# Patient Record
Sex: FEMALE | Race: WHITE | NOT HISPANIC OR LATINO | Employment: UNEMPLOYED | URBAN - METROPOLITAN AREA
[De-identification: names, ages, dates, MRNs, and addresses within clinical notes are randomized per-mention and may not be internally consistent; named-entity substitution may affect disease eponyms.]

---

## 2017-01-21 ENCOUNTER — HOSPITAL ENCOUNTER (EMERGENCY)
Facility: HOSPITAL | Age: 32
Discharge: HOME | End: 2017-01-21
Attending: EMERGENCY MEDICINE | Admitting: EMERGENCY MEDICINE
Payer: COMMERCIAL

## 2017-01-21 ENCOUNTER — APPOINTMENT (EMERGENCY)
Dept: RADIOLOGY | Facility: HOSPITAL | Age: 32
End: 2017-01-21
Payer: COMMERCIAL

## 2017-01-21 VITALS
TEMPERATURE: 97.4 F | SYSTOLIC BLOOD PRESSURE: 129 MMHG | WEIGHT: 150 LBS | DIASTOLIC BLOOD PRESSURE: 80 MMHG | BODY MASS INDEX: 25.61 KG/M2 | HEIGHT: 64 IN | OXYGEN SATURATION: 98 % | RESPIRATION RATE: 16 BRPM | HEART RATE: 72 BPM

## 2017-01-21 DIAGNOSIS — R00.2 PALPITATIONS: ICD-10-CM

## 2017-01-21 DIAGNOSIS — R07.9 CHEST PAIN: ICD-10-CM

## 2017-01-21 DIAGNOSIS — R51.9 HEADACHE: Primary | ICD-10-CM

## 2017-01-21 LAB
ALBUMIN SERPL BCP-MCNC: 3.7 G/DL (ref 3.5–5)
ALP SERPL-CCNC: 80 U/L (ref 46–116)
ALT SERPL W P-5'-P-CCNC: 19 U/L (ref 12–78)
ANION GAP SERPL CALCULATED.3IONS-SCNC: 11 MMOL/L (ref 4–13)
APTT PPP: 28 SECONDS (ref 24–36)
AST SERPL W P-5'-P-CCNC: 15 U/L (ref 5–45)
BACTERIA UR QL AUTO: ABNORMAL /HPF
BASOPHILS # BLD AUTO: 0.02 THOUSANDS/ΜL (ref 0–0.1)
BASOPHILS NFR BLD AUTO: 0 % (ref 0–1)
BILIRUB SERPL-MCNC: 0.18 MG/DL (ref 0.2–1)
BILIRUB UR QL STRIP: NEGATIVE
BUN SERPL-MCNC: 16 MG/DL (ref 5–25)
CALCIUM SERPL-MCNC: 9.1 MG/DL (ref 8.3–10.1)
CHLORIDE SERPL-SCNC: 106 MMOL/L (ref 100–108)
CLARITY UR: CLEAR
CO2 SERPL-SCNC: 23 MMOL/L (ref 21–32)
COLOR UR: YELLOW
COLOR, POC: NORMAL
CREAT SERPL-MCNC: 0.71 MG/DL (ref 0.6–1.3)
EOSINOPHIL # BLD AUTO: 0.21 THOUSAND/ΜL (ref 0–0.61)
EOSINOPHIL NFR BLD AUTO: 3 % (ref 0–6)
ERYTHROCYTE [DISTWIDTH] IN BLOOD BY AUTOMATED COUNT: 12.7 % (ref 11.6–15.1)
GFR SERPL CREATININE-BSD FRML MDRD: >60 ML/MIN/1.73SQ M
GLUCOSE SERPL-MCNC: 110 MG/DL (ref 65–140)
GLUCOSE UR STRIP-MCNC: NEGATIVE MG/DL
HCG UR QL: NEGATIVE
HCT VFR BLD AUTO: 36.9 % (ref 34.8–46.1)
HGB BLD-MCNC: 12.2 G/DL (ref 11.5–15.4)
HGB UR QL STRIP.AUTO: ABNORMAL
HYALINE CASTS #/AREA URNS LPF: ABNORMAL /LPF
INR PPP: 0.86 (ref 0.86–1.16)
KETONES UR STRIP-MCNC: NEGATIVE MG/DL
LEUKOCYTE ESTERASE UR QL STRIP: NEGATIVE
LYMPHOCYTES # BLD AUTO: 2.84 THOUSANDS/ΜL (ref 0.6–4.47)
LYMPHOCYTES NFR BLD AUTO: 35 % (ref 14–44)
MAGNESIUM SERPL-MCNC: 2 MG/DL (ref 1.6–2.6)
MCH RBC QN AUTO: 28 PG (ref 26.8–34.3)
MCHC RBC AUTO-ENTMCNC: 33.1 G/DL (ref 31.4–37.4)
MCV RBC AUTO: 85 FL (ref 82–98)
MONOCYTES # BLD AUTO: 0.67 THOUSAND/ΜL (ref 0.17–1.22)
MONOCYTES NFR BLD AUTO: 8 % (ref 4–12)
NEUTROPHILS # BLD AUTO: 4.27 THOUSANDS/ΜL (ref 1.85–7.62)
NEUTS SEG NFR BLD AUTO: 54 % (ref 43–75)
NITRITE UR QL STRIP: NEGATIVE
NON-SQ EPI CELLS URNS QL MICRO: ABNORMAL /HPF
NRBC BLD AUTO-RTO: 0 /100 WBCS
PH UR STRIP.AUTO: 6.5 [PH] (ref 4.5–8)
PHOSPHATE SERPL-MCNC: 3.5 MG/DL (ref 2.7–4.5)
PLATELET # BLD AUTO: 371 THOUSANDS/UL (ref 149–390)
PMV BLD AUTO: 9.9 FL (ref 8.9–12.7)
POTASSIUM SERPL-SCNC: 3.9 MMOL/L (ref 3.5–5.3)
PROT SERPL-MCNC: 7.6 G/DL (ref 6.4–8.2)
PROT UR STRIP-MCNC: NEGATIVE MG/DL
PROTHROMBIN TIME: 11.9 SECONDS (ref 12–14.3)
RBC # BLD AUTO: 4.35 MILLION/UL (ref 3.81–5.12)
RBC #/AREA URNS AUTO: ABNORMAL /HPF
SODIUM SERPL-SCNC: 140 MMOL/L (ref 136–145)
SP GR UR STRIP.AUTO: 1.02 (ref 1–1.03)
SPECIMEN SOURCE: NORMAL
TROPONIN I BLD-MCNC: 0 NG/ML (ref 0–0.08)
TSH SERPL DL<=0.05 MIU/L-ACNC: 0.58 UIU/ML (ref 0.36–3.74)
UROBILINOGEN UR QL STRIP.AUTO: 0.2 E.U./DL
WBC # BLD AUTO: 8.02 THOUSAND/UL (ref 4.31–10.16)
WBC #/AREA URNS AUTO: ABNORMAL /HPF

## 2017-01-21 PROCEDURE — 83735 ASSAY OF MAGNESIUM: CPT | Performed by: EMERGENCY MEDICINE

## 2017-01-21 PROCEDURE — 80053 COMPREHEN METABOLIC PANEL: CPT | Performed by: EMERGENCY MEDICINE

## 2017-01-21 PROCEDURE — 96375 TX/PRO/DX INJ NEW DRUG ADDON: CPT

## 2017-01-21 PROCEDURE — 83625 ASSAY OF LDH ENZYMES: CPT | Performed by: EMERGENCY MEDICINE

## 2017-01-21 PROCEDURE — 81001 URINALYSIS AUTO W/SCOPE: CPT

## 2017-01-21 PROCEDURE — 99285 EMERGENCY DEPT VISIT HI MDM: CPT

## 2017-01-21 PROCEDURE — 85025 COMPLETE CBC W/AUTO DIFF WBC: CPT | Performed by: EMERGENCY MEDICINE

## 2017-01-21 PROCEDURE — 96361 HYDRATE IV INFUSION ADD-ON: CPT

## 2017-01-21 PROCEDURE — 81002 URINALYSIS NONAUTO W/O SCOPE: CPT | Performed by: EMERGENCY MEDICINE

## 2017-01-21 PROCEDURE — 85610 PROTHROMBIN TIME: CPT | Performed by: EMERGENCY MEDICINE

## 2017-01-21 PROCEDURE — 84100 ASSAY OF PHOSPHORUS: CPT | Performed by: EMERGENCY MEDICINE

## 2017-01-21 PROCEDURE — 87086 URINE CULTURE/COLONY COUNT: CPT

## 2017-01-21 PROCEDURE — 85730 THROMBOPLASTIN TIME PARTIAL: CPT | Performed by: EMERGENCY MEDICINE

## 2017-01-21 PROCEDURE — 84484 ASSAY OF TROPONIN QUANT: CPT

## 2017-01-21 PROCEDURE — 83615 LACTATE (LD) (LDH) ENZYME: CPT | Performed by: EMERGENCY MEDICINE

## 2017-01-21 PROCEDURE — 71020 HB CHEST X-RAY 2VW FRONTAL&LATL: CPT

## 2017-01-21 PROCEDURE — 84443 ASSAY THYROID STIM HORMONE: CPT | Performed by: EMERGENCY MEDICINE

## 2017-01-21 PROCEDURE — 96365 THER/PROPH/DIAG IV INF INIT: CPT

## 2017-01-21 PROCEDURE — 93005 ELECTROCARDIOGRAM TRACING: CPT | Performed by: EMERGENCY MEDICINE

## 2017-01-21 PROCEDURE — 36415 COLL VENOUS BLD VENIPUNCTURE: CPT | Performed by: EMERGENCY MEDICINE

## 2017-01-21 PROCEDURE — 81025 URINE PREGNANCY TEST: CPT | Performed by: EMERGENCY MEDICINE

## 2017-01-21 PROCEDURE — 93005 ELECTROCARDIOGRAM TRACING: CPT

## 2017-01-21 RX ORDER — KETOROLAC TROMETHAMINE 30 MG/ML
15 INJECTION, SOLUTION INTRAMUSCULAR; INTRAVENOUS ONCE
Status: COMPLETED | OUTPATIENT
Start: 2017-01-21 | End: 2017-01-21

## 2017-01-21 RX ORDER — BUTALBITAL, ACETAMINOPHEN AND CAFFEINE 50; 325; 40 MG/1; MG/1; MG/1
1 TABLET ORAL EVERY 4 HOURS PRN
Qty: 12 TABLET | Refills: 0 | Status: SHIPPED | OUTPATIENT
Start: 2017-01-21 | End: 2018-02-16 | Stop reason: ALTCHOICE

## 2017-01-21 RX ORDER — MAGNESIUM SULFATE HEPTAHYDRATE 40 MG/ML
2 INJECTION, SOLUTION INTRAVENOUS ONCE
Status: DISCONTINUED | OUTPATIENT
Start: 2017-01-21 | End: 2017-01-22 | Stop reason: HOSPADM

## 2017-01-21 RX ORDER — LORAZEPAM 2 MG/ML
1 INJECTION INTRAMUSCULAR ONCE
Status: COMPLETED | OUTPATIENT
Start: 2017-01-21 | End: 2017-01-21

## 2017-01-21 RX ADMIN — SODIUM CHLORIDE 1000 ML: 0.9 INJECTION, SOLUTION INTRAVENOUS at 21:09

## 2017-01-21 RX ADMIN — DEXAMETHASONE SODIUM PHOSPHATE 10 MG: 10 INJECTION, SOLUTION INTRAMUSCULAR; INTRAVENOUS at 22:42

## 2017-01-21 RX ADMIN — KETOROLAC TROMETHAMINE 15 MG: 30 INJECTION, SOLUTION INTRAMUSCULAR at 21:07

## 2017-01-21 RX ADMIN — VALPROATE SODIUM 1000 MG: 100 INJECTION, SOLUTION INTRAVENOUS at 22:45

## 2017-01-21 RX ADMIN — LORAZEPAM 1 MG: 2 INJECTION INTRAMUSCULAR; INTRAVENOUS at 21:08

## 2017-01-22 LAB
ATRIAL RATE: 86 BPM
P AXIS: 67 DEGREES
PR INTERVAL: 138 MS
QRS AXIS: 20 DEGREES
QRSD INTERVAL: 76 MS
QT INTERVAL: 346 MS
QTC INTERVAL: 414 MS
T WAVE AXIS: 34 DEGREES
VENTRICULAR RATE: 86 BPM

## 2017-01-23 LAB — BACTERIA UR CULT: NORMAL

## 2017-01-24 LAB
LDH SERPL-CCNC: 630 IU/L (ref 119–226)
LDH1 CFR SERPL ELPH: ABNORMAL %
LDH2 CFR SERPL ELPH: ABNORMAL %
LDH3 CFR SERPL ELPH: ABNORMAL %
LDH4 CFR SERPL ELPH: ABNORMAL %
LDH5 CFR SERPL ELPH: ABNORMAL %

## 2017-02-07 ENCOUNTER — GENERIC CONVERSION - ENCOUNTER (OUTPATIENT)
Dept: OTHER | Facility: OTHER | Age: 32
End: 2017-02-07

## 2017-04-01 ENCOUNTER — HOSPITAL ENCOUNTER (EMERGENCY)
Facility: HOSPITAL | Age: 32
Discharge: HOME | End: 2017-04-01
Attending: EMERGENCY MEDICINE | Admitting: EMERGENCY MEDICINE
Payer: COMMERCIAL

## 2017-04-01 VITALS
DIASTOLIC BLOOD PRESSURE: 77 MMHG | OXYGEN SATURATION: 96 % | BODY MASS INDEX: 25.4 KG/M2 | WEIGHT: 148 LBS | TEMPERATURE: 97.5 F | RESPIRATION RATE: 18 BRPM | HEART RATE: 73 BPM | SYSTOLIC BLOOD PRESSURE: 141 MMHG

## 2017-04-01 DIAGNOSIS — R10.9 ABDOMINAL PAIN: Primary | ICD-10-CM

## 2017-04-01 LAB
ALBUMIN SERPL BCP-MCNC: 3.9 G/DL (ref 3.5–5)
ALP SERPL-CCNC: 76 U/L (ref 46–116)
ALT SERPL W P-5'-P-CCNC: 14 U/L (ref 12–78)
ANION GAP SERPL CALCULATED.3IONS-SCNC: 5 MMOL/L (ref 4–13)
AST SERPL W P-5'-P-CCNC: 8 U/L (ref 5–45)
BACTERIA UR QL AUTO: ABNORMAL /HPF
BASOPHILS # BLD AUTO: 0.01 THOUSANDS/ΜL (ref 0–0.1)
BASOPHILS NFR BLD AUTO: 0 % (ref 0–1)
BILIRUB SERPL-MCNC: 0.37 MG/DL (ref 0.2–1)
BILIRUB UR QL STRIP: NEGATIVE
BUN SERPL-MCNC: 16 MG/DL (ref 5–25)
CALCIUM SERPL-MCNC: 9.2 MG/DL (ref 8.3–10.1)
CHLORIDE SERPL-SCNC: 106 MMOL/L (ref 100–108)
CLARITY UR: CLEAR
CO2 SERPL-SCNC: 29 MMOL/L (ref 21–32)
COLOR UR: YELLOW
COLOR, POC: NORMAL
CREAT SERPL-MCNC: 0.71 MG/DL (ref 0.6–1.3)
EOSINOPHIL # BLD AUTO: 0.15 THOUSAND/ΜL (ref 0–0.61)
EOSINOPHIL NFR BLD AUTO: 2 % (ref 0–6)
ERYTHROCYTE [DISTWIDTH] IN BLOOD BY AUTOMATED COUNT: 13.6 % (ref 11.6–15.1)
GFR SERPL CREATININE-BSD FRML MDRD: >60 ML/MIN/1.73SQ M
GLUCOSE SERPL-MCNC: 93 MG/DL (ref 65–140)
GLUCOSE UR STRIP-MCNC: NEGATIVE MG/DL
HCG UR QL: NORMAL
HCT VFR BLD AUTO: 34.3 % (ref 34.8–46.1)
HGB BLD-MCNC: 11.4 G/DL (ref 11.5–15.4)
HGB UR QL STRIP.AUTO: ABNORMAL
HYALINE CASTS #/AREA URNS LPF: ABNORMAL /LPF
KETONES UR STRIP-MCNC: NEGATIVE MG/DL
LEUKOCYTE ESTERASE UR QL STRIP: NEGATIVE
LIPASE SERPL-CCNC: 129 U/L (ref 73–393)
LYMPHOCYTES # BLD AUTO: 2.64 THOUSANDS/ΜL (ref 0.6–4.47)
LYMPHOCYTES NFR BLD AUTO: 31 % (ref 14–44)
MCH RBC QN AUTO: 28.1 PG (ref 26.8–34.3)
MCHC RBC AUTO-ENTMCNC: 33.2 G/DL (ref 31.4–37.4)
MCV RBC AUTO: 85 FL (ref 82–98)
MONOCYTES # BLD AUTO: 0.87 THOUSAND/ΜL (ref 0.17–1.22)
MONOCYTES NFR BLD AUTO: 10 % (ref 4–12)
NEUTROPHILS # BLD AUTO: 4.72 THOUSANDS/ΜL (ref 1.85–7.62)
NEUTS SEG NFR BLD AUTO: 57 % (ref 43–75)
NITRITE UR QL STRIP: NEGATIVE
NON-SQ EPI CELLS URNS QL MICRO: ABNORMAL /HPF
NRBC BLD AUTO-RTO: 0 /100 WBCS
PH UR STRIP.AUTO: 6.5 [PH] (ref 4.5–8)
PLATELET # BLD AUTO: 287 THOUSANDS/UL (ref 149–390)
PMV BLD AUTO: 9.6 FL (ref 8.9–12.7)
POTASSIUM SERPL-SCNC: 3.5 MMOL/L (ref 3.5–5.3)
PROT SERPL-MCNC: 7.3 G/DL (ref 6.4–8.2)
PROT UR STRIP-MCNC: NEGATIVE MG/DL
RBC # BLD AUTO: 4.06 MILLION/UL (ref 3.81–5.12)
RBC #/AREA URNS AUTO: ABNORMAL /HPF
SODIUM SERPL-SCNC: 140 MMOL/L (ref 136–145)
SP GR UR STRIP.AUTO: 1.02 (ref 1–1.03)
UROBILINOGEN UR QL STRIP.AUTO: 1 E.U./DL
WBC # BLD AUTO: 8.41 THOUSAND/UL (ref 4.31–10.16)
WBC #/AREA URNS AUTO: ABNORMAL /HPF

## 2017-04-01 PROCEDURE — 36415 COLL VENOUS BLD VENIPUNCTURE: CPT | Performed by: EMERGENCY MEDICINE

## 2017-04-01 PROCEDURE — 96374 THER/PROPH/DIAG INJ IV PUSH: CPT

## 2017-04-01 PROCEDURE — 87086 URINE CULTURE/COLONY COUNT: CPT

## 2017-04-01 PROCEDURE — 83690 ASSAY OF LIPASE: CPT | Performed by: EMERGENCY MEDICINE

## 2017-04-01 PROCEDURE — 80053 COMPREHEN METABOLIC PANEL: CPT | Performed by: EMERGENCY MEDICINE

## 2017-04-01 PROCEDURE — 81001 URINALYSIS AUTO W/SCOPE: CPT

## 2017-04-01 PROCEDURE — 96361 HYDRATE IV INFUSION ADD-ON: CPT

## 2017-04-01 PROCEDURE — 96375 TX/PRO/DX INJ NEW DRUG ADDON: CPT

## 2017-04-01 PROCEDURE — 99284 EMERGENCY DEPT VISIT MOD MDM: CPT

## 2017-04-01 PROCEDURE — 85025 COMPLETE CBC W/AUTO DIFF WBC: CPT | Performed by: EMERGENCY MEDICINE

## 2017-04-01 PROCEDURE — 81025 URINE PREGNANCY TEST: CPT | Performed by: EMERGENCY MEDICINE

## 2017-04-01 PROCEDURE — 81002 URINALYSIS NONAUTO W/O SCOPE: CPT | Performed by: EMERGENCY MEDICINE

## 2017-04-01 RX ORDER — ONDANSETRON 2 MG/ML
4 INJECTION INTRAMUSCULAR; INTRAVENOUS ONCE
Status: COMPLETED | OUTPATIENT
Start: 2017-04-01 | End: 2017-04-01

## 2017-04-01 RX ORDER — MAGNESIUM HYDROXIDE/ALUMINUM HYDROXICE/SIMETHICONE 120; 1200; 1200 MG/30ML; MG/30ML; MG/30ML
30 SUSPENSION ORAL ONCE
Status: COMPLETED | OUTPATIENT
Start: 2017-04-01 | End: 2017-04-01

## 2017-04-01 RX ORDER — FAMOTIDINE 20 MG/1
20 TABLET, FILM COATED ORAL 2 TIMES DAILY
Qty: 28 TABLET | Refills: 0 | Status: SHIPPED | OUTPATIENT
Start: 2017-04-01 | End: 2018-01-21

## 2017-04-01 RX ORDER — ONDANSETRON 4 MG/1
4 TABLET, ORALLY DISINTEGRATING ORAL EVERY 8 HOURS PRN
Qty: 12 TABLET | Refills: 0 | Status: SHIPPED | OUTPATIENT
Start: 2017-04-01 | End: 2018-01-21

## 2017-04-01 RX ORDER — SUCRALFATE 1 G/1
1 TABLET ORAL 4 TIMES DAILY
Qty: 28 TABLET | Refills: 0 | Status: SHIPPED | OUTPATIENT
Start: 2017-04-01 | End: 2018-01-21

## 2017-04-01 RX ORDER — SUCRALFATE ORAL 1 G/10ML
1000 SUSPENSION ORAL EVERY 6 HOURS SCHEDULED
Status: DISCONTINUED | OUTPATIENT
Start: 2017-04-02 | End: 2017-04-02 | Stop reason: HOSPADM

## 2017-04-01 RX ADMIN — ONDANSETRON 4 MG: 2 INJECTION INTRAMUSCULAR; INTRAVENOUS at 22:06

## 2017-04-01 RX ADMIN — FAMOTIDINE 20 MG: 10 INJECTION, SOLUTION INTRAVENOUS at 22:08

## 2017-04-01 RX ADMIN — LIDOCAINE HYDROCHLORIDE 15 ML: 20 SOLUTION ORAL; TOPICAL at 22:10

## 2017-04-01 RX ADMIN — SODIUM CHLORIDE 1000 ML: 0.9 INJECTION, SOLUTION INTRAVENOUS at 22:05

## 2017-04-01 RX ADMIN — ALUMINUM HYDROXIDE, MAGNESIUM HYDROXIDE, AND SIMETHICONE 30 ML: 200; 200; 20 SUSPENSION ORAL at 22:10

## 2017-04-03 LAB — BACTERIA UR CULT: NORMAL

## 2017-04-13 ENCOUNTER — ALLSCRIPTS OFFICE VISIT (OUTPATIENT)
Dept: OTHER | Facility: OTHER | Age: 32
End: 2017-04-13

## 2017-04-21 ENCOUNTER — ALLSCRIPTS OFFICE VISIT (OUTPATIENT)
Dept: OTHER | Facility: OTHER | Age: 32
End: 2017-04-21

## 2017-04-21 DIAGNOSIS — I10 ESSENTIAL (PRIMARY) HYPERTENSION: ICD-10-CM

## 2017-04-21 DIAGNOSIS — F41.9 ANXIETY DISORDER: ICD-10-CM

## 2017-05-02 ENCOUNTER — TRANSCRIBE ORDERS (OUTPATIENT)
Dept: LAB | Facility: CLINIC | Age: 32
End: 2017-05-02

## 2017-05-02 ENCOUNTER — APPOINTMENT (OUTPATIENT)
Dept: LAB | Facility: CLINIC | Age: 32
End: 2017-05-02
Payer: COMMERCIAL

## 2017-05-02 DIAGNOSIS — R10.13 ABDOMINAL PAIN, EPIGASTRIC: ICD-10-CM

## 2017-05-02 DIAGNOSIS — R10.13 DYSPEPSIA AND OTHER SPECIFIED DISORDERS OF FUNCTION OF STOMACH: Primary | ICD-10-CM

## 2017-05-02 DIAGNOSIS — K31.89 DYSPEPSIA AND OTHER SPECIFIED DISORDERS OF FUNCTION OF STOMACH: Primary | ICD-10-CM

## 2017-05-02 DIAGNOSIS — K27.9 PEPTIC ULCER: ICD-10-CM

## 2017-05-02 LAB
CRP SERPL QL: <3 MG/L
ERYTHROCYTE [SEDIMENTATION RATE] IN BLOOD: 23 MM/HOUR (ref 0–20)
LIPASE SERPL-CCNC: 126 U/L (ref 73–393)

## 2017-05-02 PROCEDURE — 85652 RBC SED RATE AUTOMATED: CPT

## 2017-05-02 PROCEDURE — 83690 ASSAY OF LIPASE: CPT

## 2017-05-02 PROCEDURE — 86677 HELICOBACTER PYLORI ANTIBODY: CPT

## 2017-05-02 PROCEDURE — 86140 C-REACTIVE PROTEIN: CPT

## 2017-05-02 PROCEDURE — 36415 COLL VENOUS BLD VENIPUNCTURE: CPT

## 2017-05-03 ENCOUNTER — TRANSCRIBE ORDERS (OUTPATIENT)
Dept: ADMINISTRATIVE | Facility: HOSPITAL | Age: 32
End: 2017-05-03

## 2017-05-03 DIAGNOSIS — K31.89 DYSPEPSIA AND OTHER SPECIFIED DISORDERS OF FUNCTION OF STOMACH: Primary | ICD-10-CM

## 2017-05-03 DIAGNOSIS — R10.13 DYSPEPSIA AND OTHER SPECIFIED DISORDERS OF FUNCTION OF STOMACH: Primary | ICD-10-CM

## 2017-05-03 LAB — H PYLORI IGG SER IA-ACNC: <0.9 U/ML (ref 0–0.8)

## 2017-05-05 ENCOUNTER — HOSPITAL ENCOUNTER (OUTPATIENT)
Dept: RADIOLOGY | Facility: HOSPITAL | Age: 32
Discharge: HOME | End: 2017-05-05
Payer: COMMERCIAL

## 2017-05-05 DIAGNOSIS — K31.89 DYSPEPSIA AND OTHER SPECIFIED DISORDERS OF FUNCTION OF STOMACH: ICD-10-CM

## 2017-05-05 DIAGNOSIS — R10.13 DYSPEPSIA AND OTHER SPECIFIED DISORDERS OF FUNCTION OF STOMACH: ICD-10-CM

## 2017-05-05 PROCEDURE — 74177 CT ABD & PELVIS W/CONTRAST: CPT

## 2017-05-05 RX ADMIN — IOHEXOL 100 ML: 350 INJECTION, SOLUTION INTRAVENOUS at 16:07

## 2017-07-19 ENCOUNTER — ALLSCRIPTS OFFICE VISIT (OUTPATIENT)
Dept: OTHER | Facility: OTHER | Age: 32
End: 2017-07-19

## 2017-08-03 ENCOUNTER — LAB CONVERSION - ENCOUNTER (OUTPATIENT)
Dept: OTHER | Facility: OTHER | Age: 32
End: 2017-08-03

## 2017-08-03 LAB
A/G RATIO (HISTORICAL): 1.8 (CALC) (ref 1–2.5)
ALBUMIN SERPL BCP-MCNC: 4.8 G/DL (ref 3.6–5.1)
ALP SERPL-CCNC: 98 U/L (ref 33–115)
ALT SERPL W P-5'-P-CCNC: 9 U/L (ref 6–29)
AST SERPL W P-5'-P-CCNC: 13 U/L (ref 10–30)
BASOPHILS # BLD AUTO: 0.1 %
BASOPHILS # BLD AUTO: 8 CELLS/UL (ref 0–200)
BILIRUB SERPL-MCNC: 0.7 MG/DL (ref 0.2–1.2)
BUN SERPL-MCNC: 11 MG/DL (ref 7–25)
BUN/CREA RATIO (HISTORICAL): NORMAL (CALC) (ref 6–22)
CALCIUM SERPL-MCNC: 9.7 MG/DL (ref 8.6–10.2)
CHLORIDE SERPL-SCNC: 103 MMOL/L (ref 98–110)
CHOLEST SERPL-MCNC: 190 MG/DL (ref 125–200)
CHOLEST/HDLC SERPL: 3.6 (CALC)
CO2 SERPL-SCNC: 24 MMOL/L (ref 20–31)
CREAT SERPL-MCNC: 0.9 MG/DL (ref 0.5–1.1)
DEPRECATED RDW RBC AUTO: 13 % (ref 11–15)
EGFR AFRICAN AMERICAN (HISTORICAL): 100 ML/MIN/1.73M2
EGFR-AMERICAN CALC (HISTORICAL): 86 ML/MIN/1.73M2
EOSINOPHIL # BLD AUTO: 188 CELLS/UL (ref 15–500)
EOSINOPHIL # BLD AUTO: 2.5 %
GAMMA GLOBULIN (HISTORICAL): 2.6 G/DL (CALC) (ref 1.9–3.7)
GLUCOSE (HISTORICAL): 89 MG/DL (ref 65–99)
HCT VFR BLD AUTO: 37.5 % (ref 35–45)
HDLC SERPL-MCNC: 53 MG/DL
HGB BLD-MCNC: 12.3 G/DL (ref 11.7–15.5)
LDL CHOLESTEROL (HISTORICAL): 117 MG/DL (CALC)
LYMPHOCYTES # BLD AUTO: 2093 CELLS/UL (ref 850–3900)
LYMPHOCYTES # BLD AUTO: 27.9 %
MCH RBC QN AUTO: 28.5 PG (ref 27–33)
MCHC RBC AUTO-ENTMCNC: 32.8 G/DL (ref 32–36)
MCV RBC AUTO: 87 FL (ref 80–100)
MONOCYTES # BLD AUTO: 488 CELLS/UL (ref 200–950)
MONOCYTES (HISTORICAL): 6.5 %
NEUTROPHILS # BLD AUTO: 4725 CELLS/UL (ref 1500–7800)
NEUTROPHILS # BLD AUTO: 63 %
NON-HDL-CHOL (CHOL-HDL) (HISTORICAL): 137 MG/DL (CALC)
PLATELET # BLD AUTO: 314 THOUSAND/UL (ref 140–400)
PMV BLD AUTO: 10.4 FL (ref 7.5–12.5)
POTASSIUM SERPL-SCNC: 4.2 MMOL/L (ref 3.5–5.3)
RBC # BLD AUTO: 4.31 MILLION/UL (ref 3.8–5.1)
SODIUM SERPL-SCNC: 138 MMOL/L (ref 135–146)
TOTAL PROTEIN (HISTORICAL): 7.4 G/DL (ref 6.1–8.1)
TRIGL SERPL-MCNC: 102 MG/DL
TSH SERPL DL<=0.05 MIU/L-ACNC: 0.89 MIU/L
WBC # BLD AUTO: 7.5 THOUSAND/UL (ref 3.8–10.8)

## 2017-08-08 ENCOUNTER — ALLSCRIPTS OFFICE VISIT (OUTPATIENT)
Dept: OTHER | Facility: OTHER | Age: 32
End: 2017-08-08

## 2017-08-21 ENCOUNTER — ALLSCRIPTS OFFICE VISIT (OUTPATIENT)
Dept: OTHER | Facility: OTHER | Age: 32
End: 2017-08-21

## 2017-08-22 ENCOUNTER — GENERIC CONVERSION - ENCOUNTER (OUTPATIENT)
Dept: OTHER | Facility: OTHER | Age: 32
End: 2017-08-22

## 2017-09-20 ENCOUNTER — ALLSCRIPTS OFFICE VISIT (OUTPATIENT)
Dept: OTHER | Facility: OTHER | Age: 32
End: 2017-09-20

## 2017-09-20 LAB
BILIRUB UR QL STRIP: NORMAL
CLARITY UR: NORMAL
COLOR UR: NORMAL
GLUCOSE (HISTORICAL): NORMAL
HGB UR QL STRIP.AUTO: NORMAL
KETONES UR STRIP-MCNC: NORMAL MG/DL
LEUKOCYTE ESTERASE UR QL STRIP: NORMAL
NITRITE UR QL STRIP: NORMAL
PH UR STRIP.AUTO: 5.5 [PH]
PROT UR STRIP-MCNC: 100 MG/DL
SP GR UR STRIP.AUTO: 1.03
UROBILINOGEN UR QL STRIP.AUTO: 1

## 2017-09-26 ENCOUNTER — ALLSCRIPTS OFFICE VISIT (OUTPATIENT)
Dept: OTHER | Facility: OTHER | Age: 32
End: 2017-09-26

## 2017-09-26 ENCOUNTER — TRANSCRIBE ORDERS (OUTPATIENT)
Dept: ADMINISTRATIVE | Facility: HOSPITAL | Age: 32
End: 2017-09-26

## 2017-09-26 ENCOUNTER — APPOINTMENT (OUTPATIENT)
Dept: LAB | Facility: HOSPITAL | Age: 32
End: 2017-09-26
Attending: FAMILY MEDICINE
Payer: COMMERCIAL

## 2017-09-26 ENCOUNTER — HOSPITAL ENCOUNTER (OUTPATIENT)
Dept: RADIOLOGY | Facility: HOSPITAL | Age: 32
Discharge: HOME | End: 2017-09-26
Attending: FAMILY MEDICINE
Payer: COMMERCIAL

## 2017-09-26 DIAGNOSIS — R30.0 DYSURIA: ICD-10-CM

## 2017-09-26 DIAGNOSIS — R10.32 ABDOMINAL PAIN, LEFT LOWER QUADRANT: Primary | ICD-10-CM

## 2017-09-26 DIAGNOSIS — R10.32 ABDOMINAL PAIN, LEFT LOWER QUADRANT: ICD-10-CM

## 2017-09-26 DIAGNOSIS — R10.32 LEFT LOWER QUADRANT PAIN: ICD-10-CM

## 2017-09-26 LAB
BILIRUB UR QL STRIP: NEGATIVE
CLARITY UR: NORMAL
COLOR UR: YELLOW
GLUCOSE (HISTORICAL): NEGATIVE
HGB UR QL STRIP.AUTO: NORMAL
KETONES UR STRIP-MCNC: NEGATIVE MG/DL
LEUKOCYTE ESTERASE UR QL STRIP: NEGATIVE
NITRITE UR QL STRIP: NEGATIVE
PH UR STRIP.AUTO: 6 [PH]
PROT UR STRIP-MCNC: NEGATIVE MG/DL
SP GR UR STRIP.AUTO: 1.03
UROBILINOGEN UR QL STRIP.AUTO: 0.2

## 2017-09-26 PROCEDURE — 87086 URINE CULTURE/COLONY COUNT: CPT

## 2017-09-26 PROCEDURE — 74176 CT ABD & PELVIS W/O CONTRAST: CPT

## 2017-09-27 ENCOUNTER — TRANSCRIBE ORDERS (OUTPATIENT)
Dept: LAB | Facility: HOSPITAL | Age: 32
End: 2017-09-27

## 2017-09-27 ENCOUNTER — APPOINTMENT (OUTPATIENT)
Dept: LAB | Facility: HOSPITAL | Age: 32
End: 2017-09-27
Attending: FAMILY MEDICINE
Payer: COMMERCIAL

## 2017-09-27 DIAGNOSIS — R10.32 LEFT LOWER QUADRANT PAIN: ICD-10-CM

## 2017-09-27 LAB
ALBUMIN SERPL BCP-MCNC: 3.9 G/DL (ref 3.5–5)
ALP SERPL-CCNC: 83 U/L (ref 46–116)
ALT SERPL W P-5'-P-CCNC: 13 U/L (ref 12–78)
ANION GAP SERPL CALCULATED.3IONS-SCNC: 8 MMOL/L (ref 4–13)
AST SERPL W P-5'-P-CCNC: 9 U/L (ref 5–45)
BASOPHILS # BLD AUTO: 0.01 THOUSANDS/ΜL (ref 0–0.1)
BASOPHILS NFR BLD AUTO: 0 % (ref 0–1)
BILIRUB SERPL-MCNC: 0.53 MG/DL (ref 0.2–1)
BUN SERPL-MCNC: 13 MG/DL (ref 5–25)
CALCIUM SERPL-MCNC: 9.7 MG/DL (ref 8.3–10.1)
CHLORIDE SERPL-SCNC: 107 MMOL/L (ref 100–108)
CO2 SERPL-SCNC: 25 MMOL/L (ref 21–32)
CREAT SERPL-MCNC: 0.92 MG/DL (ref 0.6–1.3)
EOSINOPHIL # BLD AUTO: 0.23 THOUSAND/ΜL (ref 0–0.61)
EOSINOPHIL NFR BLD AUTO: 4 % (ref 0–6)
ERYTHROCYTE [DISTWIDTH] IN BLOOD BY AUTOMATED COUNT: 13 % (ref 11.6–15.1)
GFR SERPL CREATININE-BSD FRML MDRD: 84 ML/MIN/1.73SQ M
GLUCOSE P FAST SERPL-MCNC: 89 MG/DL (ref 65–99)
HCT VFR BLD AUTO: 38.3 % (ref 34.8–46.1)
HGB BLD-MCNC: 12.5 G/DL (ref 11.5–15.4)
LYMPHOCYTES # BLD AUTO: 1.9 THOUSANDS/ΜL (ref 0.6–4.47)
LYMPHOCYTES NFR BLD AUTO: 33 % (ref 14–44)
MCH RBC QN AUTO: 28.8 PG (ref 26.8–34.3)
MCHC RBC AUTO-ENTMCNC: 32.6 G/DL (ref 31.4–37.4)
MCV RBC AUTO: 88 FL (ref 82–98)
MONOCYTES # BLD AUTO: 0.49 THOUSAND/ΜL (ref 0.17–1.22)
MONOCYTES NFR BLD AUTO: 8 % (ref 4–12)
NEUTROPHILS # BLD AUTO: 3.19 THOUSANDS/ΜL (ref 1.85–7.62)
NEUTS SEG NFR BLD AUTO: 55 % (ref 43–75)
NRBC BLD AUTO-RTO: 0 /100 WBCS
PLATELET # BLD AUTO: 325 THOUSANDS/UL (ref 149–390)
PMV BLD AUTO: 9.9 FL (ref 8.9–12.7)
POTASSIUM SERPL-SCNC: 4.3 MMOL/L (ref 3.5–5.3)
PROT SERPL-MCNC: 7.9 G/DL (ref 6.4–8.2)
RBC # BLD AUTO: 4.34 MILLION/UL (ref 3.81–5.12)
SODIUM SERPL-SCNC: 140 MMOL/L (ref 136–145)
WBC # BLD AUTO: 5.83 THOUSAND/UL (ref 4.31–10.16)

## 2017-09-27 PROCEDURE — 80053 COMPREHEN METABOLIC PANEL: CPT

## 2017-09-27 PROCEDURE — 36415 COLL VENOUS BLD VENIPUNCTURE: CPT

## 2017-09-27 PROCEDURE — 85025 COMPLETE CBC W/AUTO DIFF WBC: CPT

## 2017-09-28 ENCOUNTER — GENERIC CONVERSION - ENCOUNTER (OUTPATIENT)
Dept: OTHER | Facility: OTHER | Age: 32
End: 2017-09-28

## 2017-09-28 LAB — BACTERIA UR CULT: NORMAL

## 2017-10-23 NOTE — PROGRESS NOTES
Assessment  1  Depot contraception (V25 49) (Z30 40)   2  Benign essential HTN (401 1) (I10)   3  Anxiety (300 00) (F41 9)    Plan  Anxiety    · Follow-up visit in 3 months Evaluation and Treatment  Follow-up  Status: Hold For -  Scheduling  Requested for: 62Zya1310   Ordered; For: Anxiety; Ordered By: Narciso Davila Performed:  Due: 02SHQ3552  Anxiety, SocHx: Depot contraception    · MedroxyPROGESTERone Acetate 150 MG/ML Intramuscular Suspension; INJECT  EVERY 12 WEEKS AS DIRECTED   Rx By: Narciso Davila; Dispense: 0 Days ; #:1 ML; Refill: 3;For: Anxiety, SocHx: Depot contraception; CAL = N; Verified Transmission to [place] [address]; Last Updated By: System, [employer]; 8/21/2017 2:09:13 PM  Contraception    · MedroxyPROGESTERone Acetate 150 MG/ML Intramuscular Suspension   For: Contraception; Dose of 150 mg; Intramuscular; CAL = N; Administered by: Mg Beltran: 8/21/2017 12:00:00 AM; Last Updated By: Mg Beltran; 8/21/2017 2:26:41 PM  Contraception management    · Urine HCG- POC; Status:Active - Perform Order; Requested for:78Obg3225;    Perform: In Office; BJK:76WCG3748;DOHWZCP; Today; For:Contraception management; Ordered By:Evan Clark;    Discussion/Summary  Discussion Summary:   Menses- currently at peak flowRTO 3 months for annual visit with CoTestingDPMA # 1  Chief Complaint  Chief Complaint Free Text Note Form: pt here for yearly exam  LMP 8/20/2017 no record of last pap      History of Present Illness  HPI: This was to be Priscila's annual visit but her period started and is still very heavy  A neg pregnancy test was obtained and she is prepared to begin Depo-Provera today  Her blood pressure is on better control and her medication was cut in half  She has no complaints  Active Problems  1  Abnormal glucose in pregnancy, antepartum (648 83) (O99 810)   2  Anxiety (300 00) (F41 9)   3  Benign essential HTN (401 1) (I10)   4  Bipolar disorder (manic depression) (296 80) (F31 9)   5  Contraception (V25 9) (Z30 9)   6  Depression (311) (F32 9)   7  Hypertension in pregnancy (642 90) (O16 9)   8  Insomnia (780 52) (G47 00)   9  Menorrhagia (626 2) (N92 0)   10  Patient is a currently breast-feeding mother (V24 1) (Z39 1)   11  Post partum depression (198 74,906) (F53)   12  Severe preeclampsia, third trimester (642 53) (O14 13)    Past Medical History  1  History of Bipolar disorder with depression (296 50) (F31 30)   2  History of Breast pain (611 71) (N64 4)   3  History of endometriosis (V13 29) (Z87 42)   4  History of scoliosis (V13 59) (Z87 39)   5  History of Teratogen exposure in current pregnancy (655 80) (O35 9XX0)   6  History of Tobacco user (305 1) (Z72 0)  Active Problems And Past Medical History Reviewed: The active problems and past medical history were reviewed and updated today  Anxiety  Depression  Scoliosis   1 para 1; C/S 30 weeks for non-reassuring fetal status during cervical ripening and preeclampsia with severe features- prolonged admission for control of hypertension and anxiety- M 3# 3 oz  D/C POD 8  Persistent hypertension        Surgical History  1  History of Hernia Repair   2  History of Oral Surgery Tooth Extraction [place] [facility]   3  History of Tonsillectomy    Family History  Mother    1  Family history of depression (V17 0) (Z81 8)  Father    2  Family history of High blood pressure (not hypertension)  Family History Reviewed: The family history was reviewed and updated today  Social History   · Currently sexually active   · Daily caffeine consumption, 1 serving a day   · Exercises regularly   · Former cigarette smoker (V15 82) (Q48 900)   · No alcohol use   · No drug use   · History of Smoker (305 1) (F17 200)        Son is doing well  Birthday 16  , lives with her  Marla Iglesias and son Mary Alice Gee  Current Meds   1  AmLODIPine Besylate 2 5 MG Oral Tablet; TAKE 1 TABLET DAILY;    Therapy: 72HKN0629 to (Evaluate:50Gsr7817)  Requested for: 08Aug2017; Last   Rx:67Cax6893 Ordered   2  ClonazePAM 1 MG Oral Tablet Disintegrating; PLACE 1 TABLET ON TONGUE AND   ALLOW TO DISSOLVE 3 TIMES DAILY; Therapy: (Recorded:21Apr2017) to Recorded   3  Effexor  MG Oral Capsule Extended Release 24 Hour; take 1 capsule daily; Therapy: 28UGW9363 to Recorded   4  MedroxyPROGESTERone Acetate 150 MG/ML Intramuscular Suspension; INJECT   EVERY 12 WEEKS AS DIRECTED; Therapy: 37WTX4856 to (Evaluate:15Pbt2183)  Requested for: 72HSR7870; Last   Rx:15Evg6475 Ordered   5  Norethindrone 0 35 MG Oral Tablet; TAKE 1 TABLET DAILY; Therapy: 13Apr2017 to (Evaluate:05Shv9591)  Requested for: 13Apr2017; Last   Rx:13Apr2017 Ordered   6  Omeprazole 40 MG Oral Capsule Delayed Release; take 1 capsule daily; Therapy: (Recorded:08Aug2017) to Recorded   7  SEROquel 100 MG Oral Tablet; TAKE 1 TABLET AT BEDTIME; Therapy: (Recorded:93Xmy7938) to Recorded   8  Tylenol 500 MG CAPS; Therapy: (Recorded:13Jun2016) to Recorded   9  Wellbutrin  MG Oral Tablet Extended Release 24 Hour; TAKE 1 TABLET DAILY; Therapy: (Recorded:21Apr2017) to Recorded    Allergies  1  Reglan   2  sulfa  3  No Known Environmental Allergies   4  No Known Food Allergies    Vitals  Vital Signs    Recorded: 21Aug2017 01:44PM   Heart Rate 81   Systolic 874   Diastolic 76   Height 5 ft 3 in   Weight 134 lb 8 oz   BMI Calculated 23 83   BSA Calculated 1 63   O2 Saturation 98   LMP 83Fbo0818     Physical Exam    Constitutional   General appearance: No acute distress, well appearing and well nourished  Psychiatric   Orientation to person, place, and time: Normal     Mood and affect: Normal        Future Appointments    Date/Time Provider Specialty Site   11/13/2017 01:45 PM Delisa Madera MD Internal Medicine [place] [address]   11/21/2017 11:00 AM JADEN Fletcher   Obstetrics/Gynecology [facility]  [facility]   Electronically signed by : JADEN Mckeon ; Aug 21 2017  4:58PM EST                       (Author)

## 2017-10-23 NOTE — PROGRESS NOTES
Assessment  1  Depot contraception (V25 49) (Z30 40)   2  Benign essential HTN (401 1) (I10)   3  Anxiety (300 00) (F41 9)    Plan  Anxiety    · Follow-up visit in 3 months Evaluation and Treatment  Follow-up  Status: Hold For -  Scheduling  Requested for: 17Sqs3958   Ordered; For: Anxiety; Ordered By: Marga Donaldson Performed:  Due: 97OHE1658  Anxiety, SocHx: Depot contraception    · MedroxyPROGESTERone Acetate 150 MG/ML Intramuscular Suspension; INJECT  EVERY 12 WEEKS AS DIRECTED   Rx By: Marga Donaldson; Dispense: 0 Days ; #:1 ML; Refill: 3;For: Anxiety, SocHx: Depot contraception; CAL = N; Sent To: [facility] 5334    Discussion/Summary    Menses- currently at peak flowRTO 3 months for annual visit with CoTestingDPMA # 1  Chief Complaint  pt here for yearly exam  LMP 8/20/2017 no record of last pap      History of Present Illness  HPI: This was to be Priscila's annual visit but her period started and is still very heavy  A neg pregnancy test was obtained and she is prepared to begin Depo-Provera today  Her blood pressure is on better control and her medication was cut in half  She has no complaints  Active Problems  1  Abnormal glucose in pregnancy, antepartum (648 83) (O99 810)   2  Anxiety (300 00) (F41 9)   3  Benign essential HTN (401 1) (I10)   4  Bipolar disorder (manic depression) (296 80) (F31 9)   5  Contraception (V25 9) (Z30 9)   6  Depression (311) (F32 9)   7  Hypertension in pregnancy (642 90) (O16 9)   8  Insomnia (780 52) (G47 00)   9  Menorrhagia (626 2) (N92 0)   10  Patient is a currently breast-feeding mother (V24 1) (Z39 1)   11  Post partum depression (583 97,555) (F53)   12   Severe preeclampsia, third trimester (642 53) (O14 13)    Past Medical History   · History of Bipolar disorder with depression (296 50) (F31 30)   · History of Breast pain (611 71) (N64 4)   · History of endometriosis (V13 29) (Z87 42)   · History of scoliosis (V13 59) (Z87 39)   · History of Teratogen exposure in current pregnancy (655 80) (O35 9XX0)   · History of Tobacco user (305 1) (Z72 0)    The active problems and past medical history were reviewed and updated today  Anxiety  Depression  Scoliosis   1 para 1; C/S 30 weeks for non-reassuring fetal status during cervical ripening and preeclampsia with severe features- prolonged admission for control of hypertension and anxiety- M 3# 3 oz  D/C POD 8  Persistent hypertension        Surgical History   · History of Hernia Repair   · History of Oral Surgery Tooth Extraction [place] [facility]   · History of Tonsillectomy    Family History  Mother    · Family history of depression (V17 0) (Z81 8)  Father    · Family history of High blood pressure (not hypertension)    The family history was reviewed and updated today  Social History   · Currently sexually active   · Daily caffeine consumption, 1 serving a day   · Depot contraception (V25 49) (Z30 40)   · Exercises regularly   · Former cigarette smoker (V15 82) (M24 460)   · No alcohol use   · No drug use   · History of Smoker (305 1) (F17 200)      Son is doing well  Birthday 16  , lives with her  Carmelina Singh and son Óscar Hunter  Current Meds   1  AmLODIPine Besylate 2 5 MG Oral Tablet; TAKE 1 TABLET DAILY; Therapy: 90NGY9965 to (Evaluate:41Gyy2572)  Requested for: 04Nqz4238; Last   Rx:64Zci8439 Ordered   2  ClonazePAM 1 MG Oral Tablet Disintegrating; PLACE 1 TABLET ON TONGUE AND   ALLOW TO DISSOLVE 3 TIMES DAILY; Therapy: (Recorded:2017) to Recorded   3  Effexor  MG Oral Capsule Extended Release 24 Hour; take 1 capsule daily; Therapy: 57VWS5943 to Recorded   4  MedroxyPROGESTERone Acetate 150 MG/ML Intramuscular Suspension; INJECT   EVERY 12 WEEKS AS DIRECTED; Therapy: 47SHY6823 to (Evaluate:18Yqx6475)  Requested for: 25JKB5661; Last   Rx:14Ohs8765 Ordered   5  Norethindrone 0 35 MG Oral Tablet; TAKE 1 TABLET DAILY;    Therapy: 84Zsc9440 to (Evaluate:26Etw1915)  Requested for: 04Ggh5183; Last   Rx:13Apr2017 Ordered   6  Omeprazole 40 MG Oral Capsule Delayed Release; take 1 capsule daily; Therapy: (Recorded:08Aug2017) to Recorded   7  SEROquel 100 MG Oral Tablet; TAKE 1 TABLET AT BEDTIME; Therapy: (Recorded:03Wmu9520) to Recorded   8  Tylenol 500 MG CAPS; Therapy: (Recorded:13Jun2016) to Recorded   9  Wellbutrin  MG Oral Tablet Extended Release 24 Hour; TAKE 1 TABLET DAILY; Therapy: (Recorded:21Apr2017) to Recorded    Allergies  1  Reglan   2  sulfa  3  No Known Environmental Allergies   4  No Known Food Allergies    Vitals   Recorded: 21Aug2017 01:44PM   Heart Rate 81   Systolic 484   Diastolic 76   Height 5 ft 3 in   Weight 134 lb 8 oz   BMI Calculated 23 83   BSA Calculated 1 63   O2 Saturation 98   LMP 20Aug2017     Physical Exam    Constitutional   General appearance: No acute distress, well appearing and well nourished  Psychiatric   Orientation to person, place, and time: Normal     Mood and affect: Normal        Future Appointments    Date/Time Provider Specialty Site   11/13/2017 01:45 PM Liliana Sanchez MD Internal Medicine [facility]   11/21/2017 11:00 AM JADEN Max   Obstetrics/Gynecology [facility] OB/GYN AT [address] [place] [address]   Electronically signed by : JADEN Samaniego ; Aug 21 2017  2:08PM EST                       (Author)

## 2017-11-21 ENCOUNTER — ALLSCRIPTS OFFICE VISIT (OUTPATIENT)
Dept: OTHER | Facility: OTHER | Age: 32
End: 2017-11-21

## 2017-11-21 PROCEDURE — G0145 SCR C/V CYTO,THINLAYER,RESCR: HCPCS | Performed by: OBSTETRICS & GYNECOLOGY

## 2017-11-21 PROCEDURE — 87624 HPV HI-RISK TYP POOLED RSLT: CPT | Performed by: OBSTETRICS & GYNECOLOGY

## 2017-11-22 ENCOUNTER — LAB REQUISITION (OUTPATIENT)
Dept: LAB | Facility: HOSPITAL | Age: 32
End: 2017-11-22
Payer: COMMERCIAL

## 2017-11-22 DIAGNOSIS — Z01.419 ENCOUNTER FOR GYNECOLOGICAL EXAMINATION WITHOUT ABNORMAL FINDING: ICD-10-CM

## 2017-11-22 NOTE — PROGRESS NOTES
Assessment    1  Encounter for annual routine gynecological examination (V72 31) (Z01 419)   2  Depot contraception (V25 49) (Z30 40)   3  Post partum depression (420 55,982) (F53)    Plan  Encounter for annual routine gynecological examination    · (1) THIN PREP PAP WITH IMAGING; Status: In Progress - Specimen/DataCollected,Retrospective Authorization;   Done: 40CSH8416   Perform:[facility] In Office Collection; YVF:51YUG6700; Last Updated By:Kierra Cuevas; 11/21/2017 11:34:58 AM;Ordered;for annual routine gynecological examination; Ordered By:Stephanie Clark; Maturation index required? : No  HPV? : Regardless of Interpretation   · Follow-up visit in 1 year Evaluation and Treatment  Follow-up  Status: Hold For -Scheduling  Requested for: 97MVJ3080   Ordered;Encounter for annual routine gynecological examination; Ordered By: Davey Formica Performed:  Due: 70BLP9642  SocHx: Depot contraception    · MedroxyPROGESTERone Acetate 150 MG/ML Intramuscular Suspension; INJECTEVERY 12 WEEKS AS DIRECTED   Rx By: Davey Formica; Dispense: 0 Days ; #:1 ML; Refill: 3;SocHx: Depot contraception; CAL = N; Sent To: [facility] 8096   · Follow-up visit in 3 months Evaluation and Treatment  Follow-up  Status: Hold For -Scheduling  Requested for: 99Akk6362   Ordered;SocHx: Depot contraception; Ordered By: Davey Formica Performed:  Due: 75LPY1284    Discussion/Summary  Currently, she eats an adequate diet and has an inadequate exercise regimen  the risks and benefits of cervical cancer screening were discussed HPV and Pap Co-testing Done Today Breast cancer screening: the risks and benefits of breast cancer screening were discussed and monthly self breast exam was advised  Advice and education were given regarding aerobic exercise, weight bearing exercise, calcium supplements, vitamin D supplements and contraception       NGEDPMA- may need to increase to every 11 week intervals if menses become a problem'20Depressionfrom deliverysees GI next week1 yr monthly3/ wk1,000 mg/d with Vit D    Chief Complaint  pt here for yearly exam  LMP 11/15/2017 no pap on file   depo given today      History of Present Illness  HPI: Joan Muse is here for her annual visit  is still menstruating on Depo-Provera and the last one was heavier than usual is up to date for her injections  is currently at the end of the period  in the month, at the time of Enzo's birthday she was having flashbacks of the delivery anddiagnosed with PTSD by her psychiatrist  She was placed on Klonopin  This is her 4th psychiatric medication  Review of Systems   Constitutional: No fever, no chills, feels well, no tiredness, no recent weight gain or loss  ENT: no ear ache, no loss of hearing, no nosebleeds or nasal discharge, no sore throat or hoarseness  Cardiovascular: no complaints of slow or fast heart rate, no chest pain, no palpitations, no leg claudication or lower extremity edema  Respiratory: no complaints of shortness of breath, no wheezing, no dyspnea on exertion, no orthopnea or PND  Breasts: no complaints of breast pain, breast lump or nipple discharge  Gastrointestinal: constipation, but-- no abdominal pain,-- no nausea,-- no vomiting,-- no diarrhea-- and-- no blood in stools  Genitourinary: no dysuria,-- no pelvic pain,-- no vaginal discharge,-- no incontinence,-- no dysmenorrhea-- and-- no unexplained vaginal bleeding  Musculoskeletal: no complaints of arthralgia, no myalgia, no joint swelling or stiffness, no limb pain or swelling  Integumentary: no complaints of skin rash or lesion, no itching or dry skin, no skin wounds  Neurological: headache-- and-- Proved with new glasses  , but-- no numbness,-- no tingling,-- no confusion,-- no dizziness-- and-- no fainting  Active Problems    1  Abnormal glucose in pregnancy, antepartum (648 83) (O99 810)   2  Anxiety (300 00) (F41 9)   3  Benign essential HTN (401 1) (I10)   4   Bipolar disorder (manic depression) (296 80) (F31 9)   5  Constipation (564 00) (K59 00)   6  Continuous LLQ abdominal pain (789 04) (R10 32)   7  Contraception (V25 9) (Z30 9)   8  Contraception management (V25 9) (Z30 9)   9  Depot contraception (V25 49) (Z30 40)   10  Depression (311) (F32 9)   11  Hypertension in pregnancy (642 90) (O16 9)   12  Insomnia (780 52) (G47 00)   13  Menorrhagia (626 2) (N92 0)   14  Patient is a currently breast-feeding mother (V24 1) (Z39 1)   15  Post partum depression (061 88,015) (F53)   16  Severe preeclampsia, third trimester (642 53) (O14 13)    Past Medical History     · History of Bipolar disorder with depression (296 50) (F31 30)   · History of Breast pain (611 71) (N64 4)   · History of dysuria (V13 00) (S12 200)   · History of endometriosis (V13 29) (Z87 42)   · History of hematuria (V13 09) (Z87 448)   · History of scoliosis (V13 59) (Z87 39)   · History of Teratogen exposure in current pregnancy (655 80) (O35 9XX0)   · History of Tobacco user (305 1) (Z72 0)    The active problems and past medical history were reviewed and updated today  Anxiety Depression Scoliosis  1 para 1; C/S 30 weeks for non-reassuring fetal status during cervical ripening and preeclampsia with severe features- prolonged admission for control of hypertension and anxiety- M 3# 3 oz  D/C POD 8 Persistent hypertension PUD/Stomach Polyps- EGD  Constipation - sees GI  PTSD       Surgical History   · History of Hernia Repair   · History of Oral Surgery Tooth Extraction [place] Tooth   · History of Tonsillectomy    Family History  Mother    · Family history of depression (V17 0) (Z81 8)  Father    · Family history of High blood pressure (not hypertension)    The family history was reviewed and updated today         Social History     · Currently sexually active   · Daily caffeine consumption, 1 serving a day   · Depot contraception (V25 49) (Z30 40)   · Exercises regularly   · Former cigarette smoker (V15 82) (Z63 991)   · No alcohol use   · No drug use   · History of Smoker (305 1) (F17 200)  Son is doing well  Birthday 11/2/16 , lives with her  Jeromy Tao and son Aleena Angeles  Current Meds   1  AmLODIPine Besylate 2 5 MG Oral Tablet; TAKE 1 TABLET DAILY; Therapy: 24RLK2210 to (Evaluate:73Gti6184)  Requested for: 71Dit6396; Last Rx:43Etf4241 Ordered   2  ClonazePAM 1 MG Oral Tablet Disintegrating; PLACE 1 TABLET ON TONGUE AND ALLOW TO DISSOLVE 3 TIMES DAILY; Therapy: (Recorded:21Apr2017) to Recorded   3  Dulcolax Stool Softener CAPS; take 1 capsule daily; Therapy: [phone]) to Recorded   4  Effexor  MG Oral Capsule Extended Release 24 Hour; take 1 capsule daily; Therapy: 46LWC3650 to Recorded   5  MedroxyPROGESTERone Acetate 150 MG/ML Intramuscular Suspension; INJECT EVERY 12 WEEKS AS DIRECTED; Therapy: 15LIQ8906 to (Evaluate:21Mjw6094)  Requested for: 53NNZ9434; Last Rx:91Njl9506 Ordered   6  MiraLax Oral Packet; MIX 1 PACKET IN 8 OUNCES OF LIQUID AND DRINK ONCE DAILY as needed for constipation; Therapy: [phone]) to Recorded   7  Omeprazole 40 MG Oral Capsule Delayed Release; take 1 capsule daily; Therapy: (Recorded:30Wsu8463) to Recorded   8  SEROquel 100 MG Oral Tablet; TAKE 1 TABLET AT BEDTIME; Therapy: (Recorded:38Qyn7529) to Recorded   9  Tylenol 500 MG CAPS; Therapy: (Recorded:13Jun2016) to Recorded   10  Wellbutrin  MG Oral Tablet Extended Release 24 Hour; TAKE 1 TABLET DAILY; Therapy: (Recorded:21Apr2017) to Recorded    Allergies  1  Reglan   2  sulfa  3  No Known Environmental Allergies   4  No Known Food Allergies    Vitals   Recorded: 21Nov2017 10:54AM   Heart Rate 81   Systolic 795   Diastolic 68   Height 5 ft 4 in   Weight 133 lb 4 oz   BMI Calculated 22 87   BSA Calculated 1 65   O2 Saturation 98   LMP 43TLI5023       Physical Exam   Constitutional  General appearance: No acute distress, well appearing and well nourished     Neck  Neck: Normal, supple, trachea midline, no masses  Thyroid: Normal, no thyromegaly  Pulmonary  Respiratory effort: No increased work of breathing or signs of respiratory distress  Auscultation of lungs: Clear to auscultation  Cardiovascular  Auscultation of heart: Normal rate and rhythm, normal S1 and S2, no murmurs  Peripheral vascular exam: Normal pulses Throughout  Genitourinary  External genitalia: Normal and no lesions appreciated  Vagina: Normal, no lesions or dryness appreciated  Urethra: Normal    Urethral meatus: Normal    Bladder: Normal, soft, non-tender and no prolapse or masses appreciated  Cervix: Normal, no palpable masses  -- Nulliparous  Uterus: Normal, non-tender, not enlarged, and no palpable masses  -- Retroverted and small  Adnexa/parametria: Normal, non-tender and no fullness or masses appreciated  Anus, perineum, and rectum: Normal sphincter tone, no masses, and no prolapse  Chest  Breasts: Normal and no dimpling or skin changes noted  Abdomen  Abdomen: Normal, non-tender, and no organomegaly noted  Liver and spleen: No hepatomegaly or splenomegaly  Examination for hernias: No hernias appreciated  Lymphatic  Palpation of lymph nodes in neck, axillae, groin and/or other locations: No lymphadenopathy or masses noted  Skin  Skin and subcutaneous tissue: Normal skin turgor and no rashes     Palpation of skin and subcutaneous tissue: Normal    Psychiatric  Orientation to person, place, and time: Normal    Mood and affect: Normal        Future Appointments    Date/Time Provider Specialty Site   12/04/2017 03:30 PM Cecilia Martinez MD Internal Medicine [facility]   11/28/2017 01:00 PM Mela Umana MD Gastroenterology Adult [address]       Signatures   Electronically signed by : JADEN Marcial ; Nov 21 2017 11:43AM EST                       (Author)

## 2017-11-27 LAB — HPV RRNA GENITAL QL NAA+PROBE: NORMAL

## 2017-11-28 ENCOUNTER — HOSPITAL ENCOUNTER (OUTPATIENT)
Dept: RADIOLOGY | Facility: HOSPITAL | Age: 32
Discharge: HOME | End: 2017-11-28
Attending: INTERNAL MEDICINE
Payer: COMMERCIAL

## 2017-11-28 ENCOUNTER — TRANSCRIBE ORDERS (OUTPATIENT)
Dept: RADIOLOGY | Facility: HOSPITAL | Age: 32
End: 2017-11-28

## 2017-11-28 ENCOUNTER — ALLSCRIPTS OFFICE VISIT (OUTPATIENT)
Dept: OTHER | Facility: OTHER | Age: 32
End: 2017-11-28

## 2017-11-28 DIAGNOSIS — K59.00 CONSTIPATION: ICD-10-CM

## 2017-11-29 LAB
LAB AP GYN PRIMARY INTERPRETATION: NORMAL
Lab: NORMAL

## 2017-11-29 NOTE — CONSULTS
Assessment  1  Constipation (564 00) (K59 00)   2  Acid reflux disease (530 81) (K21 9)    Plan  Acid reflux disease    · Famotidine 40 MG Oral Tablet; TAKE 1 TABLET AT BEDTIME   Rx By: Dylon Kinsey; Dispense: 30 Days ; #:30 Tablet; Refill: 2;Acid reflux disease; CAL = N; Verified Transmission to [employer] [facility]; Last Updated By: System, [employer]; 11/28/2017 1:13:18 PM  Constipation    · MiraLax Oral Powder (Polyethylene Glycol 3350); MIX 17 GM IN 8 OUNCES OFLIQUID AND DRINK 1 TO 2 TIMES DAILY FOR CONSTIPATION   Rx By: Dylon Kinsey; Dispense: 30 Days ; #:1 X 510 GM Bottle (2 Bottles); Refill: 2;For: Constipation; CAL = N; Verified Transmission to [employer] [facility]; Last Updated By: System, [employer]; 11/28/2017 1:13:18 PM   · XR COLON TRANSIT STUDY; Status:Active; Requested for:28Nov2017;    Perform:[facility]; [phone]; Last Updated By:Hume, Monta Spark; 11/28/2017 1:22:34 PM;Ordered;Ordered By:Yamileth Taylor;    Discussion/Summary  Discussion Summary:   Acdi reflux:Not well controlled with omeprazole 40 mg daily  I will add on famotidine at night to see whether they can improve her symptoms  Constipation:it appears she has chronic constipation for years  Patient and mother were counseled on the correct use of laxative (miralax) and stool softeners  They were also counseled on the importance of lifestyle modification including increased fluid intake and fiber intake  colonic transit testfollow-up in 2 months  consider using Amitiza/Linzess if patient's symptoms not improving  Consider anorectal manometry  Obtain records from previous gastroenterologist    Counseling Documentation With Imm: The patient was counseled regarding diagnostic results,-- instructions for management,-- patient and family education,-- impressions  Chief Complaint  Chief Complaint Free Text Note Form: Pt consult for constipation; complains of abdominal pain, blood in stool, nausea, reflux and fatigue        History of Present Illness  HPI: 82-year-old female with long-time constipation presented for evaluation  Patient was seen by Dr Vanessa Dewitt more than 3 years ago and underwent an upper endoscopy and colonoscopy  Her upper endoscopy biopsy showed gastritis and colonoscopy was negative for microscopic colitis  Patient reported she has been having small pellet stool all her life  She has sense of incomplete evacuation  She has bowel movement usually once every 3 days  She takes over-the-counter stool softeners without relief of her symptoms  She denies blood in stool  She went to the [facility] gastroenterologist for evaluation and they did moreno an upper endoscopy She was told she has peptic ulcer disease and gastric polyps  She also reported significant acid reflux  She is on omeprazole 40 mg a day with no relief of her acid reflux  She underwent CT scan with IV contrast ordered by her primary care doctor and was found to have significant stool impaction  Review of Systems  Complete-Female GI Adult:  Constitutional: No fever, no chills, feels well, no tiredness, no recent weight gain or weight loss  Eyes: No complaints of eye pain, no red eyes, no eyesight problems, no discharge, no dry eyes, no itching of eyes  ENT: no complaints of earache, no loss of hearing, no nose bleeds, no nasal discharge, no sore throat, no hoarseness  Cardiovascular: No complaints of slow heart rate, no fast heart rate, no chest pain, no palpitations, no leg claudication, no lower extremity edema  Respiratory: No complaints of shortness of breath, no wheezing, no cough, no SOB on exertion, no orthopnea, no PND  Gastrointestinal: abdominal pain,-- nausea,-- constipation,-- bloody stools-- and-- reflux  Genitourinary: No complaints of dysuria, no incontinence, no pelvic pain, no dysmenorrhea, no vaginal discharge or bleeding  Musculoskeletal: No complaints of arthralgias, no myalgias, no joint swelling or stiffness, no limb pain or swelling  Integumentary: No complaints of skin rash or lesions, no itching, no skin wounds, no breast pain or lump  Neurological: No complaints of headache, no confusion, no convulsions, no numbness, no dizziness or fainting, no tingling, no limb weakness, no difficulty walking  Psychiatric: Not suicidal, no sleep disturbance, no anxiety or depression, no change in personality, no emotional problems  Endocrine: No complaints of proptosis, no hot flashes, no muscle weakness, no deepening of the voice, no feelings of weakness  Hematologic/Lymphatic: No complaints of swollen glands, no swollen glands in the neck, does not bleed easily, does not bruise easily  ROS Reviewed:   ROS reviewed  Active Problems    1  Abnormal glucose in pregnancy, antepartum (648 83) (O99 810)   2  Anxiety (300 00) (F41 9)   3  Benign essential HTN (401 1) (I10)   4  Bipolar disorder (manic depression) (296 80) (F31 9)   5  Constipation (564 00) (K59 00)   6  Continuous LLQ abdominal pain (789 04) (R10 32)   7  Contraception (V25 9) (Z30 9)   8  Contraception management (V25 9) (Z30 9)   9  Depot contraception (V25 49) (Z30 40)   10  Depression (311) (F32 9)   11  Encounter for annual routine gynecological examination (V72 31) (Z01 419)   12  Hypertension in pregnancy (642 90) (O16 9)   13  Insomnia (780 52) (G47 00)   14  Menorrhagia (626 2) (N92 0)   15  Patient is a currently breast-feeding mother (V24 1) (Z39 1)   16  Post partum depression (075 08,389) (F53)   17  Severe preeclampsia, third trimester (642 53) (O14 13)    Past Medical History  1  History of Bipolar disorder with depression (296 50) (F31 30)   2  History of Breast pain (611 71) (N64 4)   3  History of dysuria (V13 00) (Z87 898)   4  History of endometriosis (V13 29) (Z87 42)   5  History of hematuria (V13 09) (Z87 448)   6  History of scoliosis (V13 59) (Z87 39)   7  History of Teratogen exposure in current pregnancy (655 80) (O35 9XX0)   8   History of Tobacco user (305  1) (Z72 0)  Active Problems And Past Medical History Reviewed: The active problems and past medical history were reviewed and updated today  Surgical History  1  History of Hernia Repair   2  History of Oral Surgery Tooth Extraction [place] Tooth   3  History of Tonsillectomy  Surgical History Reviewed: The surgical history was reviewed and updated today  Family History  Mother    1  Family history of depression (V17 0) (Z81 8)  Father    2  Family history of High blood pressure (not hypertension)  Family History Reviewed: The family history was reviewed and updated today  Social History     · Currently sexually active   · Daily caffeine consumption, 1 serving a day   · Depot contraception (V25 49) (Z30 40)   · Exercises regularly   · Former cigarette smoker (V15 82) (L98 013)   · No alcohol use   · No drug use   · History of Smoker (305 1) (F17 200)  Social History Reviewed: The social history was reviewed and updated today  Current Meds   1  AmLODIPine Besylate 2 5 MG Oral Tablet; TAKE 1 TABLET DAILY; Therapy: 94FLN7580 to (Evaluate:23Jaa5337)  Requested for: 37Ivk8795; Last Rx:08Aug2017 Ordered   2  ClonazePAM 1 MG Oral Tablet; Therapy: 59PRP3129 to Recorded   3  Dulcolax Stool Softener CAPS; take 1 capsule daily; Therapy: [phone]) to Recorded   4  Effexor  MG Oral Capsule Extended Release 24 Hour; take 1 capsule daily; Therapy: 50ONR7894 to Recorded   5  MedroxyPROGESTERone Acetate 150 MG/ML Intramuscular Suspension; INJECT EVERY 12 WEEKS AS DIRECTED; Therapy: 25FUO3329 to (Evaluate:51Mll7865)  Requested for: 90OWQ9164; Last Rx:87Sml0086 Ordered   6  MedroxyPROGESTERone Acetate 150 MG/ML Intramuscular Suspension; INJECT EVERY 12 WEEKS AS DIRECTED; Therapy: 32NXB8831 to (Last Rx:21Nov2017)  Requested for: 21Nov2017 Ordered   7  MiraLax Oral Packet; MIX 1 PACKET IN 8 OUNCES OF LIQUID AND DRINK ONCE DAILY as needed for constipation;  Therapy: (Recorded:02Oct2017) to Recorded   8  Omeprazole 40 MG Oral Capsule Delayed Release; take 1 capsule daily; Therapy: (Recorded:72Mcx9991) to Recorded   9  SEROquel 100 MG Oral Tablet; TAKE 1 TABLET AT BEDTIME; Therapy: (Recorded:93Xsw8361) to Recorded   10  Tylenol 500 MG CAPS; Therapy: (Recorded:71Cwx6938) to Recorded   11  Wellbutrin  MG Oral Tablet Extended Release 24 Hour; TAKE 1 TABLET DAILY; Therapy: (Recorded:21Apr2017) to Recorded  Medication List Reviewed: The medication list was reviewed and updated today  Allergies  1  Reglan   2  sulfa  3  No Known Environmental Allergies   4  No Known Food Allergies    Vitals  Vital Signs    Recorded: 85YYT8518 12:43PM   Temperature 98 1 F, Tympanic   Heart Rate 82   Systolic 139, LUE, Sitting   Diastolic 88, LUE, Sitting   Height 5 ft 4 in   Weight 132 lb 8 oz   BMI Calculated 22 74   BSA Calculated 1 64   O2 Saturation 98, RA       Physical Exam   Constitutional  General appearance: No acute distress, well appearing and well nourished  Eyes  Conjunctiva and lids: No swelling, erythema or discharge  Ears, Nose, Mouth, and Throat  Oropharynx: Normal with no erythema, edema, exudate or lesions  Pulmonary  Respiratory effort: No increased work of breathing or signs of respiratory distress  Cardiovascular  Examination of extremities for edema and/or varicosities: Normal    Abdomen  Abdomen: Non-tender, no masses     Musculoskeletal  Gait and station: Normal    Psychiatric  Orientation to person, place, and time: Normal          Future Appointments    Date/Time Provider Specialty Site   12/04/2017 03:30 PM Dewayne Campos MD Internal Medicine [facility]   02/08/2018 09:00 AM Lupis Wu MD Gastroenterology Adult [facility] [address] [facility]   02/06/2018 11:00 AM OB/GYN at [facility], Nurse Schedule   [address] [facility] OB/GYN AT [address] [place] [address]   Electronically signed by : Deyanira Art MD; Nov 28 2017  3:52PM EST                       (Author)

## 2017-11-30 ENCOUNTER — GENERIC CONVERSION - ENCOUNTER (OUTPATIENT)
Dept: OTHER | Facility: OTHER | Age: 32
End: 2017-11-30

## 2018-01-07 ENCOUNTER — APPOINTMENT (OUTPATIENT)
Dept: LAB | Facility: HOSPITAL | Age: 33
End: 2018-01-07
Payer: COMMERCIAL

## 2018-01-07 ENCOUNTER — TRANSCRIBE ORDERS (OUTPATIENT)
Dept: LAB | Facility: HOSPITAL | Age: 33
End: 2018-01-07

## 2018-01-07 DIAGNOSIS — K59.00 CONSTIPATION, UNSPECIFIED CONSTIPATION TYPE: ICD-10-CM

## 2018-01-07 DIAGNOSIS — R10.13 ABDOMINAL PAIN, EPIGASTRIC: ICD-10-CM

## 2018-01-07 DIAGNOSIS — R10.13 ABDOMINAL PAIN, EPIGASTRIC: Primary | ICD-10-CM

## 2018-01-07 LAB
ALBUMIN SERPL BCP-MCNC: 3.9 G/DL (ref 3.5–5)
ALP SERPL-CCNC: 82 U/L (ref 46–116)
ALT SERPL W P-5'-P-CCNC: 13 U/L (ref 12–78)
ANION GAP SERPL CALCULATED.3IONS-SCNC: 5 MMOL/L (ref 4–13)
AST SERPL W P-5'-P-CCNC: 11 U/L (ref 5–45)
BILIRUB SERPL-MCNC: 0.63 MG/DL (ref 0.2–1)
BUN SERPL-MCNC: 11 MG/DL (ref 5–25)
CALCIUM SERPL-MCNC: 9.7 MG/DL (ref 8.3–10.1)
CHLORIDE SERPL-SCNC: 107 MMOL/L (ref 100–108)
CO2 SERPL-SCNC: 27 MMOL/L (ref 21–32)
CREAT SERPL-MCNC: 0.74 MG/DL (ref 0.6–1.3)
CRP SERPL QL: 5 MG/L
ERYTHROCYTE [DISTWIDTH] IN BLOOD BY AUTOMATED COUNT: 13.3 % (ref 11.6–15.1)
ERYTHROCYTE [SEDIMENTATION RATE] IN BLOOD: 34 MM/HOUR (ref 0–20)
GFR SERPL CREATININE-BSD FRML MDRD: 109 ML/MIN/1.73SQ M
GLUCOSE SERPL-MCNC: 106 MG/DL (ref 65–140)
HCT VFR BLD AUTO: 37.3 % (ref 34.8–46.1)
HGB BLD-MCNC: 12.4 G/DL (ref 11.5–15.4)
MCH RBC QN AUTO: 29 PG (ref 26.8–34.3)
MCHC RBC AUTO-ENTMCNC: 33.2 G/DL (ref 31.4–37.4)
MCV RBC AUTO: 87 FL (ref 82–98)
PLATELET # BLD AUTO: 322 THOUSANDS/UL (ref 149–390)
PMV BLD AUTO: 10 FL (ref 8.9–12.7)
POTASSIUM SERPL-SCNC: 4 MMOL/L (ref 3.5–5.3)
PROT SERPL-MCNC: 7.9 G/DL (ref 6.4–8.2)
RBC # BLD AUTO: 4.28 MILLION/UL (ref 3.81–5.12)
SODIUM SERPL-SCNC: 139 MMOL/L (ref 136–145)
WBC # BLD AUTO: 5.23 THOUSAND/UL (ref 4.31–10.16)

## 2018-01-07 PROCEDURE — 85027 COMPLETE CBC AUTOMATED: CPT

## 2018-01-07 PROCEDURE — 83516 IMMUNOASSAY NONANTIBODY: CPT

## 2018-01-07 PROCEDURE — 85652 RBC SED RATE AUTOMATED: CPT

## 2018-01-07 PROCEDURE — 86255 FLUORESCENT ANTIBODY SCREEN: CPT

## 2018-01-07 PROCEDURE — 82784 ASSAY IGA/IGD/IGG/IGM EACH: CPT

## 2018-01-07 PROCEDURE — 80053 COMPREHEN METABOLIC PANEL: CPT

## 2018-01-07 PROCEDURE — 86140 C-REACTIVE PROTEIN: CPT

## 2018-01-07 PROCEDURE — 36415 COLL VENOUS BLD VENIPUNCTURE: CPT

## 2018-01-09 ENCOUNTER — APPOINTMENT (OUTPATIENT)
Dept: LAB | Facility: HOSPITAL | Age: 33
End: 2018-01-09
Payer: COMMERCIAL

## 2018-01-09 DIAGNOSIS — R10.13 ABDOMINAL PAIN, EPIGASTRIC: ICD-10-CM

## 2018-01-09 DIAGNOSIS — K59.00 CONSTIPATION, UNSPECIFIED CONSTIPATION TYPE: ICD-10-CM

## 2018-01-09 LAB
ENDOMYSIUM IGA SER QL: NEGATIVE
GLIADIN PEPTIDE IGA SER-ACNC: 3 UNITS (ref 0–19)
GLIADIN PEPTIDE IGG SER-ACNC: 2 UNITS (ref 0–19)
IGA SERPL-MCNC: 136 MG/DL (ref 87–352)
TTG IGA SER-ACNC: <2 U/ML (ref 0–3)
TTG IGG SER-ACNC: <2 U/ML (ref 0–5)

## 2018-01-09 PROCEDURE — 83993 ASSAY FOR CALPROTECTIN FECAL: CPT

## 2018-01-09 PROCEDURE — 87338 HPYLORI STOOL AG IA: CPT

## 2018-01-10 LAB — H PYLORI AG STL QL IA: NEGATIVE

## 2018-01-10 NOTE — PROGRESS NOTES
OCT 27 2016         RE: Tatiana Fret                              To: JADEN Lutz    MR#: [id number]                                    [address]   : AUG [address]:                                              Faustino Dunn U  6    (Exam #: [id number])                           Fax: [phone]      The LMP of this 36year old,  G1, P0-0-0-0 patient was 2016, giving   her an BEHZAD of 2017 and a current gestational age of 33 weeks 6 days   by dates  A sonographic examination was performed on OCT 27 2016 using   real time equipment  The ultrasound examination was performed using   abdominal technique  Earliest ultrasound found in her record: 2016  6w1d  2016 BEHZAD      Thelma Singh is currently an inpatient at [facility] receiving   evaluation and management for her pregnancy complicated by a diagnosis of   preeclampsia with severe features  Cardiac motion was observed at 129 bpm       INDICATIONS      preeclampsia      Exam Types      Level I      RESULTS      Fetus # 1 of 1   Fetal growth appeared normal   Placenta Location = Anterior   Placenta Grade = II      MEASUREMENTS (* Included In Average GA)      AC              25 1 cm        29 weeks 2 days* (35%)   BPD              7 9 cm        31 weeks 4 days* (72%)   HC              28 1 cm        30 weeks 4 days* (45%)   Femur            5 3 cm        28 weeks 3 days* (16%)      Cerebellum       3 7 cm        32 weeks 1 day      HC/AC           1 12   FL/AC           0 21   FL/BPD          0 68   EFW (Ac/Fl/Hc)  1353 grams - 2 lbs 15 oz                 (37%)      THE AVERAGE GESTATIONAL AGE is 29 weeks 6 days +/- 18 days        AMNIOTIC FLUID      Q1: 6 7      Q2: 4 9      Q3: 4 6      Q4: 7 4   ROSIO Total = 23 6 cm   Amniotic Fluid: Normal      ANATOMY DETAILS      Visualized Appearing Sonographically Normal:   HEAD: (Calvarium, BPD Level, Lateral Ventricles, Cerebellum);      FACE/NECK: (Profile, Nose/Lips);    TH  CAV : (Diaphragm); HEART: ([facility], Cardiac Axis);    STOMACH, RIGHT KIDNEY, LEFT KIDNEY,   BLADDER, SPINE: (Cervical Spine, Thoracic Spine, Lumbar Spine, Sacrum); PLACENTA      ANATOMY COMMENTS      The prior fetal anatomic survey was limited with respect to imaging of the   spine  This anatomic view was seen today as sonographically normal within   the inherent limitations of fetal ultrasound  BIOPHYSICAL PROFILE      The Biophysical Profile score was 8/8  Breathin  Movement: 2  Tone: 2  AFV: 2      IMPRESSION      Duffy IUP   29 weeks and 6 days by this ultrasound  (BEHZAD=2017)   Fetal growth appeared normal   Regular fetal heart rate of 129 bpm   Anterior placenta      GENERAL COMMENT      No fetal structural abnormality is identified on the Level I survey today  Fetal interval growth and amniotic fluid volume are normal  The placenta   is normal in appearance  NICHOLAS Davila M D     Maternal-Fetal Medicine   Electronically signed 10/27/16 17:58

## 2018-01-11 NOTE — MISCELLANEOUS
Signatures   Electronically signed by :  Corinne Celeste MD; Feb 19 2017  6:44PM EST                       (Author)

## 2018-01-11 NOTE — PROGRESS NOTES
Chief Complaint  Patient and , Heri Blackburn, seen for genetic counseling to discuss concerns related to use of multiple medications in pregnancy for treatment of anxiety and depression  At the time of our genetic counseling session, Wale Meredith was approximately 10 weeks 3 days pregnant  To review, the patient reports taking multiple medications for treatment of anxiety and depression  Her current medications include Effexor and Buspar though she was also taking Seroquel, Clonopin and doxepin up until approximately 6 weeks gestation  She did report also having taken Remeron but stopping use of that medication approximate one-month prior to conception  Her anxiety and depression are managed by her family doctor and the plan is for her to continue use of Effexor and BuSpar through the duration of the pregnancy  With regards to medication use in pregnancy, this couple was advised that most of the available data comes from animal studies which may or may not be reliably applied to human experience and anecdotal reports of human pregnancies which are often obtained through drug registries  According to the Reproductive Toxicity Review, none of the medications the patient has taken are expected to significantly increase the risk of any particular birth defect or pattern of birth defects  However, we discussed that it is not possible to determine if there is any potential interactive effects from taking multiple medications during pregnancy  Effexor which is a bicyclic anti-depressant has been associated with an increased risk for mild  complications most likely due to withdrawal effects  Though successful lactation has been reported in some cases I was not able to locate a statement by the American Academy of Pediatrics either for or against lactation with maternal use of either Effexor or BuSpar  This couple was encouraged to discuss this issue with their chosen pediatrician prior to delivery   Please refer to the [employer] reports scanned in under separate cover for more detailed information  During our counseling session, histories were taken on the patient's family and her 's family  In addition to her own anxiety and depression, Stanton López reports having a sister with OCD and a mother with bipolar disorder  We discussed the multifactorial inheritance of anxiety depression and other mental health issues and the patient was advised that given this history her children are likely to have a significantly increased risk over the general population are developing similar problems to herself and her family members  Currently, there are no genetic tests routinely clinically available to detect susceptibility to mental health conditions  The patient reports being of [place] descent while her  reports being of [place], [place] and [facility] descent  They both deny having any known Anglican ancestry  Carrier screening for CF, SMA, Fragile X and hemoglobinopathies was discussed  This couple expressed interest in having CF, SMA and Fragile X any performed and was provided with the CPT an ICD 10 codes for checking insurance coverage  Hemoglobinopathy screening is recommended if not previously performed  Lastly, we discussed the fact that it is important to keep in mind that everyone in the general population regardless of age, family history, or medical background has approximately a 3% risk of having a child with some type of her defect, genetic disease or intellectual disability  Currently there are no tests available to rule out all birth defects or health problems  Part of this risk is an age specific risk for chromosome abnormalities  At the age of 36, there is an estimated 1/416 for a fetal chromosome abnormality at term  Approximately half of these abnormalities are due to Down syndrome and the remainder due to other chromosome abnormalities      The patient and her  were made aware of the availability of screening for Down syndrome and other chromosome abnormalities  They elected to pursue sequential screening including nuchal translucency ultrasound evaluation as well as level II ultrasound evaluation  Appointments have already been made  Active Problems    1  Bipolar disorder (manic depression) (296 80) (F31 9)   2  Breast pain (611 71) (N64 4)   3  Depression (311) (F32 9)   4  Encounter for pregnancy related examination in first trimester (V22 1) (Z34 81)   5  Single pregnancy, first trimester (V22 1) (Z34 81)   6  Teratogen exposure in current pregnancy, fetus 1 (655 80) (O35 8XX1)    Past Medical History    1  History of Anxiety (300 00) (F41 9)   2  History of Bipolar disorder with depression (296 50) (F31 30)   3  History of endometriosis (V13 29) (Z87 42)   4  History of scoliosis (V13 59) (Z87 39)   5  History of Teratogen exposure in current pregnancy (655 80) (O35 8XX0)   6  History of Tobacco user (305 1) (Z72 0)    Surgical History    1  History of Oral Surgery Tooth Extraction [place] Tooth   2  History of Tonsillectomy    Family History    1  Family history of depression (V17 0) (Z81 8)    2  Family history of High blood pressure (not hypertension)    Social History    · Currently sexually active   · Daily caffeine consumption, 1 serving a day   · Exercises regularly   · Never a smoker   · No alcohol use   · No drug use   · History of Smoker (305 1) (F17 200)    Current Meds   1  Benadryl 25 MG Oral Tablet; Therapy: (Recorded:13Jun2016) to Recorded   2  BusPIRone HCl - 10 MG Oral Tablet; Therapy: (Recorded:11May2016) to Recorded   3  Effexor 75 MG TABS; Therapy: (Recorded:19May2016) to Recorded   4  Prenate Essential 29-0 6-0 4-340 MG Oral Capsule; take 1 capsule daily; Therapy: 98XVD2935 to (Evaluate:10May2017)  Requested for: 92EZR4267; Last   Rx:11May2016 Ordered   5  Tums 500 CHEW;   Therapy: (Recorded:13Jun2016) to Recorded   6  Tylenol 500 MG CAPS;    Therapy: (Recorded:74Ayp1403) to Recorded    Allergies    1   Reglan   2  sulfa    Future Appointments    Date/Time Provider Specialty Site   2016 01:00 PM MIKE Ivy Obstetrics/Gynecology [facility] OB/GYN AT [facility]   2016 09:30 AM  Shaun, [facility] [address]   2016 09:30 AM  Shaun, [facility]     Signatures   Electronically signed by : Magaly Pierce, ; 2016 12:54PM EST                       (Author)

## 2018-01-12 VITALS
BODY MASS INDEX: 24.45 KG/M2 | OXYGEN SATURATION: 98 % | WEIGHT: 138 LBS | HEIGHT: 63 IN | DIASTOLIC BLOOD PRESSURE: 86 MMHG | SYSTOLIC BLOOD PRESSURE: 142 MMHG

## 2018-01-12 LAB — CALPROTECTIN STL-MCNT: 190 UG/G (ref 0–120)

## 2018-01-12 NOTE — PROGRESS NOTES
2016         RE: Rena Christy                              To: JADEN Jose    MR#: [id number]                                    [address]   : AUG [address]:                                              Faustino Dunn U  6    (Exam #: [id number])                           Fax: [phone]      The LMP of this 31year old,  G1, P0-0-0-0 patient was 2016, giving   her an BEHZAD of 2017 and a current gestational age of 16 weeks 3 days   by dates  A sonographic examination was performed on 2016 using   real time equipment  The ultrasound examination was performed using   abdominal technique  The patient has a BMI of 23 7  Her blood pressure   today was 151/82  Earliest ultrasound found in her record: 2016  6w1d  2016 BEHZAD   Multiple longitudinal and transverse sections revealed a loaiza   intrauterine pregnancy with the fetus in variable presentation  The   placenta is anterior in implantation, grade 0 in appearance, and there is   no placenta previa  Cardiac motion was observed at 167 bpm       INDICATIONS      depression   maternal anxiety   maternal bipolar disorder   first trimester screening      Exam Types      Level I      RESULTS      Fetus # 1 of 1   Variable presentation   Fetal growth appeared normal      MEASUREMENTS (* Included In Average GA)      CRL              5 3 cm        11 weeks 5 days*   Nuchal Trans    1 50 mm      THE AVERAGE GESTATIONAL AGE is 11 weeks 5 days +/- 7 days  UTERINE ARTERIES                                  S/D   PI    RI    NOTCH       Left Uterine Artery              2 60         No       Right Uterine Artery             2 06         No      ANATOMY COMMENTS      Anatomic detail is limited at this gestational age  The yolk sac was not   noted  The fetal cranium appeared normal in shape and the nuchal   translucency was normal in size (1 5mm)  The nasal bone appears to be   present  The intracranial anatomy was unremarkable  Evaluation of the   spine revealed no obvious evidence for a neural tube defect  Anatomy of   the fetal thorax appeared within normal limits  The cardiac rhythm was   regular  Within the abdomen, stomach was visualized and the abdominal   wall appeared intact  A three vessel cord appears to be present  Active   movement of the fetal body & extremities was seen  There is no suspicion   of a subchorionic bleed  The placental cord insertion was normal    The   uterine artery Dopplers are abnormal for this gestational age  There is no   suspicion of a uterine myoma  Free fluid is not seen in the posterior   cul-de-sac  ADNEXA      The left ovary appeared normal and measured 2 8 x 2 2 x 1 3 cm with a   volume of 4 2 cc  The right ovary was not visualized  AMNIOTIC FLUID         Largest Vertical Pocket = 3 0 cm   Amniotic Fluid: Normal      IMPRESSION      Duffy IUP   11 weeks and 5 days by this ultrasound  (BEHZAD=2017)   Variable presentation   Fetal growth appeared normal   Regular fetal heart rate of 167 bpm   Anterior placenta   No placenta previa      GENERAL COMMENT         I had the pleasure of seeing Amna Cardenas in the  Center for   an initial sequential screen and blood work  She is a 27-year-old    1 para 0 with a working due date  currently at 12 weeks and 3   days  She does take Effexor and BuSpar with no problems  She denies any   pregnancy related issues  As you know she does have anxiety and depression   as well  She notes no pregnancy complications to date  Today's ultrasound showed a viable fetus in a variable presentation  The   amniotic fluid and overall fetal growth appeared normal  The placenta is    anterior in location and there is no evidence of a placenta previa  There   were no obvious anomalies seen in the fetus today   Down syndrome screening   was reassuring as the nuchal translucency was normal in size and the nasal   bone was present  The uterine artery Doppler flow studies were abnormal   bilaterally  Only the left ovary was seen and it appeared normal  The   right ovary was not well seen today  There were no subchorionic hematomas   or uterine myomas  The patient appears well-nourished, well-developed and oriented to person   place and time  Her abdomen was soft and nontender  The uterus was gravid   and nontender  The fetal cranium was intact  There was a normal midline to   the fetal cranium, and the choroid plexi were seen bilaterally  A fetal   heart, a fetal stomach, and the fetal bladder were all seen today and   appeared unremarkable  The ventral wall appeared intact with no evidence   of an abdominal wall defect  There was a normal abdominal cord insertion   seen  All extremities were seen including 2 arms and 2 legs  The spine   showed no obvious neural tube defect  The fetal heart rate and rhythm were   within normal range  Active fetal movements were seen today  She went on to complete the part one bloodwork today  We did discuss the   logistics of part 2 of the sequential screen  As you know the second part   of the sequential screen is best done between 16 and 18 weeks although it   can be done up until 21 weeks gestational age  I did recommend that she   return to the [address] [place] [address] for her 20 week level II ultrasound  I   answered all of her questions to the best of my ability  One issue on today's ultrasound is that the uterine artery Doppler flow   studies are abnormal  This does increase the risk of adverse pregnancy   outcomes including preeclampsia and fetal growth restriction  Low dose   aspirin has been shown in many studies to help reduce the risk of these   adverse pregnancy outcomes, however low-dose aspirin therapy must be   started prior to 16 weeks in order to help prevent adverse pregnancy   outcomes  Her recent meta-analysis showed a very favorable effect when   started less than 16 weeks on reducing the risk of preeclampsia and fetal   growth restriction  It started after 16 weeks and has not shown any   significant favorable affect on reducing the risk of adverse pregnancy   outcomes  IMPORTANT NEW FINDINGS ON TODAY'S ULTRASOUND: Abnormal uterine artery   Doppler flow studies         IN SUMMARY: Today's ultrasound was overall reassuring  The fetus appears   to be growing well  There were no obvious anomalies seen today  The nuchal   translucency was normal in size as it measured 1 5 mm in an enlarged   midsagittal plane and the nasal bone was present  Her uterine artery   Doppler flow studies are abnormal and I did recommend that she begin low   dose aspirin at this time  She will return in 7-8 weeks for her level II   ultrasound  She did go on to complete the bloodwork portion of today's   visit  Total face-to-face time with the patient, excluding ultrasound time was 15   minutes with more than 50% of the time devoted to counseling and   coordination of care  Thank you very much for allowing me to participate in the care of your   patient  If you have any questions or concerns about today's visit, please   do not hesitate to call me  Sincerely,      JADEN Winter  Maternal Fetal Medicine      NICHOLAS Potts M D  Maternal-Fetal Medicine   Electronically signed 06/27/16 11:36           Electronically signed by:Evan LEAL  Jun 28 2016  2:51PM EST      Electronically signed by:Evan LEAL    Jun 28 2016  2:52PM EST

## 2018-01-12 NOTE — PROGRESS NOTES
AUG 22 2016         RE: Lemuel Stoll                              To: JADEN Villagomez    MR#: [id number]                                    [address]   :  James Givens : [id number]                             Faustino Dunn U  6    (Exam #: [id number])                           Fax: [phone]      The LMP of this 36year old,  G1, P0-0-0-0 patient was 2016, giving   her an BEHZAD of 2017 and a current gestational age of 25 weeks 3 days   by dates  A sonographic examination was performed on AUG 22 2016 using   real time equipment  The ultrasound examination was performed using   abdominal & vaginal techniques  The patient has a BMI of 24 9  Her blood   pressure today was 137/86  Earliest ultrasound found in her record: 2016  6w1d  2016 BEHZAD      Nura Gregorio has no complaints today  She reports fetal movement and denies   vaginal bleeding  She has not yet gone to the lab for the second trimester   component of the Sequential Screen  Nura Gregorio continues to be treated with   Effexor and BuSpar  She also takes 81 mg of aspirin a day for the   indication of abnormal first trimester uterine artery Dopplers        Cardiac motion was observed at 144 bpm       INDICATIONS      fetal anatomical survey   abnormal uterine Doppler      Exam Types      LEVEL II   Transvaginal      RESULTS      Fetus # 1 of 1   Vertex presentation   Fetal growth appeared normal   Placenta Location = Anterior   No placenta previa   Placenta Grade = 0      MEASUREMENTS (* Included In Average GA)      AC              14 8 cm        19 weeks 5 days* (38%)   BPD              4 8 cm        20 weeks 4 days* (55%)   HC              17 5 cm        19 weeks 6 days* (34%)   Femur            3 1 cm        19 weeks 5 days* (27%)      Nuchal Fold      3 6 mm      Humerus          3 0 cm        19 weeks 6 days  (40%)   Radius           2 5 cm        20 weeks 1 day   Ulna             2 7 cm        19 weeks 6 days   Tibia            2 5 cm        19 weeks 0 days  (<5%)   Fibula           2 5 cm        18 weeks 3 days   Foot             3 4 cm        20 weeks 5 days      Cerebellum       2 1 cm        20 weeks 6 days   Biorbit          3 2 cm        20 weeks 2 days   CisternaMagna    4 5 mm      HC/AC           1 18   FL/AC           0 21   FL/BPD          0 64   EFW (Ac/Fl/Hc)   314 grams - 0 lbs 11 oz      THE AVERAGE GESTATIONAL AGE is 20 weeks 0 days +/- 10 days  AMNIOTIC FLUID         Largest Vertical Pocket = 4 9 cm   Amniotic Fluid: Normal      UTERINE ARTERIES                                  S/D   PI    RI    NOTCH       Left Uterine Artery              1 81         Yes       Right Uterine Artery             1 41         No      CERVICAL EVALUATION      The cervix appeared normal (Ultrasound Examination)  SUPINE      Cervical Length: 3 80 cm      OTHER TEST RESULTS           Funneling?: No             Dynamic Changes?: No        Resp  To TFP?: No                      Debris?: No      ANATOMY      Head                                    Normal   Face/Neck                               Normal   Th  Cav  Normal   Heart                                   Normal   Abd  Cav  Normal   Stomach                                 Normal   Right Kidney                            Normal   Left Kidney                             Normal   Bladder                                 Normal   Abd  Wall                               Normal   Spine                                   Not Visualized   Extrems                                 Normal   Placenta                                Normal   Umbl   Cord                              Normal   Uterus                                  Normal   PCI                                     Normal      ANATOMY DETAILS      Visualized Appearing Sonographically Normal:   HEAD: (Calvarium, BPD Level, Cavum, Lateral Ventricles, Choroid Plexus,   Cerebellum, Cisterna Magna);    FACE/NECK: (Neck, Nuchal Fold, Profile,   Orbits, Nose/Lips, Palate, Face);    TH  CAV : (Diaphragm); HEART:   (Four Chamber View, Proximal Left Outflow, Proximal Right Outflow, 3   Vessel Trachea, Short Axis of Greater Vessels, Ductal Arch, Aortic Arch,   Interventricular Septum, Interatrial Septum, IVC, SVC, Cardiac Axis,   Cardiac Position);    ABD  CAV : (Gall Bladder);    STOMACH, RIGHT KIDNEY,   LEFT KIDNEY, BLADDER, ABD  WALL, EXTREMS: (Lt Humerus, Rt Humerus, Lt   Forearm, Rt Forearm, Lt Hand, Rt Hand, Lt Femur, Rt Femur, Lt Low Leg, Rt   Low Leg, Lt Foot, Rt Foot); PLACENTA, UMBL  CORD, UTERUS, PCI      Not Visualized:   SPINE      ADNEXA      The left ovary appeared normal and measured 3 1 x 1 9 x 1 3 cm with a   volume of 4 0 cc  The right ovary appeared normal and measured 2 4 x 2 1 x   1 5 cm with a volume of 4 0 cc  IMPRESSION      Duffy IUP   20 weeks and 0 days by this ultrasound  (BEHZAD=JAN 9 2017)   Vertex presentation   Fetal growth appeared normal   Regular fetal heart rate of 144 bpm   Anterior placenta   No placenta previa      GENERAL COMMENT      No fetal structural abnormality or ultrasound marker for aneuploidy is   identified on the Level II ultrasound study today  The spine is   suboptimally imaged  Fetal growth and amniotic fluid volume are normal    The maternal uterine artery Doppler study remains abnormal, with an   elevated mean pulsatility index  The placenta is normal in appearance  The cervix is normal in appearance by transvaginal sonography  Today's ultrasound findings and suggested follow-up were discussed in   detail with Bhavani Goss  We discussed that prenatal ultrasound cannot rule   out all congenital abnormalities   The persistently abnormal maternal   uterine artery Doppler study is associated with an increased risk for   adverse pregnancy outcomes related to abnormal placentation, including   IUGR and preeclampsia  Daily low dose aspirin therapy initiated at less   than 16 weeks gestation is associated with a significant reduction of   these risks  Continuation of daily low dose aspirin therapy is recommended   through 34 weeks gestation  Lakshmi López will return to the [facility]  at   32 weeks gestation to assess interval growth  She was given a lab slip   today in order to have the second trimester component of the Sequential   Screen completed  Based on experimental animal studies and limited human reports, Effexor   and its active metabolite are not anticipated to increase the risk of   congenital anomalies  Transient and usually mild  complications   have been reported for Effexor and other serotonergic neurogenic   antidepressants  Based on experimental animal studies, BuSpar is not   anticipated to increase the risk of congenital anomalies  There are no   controlled human data  The face to face time, in addition to time spent discussing ultrasound   results, was 10 minutes, greater than 50% of which was spent during   counseling and coordination of care  NICHOLAS Mclaughlin M D  Maternal-Fetal Medicine   Electronically signed 16 13:01           Electronically signed by:Evan LEAL    Aug 23 2016 12:43AM EST

## 2018-01-13 VITALS
BODY MASS INDEX: 22.73 KG/M2 | WEIGHT: 133.13 LBS | DIASTOLIC BLOOD PRESSURE: 80 MMHG | OXYGEN SATURATION: 98 % | HEART RATE: 123 BPM | SYSTOLIC BLOOD PRESSURE: 130 MMHG | HEIGHT: 64 IN | TEMPERATURE: 98.5 F

## 2018-01-13 VITALS
WEIGHT: 147 LBS | DIASTOLIC BLOOD PRESSURE: 94 MMHG | HEART RATE: 112 BPM | BODY MASS INDEX: 26.05 KG/M2 | SYSTOLIC BLOOD PRESSURE: 132 MMHG | OXYGEN SATURATION: 98 % | TEMPERATURE: 98.9 F | HEIGHT: 63 IN

## 2018-01-13 VITALS
HEIGHT: 64 IN | OXYGEN SATURATION: 98 % | WEIGHT: 132.5 LBS | HEART RATE: 82 BPM | DIASTOLIC BLOOD PRESSURE: 88 MMHG | SYSTOLIC BLOOD PRESSURE: 126 MMHG | BODY MASS INDEX: 22.62 KG/M2 | TEMPERATURE: 98.1 F

## 2018-01-13 VITALS
OXYGEN SATURATION: 97 % | SYSTOLIC BLOOD PRESSURE: 120 MMHG | HEIGHT: 64 IN | HEART RATE: 90 BPM | BODY MASS INDEX: 22.81 KG/M2 | DIASTOLIC BLOOD PRESSURE: 82 MMHG | WEIGHT: 133.6 LBS | TEMPERATURE: 98.1 F

## 2018-01-14 VITALS
OXYGEN SATURATION: 98 % | SYSTOLIC BLOOD PRESSURE: 122 MMHG | BODY MASS INDEX: 22.75 KG/M2 | WEIGHT: 133.25 LBS | DIASTOLIC BLOOD PRESSURE: 68 MMHG | HEART RATE: 81 BPM | HEIGHT: 64 IN

## 2018-01-14 VITALS
SYSTOLIC BLOOD PRESSURE: 124 MMHG | HEART RATE: 81 BPM | WEIGHT: 134.5 LBS | OXYGEN SATURATION: 98 % | HEIGHT: 63 IN | DIASTOLIC BLOOD PRESSURE: 76 MMHG | BODY MASS INDEX: 23.83 KG/M2

## 2018-01-14 VITALS
HEIGHT: 63 IN | WEIGHT: 135.13 LBS | OXYGEN SATURATION: 99 % | TEMPERATURE: 98.2 F | SYSTOLIC BLOOD PRESSURE: 112 MMHG | BODY MASS INDEX: 23.94 KG/M2 | DIASTOLIC BLOOD PRESSURE: 74 MMHG | HEART RATE: 95 BPM

## 2018-01-14 VITALS
DIASTOLIC BLOOD PRESSURE: 86 MMHG | OXYGEN SATURATION: 99 % | HEIGHT: 63 IN | SYSTOLIC BLOOD PRESSURE: 124 MMHG | HEART RATE: 82 BPM | BODY MASS INDEX: 26.05 KG/M2 | WEIGHT: 147 LBS

## 2018-01-15 NOTE — MISCELLANEOUS
Message  Return to work or school:   Joann Rahman is under my professional care  She was seen in my office on 10/26/2016       Please excuse Hernando Zapata from either being late or missing work today due to wife being sent to hospital from visit  Any questions or concerns please feel free to call us  Thanks  Dr Leland Alvarado        Signatures   Electronically signed by : JADEN Cardona ; Oct 26 2016  1:47PM EST                       (Author)

## 2018-01-15 NOTE — RESULT NOTES
Verified Results  (1) THIN PREP PAP WITH IMAGING 21Nov2017 11:30AM Mando Rodriguez     Test Name Result Flag Reference   LAB AP CASE REPORT (Report)     Gynecologic Cytology Report            Case: [id number]                  Authorizing Provider: Lugenia Gilford, MD     Collected:      11/21/2017 1130        First Screen:     BRUNO Granados    Received:      11/24/2017 1103        Specimen:  LIQUID-BASED PAP, SCREENING, Cervix   HPV HIGH RISK RESULT (Report)     HPV, High Risk: HPV NEG, HPV16 NEG, HPV18 NEG      Other High Risk HPV Negative, HPV 16 Negative, HPV 18 Negative  HPV types: 16,18,31,33,35,39,45,51,52,56,58,59,66 and 68 DNA are undetectable or below the pre-set threshold  [employer] FDA approved Jose [address] 4800 is utilized with strict adherence to the manufacturers instruction  manual to test for the presence of High-Risk HPV DNA, as well as HPV 16 and HPV 18  This instrument  has been validated by our laboratory and/or by the   A negative result does not preclude the presence of HPV infection because results depend on adequate  specimen collection, absence of inhibitors and sufficient DNA to be detected  Additionally, HPV negative  results are not intended to prevent women from proceeding to colposcopy if clinically warranted  Positive HPV test results indicate the presence of any one or more of the high risk types, but since patients  are often co-infected with low-risk types it does not rule out the presence of low-risk types in patients  with mixed infections  LAB AP GYN PRIMARY INTERPRETATION      Negative for intraepithelial lesion or malignancy  Electronically signed by BRUNO Granados on 11/29/2017 at 12:23 PM   LAB AP GYN SPECIMEN ADEQUACY      Satisfactory for evaluation  Endocervical/transformation zone component present     LAB AP GYN ADDITIONAL INFORMATION (Report)     [employer]'s FDA approved ,  and ThinPrep Imaging System are   utilized with strict adherence to the 's instruction manual to   prepare gynecologic and non-gynecologic cytology specimens for the   production of ThinPrep slides as well as for gynecologic ThinPrep imaging  These processes have been validated by our laboratory and/or by the     The Pap test is not a diagnostic procedure and should not be used as the   sole means to detect cervical cancer  It is only a screening procedure to   aid in the detection of cervical cancer and its precursors  Both   false-negative and false-positive results have been experienced  Your   patient's test result should be interpreted in this context together with   the history and clinical findings

## 2018-01-15 NOTE — MISCELLANEOUS
Message  Return to work or school:   Tin Saldaña is under my professional care  She was seen in my office on 7/20/2016   She is currently 15 weeks pregnant and her BEHZAD is 1/6/2017  If there are any questions please feel free to give the office a call    Thank you,          Leopoldo De Los Santos MD       Signatures   Electronically signed by : JADEN Garcia ; Jul 20 2016 11:32AM EST                       (Author)

## 2018-01-16 NOTE — MISCELLANEOUS
Assessment   1  Benign essential HTN (401 1) (I10)    Plan  Benign essential HTN    · Start: AmLODIPine Besylate 5 MG Oral Tablet; TAKE 1 TABLET DAILY  Rx By: Jose Vann; Dispense: 30 Days ; #:30 Tablet; Refill: 3;For: Benign essential   HTN; CAL = N; Sent To: [facility] 106 [id number]   · (1) CBC/ PLT (NO DIFF); Status:Active; Requested for:84Lkm7779;   Perform:[facility]; NNE:[id number];SEKAEJW; For:Benign essential HTN;   Ordered By:Luis Alberto Douglas;   · (1) VITAMIN D 25-HYDROXY; Status:Active; Requested for:80Knu1379;   Perform:[facility]; OWY:54APE1561;VFFZQTC; For:Benign essential HTN;   Ordered By:Luis Alberto Douglas;   · Follow-up visit in 1 month Evaluation and Treatment  Follow-up  Status: Hold For -  Scheduling  Requested for: 86Isj5740  Ordered; For: Benign essential HTN;  Ordered By: Jose Vann  Performed:   Due:   56TFM9743    Discussion/Summary  Discussion Summary:   D/c hydralazine  start amlodopine 5 mg daily  add iron, vit D  Counseling Documentation With Imm: The was counseled regarding diagnostic results, instructions for management, risk factor reductions, prognosis, patient and family education, impressions  Chief Complaint  Chief Complaint Free Text Note Form: Patient is here today for transition of care visit  She was hospitalized 12/17/16 for pre-eclampsia and discharged on 12/18/16  Hydralazine is listed as three times daily and as every 6 hours  She is taking the medication twice daily  Last blood testing done 12/17/16  She is using Labetalol as emergency med  History of Present Illness  TCM Communication [facility]: The patient is being contacted for follow-up after hospitalization and JOVANI scheduled 12/29/16 @ 3 PM w/ IA in [facility] records were reviewed  She was hospitalized at [facility]  The date of admission: 12/17/16, date of discharge: 12/18/16  Diagnosis: pre-eclampsia, post partum depression  She was discharged to home   Medications reviewed and updated today  She scheduled a follow up appointment  Follow-up appointments with other specialists: OB/ GYN  Symptoms: headache  The patient is currently experiencing symptoms  Counseling was provided to the patient  Communication performed and completed by kb      Review of Systems  Complete-Female:   Constitutional: No fever, no chills, feels well, no tiredness, no recent weight gain or weight loss  Eyes: No complaints of eye pain, no red eyes, no eyesight problems, no discharge, no dry eyes, no itching of eyes  ENT: no complaints of earache, no loss of hearing, no nose bleeds, no nasal discharge, no sore throat, no hoarseness  Cardiovascular: No complaints of slow heart rate, no fast heart rate, no chest pain, no palpitations, no leg claudication, no lower extremity edema  Respiratory: No complaints of shortness of breath, no wheezing, no cough, no SOB on exertion, no orthopnea, no PND  Musculoskeletal: No complaints of arthralgias, no myalgias, no joint swelling or stiffness, no limb pain or swelling  Psychiatric: depression, but Not suicidal, no sleep disturbance, no anxiety or depression, no change in personality, no emotional problems  Active Problems   1  Abnormal glucose in pregnancy, antepartum (648 83) (O99 810)  2  Anxiety (300 00) (F41 9)  3  Benign essential HTN (401 1) (I10)  4  Bipolar disorder (manic depression) (296 80) (F31 9)  5  Contraception (V25 9) (Z30 9)  6  Depression (311) (F32 9)  7  Hypertension in pregnancy (642 90) (O16 9)  8  Insomnia (780 52) (G47 00)  9  Menorrhagia (626 2) (N92 0)  10  Patient is a currently breast-feeding mother (V24 1) (Z39 1)  11  Severe preeclampsia, third trimester (642 53) (O14 13)    Past Medical History   1  History of Bipolar disorder with depression (296 50) (F31 30)  2  History of Breast pain (611 71) (N64 4)  3  History of endometriosis (V13 29) (Z87 42)  4  History of scoliosis (V13 59) (Z87 39)  5   History of Teratogen exposure in current pregnancy (655 80) (O35 8XX0)  6  History of Tobacco user (305 1) (Z72 0)    Surgical History   1  History of Hernia Repair  2  History of Oral Surgery Tooth Extraction [place] Tooth  3  History of Tonsillectomy  Surgical History Reviewed: The surgical history was reviewed and updated today  Family History  Mother   1  Family history of depression (V17 0) (Z81 8)  Father   2  Family history of High blood pressure (not hypertension)  Family History Reviewed: The family history was reviewed and updated today  Social History    · Currently sexually active   · Daily caffeine consumption, 1 serving a day   · Exercises regularly   · Never a smoker   · No alcohol use   · No drug use   · History of Smoker (305 1) (F17 200)  Social History Reviewed: The social history was reviewed and updated today  Current Meds  1  Ativan 1 MG Oral Tablet; TAKE 1 TABLET 3 times daily PRN; Therapy: (Recorded:58Ses8312) to Recorded  2  Effexor  MG Oral Capsule Extended Release 24 Hour; take 1 capsule daily; Therapy: 28OOG7278 to Recorded  3  Magnesium Oxide 400 MG Oral Tablet; Take 1 tablet twice daily; Therapy: (Recorded:67Jon2295) to Recorded  4  SEROquel 100 MG Oral Tablet; TAKE 1 TABLET AT BEDTIME; Therapy: (Recorded:38Ucm7992) to Recorded  5  Tylenol 500 MG CAPS; Therapy: (Recorded:40Zxb7044) to Recorded  Medication List Reviewed: The medication list was reviewed and updated today  Allergies   1  Reglan  2  sulfa   3  No Known Environmental Allergies  4   No Known Food Allergies    Vitals  Signs   Recorded: 68KUO3508 33:56WR   Systolic: 857  Diastolic: 78  Recorded: 34PLA0386 02:58PM   Temperature: 97 6 F, Tympanic  Heart Rate: 96, L Radial  Pulse Quality: Regular, L Radial  Systolic: 738, LUE, Sitting  Diastolic: 80, LUE, Sitting  Height: 5 ft 3 19 in  Weight: 153 lb   BMI Calculated: 26 94  BSA Calculated: 1 73  O2 Saturation: 98, RA    Physical Exam    Constitutional General appearance: Abnormal   overweight  Eyes   Conjunctiva and lids: No swelling, erythema or discharge  Ears, Nose, Mouth, and Throat   Otoscopic examination: Tympanic membranes translucent with normal light reflex  Canals patent without erythema  Oropharynx: Normal with no erythema, edema, exudate or lesions  Pulmonary   Auscultation of lungs: Clear to auscultation  Cardiovascular   Auscultation of heart: Normal rate and rhythm, normal S1 and S2, without murmurs  Examination of extremities for edema and/or varicosities: Normal     Carotid pulses: Normal     Abdomen   Abdomen: Non-tender, no masses  Musculoskeletal   Gait and station: Normal     Neurologic   Cranial nerves: Cranial nerves 2-12 intact  Reflexes: 2+ and symmetric  Psychiatric   Orientation to person, place, and time: Normal     Mood and affect: Normal          Future Appointments    Date/Time Provider Specialty Site   12/29/2016 03:00 PM Bulmaro Viramontes MD Internal Medicine [facility]     Signatures   Electronically signed by : Tobias Quispe, ; Dec 20 2016 12:49PM EST                       (Co-author)    Electronically signed by : Henrietta Townsend MD; Dec 29 2016  3:21PM EST                       (Author)    Electronically signed by :  Henrietta Townsend MD; Mar  6 2017  9:11PM EST                       (Author)

## 2018-01-16 NOTE — PROGRESS NOTES
2016         RE: [facility]                              To: JADEN Nelson    MR#: [id number]                                    [address]   : AUG [address]: [id number]:Faustino Zarate U  6    (Exam #: [id number])                           Fax: [phone]      The LMP of this 31year old,  G1, P0-0-0-0 patient was 2016, giving   her an BEHZAD of 2017 and a current gestational age of 9 weeks 3 days   by dates  A sonographic examination was performed on 2016 using   real time equipment  The ultrasound examination was performed using   abdominal technique  The patient has a BMI of 23 9  Her blood pressure   today was 129/90  Earliest ultrasound found in her record: 2016  6w1d  2016 BEHZAD      Mazin Ariza reports she is on Effexor 75 mg daily and buspirone 10 mg 3 times   a day  She is also on prenatal vitamins, Tums, and DHA daily and Benadryl   25 mg tablet at bedtime for sleep as needed  She reports an allergy to   sulfa and Reglan  She is followed by a Jonny Antonio from the [address] for her depression, anxiety, and bipolar syndrome   that she was diagnosed with at age 17  Prior to pregnancy and in early   pregnancy she was also on Seroquel, Clonazepam, Remeron and Doxepin  She   denies any use of cigarettes, alcohol or illicit drugs  Her past medical   history as stated above is significant for depression, anxiety, and   bipolar since age 17 and scoliosis for which she sees a chiropractor  Her   surgical history includes a tonsillectomy, removal of wisdom teeth, and a   hernia repair  Her family history significant for high blood pressure, and   hypothyroidism in her mother and mental illness in herself, her mother and   her grandmother who all have depression, anxiety and bipolar disease   She   denies any first generation family history of diabetes, thrombosis or   congenital anomalies  A normal gestational sac was documented  A normal fetal pole was   visualized  Cardiac motion was observed at 173 bpm  The yolk sac was seen  INDICATIONS      dating   viability   depression   maternal anxiety   maternal bipolar disorder      Exam Types      Level I      RESULTS      Fetus # 1 of 1   Fetal growth appeared normal   Placenta Location = Anterior   Placenta Grade = 0      MEASUREMENTS (* Included In Average GA)      CRL              3 1 cm        9 weeks 6 days *      THE AVERAGE GESTATIONAL AGE is 9 weeks 6 days +/- 5 days  ANATOMY COMMENTS      Anatomic detail is extremely limited at this gestational age  However, a   discrete fetal pole with cardiac and fetal body motion was documented  Limb buds were documented as well  The yolk sac was clearly seen, and the   gestational sac is normal in appearance and located in the fundus of the   uterus  No gross abnormalities were noted on this examination  Free fluid   is not seen in the posterior cul-de-sac  There is no suspicion of a   subchorionic bleed  ADNEXA      The left ovary appeared normal and measured 3 1 x 1 6 x 2 1 cm with a   volume of 5 4 cc  The right ovary appeared normal and measured 2 3 x 2 3 x   1 5 cm with a volume of 4 2 cc  IMPRESSION      Duffy IUP   9 weeks and 6 days by this ultrasound  (BEHZAD=DANILO 10 2017)   Fetal growth appeared normal   Regular fetal heart rate of 173 bpm   Anterior placenta      GENERAL COMMENT      As per your request, a consultation was performed on your patient for the   following indication:medication use in pregnancy that may be   teratogenic  She will meet with Ben Choi our genetic counselor next to go   over her medication list in depth  Please see that note under a different   cover        The patient was informed of the findings and counseled about the   limitations of the exam in detecting all forms of fetal congenital   abnormalities  She denies any vaginal bleeding or uterine cramping/contractions  Exam shows she is comfortable and her abdomen is non tender  Follow up recommended:   1  She is interested in genetic screening  She will return at 12 weeks for   a sequential screen and at 20 weeks for an anatomy scan  2  Will review her consult with genetics at her next ultrasound to decide   if further ultrasounds for fetal growth are recommended  NICHOLAS Wilkins M D  Maternal-Fetal Medicine   Electronically signed 06/13/16 20:50           Electronically signed by:Evan LEAL    Jun 16 2016  9:26AM EST

## 2018-01-17 ENCOUNTER — TRANSCRIBE ORDERS (OUTPATIENT)
Dept: ADMINISTRATIVE | Facility: HOSPITAL | Age: 33
End: 2018-01-17

## 2018-01-17 DIAGNOSIS — K59.1 FUNCTIONAL DIARRHEA: ICD-10-CM

## 2018-01-17 DIAGNOSIS — R11.0 NAUSEA: ICD-10-CM

## 2018-01-17 DIAGNOSIS — R10.13 ABDOMINAL PAIN, EPIGASTRIC: Primary | ICD-10-CM

## 2018-01-21 ENCOUNTER — APPOINTMENT (EMERGENCY)
Dept: RADIOLOGY | Facility: HOSPITAL | Age: 33
End: 2018-01-21
Payer: COMMERCIAL

## 2018-01-21 ENCOUNTER — HOSPITAL ENCOUNTER (EMERGENCY)
Facility: HOSPITAL | Age: 33
Discharge: HOME | End: 2018-01-21
Attending: EMERGENCY MEDICINE | Admitting: EMERGENCY MEDICINE
Payer: COMMERCIAL

## 2018-01-21 VITALS
RESPIRATION RATE: 18 BRPM | DIASTOLIC BLOOD PRESSURE: 74 MMHG | HEART RATE: 70 BPM | SYSTOLIC BLOOD PRESSURE: 118 MMHG | OXYGEN SATURATION: 99 % | TEMPERATURE: 97.5 F

## 2018-01-21 DIAGNOSIS — R11.0 NAUSEA: ICD-10-CM

## 2018-01-21 DIAGNOSIS — R10.11 RUQ ABDOMINAL PAIN: ICD-10-CM

## 2018-01-21 DIAGNOSIS — R10.13 DYSPEPSIA: Primary | ICD-10-CM

## 2018-01-21 LAB
ALBUMIN SERPL BCP-MCNC: 4.1 G/DL (ref 3.5–5)
ALP SERPL-CCNC: 66 U/L (ref 46–116)
ALT SERPL W P-5'-P-CCNC: 14 U/L (ref 12–78)
ANION GAP SERPL CALCULATED.3IONS-SCNC: 7 MMOL/L (ref 4–13)
AST SERPL W P-5'-P-CCNC: 6 U/L (ref 5–45)
BACTERIA UR QL AUTO: ABNORMAL /HPF
BASOPHILS # BLD AUTO: 0.01 THOUSANDS/ΜL (ref 0–0.1)
BASOPHILS NFR BLD AUTO: 0 % (ref 0–1)
BILIRUB SERPL-MCNC: 0.34 MG/DL (ref 0.2–1)
BILIRUB UR QL STRIP: ABNORMAL
BUN SERPL-MCNC: 11 MG/DL (ref 5–25)
CALCIUM SERPL-MCNC: 9.1 MG/DL (ref 8.3–10.1)
CHLORIDE SERPL-SCNC: 107 MMOL/L (ref 100–108)
CLARITY UR: CLEAR
CO2 SERPL-SCNC: 26 MMOL/L (ref 21–32)
COLOR UR: YELLOW
COLOR, POC: YELLOW
CREAT SERPL-MCNC: 0.87 MG/DL (ref 0.6–1.3)
EOSINOPHIL # BLD AUTO: 0.13 THOUSAND/ΜL (ref 0–0.61)
EOSINOPHIL NFR BLD AUTO: 2 % (ref 0–6)
ERYTHROCYTE [DISTWIDTH] IN BLOOD BY AUTOMATED COUNT: 13.1 % (ref 11.6–15.1)
EXT PREG TEST URINE: NORMAL
GFR SERPL CREATININE-BSD FRML MDRD: 90 ML/MIN/1.73SQ M
GLUCOSE SERPL-MCNC: 98 MG/DL (ref 65–140)
GLUCOSE UR STRIP-MCNC: NEGATIVE MG/DL
HCT VFR BLD AUTO: 38.3 % (ref 34.8–46.1)
HGB BLD-MCNC: 12.7 G/DL (ref 11.5–15.4)
HGB UR QL STRIP.AUTO: NEGATIVE
HYALINE CASTS #/AREA URNS LPF: ABNORMAL /LPF
KETONES UR STRIP-MCNC: NEGATIVE MG/DL
LEUKOCYTE ESTERASE UR QL STRIP: ABNORMAL
LIPASE SERPL-CCNC: 239 U/L (ref 73–393)
LYMPHOCYTES # BLD AUTO: 2.29 THOUSANDS/ΜL (ref 0.6–4.47)
LYMPHOCYTES NFR BLD AUTO: 38 % (ref 14–44)
MCH RBC QN AUTO: 28.7 PG (ref 26.8–34.3)
MCHC RBC AUTO-ENTMCNC: 33.2 G/DL (ref 31.4–37.4)
MCV RBC AUTO: 87 FL (ref 82–98)
MONOCYTES # BLD AUTO: 0.54 THOUSAND/ΜL (ref 0.17–1.22)
MONOCYTES NFR BLD AUTO: 9 % (ref 4–12)
NEUTROPHILS # BLD AUTO: 3.06 THOUSANDS/ΜL (ref 1.85–7.62)
NEUTS SEG NFR BLD AUTO: 51 % (ref 43–75)
NITRITE UR QL STRIP: NEGATIVE
NON-SQ EPI CELLS URNS QL MICRO: ABNORMAL /HPF
NRBC BLD AUTO-RTO: 0 /100 WBCS
PH UR STRIP.AUTO: 7 [PH] (ref 4.5–8)
PLATELET # BLD AUTO: 353 THOUSANDS/UL (ref 149–390)
PMV BLD AUTO: 10.3 FL (ref 8.9–12.7)
POTASSIUM SERPL-SCNC: 3.9 MMOL/L (ref 3.5–5.3)
PROT SERPL-MCNC: 7.8 G/DL (ref 6.4–8.2)
PROT UR STRIP-MCNC: ABNORMAL MG/DL
RBC # BLD AUTO: 4.43 MILLION/UL (ref 3.81–5.12)
RBC #/AREA URNS AUTO: ABNORMAL /HPF
SODIUM SERPL-SCNC: 140 MMOL/L (ref 136–145)
SP GR UR STRIP.AUTO: >=1.03 (ref 1–1.03)
UROBILINOGEN UR QL STRIP.AUTO: 0.2 E.U./DL
WBC # BLD AUTO: 6.04 THOUSAND/UL (ref 4.31–10.16)
WBC #/AREA URNS AUTO: ABNORMAL /HPF

## 2018-01-21 PROCEDURE — 96361 HYDRATE IV INFUSION ADD-ON: CPT

## 2018-01-21 PROCEDURE — 81001 URINALYSIS AUTO W/SCOPE: CPT

## 2018-01-21 PROCEDURE — 96374 THER/PROPH/DIAG INJ IV PUSH: CPT

## 2018-01-21 PROCEDURE — 99284 EMERGENCY DEPT VISIT MOD MDM: CPT

## 2018-01-21 PROCEDURE — 36415 COLL VENOUS BLD VENIPUNCTURE: CPT | Performed by: EMERGENCY MEDICINE

## 2018-01-21 PROCEDURE — 96375 TX/PRO/DX INJ NEW DRUG ADDON: CPT

## 2018-01-21 PROCEDURE — 74177 CT ABD & PELVIS W/CONTRAST: CPT

## 2018-01-21 PROCEDURE — 81025 URINE PREGNANCY TEST: CPT

## 2018-01-21 PROCEDURE — 83690 ASSAY OF LIPASE: CPT | Performed by: EMERGENCY MEDICINE

## 2018-01-21 PROCEDURE — 85025 COMPLETE CBC W/AUTO DIFF WBC: CPT | Performed by: EMERGENCY MEDICINE

## 2018-01-21 PROCEDURE — 80053 COMPREHEN METABOLIC PANEL: CPT | Performed by: EMERGENCY MEDICINE

## 2018-01-21 PROCEDURE — 81002 URINALYSIS NONAUTO W/O SCOPE: CPT

## 2018-01-21 RX ORDER — FAMOTIDINE 20 MG/1
20 TABLET, FILM COATED ORAL 2 TIMES DAILY
Qty: 28 TABLET | Refills: 0 | Status: SHIPPED | OUTPATIENT
Start: 2018-01-21 | End: 2018-02-16 | Stop reason: ALTCHOICE

## 2018-01-21 RX ORDER — MAGNESIUM HYDROXIDE/ALUMINUM HYDROXICE/SIMETHICONE 120; 1200; 1200 MG/30ML; MG/30ML; MG/30ML
30 SUSPENSION ORAL ONCE
Status: COMPLETED | OUTPATIENT
Start: 2018-01-21 | End: 2018-01-21

## 2018-01-21 RX ORDER — MORPHINE SULFATE 4 MG/ML
4 INJECTION, SOLUTION INTRAMUSCULAR; INTRAVENOUS ONCE
Status: COMPLETED | OUTPATIENT
Start: 2018-01-21 | End: 2018-01-21

## 2018-01-21 RX ORDER — SUCRALFATE 1 G/1
1 TABLET ORAL 4 TIMES DAILY
Qty: 28 TABLET | Refills: 0 | Status: SHIPPED | OUTPATIENT
Start: 2018-01-21 | End: 2018-02-16 | Stop reason: ALTCHOICE

## 2018-01-21 RX ORDER — DICYCLOMINE HCL 20 MG
20 TABLET ORAL ONCE
Status: COMPLETED | OUTPATIENT
Start: 2018-01-21 | End: 2018-01-21

## 2018-01-21 RX ORDER — ONDANSETRON 2 MG/ML
4 INJECTION INTRAMUSCULAR; INTRAVENOUS ONCE
Status: COMPLETED | OUTPATIENT
Start: 2018-01-21 | End: 2018-01-21

## 2018-01-21 RX ORDER — ONDANSETRON 4 MG/1
4 TABLET, ORALLY DISINTEGRATING ORAL EVERY 8 HOURS PRN
Qty: 12 TABLET | Refills: 0 | Status: SHIPPED | OUTPATIENT
Start: 2018-01-21 | End: 2018-05-24

## 2018-01-21 RX ADMIN — FAMOTIDINE 20 MG: 10 INJECTION, SOLUTION INTRAVENOUS at 20:51

## 2018-01-21 RX ADMIN — SODIUM CHLORIDE 1000 ML: 0.9 INJECTION, SOLUTION INTRAVENOUS at 19:43

## 2018-01-21 RX ADMIN — LIDOCAINE HYDROCHLORIDE 15 ML: 20 SOLUTION ORAL; TOPICAL at 20:50

## 2018-01-21 RX ADMIN — IOHEXOL 80 ML: 350 INJECTION, SOLUTION INTRAVENOUS at 20:43

## 2018-01-21 RX ADMIN — ALUMINUM HYDROXIDE, MAGNESIUM HYDROXIDE, AND SIMETHICONE 30 ML: 200; 200; 20 SUSPENSION ORAL at 20:50

## 2018-01-21 RX ADMIN — ONDANSETRON 4 MG: 2 INJECTION INTRAMUSCULAR; INTRAVENOUS at 19:45

## 2018-01-21 RX ADMIN — DICYCLOMINE HYDROCHLORIDE 20 MG: 20 TABLET ORAL at 19:43

## 2018-01-21 RX ADMIN — MORPHINE SULFATE 4 MG: 4 INJECTION INTRAVENOUS at 19:46

## 2018-01-22 ENCOUNTER — HOSPITAL ENCOUNTER (OUTPATIENT)
Dept: RADIOLOGY | Facility: HOSPITAL | Age: 33
Discharge: HOME | End: 2018-01-22
Payer: COMMERCIAL

## 2018-01-22 ENCOUNTER — HOSPITAL ENCOUNTER (EMERGENCY)
Facility: HOSPITAL | Age: 33
Discharge: HOME | End: 2018-01-23
Attending: EMERGENCY MEDICINE | Admitting: EMERGENCY MEDICINE
Payer: COMMERCIAL

## 2018-01-22 VITALS
OXYGEN SATURATION: 98 % | BODY MASS INDEX: 22.79 KG/M2 | TEMPERATURE: 98.3 F | HEIGHT: 64 IN | HEART RATE: 84 BPM | WEIGHT: 133.5 LBS | SYSTOLIC BLOOD PRESSURE: 124 MMHG | DIASTOLIC BLOOD PRESSURE: 86 MMHG

## 2018-01-22 VITALS
RESPIRATION RATE: 18 BRPM | BODY MASS INDEX: 22.31 KG/M2 | TEMPERATURE: 98.2 F | DIASTOLIC BLOOD PRESSURE: 74 MMHG | WEIGHT: 130 LBS | SYSTOLIC BLOOD PRESSURE: 128 MMHG | OXYGEN SATURATION: 99 % | HEART RATE: 115 BPM

## 2018-01-22 DIAGNOSIS — R10.13 ABDOMINAL PAIN, EPIGASTRIC: ICD-10-CM

## 2018-01-22 DIAGNOSIS — K59.1 FUNCTIONAL DIARRHEA: ICD-10-CM

## 2018-01-22 DIAGNOSIS — R10.9 ABDOMINAL PAIN: Primary | ICD-10-CM

## 2018-01-22 DIAGNOSIS — R11.0 NAUSEA: ICD-10-CM

## 2018-01-22 LAB
ALBUMIN SERPL BCP-MCNC: 4 G/DL (ref 3.5–5)
ALP SERPL-CCNC: 66 U/L (ref 46–116)
ALT SERPL W P-5'-P-CCNC: 13 U/L (ref 12–78)
ANION GAP SERPL CALCULATED.3IONS-SCNC: 5 MMOL/L (ref 4–13)
AST SERPL W P-5'-P-CCNC: 8 U/L (ref 5–45)
BACTERIA UR QL AUTO: NORMAL /HPF
BASOPHILS # BLD AUTO: 0.01 THOUSANDS/ΜL (ref 0–0.1)
BASOPHILS NFR BLD AUTO: 0 % (ref 0–1)
BILIRUB SERPL-MCNC: 0.26 MG/DL (ref 0.2–1)
BILIRUB UR QL STRIP: NEGATIVE
BUN SERPL-MCNC: 9 MG/DL (ref 5–25)
CALCIUM SERPL-MCNC: 9.5 MG/DL (ref 8.3–10.1)
CHLORIDE SERPL-SCNC: 107 MMOL/L (ref 100–108)
CLARITY UR: CLEAR
CO2 SERPL-SCNC: 27 MMOL/L (ref 21–32)
COLOR UR: YELLOW
CREAT SERPL-MCNC: 0.79 MG/DL (ref 0.6–1.3)
EOSINOPHIL # BLD AUTO: 0.17 THOUSAND/ΜL (ref 0–0.61)
EOSINOPHIL NFR BLD AUTO: 3 % (ref 0–6)
ERYTHROCYTE [DISTWIDTH] IN BLOOD BY AUTOMATED COUNT: 13.1 % (ref 11.6–15.1)
GFR SERPL CREATININE-BSD FRML MDRD: 101 ML/MIN/1.73SQ M
GLUCOSE SERPL-MCNC: 97 MG/DL (ref 65–140)
GLUCOSE UR STRIP-MCNC: NEGATIVE MG/DL
HCT VFR BLD AUTO: 36.5 % (ref 34.8–46.1)
HGB BLD-MCNC: 12.2 G/DL (ref 11.5–15.4)
HGB UR QL STRIP.AUTO: ABNORMAL
HYALINE CASTS #/AREA URNS LPF: NORMAL /LPF
KETONES UR STRIP-MCNC: NEGATIVE MG/DL
LEUKOCYTE ESTERASE UR QL STRIP: NEGATIVE
LIPASE SERPL-CCNC: 202 U/L (ref 73–393)
LYMPHOCYTES # BLD AUTO: 2.36 THOUSANDS/ΜL (ref 0.6–4.47)
LYMPHOCYTES NFR BLD AUTO: 39 % (ref 14–44)
MCH RBC QN AUTO: 29.1 PG (ref 26.8–34.3)
MCHC RBC AUTO-ENTMCNC: 33.4 G/DL (ref 31.4–37.4)
MCV RBC AUTO: 87 FL (ref 82–98)
MONOCYTES # BLD AUTO: 0.43 THOUSAND/ΜL (ref 0.17–1.22)
MONOCYTES NFR BLD AUTO: 7 % (ref 4–12)
NEUTROPHILS # BLD AUTO: 3.12 THOUSANDS/ΜL (ref 1.85–7.62)
NEUTS SEG NFR BLD AUTO: 51 % (ref 43–75)
NITRITE UR QL STRIP: NEGATIVE
NON-SQ EPI CELLS URNS QL MICRO: NORMAL /HPF
NRBC BLD AUTO-RTO: 0 /100 WBCS
PH UR STRIP.AUTO: 6.5 [PH] (ref 4.5–8)
PLATELET # BLD AUTO: 297 THOUSANDS/UL (ref 149–390)
PMV BLD AUTO: 9.7 FL (ref 8.9–12.7)
POTASSIUM SERPL-SCNC: 3.8 MMOL/L (ref 3.5–5.3)
PROT SERPL-MCNC: 7.5 G/DL (ref 6.4–8.2)
PROT UR STRIP-MCNC: NEGATIVE MG/DL
RBC # BLD AUTO: 4.19 MILLION/UL (ref 3.81–5.12)
RBC #/AREA URNS AUTO: NORMAL /HPF
SODIUM SERPL-SCNC: 139 MMOL/L (ref 136–145)
SP GR UR STRIP.AUTO: >=1.03 (ref 1–1.03)
UROBILINOGEN UR QL STRIP.AUTO: 0.2 E.U./DL
WBC # BLD AUTO: 6.1 THOUSAND/UL (ref 4.31–10.16)
WBC #/AREA URNS AUTO: NORMAL /HPF

## 2018-01-22 PROCEDURE — 96374 THER/PROPH/DIAG INJ IV PUSH: CPT

## 2018-01-22 PROCEDURE — 80053 COMPREHEN METABOLIC PANEL: CPT | Performed by: EMERGENCY MEDICINE

## 2018-01-22 PROCEDURE — 36415 COLL VENOUS BLD VENIPUNCTURE: CPT | Performed by: EMERGENCY MEDICINE

## 2018-01-22 PROCEDURE — C9113 INJ PANTOPRAZOLE SODIUM, VIA: HCPCS | Performed by: EMERGENCY MEDICINE

## 2018-01-22 PROCEDURE — 76700 US EXAM ABDOM COMPLETE: CPT

## 2018-01-22 PROCEDURE — 85025 COMPLETE CBC W/AUTO DIFF WBC: CPT | Performed by: EMERGENCY MEDICINE

## 2018-01-22 PROCEDURE — 83690 ASSAY OF LIPASE: CPT | Performed by: EMERGENCY MEDICINE

## 2018-01-22 PROCEDURE — 81001 URINALYSIS AUTO W/SCOPE: CPT

## 2018-01-22 RX ORDER — PANTOPRAZOLE SODIUM 40 MG/1
40 INJECTION, POWDER, FOR SOLUTION INTRAVENOUS ONCE
Status: COMPLETED | OUTPATIENT
Start: 2018-01-22 | End: 2018-01-22

## 2018-01-22 RX ORDER — SUCRALFATE ORAL 1 G/10ML
1000 SUSPENSION ORAL ONCE
Status: COMPLETED | OUTPATIENT
Start: 2018-01-22 | End: 2018-01-22

## 2018-01-22 RX ADMIN — SUCRALFATE 1000 MG: 1 SUSPENSION ORAL at 23:46

## 2018-01-22 RX ADMIN — PANTOPRAZOLE SODIUM 40 MG: 40 INJECTION, POWDER, FOR SOLUTION INTRAVENOUS at 23:32

## 2018-01-22 NOTE — DISCHARGE INSTRUCTIONS
Acute Abdominal Pain   WHAT YOU NEED TO KNOW:   The cause of your abdominal pain may not be found  If a cause is found, treatment will depend on what the cause is  DISCHARGE INSTRUCTIONS:   Return to the emergency department if:   · You vomit blood or cannot stop vomiting  · You have blood in your bowel movement or it looks like tar  · You have bleeding from your rectum  · Your abdomen is larger than usual, more painful, and hard  · You have severe pain in your abdomen  · You stop passing gas and having bowel movements  · You feel weak, dizzy, or faint  Contact your healthcare provider if:   · You have a fever  · You have new signs and symptoms  · Your symptoms do not get better with treatment  · You have questions or concerns about your condition or care  Medicines  may be given to decrease pain, treat an infection, and manage your symptoms  Take your medicine as directed  Call your healthcare provider if you think your medicine is not helping or if you have side effects  Tell him if you are allergic to any medicine  Keep a list of the medicines, vitamins, and herbs you take  Include the amounts, and when and why you take them  Bring the list or the pill bottles to follow-up visits  Carry your medicine list with you in case of an emergency  Manage your symptoms:   · Apply heat  on your abdomen for 20 to 30 minutes every 2 hours for as many days as directed  Heat helps decrease pain and muscle spasms  · Manage your stress  Stress may cause abdominal pain  Your healthcare provider may recommend relaxation techniques and deep breathing exercises to help decrease your stress  Your healthcare provider may recommend you talk to someone about your stress or anxiety, such as a counselor or a trusted friend  Get plenty of sleep and exercise regularly  · Limit or do not drink alcohol  Alcohol can make your abdominal pain worse   Ask your healthcare provider if it is safe for you to drink alcohol  Also ask how much is safe for you to drink  · Do not smoke  Nicotine and other chemicals in cigarettes can damage your esophagus and stomach  Ask your healthcare provider for information if you currently smoke and need help to quit  E-cigarettes or smokeless tobacco still contain nicotine  Talk to your healthcare provider before you use these products  Make changes to the food you eat as directed:  Do not eat foods that cause abdominal pain or other symptoms  Eat small meals more often  · Eat more high-fiber foods if you are constipated  High-fiber foods include fruits, vegetables, whole-grain foods, and legumes  · Do not eat foods that cause gas if you have bloating  Examples include broccoli, cabbage, and cauliflower  Do not drink soda or carbonated drinks, because these may also cause gas  · Do not eat foods or drinks that contain sorbitol or fructose if you have diarrhea and bloating  Some examples are fruit juices, candy, jelly, and sugar-free gum  · Do not eat high-fat foods, such as fried foods, cheeseburgers, hot dogs, and desserts  · Limit or do not drink caffeine  Caffeine may make symptoms, such as heart burn or nausea, worse  · Drink plenty of liquids to prevent dehydration from diarrhea or vomiting  Ask your healthcare provider how much liquid to drink each day and which liquids are best for you  Follow up with your healthcare provider as directed:  Write down your questions so you remember to ask them during your visits  © 2017 [employer] Information is for End User's use only and may not be sold, redistributed or otherwise used for commercial purposes  All illustrations and images included in CareNotes® are the copyrighted property of [employer] , [employer]  or Darren Beebe  The above information is an  only  It is not intended as medical advice for individual conditions or treatments   Talk to your doctor, nurse or pharmacist before following any medical regimen to see if it is safe and effective for you

## 2018-01-22 NOTE — ED PROVIDER NOTES
History  Chief Complaint   Patient presents with    Abdominal Pain     R sided abd pain, burping a lot, diarrhea, weight loss, decreased appetite  HPI  68-year-old female presents to the emergency department for evaluation of right upper quadrant abdominal pain  Patient states that she has had constant right upper quadrant abdominal pain for approximately 5 days  Pain has been worse when lying down, better when sitting  Patient reports associated nausea without vomiting and excessive belching  On review of systems, she complains of chills and decreased p o  intake  She denies any recent fevers, chest pain, shortness of breath, vomiting, dysuria, hematuria, change in menstrual cycle, or complaints other than stated above  Of note, patient does have history of GI ulcers and follows with gastroenterologist   She reports that pain from ulcers was different thing current pain and that ulcers have reportedly healed, the patient was noted to have hernia  Patient was seen by her gastroenterologist few days ago and was sent home with Zofran, Tums, and plan for outpatient right upper quadrant ultrasound  Patient presents today because she was unable to weight for ultrasound tomorrow  Patient does also mention weight loss, but states that she has only lost approximately 10 lb over the past 3 months since onset of GI problems  Prior to Admission Medications   Prescriptions Last Dose Informant Patient Reported? Taking?    LORazepam (ATIVAN) 1 mg tablet 1/20/2018 at Unknown time  Yes Yes   Sig: Take 1 mg by mouth 3 (three) times a day   QUEtiapine (SEROquel) 100 mg tablet 1/20/2018 at Unknown time  Yes Yes   Sig: Take 100 mg by mouth daily at bedtime   butalbital-acetaminophen-caffeine (FIORICET,ESGIC) -40 mg per tablet 1/20/2018 at Unknown time  No Yes   Sig: Take 1 tablet by mouth every 4 (four) hours as needed for headaches for up to 3 doses   famotidine (PEPCID) 20 mg tablet   No No   Sig: Take 1 tablet by mouth 2 (two) times a day for 14 days   hydrALAZINE (APRESOLINE) 25 mg tablet   No No   Sig: Take 1 tablet by mouth every 6 (six) hours for 30 days   Patient taking differently: Take 25 mg by mouth 2 (two) times a day     hydrOXYzine HCL (ATARAX) 25 mg tablet   No No   Sig: Take 1 tablet by mouth every 6 (six) hours for 30 days   labetalol (NORMODYNE) 300 mg tablet   No No   Sig: Take 1 tablet by mouth every 8 (eight) hours for 30 days   magnesium oxide (MAG-OX) 400 mg   No No   Sig: Take 1 tablet by mouth 2 (two) times a day for 30 days   ondansetron (ZOFRAN-ODT) 4 mg disintegrating tablet   No No   Sig: Take 1 tablet by mouth every 8 (eight) hours as needed for nausea or vomiting for up to 3 days   sucralfate (CARAFATE) 1 g tablet   No No   Sig: Take 1 tablet by mouth 4 (four) times a day for 7 days   venlafaxine (EFFEXOR) 75 mg tablet 2018 at Unknown time  Yes Yes   Sig: Take 150 mg by mouth daily        Facility-Administered Medications: None       Past Medical History:   Diagnosis Date    Anxiety     Depression     Hypertension     Scoliosis     Varicella        Past Surgical History:   Procedure Laterality Date    HERNIA REPAIR      [place]  DELIVERY ONLY N/A 2016    Procedure:  SECTION (); Surgeon: Eleonora Gonzales DO;  Location: [facility] ;  Service: Obstetrics    TONSILECTOMY AND ADNOIDECTOMY Bilateral     WISDOM TOOTH EXTRACTION Bilateral        Family History   Problem Relation Age of Onset    Hypertension Father      I have reviewed and agree with the history as documented  Social History   Substance Use Topics    Smoking status: Former Smoker     Quit date: 2016    Smokeless tobacco: Never Used    Alcohol use No      Comment: once a month prior to pregnancy        Review of Systems   Constitutional: Positive for chills and unexpected weight change  Negative for fever  Respiratory: Negative for shortness of breath      Gastrointestinal: Positive for abdominal pain and nausea  Negative for vomiting  Musculoskeletal: Negative for arthralgias and joint swelling  Skin: Negative for rash and wound  Allergic/Immunologic: Negative for immunocompromised state  Neurological: Negative for headaches  Psychiatric/Behavioral: The patient is not nervous/anxious  All other systems reviewed and are negative  Physical Exam  ED Triage Vitals [01/21/18 1802]   Temperature Pulse Respirations Blood Pressure SpO2   97 5 °F (36 4 °C) (!) 128 20 143/89 100 %      Temp Source Heart Rate Source Patient Position - Orthostatic VS BP Location FiO2 (%)   Oral Monitor Sitting Left arm --      Pain Score       8           Orthostatic Vital Signs  Vitals:    01/21/18 1802 01/21/18 2011   BP: 143/89 118/74   Pulse: (!) 128 70   Patient Position - Orthostatic VS: Sitting Lying       Physical Exam   Constitutional: She is oriented to person, place, and time  She appears well-nourished  No distress  HENT:   Head: Normocephalic and atraumatic  Eyes: EOM are normal    Neck: Normal range of motion  Neck supple  Cardiovascular: Normal rate and regular rhythm  Pulmonary/Chest: Effort normal and breath sounds normal  No respiratory distress  Abdominal: Soft  She exhibits no distension  There is tenderness in the right upper quadrant  Musculoskeletal: Normal range of motion  Neurological: She is alert and oriented to person, place, and time  Skin: Skin is warm and dry  She is not diaphoretic  Psychiatric: She has a normal mood and affect  Her behavior is normal    Nursing note and vitals reviewed        ED Medications  Medications   dicyclomine (BENTYL) tablet 20 mg (20 mg Oral Given 1/21/18 1943)   morphine (PF) 4 mg/mL injection 4 mg (4 mg Intravenous Given 1/21/18 1946)   ondansetron (ZOFRAN) injection 4 mg (4 mg Intravenous Given 1/21/18 1945)   sodium chloride 0 9 % bolus 1,000 mL (0 mL Intravenous Stopped 1/21/18 2045)   famotidine (PEPCID) injection 20 mg (20 mg Intravenous Given 1/21/18 2051)   lidocaine viscous (XYLOCAINE) 2 % mucosal solution 15 mL (15 mL Swish & Swallow Given 1/21/18 2050)   aluminum-magnesium hydroxide-simethicone (MYLANTA) 200-200-20 mg/5 mL oral suspension 30 mL (30 mL Oral Given 1/21/18 2050)   iohexol (OMNIPAQUE) 350 MG/ML injection (MULTI-DOSE) 80 mL (80 mL Intravenous Given 1/21/18 2043)       Diagnostic Studies  Results Reviewed     Procedure Component Value Units Date/Time    Comprehensive metabolic panel [[id number]] Collected:  01/21/18 1947    Lab Status:  Final result Specimen:  Blood from Arm, Right Updated:  01/21/18 2022     Sodium 140 mmol/L      Potassium 3 9 mmol/L      Chloride 107 mmol/L      CO2 26 mmol/L      Anion Gap 7 mmol/L      BUN 11 mg/dL      Creatinine 0 87 mg/dL      Glucose 98 mg/dL      Calcium 9 1 mg/dL      AST 6 U/L      ALT 14 U/L      Alkaline Phosphatase 66 U/L      Total Protein 7 8 g/dL      Albumin 4 1 g/dL      Total Bilirubin 0 34 mg/dL      eGFR 90 ml/min/1 73sq m     Narrative:         National Kidney Disease Education Program recommendations are as follows:  GFR calculation is accurate only with a steady state creatinine  Chronic Kidney disease less than 60 ml/min/1 73 sq  meters  Kidney failure less than 15 ml/min/1 73 sq  meters      Lipase [[id number]]  (Normal) Collected:  01/21/18 1947    Lab Status:  Final result Specimen:  Blood from Arm, Right Updated:  01/21/18 2022     Lipase 239 u/L     CBC and differential [[id number]]  (Normal) Collected:  01/21/18 1947    Lab Status:  Final result Specimen:  Blood from Arm, Right Updated:  01/21/18 2017     WBC 6 04 Thousand/uL      RBC 4 43 Million/uL      Hemoglobin 12 7 g/dL      Hematocrit 38 3 %      MCV 87 fL      MCH 28 7 pg      MCHC 33 2 g/dL      RDW 13 1 %      MPV 10 3 fL      Platelets 978 Thousands/uL      nRBC 0 /100 WBCs      Neutrophils Relative 51 %      Lymphocytes Relative 38 %      Monocytes Relative 9 %      Eosinophils Relative 2 % Basophils Relative 0 %      Neutrophils Absolute 3 06 Thousands/µL      Lymphocytes Absolute 2 29 Thousands/µL      Monocytes Absolute 0 54 Thousand/µL      Eosinophils Absolute 0 13 Thousand/µL      Basophils Absolute 0 01 Thousands/µL     POCT urinalysis dipstick [[id number]]  (Normal) Resulted:  01/21/18 2006    Lab Status:  Final result Updated:  01/21/18 2006     Color, UA yellow    POCT pregnancy, urine [[id number]]  (Normal) Resulted:  01/21/18 2006    Lab Status:  Final result Updated:  01/21/18 2006     EXT PREG TEST UR (Ref: Negative) neg    Urine Microscopic [[id number]]  (Abnormal) Collected:  01/21/18 1930    Lab Status:  Final result Specimen:  Urine from Urine, Clean Catch Updated:  01/21/18 1936     RBC, UA 2-4 (A) /hpf      WBC, UA 4-10 (A) /hpf      Epithelial Cells Moderate (A) /hpf      Bacteria, UA Occasional /hpf      Hyaline Casts, UA 10-25 (A) /lpf     ED Urine Macroscopic [[id number]]  (Abnormal) Collected:  01/21/18 1930    Lab Status:  Final result Specimen:  Urine Updated:  01/21/18 1927     Color, UA Yellow     Clarity, UA Clear     pH, UA 7 0     Leukocytes, UA Trace (A)     Nitrite, UA Negative     Protein, UA 30 (1+) (A) mg/dl      Glucose, UA Negative mg/dl      Ketones, UA Negative mg/dl      Urobilinogen, UA 0 2 E U /dl      Bilirubin, UA Interference- unable to analyze (A)     Blood, UA Negative     Specific Gravity, UA >=1 030    Narrative:       CLINITEK RESULT                 CT abdomen pelvis w contrast   Final Result by Shanique Arana MD (01/21 2058)      Slightly prominent intrahepatic biliary ducts, new compared to prior  Common hepatic and common bile duct remain within normal limits  No filling defects evident  Gallbladder also appears within normal limits  Correlate with clinical findings for    evidence of early biliary obstruction           Workstation performed: [id number]               Procedures  Procedures      Phone Consults  ED Phone Contact    ED Course  ED Course as of Jan 22 0257   Theo Campos Jan 21, 2018 2125 Patient feeling better, though still complains of some pain  Feels comfortable going home with GI follow up, return precautions  [facility]  Number of Diagnoses or Management Options  Dyspepsia:   Nausea:   RUQ abdominal pain:   Diagnosis management comments: 31-year-old female with RUQ pain x 5 days  Bedside RUQ ultrasound negative for GB wall thickening  Will obtain belly labs, CTAP  Will provide analgesia, IVF, antiemetics, and reassess  Dispo accordingly  CritCare Time    Disposition  Final diagnoses:   Dyspepsia   RUQ abdominal pain   Nausea     Time reflects when diagnosis was documented in both MDM as applicable and the Disposition within this note     Time User Action Codes Description Comment    1/21/2018  9:26 PM Guadalupe Knott Add [R10 13] Dyspepsia     1/21/2018  9:26 PM Ava Najera Add [R10 11] RUQ abdominal pain     1/21/2018  9:26 PM Ava Marmolejo Add [R11 0] Nausea       ED Disposition     ED Disposition Condition Comment    Discharge  [facility] discharge to home/self care      Condition at discharge: Good        Follow-up Information     Follow up With Specialties Details Why Contact Info    Tremayne Barros MD Gastroenterology Call in 1 day  [address]  [phone]          Discharge Medication List as of 1/21/2018  9:28 PM      CONTINUE these medications which have CHANGED    Details   famotidine (PEPCID) 20 mg tablet Take 1 tablet by mouth 2 (two) times a day for 14 days, Starting Sun 1/21/2018, Until Sun 2/4/2018, Print      ondansetron (ZOFRAN-ODT) 4 mg disintegrating tablet Take 1 tablet by mouth every 8 (eight) hours as needed for nausea or vomiting for up to 3 days, Starting Sun 1/21/2018, Until Wed 1/24/2018, Print      sucralfate (CARAFATE) 1 g tablet Take 1 tablet by mouth 4 (four) times a day for 7 days, Starting Sun 1/21/2018, Until Sun 1/28/2018, Print CONTINUE these medications which have NOT CHANGED    Details   butalbital-acetaminophen-caffeine (FIORICET,ESGIC) -40 mg per tablet Take 1 tablet by mouth every 4 (four) hours as needed for headaches for up to 3 doses, Starting 1/21/2017, Until Discontinued, Print      LORazepam (ATIVAN) 1 mg tablet Take 1 mg by mouth 3 (three) times a day, Until Discontinued, Historical Med      QUEtiapine (SEROquel) 100 mg tablet Take 100 mg by mouth daily at bedtime, Until Discontinued, Historical Med      venlafaxine (EFFEXOR) 75 mg tablet Take 150 mg by mouth daily  , Until Discontinued, Historical Med      hydrALAZINE (APRESOLINE) 25 mg tablet Take 1 tablet by mouth every 6 (six) hours for 30 days, Starting 11/10/2016, Until Fri 12/16/16, Print      hydrOXYzine HCL (ATARAX) 25 mg tablet Take 1 tablet by mouth every 6 (six) hours for 30 days, Starting 11/10/2016, Until Sat 12/10/16, Print      labetalol (NORMODYNE) 300 mg tablet Take 1 tablet by mouth every 8 (eight) hours for 30 days, Starting 11/10/2016, Until Sat 12/10/16, Print      magnesium oxide (MAG-OX) 400 mg Take 1 tablet by mouth 2 (two) times a day for 30 days, Starting 11/10/2016, Until Sat 12/10/16, Print           No discharge procedures on file  ED Provider  Attending physically available and evaluated Keira Knowles I managed the patient along with the ED Attending      Electronically Signed by         Celeste Epley, MD  01/22/18 8280

## 2018-01-22 NOTE — ED ATTENDING ATTESTATION
I, Shira Hannon, DO, saw and evaluated the patient  I have discussed the patient with the resident/non-physician practitioner and agree with the resident's/non-physician practitioner's findings, Plan of Care, and MDM as documented in the resident's/non-physician practitioner's note, except where noted  All available labs and Radiology studies were reviewed  At this point I agree with the current assessment done in the Emergency Department  I have conducted an independent evaluation of this patient a history and physical is as follows:      Critical Care Time  CritCare Time    Procedures     29 yr old fem with five days with RUQ pain with nausea wo vomiting  Better with zofran from primary  ? Change with diet  Increased laying down  NO fever  No prior hx of the same  Exm: tender in RUQ wo distention  Pln: eval for GB and duodenitis

## 2018-01-23 PROCEDURE — 99284 EMERGENCY DEPT VISIT MOD MDM: CPT

## 2018-01-23 RX ORDER — SUCRALFATE ORAL 1 G/10ML
1 SUSPENSION ORAL 4 TIMES DAILY
Qty: 420 ML | Refills: 0 | Status: SHIPPED | OUTPATIENT
Start: 2018-01-23 | End: 2018-02-16 | Stop reason: ALTCHOICE

## 2018-01-23 NOTE — DISCHARGE INSTRUCTIONS

## 2018-01-23 NOTE — ED ATTENDING ATTESTATION
Stevan Perera MD, saw and evaluated the patient  I have discussed the patient with the resident/non-physician practitioner and agree with the resident's/non-physician practitioner's findings, Plan of Care, and MDM as documented in the resident's/non-physician practitioner's note, except where noted  All available labs and Radiology studies were reviewed  At this point I agree with the current assessment done in the Emergency Department  I have conducted an independent evaluation of this patient including a focused history and a physical exam     82-year-old female, presenting to the emergency department for evaluation of right upper quadrant abdominal pain  Patient has a history of gastritis/peptic ulcer disease, has been on omeprazole, had a endoscopy performed 2 weeks ago and was told that she had a hiatal hernia but no evidence of ulcer disease at that time  Six days ago the patient developed right upper quadrant abdominal pain that has been constant, without any alleviating or exacerbating factors, without any radiation to other areas of the abdomen but with some radiation to the right flank  Patient was seen in the emergency department last night for the same, underwent evaluation with lab work and CT scan that were reviewed and were essentially unremarkable except for some questionable intrahepatic ductal dilatation  Patient followed up with an outpatient ultrasound that was performed this morning  Patient presents the emergency department with continued symptoms  Ten systems reviewed negative except as noted  The patient is resting comfortably on a stretcher in no acute respiratory distress  The patient appears nontoxic  HEENT reveals moist mucous membranes  Head is normocephalic and atraumatic  Conjunctiva and sclera are normal  Neck is nontender and supple with full range of motion to flexion, extension, lateral rotation  No meningismus appreciated  No masses are appreciated   Lungs are clear to auscultation bilaterally without any wheezes, rales or rhonchi  Heart is regular rate and rhythm without any murmurs, rubs or gallops  Abdomen is nondistended, is soft with right upper quadrant abdominal tenderness  Negative Adams's sign  Extremities appear grossly normal without any significant arthropathy  Patient is awake, alert, and oriented x3  The patient has normal interaction  Motor is 5 out of 5  Assessment and plan:  Patient with right upper quadrant abdominal pain  Patient will have ultrasound reviewed and will have repeat lab work  Likely this represents gastritis given that the patient is having lot of her T shins  Recommend Carafate in addition to the medications that she is currently prescribed      Labs Reviewed   ED URINE MACROSCOPIC - Abnormal        Result Value Ref Range Status    Color, UA Yellow   Final    Clarity, UA Clear   Final    pH, UA 6 5  4 5 - 8 0 Final    Leukocytes, UA Negative  Negative Final    Nitrite, UA Negative  Negative Final    Protein, UA Negative  Negative mg/dl Final    Glucose, UA Negative  Negative mg/dl Final    Ketones, UA Negative  Negative mg/dl Final    Urobilinogen, UA 0 2  0 2, 1 0 E U /dl E U /dl Final    Bilirubin, UA Negative  Negative Final    Blood, UA Trace (*) Negative Final    Specific Gravity, UA >=1 030  1 003 - 1 030 Final    Narrative:     CLINITEK RESULT   URINE MICROSCOPIC - Normal    RBC, UA None Seen  None Seen, 0-5 /hpf Final    WBC, UA None Seen  None Seen, 0-5, 5-55, 5-65 /hpf Final    Epithelial Cells None Seen  None Seen, Occasional /hpf Final    Bacteria, UA Occasional  None Seen, Occasional /hpf Final    Hyaline Casts, UA None Seen  None Seen /lpf Final   CBC AND DIFFERENTIAL - Normal    WBC 6 10  4 31 - 10 16 Thousand/uL Final    RBC 4 19  3 81 - 5 12 Million/uL Final    Hemoglobin 12 2  11 5 - 15 4 g/dL Final    Hematocrit 36 5  34 8 - 46 1 % Final    MCV 87  82 - 98 fL Final    MCH 29 1  26 8 - 34 3 pg Final    MCHC 33 4  31 4 - 37 4 g/dL Final    RDW 13 1  11 6 - 15 1 % Final    MPV 9 7  8 9 - 12 7 fL Final    Platelets 912  481 - 390 Thousands/uL Final    nRBC 0  /100 WBCs Final    Neutrophils Relative 51  43 - 75 % Final    Lymphocytes Relative 39  14 - 44 % Final    Monocytes Relative 7  4 - 12 % Final    Eosinophils Relative 3  0 - 6 % Final    Basophils Relative 0  0 - 1 % Final    Neutrophils Absolute 3 12  1 85 - 7 62 Thousands/µL Final    Lymphocytes Absolute 2 36  0 60 - 4 47 Thousands/µL Final    Monocytes Absolute 0 43  0 17 - 1 22 Thousand/µL Final    Eosinophils Absolute 0 17  0 00 - 0 61 Thousand/µL Final    Basophils Absolute 0 01  0 00 - 0 10 Thousands/µL Final   LIPASE - Normal    Lipase 202  73 - 393 u/L Final   COMPREHENSIVE METABOLIC PANEL    Sodium 100  136 - 145 mmol/L Final    Potassium 3 8  3 5 - 5 3 mmol/L Final    Chloride 107  100 - 108 mmol/L Final    CO2 27  21 - 32 mmol/L Final    Anion Gap 5  4 - 13 mmol/L Final    BUN 9  5 - 25 mg/dL Final    Creatinine 0 79  0 60 - 1 30 mg/dL Final    Comment: Standardized to IDMS reference method    Glucose 97  65 - 140 mg/dL Final    Comment:   If the patient is fasting, the ADA then defines impaired fasting glucose as > 100 mg/dL and diabetes as > or equal to 123 mg/dL  Specimen collection should occur prior to Sulfasalazine administration due to the potential for falsely depressed results  Specimen collection should occur prior to Sulfapyridine administration due to the potential for falsely elevated results  Calcium 9 5  8 3 - 10 1 mg/dL Final    AST 8  5 - 45 U/L Final    Comment:   Specimen collection should occur prior to Sulfasalazine administration due to the potential for falsely depressed results  ALT 13  12 - 78 U/L Final    Comment:   Specimen collection should occur prior to Sulfasalazine and/or Sulfapyridine administration due to the potential for falsely depressed results       Alkaline Phosphatase 66  46 - 116 U/L Final    Total Protein 7 5  6 4 - 8 2 g/dL Final    Albumin 4 0  3 5 - 5 0 g/dL Final    Total Bilirubin 0 26  0 20 - 1 00 mg/dL Final    eGFR 101  ml/min/1 73sq m Final    Narrative:     National Kidney Disease Education Program recommendations are as follows:  GFR calculation is accurate only with a steady state creatinine  Chronic Kidney disease less than 60 ml/min/1 73 sq  meters  Kidney failure less than 15 ml/min/1 73 sq  meters  No orders to display    Right upper quadrant ultrasound: Tiny gallbladder polyp  No cholelithiasis or biliary ductal obstruction  No ascites

## 2018-01-25 ENCOUNTER — APPOINTMENT (OUTPATIENT)
Dept: LAB | Facility: HOSPITAL | Age: 33
End: 2018-01-25
Payer: COMMERCIAL

## 2018-01-25 ENCOUNTER — TRANSCRIBE ORDERS (OUTPATIENT)
Dept: LAB | Facility: HOSPITAL | Age: 33
End: 2018-01-25

## 2018-01-25 DIAGNOSIS — R10.13 ABDOMINAL PAIN, EPIGASTRIC: ICD-10-CM

## 2018-01-25 DIAGNOSIS — R10.13 ABDOMINAL PAIN, EPIGASTRIC: Primary | ICD-10-CM

## 2018-01-25 PROCEDURE — 87493 C DIFF AMPLIFIED PROBE: CPT

## 2018-01-25 PROCEDURE — 87505 NFCT AGENT DETECTION GI: CPT

## 2018-01-25 NOTE — ED PROVIDER NOTES
History  Chief Complaint   Patient presents with    Abdominal Pain     pt was seen last night for abd pain and went for u/s today  pt states she vomited 2 times today and the pain is worse tonight  This is a 29year old female presenting to the emergency department for evaluation of RUQ abdominal pain  This pain has been present for about a week, and is burning/aching in nature  It does not radiate and is not made better or worse by food  She follows with an outpatient GI doctor and had an recent endoscopy which was unremarkable  She had and outpatient RUQ US performed today and is scheduled to f/u with GI  She states that she had been taking an PPI daily until about 2 weeks ago when she switched to every other day  She was recently seen and evaluated for this pain yesterday and had laboratory studies and imaging which was unremarkable  The pain is similar to ulcer disease she has had in the past but different in location and somewhat in quality  She has had no diarrhea, vomiting, black or bloody stools  No fevers or chills, chest pain or SOB  Prior to Admission Medications   Prescriptions Last Dose Informant Patient Reported? Taking?    LORazepam (ATIVAN) 1 mg tablet   Yes No   Sig: Take 1 mg by mouth 3 (three) times a day   QUEtiapine (SEROquel) 100 mg tablet   Yes No   Sig: Take 100 mg by mouth daily at bedtime   butalbital-acetaminophen-caffeine (FIORICET,ESGIC) -40 mg per tablet   No No   Sig: Take 1 tablet by mouth every 4 (four) hours as needed for headaches for up to 3 doses   famotidine (PEPCID) 20 mg tablet   No No   Sig: Take 1 tablet by mouth 2 (two) times a day for 14 days   hydrALAZINE (APRESOLINE) 25 mg tablet   No No   Sig: Take 1 tablet by mouth every 6 (six) hours for 30 days   Patient taking differently: Take 25 mg by mouth 2 (two) times a day     hydrOXYzine HCL (ATARAX) 25 mg tablet   No No   Sig: Take 1 tablet by mouth every 6 (six) hours for 30 days   labetalol (NORMODYNE) 300 mg tablet   No No   Sig: Take 1 tablet by mouth every 8 (eight) hours for 30 days   magnesium oxide (MAG-OX) 400 mg   No No   Sig: Take 1 tablet by mouth 2 (two) times a day for 30 days   ondansetron (ZOFRAN-ODT) 4 mg disintegrating tablet   No No   Sig: Take 1 tablet by mouth every 8 (eight) hours as needed for nausea or vomiting for up to 3 days   sucralfate (CARAFATE) 1 g tablet   No No   Sig: Take 1 tablet by mouth 4 (four) times a day for 7 days   venlafaxine (EFFEXOR) 75 mg tablet   Yes No   Sig: Take 150 mg by mouth daily        Facility-Administered Medications: None       Past Medical History:   Diagnosis Date    Anxiety     Depression     Hypertension     Scoliosis     Varicella        Past Surgical History:   Procedure Laterality Date    HERNIA REPAIR      [place]  DELIVERY ONLY N/A 2016    Procedure:  SECTION (); Surgeon: Daisy Cervantes DO;  Location: [facility];  Service: Obstetrics    TONSILECTOMY AND ADNOIDECTOMY Bilateral     WISDOM TOOTH EXTRACTION Bilateral        Family History   Problem Relation Age of Onset    Hypertension Father      I have reviewed and agree with the history as documented  Social History   Substance Use Topics    Smoking status: Former Smoker     Quit date: 2016    Smokeless tobacco: Never Used    Alcohol use No      Comment: once a month prior to pregnancy        Review of Systems   Constitutional: Negative for appetite change, chills, fatigue and fever  HENT: Negative for sneezing and sore throat  Eyes: Negative for visual disturbance  Respiratory: Negative for cough, choking, chest tightness, shortness of breath and wheezing  Cardiovascular: Negative for chest pain and palpitations  Gastrointestinal: Positive for abdominal pain  Negative for constipation, diarrhea, nausea and vomiting  Genitourinary: Negative for difficulty urinating and dysuria     Neurological: Negative for dizziness, weakness, light-headedness, numbness and headaches  All other systems reviewed and are negative  Physical Exam  ED Triage Vitals [01/22/18 1917]   Temperature Pulse Respirations Blood Pressure SpO2   98 2 °F (36 8 °C) (!) 115 18 128/74 99 %      Temp Source Heart Rate Source Patient Position - Orthostatic VS BP Location FiO2 (%)   Oral Monitor Sitting Left arm --      Pain Score       Worst Possible Pain           Orthostatic Vital Signs  Vitals:    01/22/18 1917   BP: 128/74   Pulse: (!) 115   Patient Position - Orthostatic VS: Sitting       Physical Exam   Constitutional: She is oriented to person, place, and time  She appears well-developed and well-nourished  No distress  HENT:   Head: Normocephalic and atraumatic  Mouth/Throat: Oropharynx is clear and moist    Eyes: EOM are normal  Pupils are equal, round, and reactive to light  Neck: No JVD present  No tracheal deviation present  Cardiovascular: Normal rate, regular rhythm, normal heart sounds and intact distal pulses  Exam reveals no gallop and no friction rub  No murmur heard  Pulmonary/Chest: Effort normal and breath sounds normal  No respiratory distress  She has no wheezes  She has no rales  Abdominal: Soft  Bowel sounds are normal  She exhibits no distension  There is tenderness in the right upper quadrant and epigastric area  There is no rebound and no guarding  Neurological: She is alert and oriented to person, place, and time  No cranial nerve deficit  She exhibits normal muscle tone  Skin: Skin is warm and dry  She is not diaphoretic  No pallor  Psychiatric: She has a normal mood and affect  Her behavior is normal    Nursing note and vitals reviewed        ED Medications  Medications   sucralfate (CARAFATE) oral suspension 1,000 mg (1,000 mg Oral Given 1/22/18 2346)   pantoprazole (PROTONIX) injection 40 mg (40 mg Intravenous Given 1/22/18 2332)       Diagnostic Studies  Results Reviewed     Procedure Component Value Units Date/Time Comprehensive metabolic panel [[id number]] Collected:  01/22/18 2331    Lab Status:  Final result Specimen:  Blood from Arm, Left Updated:  01/22/18 2358     Sodium 139 mmol/L      Potassium 3 8 mmol/L      Chloride 107 mmol/L      CO2 27 mmol/L      Anion Gap 5 mmol/L      BUN 9 mg/dL      Creatinine 0 79 mg/dL      Glucose 97 mg/dL      Calcium 9 5 mg/dL      AST 8 U/L      ALT 13 U/L      Alkaline Phosphatase 66 U/L      Total Protein 7 5 g/dL      Albumin 4 0 g/dL      Total Bilirubin 0 26 mg/dL      eGFR 101 ml/min/1 73sq m     Narrative:         National Kidney Disease Education Program recommendations are as follows:  GFR calculation is accurate only with a steady state creatinine  Chronic Kidney disease less than 60 ml/min/1 73 sq  meters  Kidney failure less than 15 ml/min/1 73 sq  meters      Lipase [[id number]]  (Normal) Collected:  01/22/18 2331    Lab Status:  Final result Specimen:  Blood from Arm, Left Updated:  01/22/18 2358     Lipase 202 u/L     CBC and differential [[id number]]  (Normal) Collected:  01/22/18 2331    Lab Status:  Final result Specimen:  Blood from Arm, Left Updated:  01/22/18 2341     WBC 6 10 Thousand/uL      RBC 4 19 Million/uL      Hemoglobin 12 2 g/dL      Hematocrit 36 5 %      MCV 87 fL      MCH 29 1 pg      MCHC 33 4 g/dL      RDW 13 1 %      MPV 9 7 fL      Platelets 168 Thousands/uL      nRBC 0 /100 WBCs      Neutrophils Relative 51 %      Lymphocytes Relative 39 %      Monocytes Relative 7 %      Eosinophils Relative 3 %      Basophils Relative 0 %      Neutrophils Absolute 3 12 Thousands/µL      Lymphocytes Absolute 2 36 Thousands/µL      Monocytes Absolute 0 43 Thousand/µL      Eosinophils Absolute 0 17 Thousand/µL      Basophils Absolute 0 01 Thousands/µL     Urine Microscopic [[id number]]  (Normal) Collected:  01/22/18 2253    Lab Status:  Final result Specimen:  Urine from Urine, Clean Catch Updated:  01/22/18 2303     RBC, UA None Seen /hpf      WBC, UA None Seen /hpf Epithelial Cells None Seen /hpf      Bacteria, UA Occasional /hpf      Hyaline Casts, UA None Seen /lpf     ED Urine Macroscopic [[id number]]  (Abnormal) Collected:  01/22/18 2253    Lab Status:  Final result Specimen:  Urine Updated:  01/22/18 2249     Color, UA Yellow     Clarity, UA Clear     pH, UA 6 5     Leukocytes, UA Negative     Nitrite, UA Negative     Protein, UA Negative mg/dl      Glucose, UA Negative mg/dl      Ketones, UA Negative mg/dl      Urobilinogen, UA 0 2 E U /dl      Bilirubin, UA Negative     Blood, UA Trace (A)     Specific Gravity, UA >=1 030    Narrative:       CLINITEK RESULT                 No orders to display         Procedures  Procedures      Phone Consults  ED Phone Contact    ED Course  ED Course                                MDM  Number of Diagnoses or Management Options  Abdominal pain:   Diagnosis management comments: 29year old female with persistent RUQ pain  Relatively benign exam and recent extensive workup which was unremarkable  Suspect PUD  Will repeat labs and obtain formal read of RUQ US performed today  Will give carafate and protonix IV for Sx and likely recommend resuming daily PPI  Reviewed labs and US, which are unremarkable  Pt feels slightly better and will be discharged w/ GI F/U    CritCare Time    Disposition  Final diagnoses:   Abdominal pain     Time reflects when diagnosis was documented in both MDM as applicable and the Disposition within this note     Time User Action Codes Description Comment    1/23/2018 12:03 AM Sissy Mckenzie Add [R10 9] Abdominal pain       ED Disposition     ED Disposition Condition Comment    Discharge  [facility] discharge to home/self care      Condition at discharge: Stable        Follow-up Information     Follow up With Specialties Details Why Contact Info    Arlin Livingston MD Gastroenterology Call  [address]  [phone]          Discharge Medication List as of 1/23/2018 12:06 AM START taking these medications    Details   sucralfate (CARAFATE) 1 g/10 mL suspension Take 10 mL by mouth 4 (four) times a day, Starting Tue 1/23/2018, Print         CONTINUE these medications which have NOT CHANGED    Details   butalbital-acetaminophen-caffeine (FIORICET,ESGIC) -40 mg per tablet Take 1 tablet by mouth every 4 (four) hours as needed for headaches for up to 3 doses, Starting 1/21/2017, Until Discontinued, Print      famotidine (PEPCID) 20 mg tablet Take 1 tablet by mouth 2 (two) times a day for 14 days, Starting Sun 1/21/2018, Until Sun 2/4/2018, Print      hydrALAZINE (APRESOLINE) 25 mg tablet Take 1 tablet by mouth every 6 (six) hours for 30 days, Starting 11/10/2016, Until Fri 12/16/16, Print      hydrOXYzine HCL (ATARAX) 25 mg tablet Take 1 tablet by mouth every 6 (six) hours for 30 days, Starting 11/10/2016, Until Sat 12/10/16, Print      labetalol (NORMODYNE) 300 mg tablet Take 1 tablet by mouth every 8 (eight) hours for 30 days, Starting 11/10/2016, Until Sat 12/10/16, Print      LORazepam (ATIVAN) 1 mg tablet Take 1 mg by mouth 3 (three) times a day, Until Discontinued, Historical Med      magnesium oxide (MAG-OX) 400 mg Take 1 tablet by mouth 2 (two) times a day for 30 days, Starting 11/10/2016, Until Sat 12/10/16, Print      ondansetron (ZOFRAN-ODT) 4 mg disintegrating tablet Take 1 tablet by mouth every 8 (eight) hours as needed for nausea or vomiting for up to 3 days, Starting Sun 1/21/2018, Until Wed 1/24/2018, Print      QUEtiapine (SEROquel) 100 mg tablet Take 100 mg by mouth daily at bedtime, Until Discontinued, Historical Med      sucralfate (CARAFATE) 1 g tablet Take 1 tablet by mouth 4 (four) times a day for 7 days, Starting Sun 1/21/2018, Until Sun 1/28/2018, Print      venlafaxine (EFFEXOR) 75 mg tablet Take 150 mg by mouth daily  , Until Discontinued, Historical Med           No discharge procedures on file      ED Provider  Attending physically available and evaluated Mukesh Dodson I managed the patient along with the ED Attending      Electronically Signed by         Rachael Baez MD  01/25/18 7002

## 2018-01-26 LAB
C DIFF TOX GENS STL QL NAA+PROBE: NORMAL
CAMPYLOBACTER DNA SPEC NAA+PROBE: NORMAL
SALMONELLA DNA SPEC QL NAA+PROBE: NORMAL
SHIGA TOXIN STX GENE SPEC NAA+PROBE: NORMAL
SHIGELLA DNA SPEC QL NAA+PROBE: NORMAL

## 2018-02-02 ENCOUNTER — TRANSCRIBE ORDERS (OUTPATIENT)
Dept: ADMINISTRATIVE | Facility: HOSPITAL | Age: 33
End: 2018-02-02

## 2018-02-02 DIAGNOSIS — R11.0 NAUSEA: ICD-10-CM

## 2018-02-02 DIAGNOSIS — R10.13 ABDOMINAL PAIN, EPIGASTRIC: ICD-10-CM

## 2018-02-02 DIAGNOSIS — R10.11 ABDOMINAL PAIN, RIGHT UPPER QUADRANT: Primary | ICD-10-CM

## 2018-02-06 ENCOUNTER — CLINICAL SUPPORT (OUTPATIENT)
Dept: GYNECOLOGY | Facility: CLINIC | Age: 33
End: 2018-02-06
Payer: COMMERCIAL

## 2018-02-06 VITALS
HEIGHT: 64 IN | DIASTOLIC BLOOD PRESSURE: 60 MMHG | BODY MASS INDEX: 22.43 KG/M2 | WEIGHT: 131.4 LBS | SYSTOLIC BLOOD PRESSURE: 98 MMHG

## 2018-02-06 DIAGNOSIS — Z30.42 DEPOT CONTRACEPTION: Primary | ICD-10-CM

## 2018-02-06 PROCEDURE — 96372 THER/PROPH/DIAG INJ SC/IM: CPT

## 2018-02-06 RX ORDER — BUPROPION HYDROCHLORIDE 150 MG/1
150 TABLET, EXTENDED RELEASE ORAL 2 TIMES DAILY
COMMUNITY
End: 2018-07-08

## 2018-02-06 RX ORDER — MEDROXYPROGESTERONE ACETATE 150 MG/ML
150 INJECTION, SUSPENSION INTRAMUSCULAR
Status: DISCONTINUED | OUTPATIENT
Start: 2018-02-06 | End: 2018-02-06

## 2018-02-06 RX ORDER — CLONAZEPAM 1 MG/1
1 TABLET ORAL 3 TIMES DAILY
COMMUNITY
End: 2019-10-23 | Stop reason: SDUPTHER

## 2018-02-06 RX ORDER — CLONIDINE HYDROCHLORIDE 0.1 MG/1
0.1 TABLET ORAL EVERY 12 HOURS SCHEDULED
COMMUNITY
End: 2019-03-19 | Stop reason: HOSPADM

## 2018-02-06 RX ORDER — AMLODIPINE BESYLATE 2.5 MG/1
5 TABLET ORAL DAILY
COMMUNITY
End: 2018-02-16 | Stop reason: SDUPTHER

## 2018-02-06 RX ORDER — MEDROXYPROGESTERONE ACETATE 150 MG/ML
150 INJECTION, SUSPENSION INTRAMUSCULAR ONCE
Status: COMPLETED | OUTPATIENT
Start: 2018-02-06 | End: 2018-02-06

## 2018-02-06 RX ORDER — OMEPRAZOLE 40 MG/1
40 CAPSULE, DELAYED RELEASE ORAL DAILY
COMMUNITY
End: 2018-07-08

## 2018-02-06 RX ORDER — ACETAMINOPHEN 500 MG
500 TABLET ORAL EVERY 6 HOURS PRN
COMMUNITY
End: 2020-01-27 | Stop reason: HOSPADM

## 2018-02-06 RX ORDER — MEDROXYPROGESTERONE ACETATE 150 MG/ML
150 INJECTION, SUSPENSION INTRAMUSCULAR
COMMUNITY
End: 2018-12-20 | Stop reason: SDUPTHER

## 2018-02-06 RX ADMIN — MEDROXYPROGESTERONE ACETATE 150 MG: 150 INJECTION, SUSPENSION INTRAMUSCULAR at 11:50

## 2018-02-15 ENCOUNTER — HOSPITAL ENCOUNTER (OUTPATIENT)
Dept: NON INVASIVE DIAGNOSTICS | Facility: CLINIC | Age: 33
Discharge: HOME | End: 2018-02-15
Payer: COMMERCIAL

## 2018-02-15 DIAGNOSIS — R10.13 ABDOMINAL PAIN, EPIGASTRIC: ICD-10-CM

## 2018-02-15 DIAGNOSIS — R11.0 NAUSEA: ICD-10-CM

## 2018-02-15 DIAGNOSIS — R10.11 ABDOMINAL PAIN, RIGHT UPPER QUADRANT: ICD-10-CM

## 2018-02-15 PROCEDURE — 78227 HEPATOBIL SYST IMAGE W/DRUG: CPT

## 2018-02-15 PROCEDURE — A9537 TC99M MEBROFENIN: HCPCS

## 2018-02-15 RX ADMIN — SINCALIDE 1.2 MCG: 5 INJECTION, POWDER, LYOPHILIZED, FOR SOLUTION INTRAVENOUS at 09:19

## 2018-02-16 ENCOUNTER — OFFICE VISIT (OUTPATIENT)
Dept: INTERNAL MEDICINE CLINIC | Age: 33
End: 2018-02-16
Payer: COMMERCIAL

## 2018-02-16 VITALS
TEMPERATURE: 96.4 F | HEART RATE: 121 BPM | SYSTOLIC BLOOD PRESSURE: 112 MMHG | OXYGEN SATURATION: 98 % | DIASTOLIC BLOOD PRESSURE: 80 MMHG | WEIGHT: 132 LBS | BODY MASS INDEX: 22.53 KG/M2 | HEIGHT: 64 IN

## 2018-02-16 DIAGNOSIS — K21.9 GASTROESOPHAGEAL REFLUX DISEASE WITHOUT ESOPHAGITIS: ICD-10-CM

## 2018-02-16 DIAGNOSIS — I10 ESSENTIAL HYPERTENSION: ICD-10-CM

## 2018-02-16 DIAGNOSIS — I10 ESSENTIAL HYPERTENSION: Primary | ICD-10-CM

## 2018-02-16 DIAGNOSIS — F41.9 ANXIETY: ICD-10-CM

## 2018-02-16 DIAGNOSIS — R10.11 RIGHT UPPER QUADRANT ABDOMINAL PAIN: ICD-10-CM

## 2018-02-16 PROCEDURE — 99214 OFFICE O/P EST MOD 30 MIN: CPT | Performed by: INTERNAL MEDICINE

## 2018-02-16 RX ORDER — AMLODIPINE BESYLATE 2.5 MG/1
5 TABLET ORAL DAILY
Qty: 30 TABLET | Refills: 5 | Status: SHIPPED | OUTPATIENT
Start: 2018-02-16 | End: 2018-02-16 | Stop reason: SDUPTHER

## 2018-02-16 RX ORDER — VENLAFAXINE HYDROCHLORIDE 150 MG/1
150 CAPSULE, EXTENDED RELEASE ORAL DAILY
COMMUNITY
End: 2019-10-23 | Stop reason: ALTCHOICE

## 2018-02-16 NOTE — PROGRESS NOTES
Assessment/Plan:    1  Right upper quadrant / epigastric abdominal pain     patient is here for a right upper quadrant and epigastric pain which started about 3-4 months ago  She had extensive workup done by gastroenterologist through [facility] which includes the HIDA scan ultrasound of the gallbladder and CT abdominal and pelvis  She also underwent EGD and colonoscopy  Recently she was diagnosed with ulcerative colitis and is being treated with mesalamine 1 2 g b i d    and she is also on Prilosec 40 mg daily and Klonopin 1 mg b i d  Despite all these the medicine and workup she is not pain free  Other differential diagnosis include gastroparesis  I discussed with the patient and her mother that she should undergo gastric emptying study for possible gastric paresis  Or she may need to repeat HIDA scan to rule out dysfunctional gallbladder  She was given in the past Bentyl and Reglan but due to side effect that was discontinued  she also have extensive leg work done including CBC CMP stool for C diff and stool cultures which were all negative for any kind of infection  Diagnoses and all orders for this visit:    Essential hypertension  -     amLODIPine (NORVASC) 2 5 mg tablet; Take 2 tablets (5 mg total) by mouth daily    Gastroesophageal reflux disease without esophagitis    Right upper quadrant abdominal pain    Other orders  -     venlafaxine (EFFEXOR-XR) 150 mg 24 hr capsule; Take 150 mg by mouth daily          Subjective:   Abdominal pain         Patient ID: Kimberly Velasquez is a 29 y o  female  Abdominal Pain   This is a recurrent problem  The onset quality is undetermined  The problem occurs intermittently  The problem has been gradually worsening  The pain is located in the RUQ  The pain is at a severity of 9/10  The quality of the pain is sharp  The abdominal pain radiates to the back  Associated symptoms include anorexia and belching   Pertinent negatives include no arthralgias, diarrhea, dysuria, fever, headaches or vomiting  The pain is aggravated by belching  The pain is relieved by nothing  She has tried proton pump inhibitors for the symptoms  Her past medical history is significant for ulcerative colitis  The following portions of the patient's history were reviewed and updated as appropriate: allergies, current medications, past family history, past medical history, past social history, past surgical history and problem list     Review of Systems   Constitutional: Negative for fatigue and fever  HENT: Negative for congestion, ear discharge, ear pain, postnasal drip, sinus pressure, sore throat, tinnitus and trouble swallowing  Eyes: Negative for discharge, itching and visual disturbance  Respiratory: Negative for cough and shortness of breath  Cardiovascular: Negative for chest pain and palpitations  Gastrointestinal: Positive for abdominal pain and anorexia  Negative for diarrhea and vomiting  Endocrine: Negative for cold intolerance and polyuria  Genitourinary: Negative for difficulty urinating, dysuria and urgency  Musculoskeletal: Negative for arthralgias and neck pain  Skin: Negative for rash  Allergic/Immunologic: Negative for environmental allergies  Neurological: Negative for dizziness, weakness and headaches  Psychiatric/Behavioral: The patient is not nervous/anxious            Past Medical History:   Diagnosis Date    Anxiety     Depression     Hypertension     Scoliosis     Varicella          Current Outpatient Prescriptions:     acetaminophen (TYLENOL) 500 mg tablet, Take 500 mg by mouth every 6 (six) hours as needed for mild pain, Disp: , Rfl:     amLODIPine (NORVASC) 2 5 mg tablet, Take 5 mg by mouth daily, Disp: , Rfl:     buPROPion (WELLBUTRIN SR) 150 mg 12 hr tablet, Take 150 mg by mouth 2 (two) times a day, Disp: , Rfl:     clonazePAM (KlonoPIN) 1 mg tablet, Take 0 5 mg by mouth 2 (two) times a day, Disp: , Rfl:   cloNIDine (CATAPRES) 0 1 mg tablet, Take 0 2 mg by mouth every 12 (twelve) hours, Disp: , Rfl:     medroxyPROGESTERone (DEPO-PROVERA) 150 mg/mL injection, Inject 150 mg into the shoulder, thigh, or buttocks every 3 (three) months, Disp: , Rfl:     omeprazole (PriLOSEC) 40 MG capsule, Take 40 mg by mouth daily, Disp: , Rfl:     ondansetron (ZOFRAN-ODT) 4 mg disintegrating tablet, Take 1 tablet by mouth every 8 (eight) hours as needed for nausea or vomiting for up to 3 days, Disp: 12 tablet, Rfl: 0    QUEtiapine (SEROquel) 100 mg tablet, Take 100 mg by mouth daily at bedtime, Disp: , Rfl:     venlafaxine (EFFEXOR-XR) 150 mg 24 hr capsule, Take 150 mg by mouth daily, Disp: , Rfl:     Allergies   Allergen Reactions    Sulfa Antibiotics Anaphylaxis and Other (See Comments)     swelling    Reglan [Metoclopramide] Anxiety       Social History   Past Surgical History:   Procedure Laterality Date    HERNIA REPAIR      [place]  DELIV[place] ONLY N/A 2016    Procedure:  SECTION (); Surgeon: Jef Steen DO;  Location: [facility] ;  Service: Obstetrics    TONSILECTOMY AND ADNOIDECTOMY Bilateral     WISDOM TOOTH EXTRACTION Bilateral      Family History   Problem Relation Age of Onset    Hypertension Father        Objective:  /80 (BP Location: Left arm, Patient Position: Sitting, Cuff Size: Child)   Pulse (!) 121   Temp (!) 96 4 °F (35 8 °C)   Ht 5' 3 78" (1 62 m)   Wt 59 9 kg (132 lb)   LMP  (LMP Unknown)   SpO2 98%   BMI 22 81 kg/m²   Body mass index is 22 81 kg/m²  Physical Exam   Constitutional: She appears well-developed  HENT:   Head: Normocephalic  Mouth/Throat: Oropharynx is clear and moist    Eyes: Pupils are equal, round, and reactive to light  No scleral icterus  Neck: Normal range of motion  Neck supple  No tracheal deviation present  No thyromegaly present  Cardiovascular: Normal rate, regular rhythm and normal heart sounds      Pulmonary/Chest: Effort normal and breath sounds normal  No respiratory distress  She exhibits no tenderness  Abdominal: Soft  Bowel sounds are normal  She exhibits no mass  There is no tenderness  Mild epigastric and right upper quadrant tenderness noted  No rebound tenderness   Musculoskeletal: Normal range of motion  Lymphadenopathy:     She has no cervical adenopathy  Neurological: She is alert  No cranial nerve deficit  Skin: Skin is warm  Psychiatric: She has a normal mood and affect

## 2018-02-27 ENCOUNTER — TRANSCRIBE ORDERS (OUTPATIENT)
Dept: ADMINISTRATIVE | Facility: HOSPITAL | Age: 33
End: 2018-02-27

## 2018-02-27 DIAGNOSIS — R10.11 ABDOMINAL PAIN, RIGHT UPPER QUADRANT: Primary | ICD-10-CM

## 2018-03-01 ENCOUNTER — HOSPITAL ENCOUNTER (OUTPATIENT)
Dept: RADIOLOGY | Facility: HOSPITAL | Age: 33
Discharge: HOME | End: 2018-03-01
Payer: COMMERCIAL

## 2018-03-01 DIAGNOSIS — R10.11 ABDOMINAL PAIN, RIGHT UPPER QUADRANT: ICD-10-CM

## 2018-03-01 PROCEDURE — 74250 X-RAY XM SM INT 1CNTRST STD: CPT

## 2018-03-08 NOTE — PRE-PROCEDURE INSTRUCTIONS
My Surgical Experience    The following information was developed to assist you to prepare for your operation  What do I need to do before coming to the hospital?   Arrange for a responsible person to drive you to and from the hospital    Arrange care for your children at home  Children are not allowed in the recovery areas of the hospital   Plan to wear clothing that is easy to put on and take off  If you are having shoulder surgery, wear a shirt that buttons or zippers in the front  Bathing  o Shower the evening before and the morning of your surgery with an antibacterial soap  Please refer to the Pre Op Showering Instructions for Surgery Patients Sheet   o Remove nail polish and all body piercing jewelry  o Do not shave any body part for at least 24 hours before surgery-this includes face, arms, legs and upper body  Food  o Nothing to eat or drink after midnight the night before your surgery  This includes candy and chewing gum  o Exception: If your surgery is after 12:00pm (noon), you may have clear liquids such as 7-Up®, ginger ale, apple or cranberry juice, Jell-O®, water, or clear broth until 8:00 am  o Do not drink milk or juice with pulp on the morning before surgery  o Do not drink alcohol 24 hours before surgery  Medicine  o Follow instructions you received from your surgeon about which medicines you may take on the day of surgery  o If instructed to take medicine on the morning of surgery, take pills with just a small sip of water  Call your prescribing doctor for specific infroamtion on what to do if you take insulin    What should I bring to the hospital?    Bring:  Dilan Roberto or a walker, if you have them, for foot or knee surgery   A list of the daily medicines, vitamins, minerals, herbals and nutritional supplements you take   Include the dosages of medicines and the time you take them each day   Glasses, dentures or hearing aids   Minimal clothing; you will be wearing hospital sleepwear   Photo ID; required to verify your identity   If you have a Living Will or Power of , bring a copy of the documents   If you have an ostomy, bring an extra pouch and any supplies you use    Do not bring   Medicines or inhalers   Money, valuables or jewelry    What other information should I know about the day of surgery?  Notify your surgeons if you develop a cold, sore throat, cough, fever, rash or any other illness   Report to the Ambulatory Surgical/Same Day Surgery Unit   You will be instructed to stop at Registration only if you have not been pre-registered   Inform your  fi they do not stay that they will be asked by the staff to leave a phone number where they can be reached   Be available to be reached before surgery  In the event the operating room schedule changes, you may be asked to come in earlier or later than expected    *It is important to tell your doctor and others involved in your health care if you are taking or have been taking any non-prescription drugs, vitamins, minerals, herbals or other nutritional supplements  Any of these may interact with some food or medicines and cause a reaction      Pre-Surgery Instructions:   Medication Instructions    acetaminophen (TYLENOL) 500 mg tablet Instructed patient per Anesthesia Guidelines   amLODIPine (NORVASC) 2 5 mg tablet Instructed patient per Anesthesia Guidelines   buPROPion (WELLBUTRIN SR) 150 mg 12 hr tablet Instructed patient per Anesthesia Guidelines   clonazePAM (KlonoPIN) 1 mg tablet Instructed patient per Anesthesia Guidelines   cloNIDine (CATAPRES) 0 1 mg tablet Instructed patient per Anesthesia Guidelines   medroxyPROGESTERone (DEPO-PROVERA) 150 mg/mL injection Instructed patient per Anesthesia Guidelines   omeprazole (PriLOSEC) 40 MG capsule Instructed patient per Anesthesia Guidelines      ondansetron (ZOFRAN-ODT) 4 mg disintegrating tablet Instructed patient per Anesthesia Guidelines   QUEtiapine (SEROquel) 100 mg tablet Instructed patient per Anesthesia Guidelines  To takeamlodipine, welbutrin, klonopin, clonidine, omeprazole and effexor a m   Of surgery

## 2018-03-12 ENCOUNTER — HOSPITAL ENCOUNTER (OUTPATIENT)
Facility: HOSPITAL | Age: 33
Setting detail: OUTPATIENT SURGERY
Discharge: HOME | End: 2018-03-12
Attending: SPECIALIST | Admitting: SPECIALIST
Payer: COMMERCIAL

## 2018-03-12 ENCOUNTER — ANESTHESIA (OUTPATIENT)
Dept: PERIOP | Facility: HOSPITAL | Age: 33
End: 2018-03-12
Payer: COMMERCIAL

## 2018-03-12 ENCOUNTER — ANESTHESIA EVENT (OUTPATIENT)
Dept: PERIOP | Facility: HOSPITAL | Age: 33
End: 2018-03-12
Payer: COMMERCIAL

## 2018-03-12 VITALS
DIASTOLIC BLOOD PRESSURE: 65 MMHG | TEMPERATURE: 96.4 F | RESPIRATION RATE: 16 BRPM | OXYGEN SATURATION: 99 % | HEART RATE: 98 BPM | SYSTOLIC BLOOD PRESSURE: 122 MMHG

## 2018-03-12 DIAGNOSIS — K82.9 DISEASE OF GALLBLADDER: ICD-10-CM

## 2018-03-12 DIAGNOSIS — K82.4 POLYP OF GALLBLADDER: Primary | ICD-10-CM

## 2018-03-12 PROBLEM — R10.11 RIGHT UPPER QUADRANT ABDOMINAL PAIN: Status: ACTIVE | Noted: 2018-03-12

## 2018-03-12 LAB — EXT PREGNANCY TEST URINE: NEGATIVE

## 2018-03-12 PROCEDURE — 88304 TISSUE EXAM BY PATHOLOGIST: CPT | Performed by: PATHOLOGY

## 2018-03-12 PROCEDURE — 81025 URINE PREGNANCY TEST: CPT | Performed by: SPECIALIST

## 2018-03-12 PROCEDURE — 47562 LAPAROSCOPIC CHOLECYSTECTOMY: CPT | Performed by: PHYSICIAN ASSISTANT

## 2018-03-12 RX ORDER — FENTANYL CITRATE/PF 50 MCG/ML
25 SYRINGE (ML) INJECTION
Status: DISCONTINUED | OUTPATIENT
Start: 2018-03-12 | End: 2018-03-12 | Stop reason: HOSPADM

## 2018-03-12 RX ORDER — HYDROMORPHONE HCL 110MG/55ML
0.2 PATIENT CONTROLLED ANALGESIA SYRINGE INTRAVENOUS
Status: DISCONTINUED | OUTPATIENT
Start: 2018-03-12 | End: 2018-03-12 | Stop reason: HOSPADM

## 2018-03-12 RX ORDER — SODIUM CHLORIDE 9 MG/ML
75 INJECTION, SOLUTION INTRAVENOUS CONTINUOUS
Status: DISCONTINUED | OUTPATIENT
Start: 2018-03-12 | End: 2018-03-12 | Stop reason: HOSPADM

## 2018-03-12 RX ORDER — LIDOCAINE HYDROCHLORIDE 10 MG/ML
INJECTION, SOLUTION INFILTRATION; PERINEURAL AS NEEDED
Status: DISCONTINUED | OUTPATIENT
Start: 2018-03-12 | End: 2018-03-12 | Stop reason: SURG

## 2018-03-12 RX ORDER — OXYCODONE HYDROCHLORIDE AND ACETAMINOPHEN 5; 325 MG/1; MG/1
1 TABLET ORAL EVERY 4 HOURS PRN
Qty: 20 TABLET | Refills: 0 | Status: SHIPPED | OUTPATIENT
Start: 2018-03-12 | End: 2018-03-17

## 2018-03-12 RX ORDER — ROCURONIUM BROMIDE 10 MG/ML
INJECTION, SOLUTION INTRAVENOUS AS NEEDED
Status: DISCONTINUED | OUTPATIENT
Start: 2018-03-12 | End: 2018-03-12 | Stop reason: SURG

## 2018-03-12 RX ORDER — FENTANYL CITRATE 50 UG/ML
INJECTION, SOLUTION INTRAMUSCULAR; INTRAVENOUS AS NEEDED
Status: DISCONTINUED | OUTPATIENT
Start: 2018-03-12 | End: 2018-03-12 | Stop reason: SURG

## 2018-03-12 RX ORDER — ONDANSETRON 2 MG/ML
INJECTION INTRAMUSCULAR; INTRAVENOUS AS NEEDED
Status: DISCONTINUED | OUTPATIENT
Start: 2018-03-12 | End: 2018-03-12 | Stop reason: SURG

## 2018-03-12 RX ORDER — ONDANSETRON 2 MG/ML
4 INJECTION INTRAMUSCULAR; INTRAVENOUS ONCE AS NEEDED
Status: DISCONTINUED | OUTPATIENT
Start: 2018-03-12 | End: 2018-03-12 | Stop reason: HOSPADM

## 2018-03-12 RX ORDER — GLYCOPYRROLATE 0.2 MG/ML
INJECTION INTRAMUSCULAR; INTRAVENOUS AS NEEDED
Status: DISCONTINUED | OUTPATIENT
Start: 2018-03-12 | End: 2018-03-12 | Stop reason: SURG

## 2018-03-12 RX ORDER — MAGNESIUM HYDROXIDE 1200 MG/15ML
LIQUID ORAL AS NEEDED
Status: DISCONTINUED | OUTPATIENT
Start: 2018-03-12 | End: 2018-03-12 | Stop reason: HOSPADM

## 2018-03-12 RX ORDER — MIDAZOLAM HYDROCHLORIDE 1 MG/ML
INJECTION INTRAMUSCULAR; INTRAVENOUS AS NEEDED
Status: DISCONTINUED | OUTPATIENT
Start: 2018-03-12 | End: 2018-03-12 | Stop reason: SURG

## 2018-03-12 RX ORDER — DIPHENHYDRAMINE HYDROCHLORIDE 50 MG/ML
12.5 INJECTION INTRAMUSCULAR; INTRAVENOUS ONCE AS NEEDED
Status: COMPLETED | OUTPATIENT
Start: 2018-03-12 | End: 2018-03-12

## 2018-03-12 RX ORDER — BUPIVACAINE HYDROCHLORIDE 5 MG/ML
INJECTION, SOLUTION PERINEURAL AS NEEDED
Status: DISCONTINUED | OUTPATIENT
Start: 2018-03-12 | End: 2018-03-12 | Stop reason: HOSPADM

## 2018-03-12 RX ORDER — ONDANSETRON 2 MG/ML
4 INJECTION INTRAMUSCULAR; INTRAVENOUS ONCE AS NEEDED
Status: COMPLETED | OUTPATIENT
Start: 2018-03-12 | End: 2018-03-12

## 2018-03-12 RX ORDER — PROPOFOL 10 MG/ML
INJECTION, EMULSION INTRAVENOUS AS NEEDED
Status: DISCONTINUED | OUTPATIENT
Start: 2018-03-12 | End: 2018-03-12 | Stop reason: SURG

## 2018-03-12 RX ADMIN — FENTANYL CITRATE 50 MCG: 50 INJECTION, SOLUTION INTRAMUSCULAR; INTRAVENOUS at 12:53

## 2018-03-12 RX ADMIN — PROPOFOL 50 MG: 10 INJECTION, EMULSION INTRAVENOUS at 11:47

## 2018-03-12 RX ADMIN — CEFAZOLIN SODIUM 2000 MG: 2 SOLUTION INTRAVENOUS at 11:37

## 2018-03-12 RX ADMIN — FENTANYL CITRATE 25 MCG: 50 INJECTION, SOLUTION INTRAMUSCULAR; INTRAVENOUS at 14:13

## 2018-03-12 RX ADMIN — LIDOCAINE HYDROCHLORIDE 50 MG: 10 INJECTION, SOLUTION INFILTRATION; PERINEURAL at 11:44

## 2018-03-12 RX ADMIN — ROCURONIUM BROMIDE 35 MG: 10 INJECTION INTRAVENOUS at 11:44

## 2018-03-12 RX ADMIN — PHENYLEPHRINE HYDROCHLORIDE 2 DROP: 1 SPRAY NASAL at 11:52

## 2018-03-12 RX ADMIN — DIPHENHYDRAMINE HYDROCHLORIDE 12.5 MG: 50 INJECTION, SOLUTION INTRAMUSCULAR; INTRAVENOUS at 13:55

## 2018-03-12 RX ADMIN — DEXAMETHASONE SODIUM PHOSPHATE 8 MG: 10 INJECTION INTRAMUSCULAR; INTRAVENOUS at 11:55

## 2018-03-12 RX ADMIN — ONDANSETRON 4 MG: 2 INJECTION INTRAMUSCULAR; INTRAVENOUS at 13:30

## 2018-03-12 RX ADMIN — SODIUM CHLORIDE 75 ML/HR: 0.9 INJECTION, SOLUTION INTRAVENOUS at 13:28

## 2018-03-12 RX ADMIN — FENTANYL CITRATE 100 MCG: 50 INJECTION, SOLUTION INTRAMUSCULAR; INTRAVENOUS at 11:44

## 2018-03-12 RX ADMIN — NEOSTIGMINE METHYLSULFATE 4 MG: 1 INJECTION, SOLUTION INTRAMUSCULAR; INTRAVENOUS; SUBCUTANEOUS at 12:44

## 2018-03-12 RX ADMIN — ONDANSETRON 4 MG: 2 INJECTION INTRAMUSCULAR; INTRAVENOUS at 11:55

## 2018-03-12 RX ADMIN — PROPOFOL 150 MG: 10 INJECTION, EMULSION INTRAVENOUS at 11:44

## 2018-03-12 RX ADMIN — GLYCOPYRROLATE 0.8 MG: 0.2 INJECTION, SOLUTION INTRAMUSCULAR; INTRAVENOUS at 12:44

## 2018-03-12 RX ADMIN — SODIUM CHLORIDE: 0.9 INJECTION, SOLUTION INTRAVENOUS at 11:37

## 2018-03-12 RX ADMIN — MIDAZOLAM HYDROCHLORIDE 2 MG: 1 INJECTION, SOLUTION INTRAMUSCULAR; INTRAVENOUS at 11:37

## 2018-03-12 NOTE — ANESTHESIA PREPROCEDURE EVALUATION
Review of Systems/Medical History  Patient summary reviewed  Chart reviewed      Cardiovascular  Hypertension controlled,    Pulmonary       GI/Hepatic    GERD well controlled,             Endo/Other     GYN       Hematology   Musculoskeletal       Neurology   Psychology   Anxiety, Depression , being treated for depression,              Physical Exam    Airway    Mallampati score: II  TM Distance: >3 FB  Neck ROM: full     Dental       Cardiovascular  Rhythm: regular, Rate: normal,     Pulmonary  Breath sounds clear to auscultation,     Other Findings        Anesthesia Plan  ASA Score- 2     Anesthesia Type- general and regional with ASA Monitors  Additional Monitors:   Airway Plan: ETT  Plan Factors-    Induction- intravenous  Postoperative Plan-     Informed Consent- Anesthetic plan and risks discussed with patient  I personally reviewed this patient with the CRNA  Discussed and agreed on the Anesthesia Plan with the CRNA  Kamryn Spade

## 2018-03-12 NOTE — PERIOPERATIVE NURSING NOTE
Seen by Dr Dicky Peabody, tolerated po fluids  IV d/elizabeth,  at bedside, full discharge instructions given to  and patient  Abdominal dressing dry, intact, voided in bathroom

## 2018-03-12 NOTE — PERIOPERATIVE NURSING NOTE
Received Patient, abdominal dressing dry, intact  Patient sipping apple juice, denies nausea,  at bedside

## 2018-03-12 NOTE — DISCHARGE INSTRUCTIONS
SAME DAY SURGERY POST-OPERATIVE INSTRUCTIONS    Please follow carefully all instructions checked below  Eric Whipple  Pre-op Diagnosis:   Disease of gallbladder [K82 9]  Post-op Diagnosis:  * No post-op diagnosis entered *   Post-Op Diagnosis Codes:     * Disease of gallbladder [K82 9]     * Polyp of gallbladder [K82 4]  Procedure(s):  LAPAROSCOPIC CHOLECYSTECTOMY  Surgeon(s):  Oralee Rhyme, MD Orinda Goldmann, PA-C    1  Diet:  · Begin with liquids and light food (Jell-O, soup, etc) Progress slowly to your normal diet  No alcoholic beverages for 24 hours  · Resume your normal diet  2  Medications:  · Home medications reviewed  Resume pre-operative medications unless noted below  · Prescription sent home with patient and Prescription sent over to pharmacy  · See after visit summary for reconciled discharge medications provided to patient and family  3  Activities:  · Do NOT make important personal or business decisions for 24 hours  · Do NOT drive or operate machinery for 24 hours  · While taking pain medication, do not drive and be careful as you walk or climb stairs  · Limit your activities for 24 hours  Do not engage in sports, heavy work or heavy  lifting until your physician gives you permission  4  When to Call:       Call if fever, Nausea, vomiting, Constipation not feeling well, or bleeding per rectum        and excessive pain,  5  Follow-up Care:  · Make an appointment to see [place] [id number]  JOSUE  in 10 days for Follow up  Please Call Office  [phone] for appointment,   · Call if fever, Nausea, vomiting, Constipation not feeling well, orbleeding and excessive pain,        Jesus Dunabr MD [facility]  03/12/18  1:07 PM   PATIENT INSTRUCTIONS  GALL BLADDER SURGERY  (CHOLECYSTECTOMY)    FOLLOW-UP:  Please make an appointment with your physician in 7 day(s)    Call your physician immediately if you have any fevers greater than 102 5, drainage from your wound that is not clear or looks infected, persistent bleeding, increasing abdominal pain, problems urinating, or persistent nausea/vomiting  You should be aware that you may have right shoulder pain after surgery and that this will progressively go away  This is called 'referred pain' and is from the area of the gallbladder  It can also be caused by gas that may be trapped under the diaphragm from the surgery, especially if it was performed laparoscopically through mini-incisions  This gas will progressively get reabsorbed by your body  WOUND CARE INSTRUCTIONS:  Keep a dry clean dressing on the wound if there is drainage  The initial bandage may be removed after 24 hours  Once the wound has quit draining you may leave it open to air  If clothing rubs against the wound or causes irritation and the wound is not draining you may cover it with a dry dressing during the daytime  Try to keep the wound dry and avoid ointments on the wound unless directed to do so  If the wound becomes bright red and painful or starts to drain infected material that is not clear, please contact your physician immediately  You should also call if you begin to drain fluid that is thin and greenish-brown from the wound and appears to look like bile  If the wound though is mildly pink and has a thick firm ridge underneath it, this is normal, and is referred to as a healing ridge  This will resolve over the next 4-6 weeks  DIET:  You may eat any foods that you can tolerate  It is a good idea to eat a high fiber diet and take in plenty of fluids to prevent constipation  If you do become constipated you may want to take a mild laxative or take ducolax tablets on a daily basis until your bowel habits are regular  Constipation can be very uncomfortable, along with straining, after recent abdominal surgery      ACTIVITY:  You are encouraged to cough and deep breath or use your incentive spirometer if you were given one, every 15-30 minutes when awake   This will help prevent respiratory complications and low grade fevers post-operatively  You may want to hug a pillow when coughing and sneezing to add additional support to the surgical area(s) which will decrease pain during these times  You are encouraged to walk and engage in light activity for the next two weeks  You should not lift more than 20 pounds during this time frame as it could put you at increased risk for a post-operative hernia  Twenty pounds is roughly equivalent to a plastic bag of groceries  MEDICATIONS:  Try to take narcotic medications and anti-inflammatory medications, such as tylenol, ibuprofen, naprosyn, etc , with food  This will minimize stomach upset from the medication  Should you develop nausea and vomiting from the pain medication, or develop a rash, please discontinue the medication and contact your physician  You should not drive, make important decisions, or operate machinery when taking narcotic pain medication  QUESTIONS:  Please feel free to call your physician or the hospital  if you have any questions, and they will be glad to assist you

## 2018-03-12 NOTE — OP NOTE
General SurgeryLAPAROSCOPIC CHOLECYSTECTOMY Op Note    Ellie Shell  3/12/2018    Pre-op Diagnosis:   Disease of gallbladder [K82 9]    PMH  Past Medical History:   Diagnosis Date    Anxiety     Depression     Hypertension     Scoliosis     Varicella        Post-op Diagnosis:   Patient Active Problem List   Diagnosis    29 weeks gestation of pregnancy    Preeclampsia, severe    Anxiety    Postpartum hypertension    Acid reflux disease    Polyp of gallbladder    Right upper quadrant abdominal pain     Procedure(s):  LAPAROSCOPIC CHOLECYSTECTOMY    Surgeon(s):  MD Breann eVlazquez PA-C    Anesthesia:  General    Operative Findings:  Distended gallbladder  Multiple adhesions all along the gallbladder with omentum transverse colon omentum duodenum      Assistant:  Mr Dax Brambila of [employer] was utilized as a first assistant as there is no surgical residency program at [facility]    Staff:   Circulator: Ramo Moran RN    Ellie Shell was identified by me  Proper consent was obtained  The risks, benefits, alternatives,and probabilities of success were discussed in detail with no guarantee made as to outcome  All questions were answered to the patient's satisfaction  Site was marked prior coming to OR  Operative site was cleansed with ChloraPrep and draped in usual sterile fashion  Ellie Shell was given pre-operative antibiotics  There is no height or weight on file to calculate BMI  Ellie Shell is ASA 2 and Fire risk- 3  Ellie Shell was re-identified in the OR  Site was identified and detailed timeout was performed with Anesthesia and OR staff  Proper detailed consent was obtained from patient risk and benefits were explained to patient and family in detail prior to coming to Operating Room  Site was marked  Area was cleansed with ChloraPrep and draped in the usual sterile fashion  Infraumbilical transverse incision was made    The skin and subcutaneous tissue was cut along the line of incision  With open Camden technique a 10mm port was placed  Gallbladder was identified  Be covered with omental adhesions fundus to Jacquelyn's pouch between the gallbladder and transverse colon gallbladder and duodenum there carefully dissected cauterized and cut Three other ports were placed, one in the subxiphoid 10mm subcostal, one 5mm midclavicular subcostal and other one 5mm ant axillary subcostal  Gallbladder was grasped with a grasper introduced from the lateralmost 5 mm port at the fundus and was retracted upward laterally  Another grasper was introduced from the midclavicular port 5 mm and Soliman's pouch of the gallbladder was grasped and was retracted downward and laterally   Patient has a very dilated short cystic duct which were carefully identified  Cystic duct and artery were identified  Cystic duct was doubly clipped and cut between the clips  Cystic artery was doubly clipped and cut between the clips  Gallbladder was then removed from the gallbladder bed with Bovie dissection  Proper hemostasis was achieved  No active bleeding was noted  The gallbladder was then placed in the Endo pouch and was delivered through the infraumbilical transverse incision  All the 10 mm port sites were closed in double layers with figure of 8-0 Vicryl, 2-0 for the sheath and the skin was approximated with subcuticular Monocryl  On the final reports were stitch with a single layer with subcuticular Monocryl skin approximation All the ports were thoroughly infiltrated with Marcaine with Epinephrine  Sterile dressing was applied    Priscila Watkins tolerated the procedure well, remain stable during and after the procedure  At the end of the procedure all the instruments, needle and sponge counts were noted to be correct  Sterile dresing was applied and patient was transfered to recovery area in stable condition       Estimated Blood Loss:  Minimal    Specimens:    Order Name Source Comment Collection Info Order Time   TISSUE EXAM Gallbladder  Collected By: Bri Johnson MD 3/12/2018 12:27 PM     Drains:   nil    Complications:  None      I was present for the entire procedure No qualified resident was available  A physician's assistant was required due to the intensity of the surgery  Physician assistant was required for camera operation, hemostasis retraction and the closure of the surgical wound  Physician assistant was present during the entire procedure      Patient Disposition:  PACU       Bri Johnson MD MS Karyle Heat  Date: 3/12/2018  Time: 12:53 PM  Copy to PCP: Karen Bonner MD

## 2018-03-25 RX ORDER — AMLODIPINE BESYLATE 2.5 MG/1
2.5 TABLET ORAL DAILY
Qty: 30 TABLET | Refills: 5 | OUTPATIENT
Start: 2018-03-25 | End: 2018-07-17 | Stop reason: SDUPTHER

## 2018-04-24 ENCOUNTER — TELEPHONE (OUTPATIENT)
Dept: GASTROENTEROLOGY | Facility: CLINIC | Age: 33
End: 2018-04-24

## 2018-04-24 NOTE — TELEPHONE ENCOUNTER
Dr Jose Cui pt    Pt called in to set up an appt with dr Hussein Savage, she previously has seen Dr Jose Cui  She would like a 2nd opinion from dr Hussein Benítez

## 2018-04-26 ENCOUNTER — CLINICAL SUPPORT (OUTPATIENT)
Dept: GYNECOLOGY | Facility: CLINIC | Age: 33
End: 2018-04-26
Payer: COMMERCIAL

## 2018-04-26 VITALS
WEIGHT: 128 LBS | SYSTOLIC BLOOD PRESSURE: 118 MMHG | DIASTOLIC BLOOD PRESSURE: 60 MMHG | HEIGHT: 64 IN | BODY MASS INDEX: 21.85 KG/M2 | HEART RATE: 91 BPM

## 2018-04-26 DIAGNOSIS — Z30.42 DEPOT CONTRACEPTION: Primary | ICD-10-CM

## 2018-04-26 PROCEDURE — 96372 THER/PROPH/DIAG INJ SC/IM: CPT | Performed by: OBSTETRICS & GYNECOLOGY

## 2018-04-26 RX ORDER — MEDROXYPROGESTERONE ACETATE 150 MG/ML
150 INJECTION, SUSPENSION INTRAMUSCULAR ONCE
Status: COMPLETED | OUTPATIENT
Start: 2018-04-26 | End: 2018-04-26

## 2018-04-26 RX ADMIN — MEDROXYPROGESTERONE ACETATE 150 MG: 150 INJECTION, SUSPENSION INTRAMUSCULAR at 11:30

## 2018-05-18 ENCOUNTER — PATIENT MESSAGE (OUTPATIENT)
Dept: INTERNAL MEDICINE CLINIC | Facility: CLINIC | Age: 33
End: 2018-05-18

## 2018-07-08 ENCOUNTER — APPOINTMENT (EMERGENCY)
Dept: CT IMAGING | Facility: HOSPITAL | Age: 33
End: 2018-07-08
Payer: COMMERCIAL

## 2018-07-08 ENCOUNTER — HOSPITAL ENCOUNTER (OUTPATIENT)
Facility: HOSPITAL | Age: 33
Setting detail: OBSERVATION
Discharge: HOME | End: 2018-07-09
Attending: EMERGENCY MEDICINE | Admitting: INTERNAL MEDICINE
Payer: COMMERCIAL

## 2018-07-08 DIAGNOSIS — R10.84 GENERALIZED ABDOMINAL PAIN: ICD-10-CM

## 2018-07-08 DIAGNOSIS — K92.2 GI BLEED: ICD-10-CM

## 2018-07-08 DIAGNOSIS — R19.7 BLOODY DIARRHEA: ICD-10-CM

## 2018-07-08 DIAGNOSIS — R10.9 ABDOMINAL PAIN: Primary | ICD-10-CM

## 2018-07-08 DIAGNOSIS — R19.7 DIARRHEA: ICD-10-CM

## 2018-07-08 PROBLEM — E87.6 HYPOKALEMIA: Status: ACTIVE | Noted: 2018-07-08

## 2018-07-08 LAB
ALBUMIN SERPL BCP-MCNC: 4.1 G/DL (ref 3.5–5)
ALP SERPL-CCNC: 84 U/L (ref 46–116)
ALT SERPL W P-5'-P-CCNC: 19 U/L (ref 12–78)
ANION GAP SERPL CALCULATED.3IONS-SCNC: 9 MMOL/L (ref 4–13)
AST SERPL W P-5'-P-CCNC: 13 U/L (ref 5–45)
BASOPHILS # BLD AUTO: 0.01 THOUSANDS/ΜL (ref 0–0.1)
BASOPHILS NFR BLD AUTO: 0 % (ref 0–1)
BILIRUB SERPL-MCNC: 0.4 MG/DL (ref 0.2–1)
BUN SERPL-MCNC: 3 MG/DL (ref 5–25)
CALCIUM SERPL-MCNC: 9.1 MG/DL (ref 8.3–10.1)
CHLORIDE SERPL-SCNC: 105 MMOL/L (ref 100–108)
CO2 SERPL-SCNC: 27 MMOL/L (ref 21–32)
CREAT SERPL-MCNC: 0.82 MG/DL (ref 0.6–1.3)
EOSINOPHIL # BLD AUTO: 0.11 THOUSAND/ΜL (ref 0–0.61)
EOSINOPHIL NFR BLD AUTO: 2 % (ref 0–6)
ERYTHROCYTE [DISTWIDTH] IN BLOOD BY AUTOMATED COUNT: 12.7 % (ref 11.6–15.1)
GFR SERPL CREATININE-BSD FRML MDRD: 96 ML/MIN/1.73SQ M
GLUCOSE SERPL-MCNC: 89 MG/DL (ref 65–140)
HCT VFR BLD AUTO: 38.8 % (ref 34.8–46.1)
HGB BLD-MCNC: 12.7 G/DL (ref 11.5–15.4)
LIPASE SERPL-CCNC: 97 U/L (ref 73–393)
LYMPHOCYTES # BLD AUTO: 1.65 THOUSANDS/ΜL (ref 0.6–4.47)
LYMPHOCYTES NFR BLD AUTO: 22 % (ref 14–44)
MCH RBC QN AUTO: 29.1 PG (ref 26.8–34.3)
MCHC RBC AUTO-ENTMCNC: 32.7 G/DL (ref 31.4–37.4)
MCV RBC AUTO: 89 FL (ref 82–98)
MONOCYTES # BLD AUTO: 0.5 THOUSAND/ΜL (ref 0.17–1.22)
MONOCYTES NFR BLD AUTO: 7 % (ref 4–12)
NEUTROPHILS # BLD AUTO: 5.16 THOUSANDS/ΜL (ref 1.85–7.62)
NEUTS SEG NFR BLD AUTO: 70 % (ref 43–75)
PLATELET # BLD AUTO: 287 THOUSANDS/UL (ref 149–390)
PMV BLD AUTO: 10.4 FL (ref 8.9–12.7)
POTASSIUM SERPL-SCNC: 3.4 MMOL/L (ref 3.5–5.3)
PROT SERPL-MCNC: 7.6 G/DL (ref 6.4–8.2)
RBC # BLD AUTO: 4.37 MILLION/UL (ref 3.81–5.12)
SODIUM SERPL-SCNC: 141 MMOL/L (ref 136–145)
TSH SERPL DL<=0.05 MIU/L-ACNC: 3.73 UIU/ML (ref 0.36–3.74)
WBC # BLD AUTO: 7.43 THOUSAND/UL (ref 4.31–10.16)

## 2018-07-08 PROCEDURE — 74177 CT ABD & PELVIS W/CONTRAST: CPT

## 2018-07-08 PROCEDURE — 36415 COLL VENOUS BLD VENIPUNCTURE: CPT | Performed by: EMERGENCY MEDICINE

## 2018-07-08 PROCEDURE — 83690 ASSAY OF LIPASE: CPT | Performed by: EMERGENCY MEDICINE

## 2018-07-08 PROCEDURE — 96375 TX/PRO/DX INJ NEW DRUG ADDON: CPT

## 2018-07-08 PROCEDURE — 85025 COMPLETE CBC W/AUTO DIFF WBC: CPT | Performed by: EMERGENCY MEDICINE

## 2018-07-08 PROCEDURE — 96374 THER/PROPH/DIAG INJ IV PUSH: CPT

## 2018-07-08 PROCEDURE — 99285 EMERGENCY DEPT VISIT HI MDM: CPT

## 2018-07-08 PROCEDURE — 84443 ASSAY THYROID STIM HORMONE: CPT | Performed by: INTERNAL MEDICINE

## 2018-07-08 PROCEDURE — 99220 PR INITIAL OBSERVATION CARE/DAY 70 MINUTES: CPT | Performed by: INTERNAL MEDICINE

## 2018-07-08 PROCEDURE — 96361 HYDRATE IV INFUSION ADD-ON: CPT

## 2018-07-08 PROCEDURE — 80053 COMPREHEN METABOLIC PANEL: CPT | Performed by: EMERGENCY MEDICINE

## 2018-07-08 RX ORDER — ONDANSETRON 2 MG/ML
4 INJECTION INTRAMUSCULAR; INTRAVENOUS ONCE
Status: COMPLETED | OUTPATIENT
Start: 2018-07-08 | End: 2018-07-08

## 2018-07-08 RX ORDER — TRAMADOL HYDROCHLORIDE 50 MG/1
50 TABLET ORAL ONCE
Status: COMPLETED | OUTPATIENT
Start: 2018-07-08 | End: 2018-07-09

## 2018-07-08 RX ORDER — ONDANSETRON 2 MG/ML
4 INJECTION INTRAMUSCULAR; INTRAVENOUS EVERY 4 HOURS PRN
Status: DISCONTINUED | OUTPATIENT
Start: 2018-07-08 | End: 2018-07-09 | Stop reason: HOSPADM

## 2018-07-08 RX ORDER — VENLAFAXINE HYDROCHLORIDE 150 MG/1
150 CAPSULE, EXTENDED RELEASE ORAL DAILY
Status: DISCONTINUED | OUTPATIENT
Start: 2018-07-09 | End: 2018-07-09 | Stop reason: HOSPADM

## 2018-07-08 RX ORDER — AMLODIPINE BESYLATE 2.5 MG/1
2.5 TABLET ORAL DAILY
Status: DISCONTINUED | OUTPATIENT
Start: 2018-07-09 | End: 2018-07-09 | Stop reason: HOSPADM

## 2018-07-08 RX ORDER — SODIUM CHLORIDE 9 MG/ML
125 INJECTION, SOLUTION INTRAVENOUS CONTINUOUS
Status: DISCONTINUED | OUTPATIENT
Start: 2018-07-08 | End: 2018-07-09 | Stop reason: HOSPADM

## 2018-07-08 RX ORDER — ACETAMINOPHEN 325 MG/1
500 TABLET ORAL EVERY 6 HOURS PRN
Status: DISCONTINUED | OUTPATIENT
Start: 2018-07-08 | End: 2018-07-09 | Stop reason: HOSPADM

## 2018-07-08 RX ORDER — CLONIDINE HYDROCHLORIDE 0.1 MG/1
0.2 TABLET ORAL EVERY 12 HOURS SCHEDULED
Status: DISCONTINUED | OUTPATIENT
Start: 2018-07-08 | End: 2018-07-09 | Stop reason: HOSPADM

## 2018-07-08 RX ORDER — QUETIAPINE FUMARATE 100 MG/1
100 TABLET, FILM COATED ORAL
Status: DISCONTINUED | OUTPATIENT
Start: 2018-07-08 | End: 2018-07-09 | Stop reason: HOSPADM

## 2018-07-08 RX ORDER — BUPROPION HYDROCHLORIDE 150 MG/1
300 TABLET ORAL DAILY
Status: DISCONTINUED | OUTPATIENT
Start: 2018-07-09 | End: 2018-07-09 | Stop reason: HOSPADM

## 2018-07-08 RX ORDER — POTASSIUM CHLORIDE 20 MEQ/1
40 TABLET, EXTENDED RELEASE ORAL ONCE
Status: COMPLETED | OUTPATIENT
Start: 2018-07-08 | End: 2018-07-08

## 2018-07-08 RX ORDER — DOCUSATE SODIUM 100 MG/1
100 CAPSULE, LIQUID FILLED ORAL DAILY
Status: DISCONTINUED | OUTPATIENT
Start: 2018-07-09 | End: 2018-07-09 | Stop reason: HOSPADM

## 2018-07-08 RX ORDER — CLONAZEPAM 1 MG/1
1 TABLET ORAL 3 TIMES DAILY
Status: DISCONTINUED | OUTPATIENT
Start: 2018-07-08 | End: 2018-07-09 | Stop reason: HOSPADM

## 2018-07-08 RX ADMIN — SODIUM CHLORIDE 1000 ML: 0.9 INJECTION, SOLUTION INTRAVENOUS at 15:51

## 2018-07-08 RX ADMIN — ONDANSETRON 4 MG: 2 INJECTION INTRAMUSCULAR; INTRAVENOUS at 16:45

## 2018-07-08 RX ADMIN — HYDROMORPHONE HYDROCHLORIDE 0.5 MG: 1 INJECTION, SOLUTION INTRAMUSCULAR; INTRAVENOUS; SUBCUTANEOUS at 18:07

## 2018-07-08 RX ADMIN — IOHEXOL 100 ML: 350 INJECTION, SOLUTION INTRAVENOUS at 16:37

## 2018-07-08 RX ADMIN — POTASSIUM CHLORIDE 40 MEQ: 1500 TABLET, EXTENDED RELEASE ORAL at 20:24

## 2018-07-08 RX ADMIN — CLONAZEPAM 1 MG: 1 TABLET ORAL at 21:17

## 2018-07-08 RX ADMIN — QUETIAPINE FUMARATE 100 MG: 100 TABLET ORAL at 21:17

## 2018-07-08 RX ADMIN — ACETAMINOPHEN 488 MG: 325 TABLET, FILM COATED ORAL at 20:31

## 2018-07-08 RX ADMIN — HYDROMORPHONE HYDROCHLORIDE 0.5 MG: 1 INJECTION, SOLUTION INTRAMUSCULAR; INTRAVENOUS; SUBCUTANEOUS at 16:05

## 2018-07-08 RX ADMIN — SODIUM CHLORIDE 125 ML/HR: 0.9 INJECTION, SOLUTION INTRAVENOUS at 20:24

## 2018-07-08 NOTE — ASSESSMENT & PLAN NOTE
· In the setting of acute gastroenteritis with associated nausea and dry heaves  · CT of the abdomen/pelvis showed no acute intra-abdominal disease  · Pain control, antiemetics

## 2018-07-08 NOTE — H&P
H&P- Neno Singh 1985, 29 y o  female MRN: [id number]    Unit/Bed#: -01 Encounter: [id number]    Primary Care Provider: Kat Hernandez MD   Date and time admitted to hospital: 7/8/2018  3:23 PM    * Bloody diarrhea   Assessment & Plan    · Patient has an extensive history of GI symptoms  She is status post colonoscopy and biopsy in the past results of which revealed no evidence of UC or Crohn's disease  Patient reports positive family history of IBS  · Status post recent lab cholecystectomy on 3/12/18  · Presenting with bloody diarrhea of 1 week duration ranging from bright red blood to maroon-colored stools  · Maintain clear liquid diet at this time, check stool studies, IV fluids  · Obtain GI evaluation  Patient follows with Dr Kem Tran at [facility]      Generalized abdominal pain   Assessment & Plan    · In the setting of acute gastroenteritis with associated nausea and dry heaves  · CT of the abdomen/pelvis showed no acute intra-abdominal disease  · Pain control, antiemetics      Hypokalemia   Assessment & Plan    · Secondary to diarrhea  Replete        VTE Prophylaxis: Low risk, ambulate  / sequential compression device     Code Status:   Full code    POLST: POLST form is not discussed and not completed at this time  Anticipated Length of Stay:  Patient will be admitted on an Observation basis with an anticipated length of stay of  < 2 midnights  Justification for Hospital Stay:   Bloody diarrhea    Chief Complaint:     Diarrhea/blood in stool/abdominal pain    History of Present Illness:  Neno Singh is a 29 y o  female who presents with one-week history bloody diarrhea  She had pictures taken on her phone which showed color ranging from bright red to maroon  She has had associated generalized abdominal pain and nausea with dry heaves  She denies fever, no sick contacts, no recent travel or eating out of the ordinary    She has had extensive history of bowel disease and recently had a laparoscopic cholecystectomy in 2018  She has had colonoscopy with biopsies done which were negative for UC or Crohn's  She reports a strong family history of UC  She follows with [facility]  She has had poor oral intake due to lack of appetite since onset of symptoms    Review of Systems   Constitutional: Positive for activity change, appetite change and fatigue  Negative for chills, diaphoresis and fever  HENT: Negative  Respiratory: Negative  Cardiovascular: Negative  Gastrointestinal: Positive for abdominal pain, blood in stool, diarrhea and nausea  Negative for vomiting  Genitourinary: Negative  Musculoskeletal: Negative  Skin: Negative  Neurological: Negative  All other systems reviewed and are negative  Past Medical History:   Diagnosis Date    Anxiety     Bipolar disorder with depression ([facility] [address] )     Last assessed: 16    Depression     Endometriosis     Last assessed: 16    Hypertension     Scoliosis     Varicella        Past Surgical History:   Procedure Laterality Date     SECTION      COLONOSCOPY      HERNIA REPAIR      FL  DELIVERY ONLY N/A 2016    Procedure:  SECTION (); Surgeon: Yudith Santiago DO;  Location: [facility];  Service: Obstetrics    FL LAP,CHOLECYSTECTOMY N/A 3/12/2018    Procedure: Scarlet Jefferson;  Surgeon: Filemon Loyola MD;  Location: [facility];  Service: General    TONSILECTOMY AND ADNOIDECTOMY Bilateral     TONSILLECTOMY      WISDOM TOOTH EXTRACTION Bilateral        Family History:  Family History   Problem Relation Age of Onset    Hypertension Father     Other Father         high blood pressure (not hypertension)    Depression Mother        Home Meds:  all medications and allergies reviewed    Allergies:    Allergies   Allergen Reactions    Sulfa Antibiotics Anaphylaxis and Other (See Comments)     swelling    Reglan [Metoclopramide] Anxiety         Marital Status: /Civil Union     History   Alcohol Use    Yes     Comment: rarely, no alcohol use, per allscripts     History   Smoking Status    Former Smoker    Types: Cigarettes    Quit date: 5/27/2016   Smokeless Tobacco    Never Used     History   Drug Use No           Physical Exam:   Vitals:   Blood Pressure: 126/78 (07/08/18 1831)  Pulse: 73 (07/08/18 1831)  Temperature: 97 7 °F (36 5 °C) (07/08/18 1831)  Temp Source: Oral (07/08/18 1831)  Respirations: 18 (07/08/18 1831)  Height: 5' 4" (162 6 cm) (07/08/18 1831)  Weight - Scale: 57 2 kg (126 lb 1 7 oz) (07/08/18 1831)  SpO2: 95 % (07/08/18 1831)    Physical Exam   Constitutional: She is oriented to person, place, and time  She appears well-developed  She has a sickly appearance  No distress  HENT:   Head: Normocephalic and atraumatic  Acne scars   Eyes: Conjunctivae and EOM are normal  Right eye exhibits no discharge  Left eye exhibits no discharge  No scleral icterus  Neck: Normal range of motion  Neck supple  Cardiovascular: Normal rate, regular rhythm and normal heart sounds  No murmur heard  Pulmonary/Chest: Effort normal and breath sounds normal  No respiratory distress  Abdominal: Soft  She exhibits no distension  There is generalized tenderness  There is no rebound and no guarding  Musculoskeletal: Normal range of motion  She exhibits no edema or tenderness  Neurological: She is alert and oriented to person, place, and time  Skin: Skin is warm and dry  She is not diaphoretic  Facial acne   Vitals reviewed  Lab Results: I have personally reviewed pertinent reports          Results from last 7 days  Lab Units 07/08/18  1551   WBC Thousand/uL 7 43   HEMOGLOBIN g/dL 12 7   HEMATOCRIT % 38 8   PLATELETS Thousands/uL 287   NEUTROS PCT % 70   LYMPHS PCT % 22   MONOS PCT % 7   EOS PCT % 2       Results from last 7 days  Lab Units 07/08/18  1551   SODIUM mmol/L 141   POTASSIUM mmol/L 3 4*   CHLORIDE mmol/L 105   CO2 mmol/L 27   BUN mg/dL 3*   CREATININE mg/dL 0 82   CALCIUM mg/dL 9 1   TOTAL PROTEIN g/dL 7 6   BILIRUBIN TOTAL mg/dL 0 40   ALK PHOS U/L 84   ALT U/L 19   AST U/L 13   GLUCOSE RANDOM mg/dL 89           Imaging: I have personally reviewed pertinent reports  Ct Abdomen Pelvis With Contrast    Result Date: 7/8/2018  Narrative: CT ABDOMEN AND PELVIS WITH IV CONTRAST INDICATION:   Low abdominal pain, rectal bleeding  COMPARISON: 1/21/2018, 5/5/2017 and 11/15/2014  TECHNIQUE:  CT examination of the abdomen and pelvis was performed  Axial, sagittal, and coronal 2D reformatted images were created from the source data and submitted for interpretation  Radiation dose length product (DLP) for this visit:  384 mGy-cm   This examination, like all CT scans performed in the [facility], was performed utilizing techniques to minimize radiation dose exposure, including the use of iterative reconstruction and automated exposure control  IV Contrast:  100 mL of iohexol (OMNIPAQUE) Enteric Contrast:  Enteric contrast was not administered  FINDINGS: ABDOMEN LOWER CHEST:  No clinically significant abnormality identified in the visualized lower chest  LIVER/BILIARY TREE:  Unremarkable  GALLBLADDER:  Gallbladder is surgically absent  SPLEEN:  Unremarkable  PANCREAS:  Unremarkable  ADRENAL GLANDS:  Unremarkable  KIDNEYS/URETERS:  Unremarkable  No hydronephrosis  STOMACH AND BOWEL:  No obstruction or acute inflammatory changes  APPENDIX:  No findings to suggest appendicitis  ABDOMINOPELVIC CAVITY:  No ascites or free intraperitoneal air  No lymphadenopathy  VESSELS:  Unremarkable for patient's age  PELVIS REPRODUCTIVE ORGANS:  Unremarkable for patient's age  URINARY BLADDER:  Unremarkable  ABDOMINAL WALL/INGUINAL REGIONS:  Unremarkable  OSSEOUS STRUCTURES:  No acute fracture or destructive osseous lesion  Impression: No acute pathology   Workstation performed: [id number] ** Please Note: Dragon 360 Dictation voice to text software may have been used in the creation of this document   **

## 2018-07-08 NOTE — ASSESSMENT & PLAN NOTE
· Patient has an extensive history of GI symptoms  She is status post colonoscopy and biopsy in the past results of which revealed no evidence of UC or Crohn's disease  Patient reports positive family history of IBS  · Status post recent lab cholecystectomy on 3/12/18  · Presenting with bloody diarrhea of 1 week duration ranging from bright red blood to maroon-colored stools  · Maintain clear liquid diet at this time, check stool studies, IV fluids  · Obtain GI evaluation    Patient follows with Dr Cony Vidales at [facility]

## 2018-07-08 NOTE — ED PROVIDER NOTES
History  Chief Complaint   Patient presents with    Rectal Bleeding     bloody stools and lower abd  pain Xone week     Patient presents to the emergency department with increasing abdominal pain and rectal bleeding over the past 1 week  Patient is feeling weak at this point  She states bowel movements of become loose and diarrhea like but the blood persists  Patient has a history of inflammatory bowel disease but has been ruled out for Crohn's or ulcerative colitis  Patient denies trauma fall or injury  Patient denies vaginal bleeding discharge or urinary symptoms  Denies chest pain sob  She denies fever chills or rash  She denies ill contacts foreign travel or recent antibiotic use  Prior to Admission Medications   Prescriptions Last Dose Informant Patient Reported? Taking?    QUEtiapine (SEROquel) 100 mg tablet   Yes No   Sig: Take 100 mg by mouth daily at bedtime   XIFAXAN 550 MG tablet   Yes No   Sig: TAKE ONE TABLET BY MOUTH THREE TIMES A DAY FOR 14 DAYS   acetaminophen (TYLENOL) 500 mg tablet   Yes No   Sig: Take 500 mg by mouth every 6 (six) hours as needed for mild pain   amLODIPine (NORVASC) 2 5 mg tablet   No No   Sig: Take 1 tablet (2 5 mg total) by mouth daily   buPROPion (WELLBUTRIN SR) 150 mg 12 hr tablet   Yes No   Sig: Take 150 mg by mouth 2 (two) times a day   buPROPion (WELLBUTRIN XL) 300 mg 24 hr tablet   Yes No   Sig: Take 300 mg by mouth daily   cloNIDine (CATAPRES) 0 1 mg tablet   Yes No   Sig: Take 0 2 mg by mouth every 12 (twelve) hours   clonazePAM (KlonoPIN) 1 mg tablet   Yes No   Sig: Take 0 5 mg by mouth 2 (two) times a day   dicyclomine (BENTYL) 10 mg capsule   Yes No   Sig: TAKE ONE TO TWO BY MOUTH TWO TIMES A DAY AS NEEDED FOR PAIN   docusate sodium (DULCOLAX) 100 mg capsule   Yes No   Sig: Take 1 capsule by mouth daily   escitalopram (LEXAPRO) 10 mg tablet   Yes No   Sig: TAKE ONE-HALF (1/2) TABLET BY MOUTH DAILY FOR 6 DAYS, THEN TAKE ONE TABLET BY MOUTH EVERY DAY famotidine (PEPCID) 40 MG tablet   Yes No   Sig: Take 1 tablet by mouth   medroxyPROGESTERone (DEPO-PROVERA) 150 mg/mL injection   Yes No   Sig: Inject 150 mg into the shoulder, thigh, or buttocks every 3 (three) months   mesalamine (LIALDA) 1 2 g EC tablet   Yes No   Sig: Take 2 4 g by mouth daily   omeprazole (PriLOSEC) 40 MG capsule   Yes No   Sig: Take 40 mg by mouth daily   polyethylene glycol (MIRALAX) powder   Yes No   Sig: Take by mouth   venlafaxine (EFFEXOR-XR) 150 mg 24 hr capsule  Self Yes No   Sig: Take 150 mg by mouth daily   venlafaxine (EFFEXOR-XR) 75 mg 24 hr capsule   Yes No   Sig: Take 225 mg by mouth daily      Facility-Administered Medications: None       Past Medical History:   Diagnosis Date    Anxiety     [facility] )     Last assessed: 16    Depression     Endometriosis     Last assessed: 16    Hypertension     Scoliosis     Varicella        Past Surgical History:   Procedure Laterality Date     SECTION      COLONOSCOPY      HERNIA REPAIR      [place]  DELIVERY ONLY N/A 2016    Procedure:  SECTION (); Surgeon: Ranjana Lundberg DO;  Location: [facility];  Service: Obstetrics    [place] LAP,CHOLECYSTECTOMY N/A 3/12/2018    Procedure: Rashida Pink;  Surgeon: Tatiana Burns MD;  [facility];  Service: General    TONSILECTOMY AND ADNOIDECTOMY Bilateral     TONSILLECTOMY      WISDOM TOOTH EXTRACTION Bilateral        Family History   Problem Relation Age of Onset    Hypertension Father     Other Father         high blood pressure (not hypertension)    Depression Mother      I have reviewed and agree with the history as documented      Social History   Substance Use Topics    Smoking status: Former Smoker     Types: Cigarettes     Quit date: 2016    Smokeless tobacco: Never Used    Alcohol use Yes      Comment: rarely, no alcohol use, per allscripts        Review of Systems   Constitutional: Negative  Negative for activity change, appetite change, chills, diaphoresis, fatigue and fever  HENT: Negative  Negative for congestion, drooling, rhinorrhea, sinus pain, sinus pressure, trouble swallowing and voice change  Eyes: Negative  Negative for photophobia and visual disturbance  Respiratory: Negative  Negative for cough, chest tightness, shortness of breath, wheezing and stridor  Cardiovascular: Negative  Negative for chest pain, palpitations and leg swelling  Gastrointestinal: Positive for abdominal pain, blood in stool and diarrhea  Negative for constipation, nausea, rectal pain and vomiting  Endocrine: Negative  Genitourinary: Negative  Negative for difficulty urinating, dysuria, frequency, hematuria, urgency, vaginal bleeding, vaginal discharge and vaginal pain  Musculoskeletal: Negative  Negative for back pain, neck pain and neck stiffness  Skin: Negative  Negative for rash and wound  Allergic/Immunologic: Negative  Neurological: Negative  Negative for dizziness, tremors, seizures, syncope, facial asymmetry, speech difficulty, weakness, light-headedness, numbness and headaches  Hematological: Negative  Does not bruise/bleed easily  Psychiatric/Behavioral: Negative  Negative for confusion  Physical Exam  Physical Exam   Constitutional: She is oriented to person, place, and time  She appears well-developed and well-nourished  Nontoxic appearance  No respiratory distress  Patient looks comfortable sitting upright in the stretcher  HENT:   Head: Normocephalic and atraumatic  Right Ear: External ear normal    Left Ear: External ear normal    Mouth/Throat: Oropharynx is clear and moist    Eyes: Conjunctivae and EOM are normal  Pupils are equal, round, and reactive to light  Neck: Normal range of motion  Neck supple  Cardiovascular: Normal rate, regular rhythm, normal heart sounds and intact distal pulses      Pulmonary/Chest: Effort normal and breath sounds normal  No respiratory distress  She has no wheezes  She has no rales  She exhibits no tenderness  Abdominal: Soft  Bowel sounds are normal  She exhibits no distension and no mass  There is tenderness  There is no rebound and no guarding  No hernia  Generalized tenderness to palpation without peritoneal signs   Musculoskeletal: Normal range of motion  She exhibits no edema, tenderness or deformity  Neurological: She is alert and oriented to person, place, and time  She has normal reflexes  She displays normal reflexes  No cranial nerve deficit or sensory deficit  She exhibits normal muscle tone  Coordination normal    Skin: Skin is warm and dry  Psychiatric: She has a normal mood and affect  Her behavior is normal  Judgment and thought content normal    Nursing note and vitals reviewed        Vital Signs  ED Triage Vitals   Temperature Pulse Respirations Blood Pressure SpO2   07/08/18 1419 07/08/18 1419 07/08/18 1419 07/08/18 1419 07/08/18 1419   98 2 °F (36 8 °C) 103 18 138/70 99 %      Temp src Heart Rate Source Patient Position - Orthostatic VS BP Location FiO2 (%)   -- 07/08/18 1646 07/08/18 1419 07/08/18 1419 --    Monitor Lying Left arm       Pain Score       07/08/18 1419       7           Vitals:    07/08/18 1419 07/08/18 1646   BP: 138/70 138/84   Pulse: 103 81   Patient Position - Orthostatic VS: Lying Lying       Visual Acuity      ED Medications  Medications   sodium chloride 0 9 % bolus 1,000 mL (0 mL Intravenous Stopped 7/8/18 1645)   HYDROmorphone (DILAUDID) injection 0 5 mg (0 5 mg Intravenous Given 7/8/18 1605)   iohexol (OMNIPAQUE) 350 MG/ML injection (MULTI-DOSE) 100 mL (100 mL Intravenous Given 7/8/18 1637)   ondansetron (ZOFRAN) injection 4 mg (4 mg Intravenous Given 7/8/18 1645)       Diagnostic Studies  Results Reviewed     Procedure Component Value Units Date/Time    Comprehensive metabolic panel [[id number]]  (Abnormal) Collected:  07/08/18 1551    Lab Status:  Final result Specimen:  Blood from Arm, Right Updated:  07/08/18 1615     Sodium 141 mmol/L      Potassium 3 4 (L) mmol/L      Chloride 105 mmol/L      CO2 27 mmol/L      Anion Gap 9 mmol/L      BUN 3 (L) mg/dL      Creatinine 0 82 mg/dL      Glucose 89 mg/dL      Calcium 9 1 mg/dL      AST 13 U/L      ALT 19 U/L      Alkaline Phosphatase 84 U/L      Total Protein 7 6 g/dL      Albumin 4 1 g/dL      Total Bilirubin 0 40 mg/dL      eGFR 96 ml/min/1 73sq m     Narrative:         National Kidney Disease Education Program recommendations are as follows:  GFR calculation is accurate only with a steady state creatinine  Chronic Kidney disease less than 60 ml/min/1 73 sq  meters  Kidney failure less than 15 ml/min/1 73 sq  meters  Lipase [[id number]]  (Normal) Collected:  07/08/18 1551    Lab Status:  Final result Specimen:  Blood from Arm, Right Updated:  07/08/18 1615     Lipase 97 u/L     CBC and differential [[id number]]  (Normal) Collected:  07/08/18 1551    Lab Status:  Final result Specimen:  Blood from Arm, Right Updated:  07/08/18 1604     WBC 7 43 Thousand/uL      RBC 4 37 Million/uL      Hemoglobin 12 7 g/dL      Hematocrit 38 8 %      MCV 89 fL      MCH 29 1 pg      MCHC 32 7 g/dL      RDW 12 7 %      MPV 10 4 fL      Platelets 907 Thousands/uL      Neutrophils Relative 70 %      Lymphocytes Relative 22 %      Monocytes Relative 7 %      Eosinophils Relative 2 %      Basophils Relative 0 %      Neutrophils Absolute 5 16 Thousands/µL      Lymphocytes Absolute 1 65 Thousands/µL      Monocytes Absolute 0 50 Thousand/µL      Eosinophils Absolute 0 11 Thousand/µL      Basophils Absolute 0 01 Thousands/µL                  CT abdomen pelvis with contrast   Final Result by Ethan Delgado MD (07/08 1718)      No acute pathology              Workstation performed: [id number]                    Procedures  Procedures       Phone Contacts  ED Phone Contact    ED Course  ED Course as of Jul 08 1749   Karen Diaz Jul 08, 2018 1739 Patient to be admitted to the hospitalist service further evaluation treatment and GI consultation  [address] with Dr Marrufo who has accepted this patient to the hospitalist service  Patient is hemodynamically stable and comfortable at this time  MDM  CritCare Time    Disposition  Final diagnoses:   Abdominal pain   Diarrhea   GI bleed     Time reflects when diagnosis was documented in both MDM as applicable and the Disposition within this note     Time User Action Codes Description Comment    7/8/2018  5:40 PM Vasques, Darliss Silvius Add [R10 9] Abdominal pain     7/8/2018  5:40 PM Vasques, Darliss Silvius Add [R19 7] Diarrhea     7/8/2018  5:41 PM Vasques, Darliss Silvius Add [K92 2] GI bleed       ED Disposition     ED Disposition Condition Comment    Admit  Case was discussed with Dr Ami Monte and the patient's admission status was agreed to be Admission Status: observation status to the service of Dr Mario Campos    None         Patient's Medications   Discharge Prescriptions    No medications on file     No discharge procedures on file      ED Provider  Electronically Signed by           nAtonietta Funez MD  07/08/18 2146

## 2018-07-09 VITALS
BODY MASS INDEX: 21.53 KG/M2 | HEIGHT: 64 IN | OXYGEN SATURATION: 100 % | WEIGHT: 126.1 LBS | SYSTOLIC BLOOD PRESSURE: 129 MMHG | TEMPERATURE: 98.1 F | HEART RATE: 68 BPM | RESPIRATION RATE: 18 BRPM | DIASTOLIC BLOOD PRESSURE: 78 MMHG

## 2018-07-09 LAB
ANION GAP SERPL CALCULATED.3IONS-SCNC: 8 MMOL/L (ref 4–13)
BUN SERPL-MCNC: 2 MG/DL (ref 5–25)
CALCIUM SERPL-MCNC: 8.2 MG/DL (ref 8.3–10.1)
CHLORIDE SERPL-SCNC: 112 MMOL/L (ref 100–108)
CO2 SERPL-SCNC: 25 MMOL/L (ref 21–32)
CREAT SERPL-MCNC: 0.76 MG/DL (ref 0.6–1.3)
CRP SERPL QL: <3 MG/L
ERYTHROCYTE [DISTWIDTH] IN BLOOD BY AUTOMATED COUNT: 12.8 % (ref 11.6–15.1)
ERYTHROCYTE [SEDIMENTATION RATE] IN BLOOD: 9 MM/HOUR (ref 0–20)
GFR SERPL CREATININE-BSD FRML MDRD: 106 ML/MIN/1.73SQ M
GLUCOSE P FAST SERPL-MCNC: 92 MG/DL (ref 65–99)
GLUCOSE SERPL-MCNC: 92 MG/DL (ref 65–140)
HCT VFR BLD AUTO: 35.2 % (ref 34.8–46.1)
HGB BLD-MCNC: 11.3 G/DL (ref 11.5–15.4)
MCH RBC QN AUTO: 28.8 PG (ref 26.8–34.3)
MCHC RBC AUTO-ENTMCNC: 32.1 G/DL (ref 31.4–37.4)
MCV RBC AUTO: 90 FL (ref 82–98)
PLATELET # BLD AUTO: 258 THOUSANDS/UL (ref 149–390)
PMV BLD AUTO: 10.2 FL (ref 8.9–12.7)
POTASSIUM SERPL-SCNC: 4 MMOL/L (ref 3.5–5.3)
RBC # BLD AUTO: 3.92 MILLION/UL (ref 3.81–5.12)
SODIUM SERPL-SCNC: 145 MMOL/L (ref 136–145)
WBC # BLD AUTO: 7.33 THOUSAND/UL (ref 4.31–10.16)

## 2018-07-09 PROCEDURE — 85652 RBC SED RATE AUTOMATED: CPT | Performed by: NURSE PRACTITIONER

## 2018-07-09 PROCEDURE — 85027 COMPLETE CBC AUTOMATED: CPT | Performed by: INTERNAL MEDICINE

## 2018-07-09 PROCEDURE — 86140 C-REACTIVE PROTEIN: CPT | Performed by: NURSE PRACTITIONER

## 2018-07-09 PROCEDURE — 80048 BASIC METABOLIC PNL TOTAL CA: CPT | Performed by: INTERNAL MEDICINE

## 2018-07-09 PROCEDURE — 99217 PR OBSERVATION CARE DISCHARGE MANAGEMENT: CPT | Performed by: PHYSICIAN ASSISTANT

## 2018-07-09 RX ORDER — PANTOPRAZOLE SODIUM 40 MG/1
40 TABLET, DELAYED RELEASE ORAL
Qty: 30 TABLET | Refills: 1 | Status: SHIPPED | OUTPATIENT
Start: 2018-07-10 | End: 2018-07-17 | Stop reason: ALTCHOICE

## 2018-07-09 RX ORDER — TRAMADOL HYDROCHLORIDE 50 MG/1
50 TABLET ORAL EVERY 6 HOURS PRN
Qty: 16 TABLET | Refills: 0 | Status: SHIPPED | OUTPATIENT
Start: 2018-07-09 | End: 2018-07-13

## 2018-07-09 RX ORDER — PANTOPRAZOLE SODIUM 40 MG/1
40 TABLET, DELAYED RELEASE ORAL
Status: DISCONTINUED | OUTPATIENT
Start: 2018-07-09 | End: 2018-07-09 | Stop reason: HOSPADM

## 2018-07-09 RX ORDER — SIMETHICONE 180 MG
180 CAPSULE ORAL EVERY 6 HOURS PRN
Qty: 30 CAPSULE | Refills: 1 | Status: SHIPPED | OUTPATIENT
Start: 2018-07-09 | End: 2018-07-17 | Stop reason: ALTCHOICE

## 2018-07-09 RX ORDER — ONDANSETRON 4 MG/1
4 TABLET, ORALLY DISINTEGRATING ORAL EVERY 6 HOURS PRN
Qty: 20 TABLET | Refills: 0 | Status: SHIPPED | OUTPATIENT
Start: 2018-07-09 | End: 2018-07-17 | Stop reason: ALTCHOICE

## 2018-07-09 RX ORDER — TRAMADOL HYDROCHLORIDE 50 MG/1
50 TABLET ORAL EVERY 6 HOURS PRN
Status: DISCONTINUED | OUTPATIENT
Start: 2018-07-09 | End: 2018-07-09 | Stop reason: HOSPADM

## 2018-07-09 RX ADMIN — PANTOPRAZOLE SODIUM 40 MG: 40 TABLET, DELAYED RELEASE ORAL at 12:28

## 2018-07-09 RX ADMIN — SODIUM CHLORIDE 125 ML/HR: 0.9 INJECTION, SOLUTION INTRAVENOUS at 12:57

## 2018-07-09 RX ADMIN — VENLAFAXINE HYDROCHLORIDE 150 MG: 150 CAPSULE, EXTENDED RELEASE ORAL at 08:33

## 2018-07-09 RX ADMIN — SODIUM CHLORIDE 1000 ML: 0.9 INJECTION, SOLUTION INTRAVENOUS at 15:51

## 2018-07-09 RX ADMIN — ACETAMINOPHEN 488 MG: 325 TABLET, FILM COATED ORAL at 10:19

## 2018-07-09 RX ADMIN — ONDANSETRON 4 MG: 2 INJECTION INTRAMUSCULAR; INTRAVENOUS at 12:28

## 2018-07-09 RX ADMIN — TRAMADOL HYDROCHLORIDE 50 MG: 50 TABLET, FILM COATED ORAL at 00:04

## 2018-07-09 RX ADMIN — BUPROPION HYDROCHLORIDE 300 MG: 150 TABLET, FILM COATED, EXTENDED RELEASE ORAL at 08:33

## 2018-07-09 RX ADMIN — DOCUSATE SODIUM 100 MG: 100 CAPSULE, LIQUID FILLED ORAL at 08:33

## 2018-07-09 RX ADMIN — CLONAZEPAM 1 MG: 1 TABLET ORAL at 15:54

## 2018-07-09 RX ADMIN — SODIUM CHLORIDE 125 ML/HR: 0.9 INJECTION, SOLUTION INTRAVENOUS at 05:00

## 2018-07-09 RX ADMIN — CLONAZEPAM 1 MG: 1 TABLET ORAL at 08:33

## 2018-07-09 NOTE — DISCHARGE INSTRUCTIONS
Abdominal Pain, Ambulatory Care   Sergey Valiente RE & Oscar S: Abdominal Pain  In: Jessie Daniel, Marily RM, [employer], et al, eds  [address] Emergency Medicine Consult, 4th ed  [address] [facility],[address] [place], [place], 2011  [address], & Jacinto LOOMIS: Approach to Abdominal Pain  In: Britt Rutherford Been  The [address] [facility],5Th Floor, [facility], 2nd ed  [address], [place], [place], 2006 © 2014 [address] is for End User's use only and may not be sold, redistributed or otherwise used for commercial purposes  All illustrations and images included in CareNotes® are the copyrighted property of [employer] , [employer]  or Darren Beebe  The above information is an  only  It is not intended as medical advice for individual conditions or treatments  Talk to your doctor, nurse or pharmacist before following any medical regimen to see if it is safe and effective for you  Acute Diarrhea   AMBULATORY CARE:   Acute diarrhea  starts quickly and lasts a short time, usually 1 to 3 days  It can last up to 2 weeks  Signs and symptoms that may happen with diarrhea:  You may have 3 or more episodes of diarrhea  It may be hard to control your diarrhea  You may also have any of the following:  · Fever and chills    · Headache or abdominal pain    · Nausea and vomiting    · Symptoms of dehydration such as thirst, decreased urination, dry skin, sunken eyes, or fast, pounding heartbeat  Seek care immediately if:   · You feel confused  · Your heartbeat is faster than normal      · Your eyes look deeply sunken, or you have no tears when you cry  · You urinate less than usual, or your urine is dark yellow  · You have blood or mucus in your stools  · You have severe abdominal pain  · You are unable to drink any liquids  Contact your healthcare provider if:  · Your symptoms do not get better with treatment       · You have a fever higher than 101 3°F (38  5°C)  · You have trouble eating and drinking because you are vomiting  · You are thirsty or have a dry mouth  · Your diarrhea does not get better in 7 days  · You have questions or concerns about your condition or care  Treatment for acute diarrhea  may include medicines to slow or stop your diarrhea  You may also need medicine to treat an infection  Self-care:   · Drink liquids as directed  Liquids will help prevent dehydration caused by diarrhea  Ask your healthcare provider how much liquid to drink each day and which liquids are best for you  You may need to drink an oral rehydration solution (ORS)  An ORS has the right amounts of water, salts, and sugar you need to replace body fluids  You can buy an ORS at most grocery stores and pharmacies  · Eat foods that are easy to digest   Examples include rice, lentils, cereal, bananas, potatoes, and bread  It also includes some fruits (bananas, melon), well-cooked vegetables, and lean meats  Avoid foods high in fiber, fat, and sugar  Also avoid caffeine, alcohol, dairy, and red meat until your diarrhea is gone  Prevent diarrhea:   · Wash your hands often  Use soap and water  Wash your hands before you eat or prepare food  Also wash your hands after you use the bathroom  Use an alcohol-based hand gel when soap and water are not available  · Keep bathroom surfaces clean  This helps prevent the spread of germs that cause acute diarrhea  · Wash fruits and vegetables well before you eat them  This can help remove germs that cause diarrhea  If possible, remove the skin from fruits and vegetables, or cook them well before you eat them  · Cook meat as directed  ¨ Cook ground meat  to 160°F      ¨ Cook ground poultry, whole poultry, or cuts of poultry  to at least 165°F  Remove the meat from heat  Let it stand for 3 minutes before you eat it       ¨ Cook whole cuts of meat other than poultry  to at least 145°F  Remove the meat from heat  Let it stand for 3 minutes before you eat it  · Wash dishes that have touched raw meat with hot water and soap  This includes cutting boards, utensils, dishes, and serving containers  · Place raw or cooked meat in the refrigerator as soon as possible  Bacteria can grow in meat that is left at room temperature too long  · Do not eat raw or undercooked oysters, clams, or mussels  These foods may be contaminated and cause infection  · Drink filtered or treated water only when you travel  Do not put ice in your drinks  Drink bottled water whenever possible  Follow up with your healthcare provider as directed:  Write down your questions so you remember to ask them during your visits  © 2017 [address] Information is for End User's use only and may not be sold, redistributed or otherwise used for commercial purposes  All illustrations and images included in CareNotes® are the copyrighted property of A D A M [employer] Inc  or Darren Beebe  The above information is an  only  It is not intended as medical advice for individual conditions or treatments  Talk to your doctor, nurse or pharmacist before following any medical regimen to see if it is safe and effective for you

## 2018-07-09 NOTE — ASSESSMENT & PLAN NOTE
· In the setting of acute gastroenteritis with associated nausea and dry heaves  Reports most of her GI pain is in her lower abdomen today  · Still with some nausea today, but no further dry heaves  · CT of the abdomen/pelvis showed no acute intra-abdominal disease  · Pain control, antiemetics  · Per GI: could be related to IBS  Recommend starting pantoprazole, increased pain regimen, and advancing diet to soft meal  If able to tolerated, can be d/c'ed home   If continued belching, could consider EGD as an outpatient with Dr Alejandro Mcelroy

## 2018-07-09 NOTE — PLAN OF CARE
Nutrition/Hydration-ADULT     Nutrient/Hydration intake appropriate for improving, restoring or maintaining nutritional needs Progressing        PAIN - ADULT     Verbalizes/displays adequate comfort level or baseline comfort level Progressing        SAFETY ADULT     Patient will remain free of falls Progressing     Maintain or return to baseline ADL function Progressing     Maintain or return mobility status to optimal level Progressing

## 2018-07-09 NOTE — SOCIAL WORK
CM met with patient at bedside, patient alert and oriented  OBS status explained to patient, patient signed OBS form, copy given to patient and copy sent to medical records for scanning  CM Department will continue to assess for needs and will follow through discharge

## 2018-07-09 NOTE — PROGRESS NOTES
Progress Note - Lois Lacy 1985, 29 y o  female MRN: [id number]    Unit/Bed#: -01 Encounter: [id number]    Primary Care Provider: Leroy Levine MD   Date and time admitted to hospital: 7/8/2018  3:23 PM    * Bloody diarrhea   Assessment & Plan    · Patient has an extensive history of GI symptoms  She is status post colonoscopy and biopsy in the past results of which revealed no evidence of UC or Crohn's disease  Patient reports positive family history of IBS  · Status post recent lab cholecystectomy on 3/12/18  · Presenting with bloody diarrhea of 1 week duration ranging from bright red blood to maroon-colored stools  · Patient has not had any further episodes since admission to the hospital  · Check stool studies, IV fluids  · See GI recommendations below        Generalized abdominal pain   Assessment & Plan    · In the setting of acute gastroenteritis with associated nausea and dry heaves  Reports most of her GI pain is in her lower abdomen today  · Still with some nausea today, but no further dry heaves  · CT of the abdomen/pelvis showed no acute intra-abdominal disease  · Pain control, antiemetics  · Per GI: could be related to IBS, or questionably her recent ccx  Recommend starting pantoprazole, better pain control, and advancing diet to soft meal  If able to tolerated, can be d/c'ed home  If continued belching, could consider EGD as an outpatient with Dr Anabel Stroud        Hypokalemia   Assessment & Plan    · Secondary to diarrhea  4 0 after being repleted yesterday  · Continue to monitor          VTE Pharmacologic Prophylaxis:   Pharmacologic: Pharmacologic VTE Prophylaxis contraindicated due to low risk-- not indicated  Mechanical VTE Prophylaxis in Place: Yes    Patient Centered Rounds: I have performed bedside rounds with nursing staff today      Discussions with Specialists or Other Care Team Provider: case management, GI    Education and Discussions with Family / Patient: patient at bedside    Time Spent for Care: 30 minutes  More than 50% of total time spent on counseling and coordination of care as described above  Current Length of Stay: 0 day(s)    Current Patient Status: Observation   Certification Statement: The patient, admitted on an observation basis, will now require > 2 midnight hospital stay due to hypotension and pain management    Discharge Plan: d/c home-- possibly tonight if tolerated diet with better control     Code Status: Level 1 - Full Code      Subjective:   Patient still with pain in her lower abdomen  Some nausea, but no further dry heaves  No further episodes of bloody diarrhea  Does not feel that the tylenol is helping her pain  Objective:     Vitals:   Temp (24hrs), Av 7 °F (36 5 °C), Min:97 5 °F (36 4 °C), Max:98 2 °F (36 8 °C)    HR:  [] 80  Resp:  [16-18] 18  BP: (100-138)/(55-84) 100/56  SpO2:  [95 %-99 %] 99 %  Body mass index is 21 65 kg/m²  Input and Output Summary (last 24 hours): Intake/Output Summary (Last 24 hours) at 18 1137  Last data filed at 18 0959   Gross per 24 hour   Intake             1120 ml   Output                0 ml   Net             1120 ml       Physical Exam:     Physical Exam   Constitutional: She is oriented to person, place, and time  She appears distressed (appears uncomfortable)  HENT:   Head: Normocephalic and atraumatic  Mouth/Throat: No oropharyngeal exudate  Eyes: Conjunctivae and EOM are normal  Pupils are equal, round, and reactive to light  No scleral icterus  Neck: No JVD present  Cardiovascular: Normal rate and regular rhythm  Exam reveals no gallop and no friction rub  No murmur heard  Pulmonary/Chest: Effort normal and breath sounds normal  No respiratory distress  She has no wheezes  She has no rales  Abdominal: Soft  She exhibits no distension  There is tenderness (RLQ, LLQ, suprapubic)  There is no rebound and no guarding     Hypoactive bowel sounds Musculoskeletal: She exhibits no edema or tenderness  Neurological: She is alert and oriented to person, place, and time  She displays normal reflexes  No cranial nerve deficit  She exhibits normal muscle tone  Skin: Skin is warm and dry  No rash noted  She is not diaphoretic  No erythema  Psychiatric: She has a normal mood and affect  Her behavior is normal      Additional Data:     Labs:      Results from last 7 days  Lab Units 07/09/18  0502 07/08/18  1551   WBC Thousand/uL 7 33 7 43   HEMOGLOBIN g/dL 11 3* 12 7   HEMATOCRIT % 35 2 38 8   PLATELETS Thousands/uL 258 287   NEUTROS PCT %  --  70   LYMPHS PCT %  --  22   MONOS PCT %  --  7   EOS PCT %  --  2       Results from last 7 days  Lab Units 07/09/18  0502 07/08/18  1551   SODIUM mmol/L 145 141   POTASSIUM mmol/L 4 0 3 4*   CHLORIDE mmol/L 112* 105   CO2 mmol/L 25 27   BUN mg/dL 2* 3*   CREATININE mg/dL 0 76 0 82   CALCIUM mg/dL 8 2* 9 1   TOTAL PROTEIN g/dL  --  7 6   BILIRUBIN TOTAL mg/dL  --  0 40   ALK PHOS U/L  --  84   ALT U/L  --  19   AST U/L  --  13   GLUCOSE RANDOM mg/dL 92 89                     * I Have Reviewed All Lab Data Listed Above  * Additional Pertinent Lab Tests Reviewed:  Shirley 68 Admission Reviewed    Imaging:    Imaging Reports Reviewed Today Include: CTAP  Imaging Personally Reviewed by Myself Includes: CTAP    Recent Cultures (last 7 days):           Last 24 Hours Medication List:     Current Facility-Administered Medications:  acetaminophen 488 mg Oral Q6H PRN Amber Trujillo MD    amLODIPine 2 5 mg Oral Daily Amber Trujillo MD    buPROPion 300 mg Oral Daily Amber Trujillo MD    clonazePAM 1 mg Oral TID Amber Trujillo MD    cloNIDine 0 2 mg Oral Q12H [address] Amber Trujillo MD    docusate sodium 100 mg Oral Daily Amber Trujillo MD    ondansetron 4 mg Intravenous Q4H PRN Amber Trujillo MD    pantoprazole 40 mg Oral Early Morning MIKE Mares    QUEtiapine 100 mg Oral HS Amber Trujillo MD    sodium chloride 125 mL/hr Intravenous Continuous Aakash Vidal MD Last Rate: 125 mL/hr (07/09/18 0500)   traMADol 50 mg Oral Q6H PRN Sarah Reyes PA-C    venlafaxine 150 mg Oral Daily Aakash Vidal MD         Today, Patient Was Seen By: Jessi Zelaya PA-C    ** Please Note: Dictation voice to text software may have been used in the creation of this document   **

## 2018-07-09 NOTE — PLAN OF CARE
Problem: DISCHARGE PLANNING - CARE MANAGEMENT  Goal: Discharge to post-acute care or home with appropriate resources  INTERVENTIONS:  - Conduct assessment to determine patient/family and health care team treatment goals, and need for post-acute services based on payer coverage, community resources, and patient preferences, and barriers to discharge  - Address psychosocial, clinical, and financial barriers to discharge as identified in assessment in conjunction with the patient/family and health care team  - Arrange appropriate level of post-acute services according to patients   needs and preference and payer coverage in collaboration with the physician and health care team  - Communicate with and update the patient/family, physician, and health care team regarding progress on the discharge plan  - Arrange appropriate transportation to post-acute venues  Outcome: Progressing  CM met with patient at bedside, patient alert and oriented  OBS status explained to patient, patient signed OBS form, copy given to patient and copy sent to medical records for scanning  CM Department will continue to assess for needs and will follow through discharge

## 2018-07-09 NOTE — CASE MANAGEMENT
Initial Clinical Review    Admission: Date/Time/Statement: 07/08/18 @ 1750 Observation Written     Orders Placed This Encounter   Procedures    Place in Observation (expected length of stay for this patient is less than two midnights)     Standing Status:   Standing     Number of Occurrences:   1     Order Specific Question:   Admitting Physician     Answer:   Lorenza Whitaker [[id number]]     Order Specific Question:   Level of Care     Answer:   Med Surg [[id number]]         ED: Date/Time/Mode of Arrival:   ED Arrival Information     Expected Arrival Acuity Means of Arrival Escorted By Service Admission Type    - 7/8/2018 13:45 Urgent Walk-In Self General Medicine Urgent    Arrival Complaint    Blood in Stool           Chief Complaint:   Chief Complaint   Patient presents with    Rectal Bleeding     bloody stools and lower abd  pain Xone week       History of Illness: Patient presents to the emergency department with increasing abdominal pain and rectal bleeding over the past 1 week  Patient is feeling weak at this point  She states bowel movements of become loose and diarrhea like but the blood persists  Patient has a history of inflammatory bowel disease but has been ruled out for Crohn's or ulcerative colitis  Patient denies trauma fall or injury  Patient denies vaginal bleeding discharge or urinary symptoms  ED Vital Signs:   ED Triage Vitals   Temperature Pulse Respirations Blood Pressure SpO2   07/08/18 1419 07/08/18 1419 07/08/18 1419 07/08/18 1419 07/08/18 1419   98 2 °F (36 8 °C) 103 18 138/70 99 %      Temp Source Heart Rate Source Patient Position - Orthostatic VS BP Location FiO2 (%)   07/08/18 1831 07/08/18 1646 07/08/18 1419 07/08/18 1419 --   Oral Monitor Lying Left arm       Pain Score       07/08/18 1419       7        Wt Readings from Last 1 Encounters:   07/08/18 57 2 kg (126 lb 1 7 oz)       Vital Signs (abnormal):  WNL    Abnormal Labs/Diagnostic Test Results:   POTASSIUM 3 4*   BUN 3*     CT AP: WN    ED Treatment:   Medication Administration from 07/08/2018 1345 to 07/08/2018 1840       Date/Time Order Dose Route Action     07/08/2018 1551 sodium chloride 0 9 % bolus 1,000 mL 1,000 mL Intravenous New Bag     07/08/2018 1605 HYDROmorphone (DILAUDID) injection 0 5 mg 0 5 mg Intravenous Given     07/08/2018 1637 iohexol (OMNIPAQUE) 350 MG/ML injection (MULTI-DOSE) 100 mL 100 mL Intravenous Given     07/08/2018 1645 ondansetron (ZOFRAN) injection 4 mg 4 mg Intravenous Given     07/08/2018 1807 HYDROmorphone (DILAUDID) injection 0 5 mg 0 5 mg Intravenous Given          Past Medical/Surgical History: Active Ambulatory Problems     Diagnosis Date Noted    Anxiety 10/29/2016    Acid reflux disease 11/28/2017    Polyp of gallbladder 03/12/2018    Right upper quadrant abdominal pain 03/12/2018    Disease of gallbladder 03/12/2018     Resolved Ambulatory Problems     Diagnosis Date Noted    29 weeks gestation of pregnancy 10/26/2016    Preeclampsia, severe 10/26/2016    Postpartum hypertension 12/17/2016     Past Medical History:   Diagnosis Date    Anxiety     Bipolar disorder with depression ([facility] [address] )     Depression     Endometriosis     Hypertension     Scoliosis     Varicella        Admitting Diagnosis: Blood in stool [K92 1]  Diarrhea [R19 7]  Abdominal pain [R10 9]  Generalized abdominal pain [R10 84]  GI bleed [K92 2]  Bloody diarrhea [R19 7]    Age/Sex: 29 y o  female    Assessment/Plan:   * Bloody diarrhea   Assessment & Plan     · Patient has an extensive history of GI symptoms  She is status post colonoscopy and biopsy in the past results of which revealed no evidence of UC or Crohn's disease   Patient reports positive family history of IBS  · Status post recent lab cholecystectomy on 3/12/18  · Presenting with bloody diarrhea of 1 week duration ranging from bright red blood to maroon-colored stools  · Maintain clear liquid diet at this time, check stool studies, IV fluids  · Obtain GI evaluation  Patient follows with Dr Cony Vidales at [facility]       Generalized abdominal pain   Assessment & Plan     · In the setting of acute gastroenteritis with associated nausea and dry heaves  · CT of the abdomen/pelvis showed no acute intra-abdominal disease  · Pain control, antiemetics       Hypokalemia   Assessment & Plan     · Secondary to diarrhea  Replete          VTE Prophylaxis: Low risk, ambulate  / sequential compression device      Anticipated Length of Stay:  Patient will be admitted on an Observation basis with an anticipated length of stay of  < 2 midnights     Justification for Hospital Stay:   Bloody diarrhea     Admission Orders:  Clear liquid diet  I/O  Stool cx  Consult gastroenterology    Scheduled Meds:   Current Facility-Administered Medications:  acetaminophen 488 mg Oral Q6H PRN   amLODIPine 2 5 mg Oral Daily   buPROPion 300 mg Oral Daily   clonazePAM 1 mg Oral TID   cloNIDine 0 2 mg Oral Q12H RACHEL   docusate sodium 100 mg Oral Daily   ondansetron 4 mg Intravenous Q4H PRN   QUEtiapine 100 mg Oral HS   sodium chloride 125 mL/hr Intravenous Continuous   venlafaxine 150 mg Oral Daily     Continuous Infusions:   sodium chloride 125 mL/hr Last Rate: 125 mL/hr (07/09/18 0500)     PRN Meds:   acetaminophen    ondansetron

## 2018-07-09 NOTE — ASSESSMENT & PLAN NOTE
· Patient has an extensive history of GI symptoms  She is status post colonoscopy and biopsy in the past results of which revealed no evidence of UC or Crohn's disease   Patient reports positive family history of IBS  · Status post recent lab cholecystectomy on 3/12/18  · Presenting with bloody diarrhea of 1 week duration ranging from bright red blood to maroon-colored stools  · Patient has not had any further episodes since admission to the hospital  · Check stool studies, IV fluids  · See GI recommendations below

## 2018-07-09 NOTE — ASSESSMENT & PLAN NOTE
· Patient has an extensive history of GI symptoms  She is status post colonoscopy and biopsy in the past results of which revealed no evidence of UC or Crohn's disease   Patient reports positive family history of IBS  · Status post recent lab cholecystectomy on 3/12/18  · Presenting with bloody diarrhea of 1 week duration ranging from bright red blood to maroon-colored stools  · Patient has not had any further episodes since admission to the hospital  · See GI recommendations below

## 2018-07-09 NOTE — DISCHARGE SUMMARY
Discharge- Tatiana Fret 1985, 29 y o  female MRN: [id number]    Unit/Bed#: -01 Encounter: [id number]    Primary Care Provider: Evy Mustafa MD   Date and time admitted to hospital: 7/8/2018  3:23 PM    * Bloody diarrhea   Assessment & Plan    · Patient has an extensive history of GI symptoms  She is status post colonoscopy and biopsy in the past results of which revealed no evidence of UC or Crohn's disease  Patient reports positive family history of IBS  · Status post recent lab cholecystectomy on 3/12/18  · Presenting with bloody diarrhea of 1 week duration ranging from bright red blood to maroon-colored stools  · Patient has not had any further episodes since admission to the hospital  · See GI recommendations below        Generalized abdominal pain   Assessment & Plan    · In the setting of acute gastroenteritis with associated nausea and dry heaves  Reports most of her GI pain is in her lower abdomen today  · Still with some nausea today, but no further dry heaves  · CT of the abdomen/pelvis showed no acute intra-abdominal disease  · Pain control, antiemetics  · Per GI: could be related to IBS  Recommend starting pantoprazole, increased pain regimen, and advancing diet to soft meal  If able to tolerated, can be d/c'ed home  If continued belching, could consider EGD as an outpatient with Dr Michoacano Partida        Hypokalemia   Assessment & Plan    · Secondary to diarrhea   4 0 after being repleted yesterday  · Continue to monitor            Discharging Physician / Practitioner: Alva Bach PA-C  PCP: Evy Mustafa MD  Admission Date:   Admission Orders     Ordered        07/08/18 1749  Place in Observation (expected length of stay for this patient is less than two midnights)  Once             Discharge Date: 07/09/18    Resolved Problems  Date Reviewed: 7/9/2018    None          Consultations During Hospital Stay:  · GI-- Dr Michocaano Partida    Procedures Performed:   · CTAP    Significant Findings / Test Results:   · CTAP-- no acute intraabdominal abnormalities    Incidental Findings:   · Hypokalemia-- repleted with potassium and resolved    Test Results Pending at Discharge (will require follow up): · None-- stool samples not obtained as patient has not had a BM since admission     Outpatient Tests Requested:  · none    Complications:  none    Reason for Admission: abdominal pain, bloody diarrhea    Hospital Course:     Milagro Herring is a 29 y o  female patient who originally presented to the hospital on 7/8/2018 due to abdominal pain and blood diarrhea x1 week  Patient reports b/l LQ abdominal pain and frequent diarrhea that ranged from sanjay blood to maroon  Patient was admitted for IV hydration, pain control, and stool studies  Patient was seen by GI on day of discharge who did not make any further imaging recommendations as patient has had frequent GI imaging over the last 4 months  Her diet was advanced and GI recommended discharge if patient could tolerate without pain or nausea  Patient was able to tolerated clear liquid diet and soft diet without complication  She also has not had a bowel movement since she was admitted  She reports frustration about no imaging/testing being done and she would like to leave  She was initially going to be kept for one more night of observation as she had hypotension-- however, this was an incorrectly documented blood pressure and she actually has had normal blood pressures throughout her stay  Will provide patient with Rx for protonix, mylicon, zofran, and tramadol  She is recommended to follow up with her primary GI doctor upon discharge  Please see above list of diagnoses and related plan for additional information       Condition at Discharge: fair     Discharge Day Visit / Exam:     * Please refer to separate progress note for these details *    Discussion with Family: family at bedside    Discharge instructions/Information to patient and family:   See after visit summary for information provided to patient and family  Provisions for Follow-Up Care:  See after visit summary for information related to follow-up care and any pertinent home health orders  Disposition:     Home    For Discharges to [facility] SNF:   · Not Applicable to this Patient - Not Applicable to this Patient    Planned Readmission: none     Discharge Statement:  I spent 30 minutes discharging the patient  This time was spent on the day of discharge  I had direct contact with the patient on the day of discharge  Greater than 50% of the total time was spent examining patient, answering all patient questions, arranging and discussing plan of care with patient as well as directly providing post-discharge instructions  Additional time then spent on discharge activities  Discharge Medications:  See after visit summary for reconciled discharge medications provided to patient and family        ** Please Note: This note has been constructed using a voice recognition system **

## 2018-07-09 NOTE — CONSULTS
Consultation - Clyda Schlatter 29 y o  female MRN: [id number]    Unit/Bed#: -01 Encounter: [id number]     Reason for Consult: Abdominal pain Bloody diarrhea     Assessment/Plan     1  Right and left lower Abdominal pain bloody diarrhea CT with no evidence of acute abdomen ileus obstruction  Agree with stool studies rule out infectious etiology  This may be due to component of IBS or due to recent cholecyst   Hemoglobin is low but this may be due to hydration  Order Sed rate and CRP  Continue supportive care with Zofran as needed and pain management  Can advance diet as tolerated discussed with hospital PA, [address] as well  At this time she has had no further diarrhea if tolerates diet can consider discharge home and follow up as outpatient  OF note tsh and lipase are wnl   2  Epigastric pain belching nausea she has as history of hiatal hernia this could be due to acid peptic disease also with history of duodenal bulb ulcer  Start Pantoprazole 40mg po daily, if symptoms continue can consider EGD for further evaluation which can be done as outpatient  History of Present Illness     HPI: Clyda Schlatter is a 29y o  year old female who presents for evaluation of one week of bloody diarrhea with associated abdominal pain nausea  She recently had lap cholecystectomy in March of this year  On admission CT of abdomen and pelvis with no noted acute pathology  She is usually followed by Dr Eric Ruiz she has had ongoing abdominal pain bloating and belching  Thus far noted with elevated gege protectin suggesting bowel inflammation but also noted with prior duodenal ulcer  Evaluation with small bowel follow through unrevealing She was started on Lialda which was helping some of her symptoms but states she stopped medication as she felt it was making her anxious    She had evaluation with colonoscopy which revealed minimal  Proctitis mild erythema in left colon normal right colonic mucosa normal terminal ileum biopsies unrevealing for any underlying inflammatory process  She had evaluation with EGD on 4/17/18 which noted small clean based ulcer in duodenal bulb biopsy consistent with gastritis negative for underlying H pylori  Repeat EGD in 1/18/18 with noted previously healed ulcer and 2 cm hiatal hernia  She denies any sick contacts recent antibiotics or NSAID use  She reports dull epigastric pain has previously tried nexium, omeprazole and pepcid with no noted benefit  Her nausea is constant with no active vomiting  Lower abdominal is intermittent in nature described as cramping with no noted aggravating or relieving factors  She reports weight loss secondary to diminished appetite  Last episode of diarrhea was yesterday with bright red blood no noted mucous  Review of Systems   Constitutional: Positive for activity change, appetite change and unexpected weight change  Negative for fever  HENT: Negative for postnasal drip, sore throat, trouble swallowing and voice change  Eyes: Negative for visual disturbance  Respiratory: Positive for shortness of breath  Negative for cough, choking and chest tightness  Cardiovascular: Negative for chest pain, palpitations and leg swelling  Gastrointestinal: Positive for abdominal pain, blood in stool, diarrhea and nausea  Negative for abdominal distention, constipation and vomiting  Endocrine: Negative for cold intolerance and heat intolerance  Genitourinary: Negative for dysuria and hematuria  Musculoskeletal: Negative for arthralgias and back pain  Skin: Negative for color change and pallor  Neurological: Positive for dizziness, weakness and light-headedness  Negative for headaches  Hematological: Bruises/bleeds easily  Psychiatric/Behavioral: Negative for confusion         Historical Information   Past Medical History:   Diagnosis Date    Anxiety     [facility]     Last assessed: 5/11/16    Depression     Endometriosis     Last assessed: 16    Hypertension     Scoliosis     Varicella      Past Surgical History:   Procedure Laterality Date     SECTION      COLONOSCOPY      HERNIA REPAIR      [place]  DELIVERY ONLY N/A 2016    Procedure:  SECTION ();   Surgeon: Parker Caruso DO;  [facility];  Service: Obstetrics    [place] LAP,CHOLECYSTECTOMY N/A 3/12/2018    Procedure: Rosa Higgins;  Surgeon: Jay Norman MD;  Location: [place] [facility];  Service: General    TONSILECTOMY AND ADNOIDECTOMY Bilateral     TONSILLECTOMY      WISDOM TOOTH EXTRACTION Bilateral      Social History   History   Alcohol Use    Yes     Comment: rarely, no alcohol use, per allscripts     History   Drug Use No     History   Smoking Status    Former Smoker    Types: Cigarettes    Quit date: 2016   Smokeless Tobacco    Never Used     Family History   Problem Relation Age of Onset    Hypertension Father     Other Father         high blood pressure (not hypertension)    Depression Mother        Meds/Allergies   Current Facility-Administered Medications   Medication Dose Route Frequency Provider Last Rate Last Dose    acetaminophen (TYLENOL) tablet 488 mg  488 mg Oral Q6H PRN Chris Sierra MD   488 mg at 18 1019    amLODIPine (NORVASC) tablet 2 5 mg  2 5 mg Oral Daily Chris Sierra MD        buPROPion (WELLBUTRIN XL) 24 hr tablet 300 mg  300 mg Oral Daily Chris Sierra MD   300 mg at 18 0833    clonazePAM (KlonoPIN) tablet 1 mg  1 mg Oral TID Chris Sierra MD   1 mg at 18 0833    cloNIDine (CATAPRES) tablet 0 2 mg  0 2 mg Oral Q12H [address] Chris Sierra MD        docusate sodium (COLACE) capsule 100 mg  100 mg Oral Daily Chris Sierra MD   100 mg at 18 0833    ondansetron (ZOFRAN) injection 4 mg  4 mg Intravenous Q4H PRN Chris Sierra MD        QUEtiapine (SEROquel) tablet 100 mg  100 mg Oral HS Chris Sierra MD   100 mg at 18 2117    sodium chloride 0 9 % infusion  125 mL/hr Intravenous Continuous Liliana Galan  mL/hr at 07/09/18 0500 125 mL/hr at 07/09/18 0500    venlafaxine (EFFEXOR-XR) 24 hr capsule 150 mg  150 mg Oral Daily Liliana Galan MD   150 mg at 07/09/18 8596     Allergies   Allergen Reactions    Sulfa Antibiotics Anaphylaxis and Other (See Comments)     swelling    Reglan [Metoclopramide] Anxiety     Prescriptions Prior to Admission   Medication    acetaminophen (TYLENOL) 500 mg tablet    amLODIPine (NORVASC) 2 5 mg tablet    buPROPion (WELLBUTRIN XL) 300 mg 24 hr tablet    clonazePAM (KlonoPIN) 1 mg tablet    cloNIDine (CATAPRES) 0 1 mg tablet    docusate sodium (DULCOLAX) 100 mg capsule    medroxyPROGESTERone (DEPO-PROVERA) 150 mg/mL injection    polyethylene glycol (MIRALAX) powder    QUEtiapine (SEROquel) 100 mg tablet    venlafaxine (EFFEXOR-XR) 150 mg 24 hr capsule          Objective     Vitals: Blood pressure 100/56, pulse 80, temperature 97 5 °F (36 4 °C), temperature source Oral, resp  rate 18, height 5' 4" (1 626 m), weight 57 2 kg (126 lb 1 7 oz), SpO2 99 %, not currently breastfeeding  Intake/Output Summary (Last 24 hours) at 07/09/18 1029  Last data filed at 07/09/18 0959   Gross per 24 hour   Intake             1120 ml   Output                0 ml   Net             1120 ml       Ct Abdomen Pelvis With Contrast    Result Date: 7/8/2018  Narrative: CT ABDOMEN AND PELVIS WITH IV CONTRAST INDICATION:   Low abdominal pain, rectal bleeding  COMPARISON: 1/21/2018, 5/5/2017 and 11/15/2014  TECHNIQUE:  CT examination of the abdomen and pelvis was performed  Axial, sagittal, and coronal 2D reformatted images were created from the source data and submitted for interpretation  Radiation dose length product (DLP) for this visit:  384 mGy-cm     This examination, like all CT scans performed in the [facility], was performed utilizing techniques to minimize radiation dose exposure, including the use of iterative reconstruction and automated exposure control  IV Contrast:  100 mL of iohexol (OMNIPAQUE) Enteric Contrast:  Enteric contrast was not administered  FINDINGS: ABDOMEN LOWER CHEST:  No clinically significant abnormality identified in the visualized lower chest  LIVER/BILIARY TREE:  Unremarkable  GALLBLADDER:  Gallbladder is surgically absent  SPLEEN:  Unremarkable  PANCREAS:  Unremarkable  ADRENAL GLANDS:  Unremarkable  KIDNEYS/URETERS:  Unremarkable  No hydronephrosis  STOMACH AND BOWEL:  No obstruction or acute inflammatory changes  APPENDIX:  No findings to suggest appendicitis  ABDOMINOPELVIC CAVITY:  No ascites or free intraperitoneal air  No lymphadenopathy  VESSELS:  Unremarkable for patient's age  PELVIS REPRODUCTIVE ORGANS:  Unremarkable for patient's age  URINARY BLADDER:  Unremarkable  ABDOMINAL WALL/INGUINAL REGIONS:  Unremarkable  OSSEOUS STRUCTURES:  No acute fracture or destructive osseous lesion  Impression: No acute pathology  Workstation performed: [id number]       Physical Exam   Constitutional: She is oriented to person, place, and time  She appears well-developed and well-nourished  HENT:   Head: Normocephalic and atraumatic  Eyes: Conjunctivae and EOM are normal  Pupils are equal, round, and reactive to light  Right eye exhibits no discharge  Left eye exhibits no discharge  No scleral icterus  Neck: Normal range of motion  Neck supple  No thyromegaly present  Cardiovascular: Normal rate and regular rhythm  Exam reveals no friction rub  No murmur heard  Pulmonary/Chest: Effort normal and breath sounds normal  No respiratory distress  She has no wheezes  She has no rales  Abdominal: Soft  Bowel sounds are normal  She exhibits no distension and no mass  There is tenderness  There is no rebound and no guarding  Epigastric tenderness right and left lower abdominal tenderness    Musculoskeletal: Normal range of motion  She exhibits no edema or tenderness  Neurological: She is alert and oriented to person, place, and time  Skin: Skin is warm and dry  No rash noted  No erythema  No pallor  Psychiatric: She has a normal mood and affect         Recent Results (from the past 24 hour(s))   Comprehensive metabolic panel    Collection Time: 07/08/18  3:51 PM   Result Value Ref Range    Sodium 141 136 - 145 mmol/L    Potassium 3 4 (L) 3 5 - 5 3 mmol/L    Chloride 105 100 - 108 mmol/L    CO2 27 21 - 32 mmol/L    Anion Gap 9 4 - 13 mmol/L    BUN 3 (L) 5 - 25 mg/dL    Creatinine 0 82 0 60 - 1 30 mg/dL    Glucose 89 65 - 140 mg/dL    Calcium 9 1 8 3 - 10 1 mg/dL    AST 13 5 - 45 U/L    ALT 19 12 - 78 U/L    Alkaline Phosphatase 84 46 - 116 U/L    Total Protein 7 6 6 4 - 8 2 g/dL    Albumin 4 1 3 5 - 5 0 g/dL    Total Bilirubin 0 40 0 20 - 1 00 mg/dL    eGFR 96 ml/min/1 73sq m   CBC and differential    Collection Time: 07/08/18  3:51 PM   Result Value Ref Range    WBC 7 43 4 31 - 10 16 Thousand/uL    RBC 4 37 3 81 - 5 12 Million/uL    Hemoglobin 12 7 11 5 - 15 4 g/dL    Hematocrit 38 8 34 8 - 46 1 %    MCV 89 82 - 98 fL    MCH 29 1 26 8 - 34 3 pg    MCHC 32 7 31 4 - 37 4 g/dL    RDW 12 7 11 6 - 15 1 %    MPV 10 4 8 9 - 12 7 fL    Platelets 594 300 - 626 Thousands/uL    Neutrophils Relative 70 43 - 75 %    Lymphocytes Relative 22 14 - 44 %    Monocytes Relative 7 4 - 12 %    Eosinophils Relative 2 0 - 6 %    Basophils Relative 0 0 - 1 %    Neutrophils Absolute 5 16 1 85 - 7 62 Thousands/µL    Lymphocytes Absolute 1 65 0 60 - 4 47 Thousands/µL    Monocytes Absolute 0 50 0 17 - 1 22 Thousand/µL    Eosinophils Absolute 0 11 0 00 - 0 61 Thousand/µL    Basophils Absolute 0 01 0 00 - 0 10 Thousands/µL   Lipase    Collection Time: 07/08/18  3:51 PM   Result Value Ref Range    Lipase 97 73 - 393 u/L   TSH, 3rd generation    Collection Time: 07/08/18  8:17 PM   Result Value Ref Range    TSH 3RD GENERATON 3 732 0 358 - 3 740 uIU/mL   Basic metabolic panel    Collection Time: 07/09/18 5:02 AM   Result Value Ref Range    Sodium 145 136 - 145 mmol/L    Potassium 4 0 3 5 - 5 3 mmol/L    Chloride 112 (H) 100 - 108 mmol/L    CO2 25 21 - 32 mmol/L    Anion Gap 8 4 - 13 mmol/L    BUN 2 (L) 5 - 25 mg/dL    Creatinine 0 76 0 60 - 1 30 mg/dL    Glucose 92 65 - 140 mg/dL    Glucose, Fasting 92 65 - 99 mg/dL    Calcium 8 2 (L) 8 3 - 10 1 mg/dL    eGFR 106 ml/min/1 73sq m   CBC (With Platelets)    Collection Time: 07/09/18  5:02 AM   Result Value Ref Range    WBC 7 33 4 31 - 10 16 Thousand/uL    RBC 3 92 3 81 - 5 12 Million/uL    Hemoglobin 11 3 (L) 11 5 - 15 4 g/dL    Hematocrit 35 2 34 8 - 46 1 %    MCV 90 82 - 98 fL    MCH 28 8 26 8 - 34 3 pg    MCHC 32 1 31 4 - 37 4 g/dL    RDW 12 8 11 6 - 15 1 %    Platelets 769 738 - 064 Thousands/uL    MPV 10 2 8 9 - 12 7 fL   liver function tests   Lab Results: I have personally reviewed pertinent reports  Counseling / Coordination of Care   All of the above discussed with Dr Adina Perez  All ramos decisions made by him

## 2018-07-11 ENCOUNTER — TELEPHONE (OUTPATIENT)
Dept: INTERNAL MEDICINE CLINIC | Facility: CLINIC | Age: 33
End: 2018-07-11

## 2018-07-11 NOTE — TELEPHONE ENCOUNTER
----- Message from Shaniqua Del Cid RN sent at 7/11/2018  8:44 AM EDT -----  Regarding: FW: Test Results Question  Contact: [phone]      ----- Message -----  From: Lemuel Stoll  Sent: 7/10/2018   6:23 PM  To: Dottie Urena Clinical  Subject: Test Results Question                            I have a question about BASIC METABOLIC PANEL and other tests that resulted on 7/9/18 at 5:34 AM

## 2018-07-11 NOTE — TELEPHONE ENCOUNTER
----- Message from Alfonso Herrera sent at 7/10/2018  6:23 PM EDT -----  Regarding: Test Results Question  Contact: [phone]  I have a question about BASIC METABOLIC PANEL and other tests that resulted on 7/9/18 at 5:34 AM

## 2018-07-12 ENCOUNTER — CLINICAL SUPPORT (OUTPATIENT)
Dept: GYNECOLOGY | Facility: CLINIC | Age: 33
End: 2018-07-12
Payer: COMMERCIAL

## 2018-07-12 VITALS — WEIGHT: 121.8 LBS | HEIGHT: 64 IN | BODY MASS INDEX: 20.79 KG/M2

## 2018-07-12 DIAGNOSIS — Z30.42 DEPOT CONTRACEPTION: Primary | ICD-10-CM

## 2018-07-12 PROCEDURE — 96372 THER/PROPH/DIAG INJ SC/IM: CPT

## 2018-07-12 RX ORDER — MEDROXYPROGESTERONE ACETATE 150 MG/ML
150 INJECTION, SUSPENSION INTRAMUSCULAR ONCE
Status: COMPLETED | OUTPATIENT
Start: 2018-07-12 | End: 2018-07-12

## 2018-07-12 RX ADMIN — MEDROXYPROGESTERONE ACETATE 150 MG: 150 INJECTION, SUSPENSION INTRAMUSCULAR at 11:22

## 2018-07-17 ENCOUNTER — OFFICE VISIT (OUTPATIENT)
Dept: INTERNAL MEDICINE CLINIC | Age: 33
End: 2018-07-17
Payer: COMMERCIAL

## 2018-07-17 VITALS
HEART RATE: 92 BPM | TEMPERATURE: 98.4 F | SYSTOLIC BLOOD PRESSURE: 118 MMHG | OXYGEN SATURATION: 99 % | BODY MASS INDEX: 21.17 KG/M2 | DIASTOLIC BLOOD PRESSURE: 88 MMHG | WEIGHT: 124 LBS | HEIGHT: 64 IN

## 2018-07-17 DIAGNOSIS — R19.7 BLOODY DIARRHEA: ICD-10-CM

## 2018-07-17 DIAGNOSIS — I10 ESSENTIAL HYPERTENSION: Primary | ICD-10-CM

## 2018-07-17 DIAGNOSIS — R10.84 GENERALIZED ABDOMINAL PAIN: ICD-10-CM

## 2018-07-17 PROCEDURE — 3079F DIAST BP 80-89 MM HG: CPT | Performed by: INTERNAL MEDICINE

## 2018-07-17 PROCEDURE — 99214 OFFICE O/P EST MOD 30 MIN: CPT | Performed by: INTERNAL MEDICINE

## 2018-07-17 PROCEDURE — 3008F BODY MASS INDEX DOCD: CPT | Performed by: INTERNAL MEDICINE

## 2018-07-17 PROCEDURE — 1036F TOBACCO NON-USER: CPT | Performed by: INTERNAL MEDICINE

## 2018-07-17 PROCEDURE — 3074F SYST BP LT 130 MM HG: CPT | Performed by: INTERNAL MEDICINE

## 2018-07-17 PROCEDURE — 1111F DSCHRG MED/CURRENT MED MERGE: CPT | Performed by: INTERNAL MEDICINE

## 2018-07-17 RX ORDER — AMLODIPINE BESYLATE 2.5 MG/1
2.5 TABLET ORAL DAILY
Qty: 30 TABLET | Refills: 6 | OUTPATIENT
Start: 2018-07-17 | End: 2019-02-26 | Stop reason: SDUPTHER

## 2018-07-17 NOTE — PROGRESS NOTES
Assessment/Plan:     1  Essential hypertension   blood pressure is well controlled on present regimen of amlodipine and she is also taking clonidine 0 1 mg b i d  By her psychiatrist     2  Chronic abdominal pain/bloody diarrhea   advised to follow with her gastroenterologist as soon as possible  Diagnoses and all orders for this visit:    Essential hypertension  -     amLODIPine (NORVASC) 2 5 mg tablet; Take 1 tablet (2 5 mg total) by mouth daily    Generalized abdominal pain    Bloody diarrhea          Subjective:          Patient ID: Neno Singh is a 29 y o  female  Patient complained of bloody diarrhea on July 9th  She was [facility]  CC RP, sed rate and electrolytes were unremarkable except hemoglobin was little bit low in 11 the range  Then next day she was also seen in the emergency room of [address] [place] [address] and hemoglobin was more than 12  Still she is complaining of off and on abdominal pain        The following portions of the patient's history were reviewed and updated as appropriate: allergies, current medications, past family history, past medical history, past social history, past surgical history and problem list     Review of Systems   Constitutional: Negative for fatigue and fever  HENT: Negative for congestion, ear discharge, ear pain, postnasal drip, sinus pressure, sore throat, tinnitus and trouble swallowing  Eyes: Negative for discharge, itching and visual disturbance  Respiratory: Negative for cough and shortness of breath  Cardiovascular: Negative for chest pain and palpitations  Gastrointestinal: Positive for abdominal pain and blood in stool  Negative for diarrhea, nausea and vomiting  Endocrine: Negative for cold intolerance and polyuria  Genitourinary: Negative for difficulty urinating, dysuria and urgency  Musculoskeletal: Negative for arthralgias and neck pain  Skin: Negative for rash     Allergic/Immunologic: Negative for environmental allergies  Neurological: Negative for dizziness, weakness and headaches  Psychiatric/Behavioral: Negative for sleep disturbance and suicidal ideas  The patient is not nervous/anxious  Past Medical History:   Diagnosis Date    Anxiety     B[facility] disorder with depression ([facility] )     Last assessed: 16    Depression     Endometriosis     Last assessed: 16    Hypertension     Scoliosis     Varicella          Current Outpatient Prescriptions:     acetaminophen (TYLENOL) 500 mg tablet, Take 500 mg by mouth every 6 (six) hours as needed for mild pain, Disp: , Rfl:     amLODIPine (NORVASC) 2 5 mg tablet, Take 1 tablet (2 5 mg total) by mouth daily, Disp: 30 tablet, Rfl: 6    buPROPion (WELLBUTRIN XL) 300 mg 24 hr tablet, Take 300 mg by mouth daily, Disp: , Rfl: 2    clonazePAM (KlonoPIN) 1 mg tablet, Take 1 mg by mouth 3 (three) times a day  , Disp: , Rfl:     cloNIDine (CATAPRES) 0 1 mg tablet, Take 0 1 mg by mouth every 12 (twelve) hours  , Disp: , Rfl:     docusate sodium (DULCOLAX) 100 mg capsule, Take 1 capsule by mouth daily as needed  , Disp: , Rfl:     medroxyPROGESTERone (DEPO-PROVERA) 150 mg/mL injection, Inject 150 mg into the shoulder, thigh, or buttocks every 3 (three) months, Disp: , Rfl:     QUEtiapine (SEROquel) 100 mg tablet, Take 100 mg by mouth daily at bedtime, Disp: , Rfl:     venlafaxine (EFFEXOR-XR) 150 mg 24 hr capsule, Take 150 mg by mouth daily, Disp: , Rfl:     Allergies   Allergen Reactions    Sulfa Antibiotics Anaphylaxis, Other (See Comments) and Edema     swelling  swelling    Reglan [Metoclopramide] Anxiety       Social History   Past Surgical History:   Procedure Laterality Date     SECTION      COLONOSCOPY      HERNIA REPAIR      [place]  DELIVERY ONLY N/A 2016    Procedure:  SECTION ();   Surgeon: Braeden Martinez DO;  Location: [facility] ;  Service: Obstetrics    [place] LAP,CHOLECYSTECTOMY N/A 3/12/2018    Procedure: Parowan Downing;  Surgeon: Gopi Lomax MD;  Location: [facility];  Service: General    TONSILECTOMY AND ADNOIDECTOMY Bilateral     TONSILLECTOMY      WISDOM TOOTH EXTRACTION Bilateral      Family History   Problem Relation Age of Onset    Hypertension Father     Depression Mother        Objective:  /88 (BP Location: Left arm, Patient Position: Sitting, Cuff Size: Adult)   Pulse 92   Temp 98 4 °F (36 9 °C) (Tympanic)   Ht 5' 3 58" (1 615 m)   Wt 56 2 kg (124 lb)   SpO2 99% Comment: room air  BMI 21 56 kg/m²   Body mass index is 21 56 kg/m²  Physical Exam   Constitutional: She appears well-developed  HENT:   Head: Normocephalic  Right Ear: External ear normal    Left Ear: External ear normal    Mouth/Throat: Oropharynx is clear and moist    Eyes: Pupils are equal, round, and reactive to light  No scleral icterus  Neck: Normal range of motion  Neck supple  No tracheal deviation present  No thyromegaly present  Cardiovascular: Normal rate, regular rhythm and normal heart sounds  No murmur heard  Pulmonary/Chest: Effort normal and breath sounds normal  No respiratory distress  She has no wheezes  She exhibits no tenderness  Abdominal: Soft  Bowel sounds are normal  She exhibits no mass  There is no tenderness  Musculoskeletal: Normal range of motion  Lymphadenopathy:     She has no cervical adenopathy  Neurological: She is alert  No cranial nerve deficit  Skin: Skin is warm  No erythema

## 2018-08-01 DIAGNOSIS — I10 ESSENTIAL HYPERTENSION: ICD-10-CM

## 2018-08-01 RX ORDER — AMLODIPINE BESYLATE 2.5 MG/1
TABLET ORAL
Qty: 30 TABLET | Refills: 5 | OUTPATIENT
Start: 2018-08-01

## 2018-08-27 ENCOUNTER — TRANSCRIBE ORDERS (OUTPATIENT)
Dept: LAB | Facility: HOSPITAL | Age: 33
End: 2018-08-27

## 2018-08-27 ENCOUNTER — APPOINTMENT (OUTPATIENT)
Dept: LAB | Facility: HOSPITAL | Age: 33
End: 2018-08-27
Payer: COMMERCIAL

## 2018-08-27 DIAGNOSIS — K92.1 BLOOD IN STOOL: ICD-10-CM

## 2018-08-27 DIAGNOSIS — K92.1 BLOOD IN STOOL: Primary | ICD-10-CM

## 2018-08-27 LAB
ALBUMIN SERPL BCP-MCNC: 3.9 G/DL (ref 3.5–5)
ALP SERPL-CCNC: 89 U/L (ref 46–116)
ALT SERPL W P-5'-P-CCNC: 12 U/L (ref 12–78)
ANION GAP SERPL CALCULATED.3IONS-SCNC: 5 MMOL/L (ref 4–13)
AST SERPL W P-5'-P-CCNC: 10 U/L (ref 5–45)
BASOPHILS # BLD AUTO: 0.01 THOUSANDS/ΜL (ref 0–0.1)
BASOPHILS NFR BLD AUTO: 0 % (ref 0–1)
BILIRUB SERPL-MCNC: 0.55 MG/DL (ref 0.2–1)
BUN SERPL-MCNC: 5 MG/DL (ref 5–25)
CALCIUM SERPL-MCNC: 9.1 MG/DL (ref 8.3–10.1)
CHLORIDE SERPL-SCNC: 108 MMOL/L (ref 100–108)
CO2 SERPL-SCNC: 26 MMOL/L (ref 21–32)
CREAT SERPL-MCNC: 0.78 MG/DL (ref 0.6–1.3)
CRP SERPL QL: <3 MG/L
EOSINOPHIL # BLD AUTO: 0.17 THOUSAND/ΜL (ref 0–0.61)
EOSINOPHIL NFR BLD AUTO: 3 % (ref 0–6)
ERYTHROCYTE [DISTWIDTH] IN BLOOD BY AUTOMATED COUNT: 12.9 % (ref 11.6–15.1)
ERYTHROCYTE [SEDIMENTATION RATE] IN BLOOD: 18 MM/HOUR (ref 0–20)
GFR SERPL CREATININE-BSD FRML MDRD: 102 ML/MIN/1.73SQ M
GLUCOSE SERPL-MCNC: 82 MG/DL (ref 65–140)
HCT VFR BLD AUTO: 39.7 % (ref 34.8–46.1)
HGB BLD-MCNC: 12.8 G/DL (ref 11.5–15.4)
IMM GRANULOCYTES # BLD AUTO: 0.02 THOUSAND/UL (ref 0–0.2)
IMM GRANULOCYTES NFR BLD AUTO: 0 % (ref 0–2)
LYMPHOCYTES # BLD AUTO: 1.99 THOUSANDS/ΜL (ref 0.6–4.47)
LYMPHOCYTES NFR BLD AUTO: 32 % (ref 14–44)
MCH RBC QN AUTO: 29.6 PG (ref 26.8–34.3)
MCHC RBC AUTO-ENTMCNC: 32.2 G/DL (ref 31.4–37.4)
MCV RBC AUTO: 92 FL (ref 82–98)
MONOCYTES # BLD AUTO: 0.38 THOUSAND/ΜL (ref 0.17–1.22)
MONOCYTES NFR BLD AUTO: 6 % (ref 4–12)
NEUTROPHILS # BLD AUTO: 3.66 THOUSANDS/ΜL (ref 1.85–7.62)
NEUTS SEG NFR BLD AUTO: 59 % (ref 43–75)
NRBC BLD AUTO-RTO: 0 /100 WBCS
PLATELET # BLD AUTO: 326 THOUSANDS/UL (ref 149–390)
PMV BLD AUTO: 10.6 FL (ref 8.9–12.7)
POTASSIUM SERPL-SCNC: 3.8 MMOL/L (ref 3.5–5.3)
PROT SERPL-MCNC: 7.6 G/DL (ref 6.4–8.2)
RBC # BLD AUTO: 4.33 MILLION/UL (ref 3.81–5.12)
SODIUM SERPL-SCNC: 139 MMOL/L (ref 136–145)
WBC # BLD AUTO: 6.23 THOUSAND/UL (ref 4.31–10.16)

## 2018-08-27 PROCEDURE — 36415 COLL VENOUS BLD VENIPUNCTURE: CPT

## 2018-08-27 PROCEDURE — 85025 COMPLETE CBC W/AUTO DIFF WBC: CPT

## 2018-08-27 PROCEDURE — 80053 COMPREHEN METABOLIC PANEL: CPT

## 2018-08-27 PROCEDURE — 86140 C-REACTIVE PROTEIN: CPT

## 2018-08-27 PROCEDURE — 85652 RBC SED RATE AUTOMATED: CPT

## 2018-10-17 ENCOUNTER — CLINICAL SUPPORT (OUTPATIENT)
Dept: GYNECOLOGY | Facility: CLINIC | Age: 33
End: 2018-10-17
Payer: COMMERCIAL

## 2018-10-17 VITALS
WEIGHT: 117 LBS | DIASTOLIC BLOOD PRESSURE: 82 MMHG | HEART RATE: 99 BPM | SYSTOLIC BLOOD PRESSURE: 122 MMHG | BODY MASS INDEX: 19.97 KG/M2 | HEIGHT: 64 IN

## 2018-10-17 DIAGNOSIS — Z30.42 ENCOUNTER FOR SURVEILLANCE OF INJECTABLE CONTRACEPTIVE: Primary | ICD-10-CM

## 2018-10-17 PROCEDURE — 96372 THER/PROPH/DIAG INJ SC/IM: CPT | Performed by: OBSTETRICS & GYNECOLOGY

## 2018-10-17 RX ORDER — PANTOPRAZOLE SODIUM 40 MG/1
TABLET, DELAYED RELEASE ORAL
COMMUNITY
Start: 2018-08-18 | End: 2019-06-13

## 2018-10-17 RX ORDER — VENLAFAXINE HYDROCHLORIDE 75 MG/1
75 CAPSULE, EXTENDED RELEASE ORAL 3 TIMES DAILY
COMMUNITY
Start: 2018-08-17 | End: 2019-10-23 | Stop reason: ALTCHOICE

## 2018-10-17 RX ORDER — LUBIPROSTONE 8 UG/1
CAPSULE, GELATIN COATED ORAL
COMMUNITY
Start: 2018-08-29 | End: 2019-06-13

## 2018-10-17 RX ORDER — MEDROXYPROGESTERONE ACETATE 150 MG/ML
150 INJECTION, SUSPENSION INTRAMUSCULAR ONCE
Status: COMPLETED | OUTPATIENT
Start: 2018-10-17 | End: 2018-10-17

## 2018-10-17 RX ADMIN — MEDROXYPROGESTERONE ACETATE 150 MG: 150 INJECTION, SUSPENSION INTRAMUSCULAR at 11:14

## 2018-11-16 ENCOUNTER — TRANSCRIBE ORDERS (OUTPATIENT)
Dept: ADMINISTRATIVE | Facility: HOSPITAL | Age: 33
End: 2018-11-16

## 2018-11-16 DIAGNOSIS — R10.11 ABDOMINAL PAIN, RIGHT UPPER QUADRANT: ICD-10-CM

## 2018-11-16 DIAGNOSIS — R10.32 ABDOMINAL PAIN, LEFT LOWER QUADRANT: Primary | ICD-10-CM

## 2018-12-20 DIAGNOSIS — Z30.42 DEPOT CONTRACEPTION: Primary | ICD-10-CM

## 2018-12-20 RX ORDER — MEDROXYPROGESTERONE ACETATE 150 MG/ML
INJECTION, SUSPENSION INTRAMUSCULAR
Qty: 1 ML | Refills: 3 | Status: SHIPPED | OUTPATIENT
Start: 2018-12-20 | End: 2019-12-08 | Stop reason: SDUPTHER

## 2019-01-09 ENCOUNTER — OFFICE VISIT (OUTPATIENT)
Dept: GYNECOLOGY | Facility: CLINIC | Age: 34
End: 2019-01-09
Payer: COMMERCIAL

## 2019-01-09 VITALS
DIASTOLIC BLOOD PRESSURE: 80 MMHG | HEIGHT: 64 IN | SYSTOLIC BLOOD PRESSURE: 154 MMHG | WEIGHT: 133.6 LBS | BODY MASS INDEX: 22.81 KG/M2

## 2019-01-09 DIAGNOSIS — F41.9 ANXIETY: ICD-10-CM

## 2019-01-09 DIAGNOSIS — Z30.8 ENCOUNTER FOR OTHER CONTRACEPTIVE MANAGEMENT: ICD-10-CM

## 2019-01-09 DIAGNOSIS — L98.9 FACIAL LESION: ICD-10-CM

## 2019-01-09 DIAGNOSIS — K52.9 COLITIS: ICD-10-CM

## 2019-01-09 DIAGNOSIS — Z30.42 ENCOUNTER FOR MANAGEMENT AND INJECTION OF DEPO-PROVERA: Primary | ICD-10-CM

## 2019-01-09 PROCEDURE — 99213 OFFICE O/P EST LOW 20 MIN: CPT | Performed by: OBSTETRICS & GYNECOLOGY

## 2019-01-09 RX ORDER — MEDROXYPROGESTERONE ACETATE 150 MG/ML
150 INJECTION, SUSPENSION INTRAMUSCULAR ONCE
Status: COMPLETED | OUTPATIENT
Start: 2019-01-09 | End: 2019-01-09

## 2019-01-09 RX ORDER — BACLOFEN 20 MG/1
20 TABLET ORAL 3 TIMES DAILY
COMMUNITY
Start: 2019-01-04 | End: 2021-02-03 | Stop reason: SDUPTHER

## 2019-01-09 RX ORDER — CHLORDIAZEPOXIDE HYDROCHLORIDE AND CLIDINIUM BROMIDE 5; 2.5 MG/1; MG/1
CAPSULE ORAL
Status: ON HOLD | COMMUNITY
Start: 2018-12-20 | End: 2020-01-09 | Stop reason: ALTCHOICE

## 2019-01-09 RX ADMIN — MEDROXYPROGESTERONE ACETATE 150 MG: 150 INJECTION, SUSPENSION INTRAMUSCULAR at 10:11

## 2019-01-09 NOTE — PROGRESS NOTES
Assessment/Plan:  Mild bleeding pattern changes with Depo-Provera  Four new medications  This should not affect its efficacy  To ask pharmacist to do a computer search  Mirena was briefly discussed and a pamphlet was given  If the bleeding pattern further expands that I would use condoms as a backup  She had a  and Mirena may be difficult to place  Facial lesions- recommend she see Dr Devonte Monzon given  [facility] Annual 3 mos   Today's visit lasted 20 min , greater than 50% was spent on counseling  Diagnoses and all orders for this visit:    Encounter for management and injection of depo-Provera  -     medroxyPROGESTERone (DEPO-PROVERA) IM injection 150 mg; Inject 1 mL (150 mg total) into a muscle once     Encounter for other contraceptive management    Facial lesion    Colitis    Anxiety    Other orders  -     baclofen 20 mg tablet;   -     LIBRAX 5-2 5 MG per capsule;               Subjective:        Patient ID: Mary Kate Dean is a 34 y o  female  David Elissa is here with Ariana Landeros  She had some changes recently and her bleeding and was concerned the Depo-Provera might not be working properly  In the past she enjoyed amenorrhea but for the past 6 months has been having 2 day menses which are light and associated with headaches  There are mild cramps but nothing like the dysmenorrhea she experienced in the past     Recently she has had numerous medical problems including:  Cholecystectomy   Hiatal hernia - initially observed but now getting ready to have a TIF procedure   Colitis- 3 EGDs and 3 colonoscopies in the past year- intermittent constipation/diarrhea and chronic belching  Facial lesions- past 6 months, soft lesions developed which break open, drain and scar    Her hypertension has been stable but the last several office visits she has had a heart rate of   She is on for new medication since last year- baclofen, librax, Seroquel, and Dexalant          The following portions of the patient's history were reviewed and updated as appropriate: She  has a past medical history of Anxiety; Bipolar disorder with depression ([facility] [address] ); Depression; Endometriosis; Hypertension; Scoliosis; and Varicella  Patient Active Problem List    Diagnosis Date Noted    Encounter for management and injection of depo-Provera 2019    Encounter for other contraceptive management 2019    Colitis 2019    Facial lesion 2019    Encounter for surveillance of injectable contraceptive 10/17/2018    Bloody diarrhea 2018    Generalized abdominal pain 2018    Hypokalemia 2018    Polyp of gallbladder 2018    Right upper quadrant abdominal pain 2018    Disease of gallbladder 2018    Acid reflux disease 2017    Anxiety 10/29/2016     She  has a past surgical history that includes TONSILECTOMY AND ADNOIDECTOMY (Bilateral); Fowler tooth extraction (Bilateral); Hernia repair; pr  delivery only (N/A, 2016); Tonsillectomy;  section; Colonoscopy; and pr lap,cholecystectomy (N/A, 3/12/2018)  Her family history includes Depression in her mother; Hypertension in her father  She  reports that she quit smoking about 2 years ago  Her smoking use included Cigarettes  She has never used smokeless tobacco  She reports that she drinks alcohol  She reports that she does not use drugs    Current Outpatient Prescriptions   Medication Sig Dispense Refill    acetaminophen (TYLENOL) 500 mg tablet Take 500 mg by mouth every 6 (six) hours as needed for mild pain      AMITIZA 8 MCG capsule       amLODIPine (NORVASC) 2 5 mg tablet Take 1 tablet (2 5 mg total) by mouth daily 30 tablet 6    buPROPion (WELLBUTRIN XL) 300 mg 24 hr tablet Take 300 mg by mouth daily  2    clonazePAM (KlonoPIN) 1 mg tablet Take 1 mg by mouth 3 (three) times a day        cloNIDine (CATAPRES) 0 1 mg tablet Take 0 1 mg by mouth every 12 (twelve) hours        docusate sodium (DULCOLAX) 100 mg capsule Take 1 capsule by mouth daily as needed        medroxyPROGESTERone (DEPO-PROVERA) 150 mg/mL injection INJECT EVERY 12 WEEK AS DIRECTED 1 mL 3    pantoprazole (PROTONIX) 40 mg tablet       QUEtiapine (SEROquel) 100 mg tablet Take 100 mg by mouth daily at bedtime      venlafaxine (EFFEXOR-XR) 150 mg 24 hr capsule Take 150 mg by mouth daily      venlafaxine (EFFEXOR-XR) 75 mg 24 hr capsule       baclofen 20 mg tablet       LIBRAX 5-2 5 MG per capsule        No current facility-administered medications for this visit  Current Outpatient Prescriptions on File Prior to Visit   Medication Sig    acetaminophen (TYLENOL) 500 mg tablet Take 500 mg by mouth every 6 (six) hours as needed for mild pain    AMITIZA 8 MCG capsule     amLODIPine (NORVASC) 2 5 mg tablet Take 1 tablet (2 5 mg total) by mouth daily    buPROPion (WELLBUTRIN XL) 300 mg 24 hr tablet Take 300 mg by mouth daily    clonazePAM (KlonoPIN) 1 mg tablet Take 1 mg by mouth 3 (three) times a day      cloNIDine (CATAPRES) 0 1 mg tablet Take 0 1 mg by mouth every 12 (twelve) hours      docusate sodium (DULCOLAX) 100 mg capsule Take 1 capsule by mouth daily as needed      medroxyPROGESTERone (DEPO-PROVERA) 150 mg/mL injection INJECT EVERY 12 WEEK AS DIRECTED    pantoprazole (PROTONIX) 40 mg tablet     QUEtiapine (SEROquel) 100 mg tablet Take 100 mg by mouth daily at bedtime    venlafaxine (EFFEXOR-XR) 150 mg 24 hr capsule Take 150 mg by mouth daily    venlafaxine (EFFEXOR-XR) 75 mg 24 hr capsule      No current facility-administered medications on file prior to visit  She is allergic to sulfa antibiotics and reglan [metoclopramide]       Review of Systems   Constitutional: Negative for unexpected weight change  Gastrointestinal: Positive for abdominal pain, constipation and diarrhea  Belching   Genitourinary: Positive for vaginal bleeding   Negative for difficulty urinating, dyspareunia, frequency, menstrual problem, urgency, vaginal discharge and vaginal pain  Skin: Positive for wound  Objective:    Vitals:    01/09/19 0924   BP: 154/80   BP Location: Right arm   Patient Position: Sitting   Cuff Size: Standard   Weight: 60 6 kg (133 lb 9 6 oz)   Height: 5' 4" (1 626 m)            Physical Exam   Constitutional: She is oriented to person, place, and time  She appears well-developed and well-nourished  No distress  HENT:   Head: Normocephalic and atraumatic  Eyes: EOM are normal  Right eye exhibits no discharge  Left eye exhibits no discharge  No scleral icterus  Pulmonary/Chest: Effort normal    Neurological: She is alert and oriented to person, place, and time  Skin: Skin is warm and dry  Lesion and rash noted  Rash is nodular and pustular  Numerous facial lesions ~ 5mm in diameter had various stages of healing  The areas are raised  Pustular and scar afterwards  Discoloration along the right side of her nose- bluish   Psychiatric: She has a normal mood and affect   Her behavior is normal  Judgment and thought content normal

## 2019-02-09 DIAGNOSIS — I10 ESSENTIAL HYPERTENSION: ICD-10-CM

## 2019-02-11 RX ORDER — AMLODIPINE BESYLATE 2.5 MG/1
TABLET ORAL
Qty: 30 TABLET | Refills: 5 | OUTPATIENT
Start: 2019-02-11

## 2019-02-26 DIAGNOSIS — I10 ESSENTIAL HYPERTENSION: ICD-10-CM

## 2019-02-26 NOTE — TELEPHONE ENCOUNTER
Last appt, 7/17/18    Next appt, none pending     Pt no showed to her last 2 appts  Will have her schedule f/u since she is due

## 2019-02-27 RX ORDER — AMLODIPINE BESYLATE 2.5 MG/1
2.5 TABLET ORAL DAILY
Qty: 30 TABLET | Refills: 0 | Status: SHIPPED | OUTPATIENT
Start: 2019-02-27 | End: 2019-03-19 | Stop reason: SDUPTHER

## 2019-02-28 NOTE — TELEPHONE ENCOUNTER
30 days of med ordered and sent to pharmacy  Please call and schedule f/u appt  No further refills until seen  Pt no showed to her last 2 appts and is due for f/u  Thank you!

## 2019-03-19 ENCOUNTER — OFFICE VISIT (OUTPATIENT)
Dept: INTERNAL MEDICINE CLINIC | Age: 34
End: 2019-03-19
Payer: COMMERCIAL

## 2019-03-19 VITALS
BODY MASS INDEX: 25.47 KG/M2 | DIASTOLIC BLOOD PRESSURE: 76 MMHG | WEIGHT: 149.2 LBS | TEMPERATURE: 98.3 F | HEIGHT: 64 IN | HEART RATE: 94 BPM | SYSTOLIC BLOOD PRESSURE: 118 MMHG | OXYGEN SATURATION: 98 %

## 2019-03-19 DIAGNOSIS — I10 ESSENTIAL HYPERTENSION: Primary | ICD-10-CM

## 2019-03-19 DIAGNOSIS — K21.9 GASTROESOPHAGEAL REFLUX DISEASE WITHOUT ESOPHAGITIS: ICD-10-CM

## 2019-03-19 DIAGNOSIS — M77.9 TENDINITIS: ICD-10-CM

## 2019-03-19 DIAGNOSIS — F41.9 ANXIETY: ICD-10-CM

## 2019-03-19 PROCEDURE — 3074F SYST BP LT 130 MM HG: CPT | Performed by: INTERNAL MEDICINE

## 2019-03-19 PROCEDURE — 3078F DIAST BP <80 MM HG: CPT | Performed by: INTERNAL MEDICINE

## 2019-03-19 PROCEDURE — 99214 OFFICE O/P EST MOD 30 MIN: CPT | Performed by: INTERNAL MEDICINE

## 2019-03-19 PROCEDURE — 1036F TOBACCO NON-USER: CPT | Performed by: INTERNAL MEDICINE

## 2019-03-19 PROCEDURE — 3008F BODY MASS INDEX DOCD: CPT | Performed by: INTERNAL MEDICINE

## 2019-03-19 RX ORDER — ACETAMINOPHEN AND CODEINE PHOSPHATE 60; 300 MG/1; MG/1
2 TABLET ORAL DAILY
COMMUNITY
Start: 2019-01-28 | End: 2020-01-27 | Stop reason: HOSPADM

## 2019-03-19 RX ORDER — AMLODIPINE BESYLATE 2.5 MG/1
2.5 TABLET ORAL DAILY
Qty: 90 TABLET | Refills: 1 | Status: SHIPPED | OUTPATIENT
Start: 2019-03-19 | End: 2019-10-10 | Stop reason: SDUPTHER

## 2019-03-19 NOTE — PROGRESS NOTES
Assessment/Plan:    BMI Counseling: Body mass index is 26 01 kg/m²  Discussed the patient's BMI with her  The BMI is in the acceptable range  1  Essential hypertension  Continue with amlodipine 2 5 mg and metoprolol succinate ER 25 mg daily  2  Tendinitis of right elbow  Will prescribe Voltaren gel b i d  For 3-4 weeks  If no significant improvement then she should follow up with the orthopedic surgeon  3  Severe gastroesophageal reflux disease  Being managed by gastroenterologist   Diagnoses and all orders for this visit:    Essential hypertension  -     amLODIPine (NORVASC) 2 5 mg tablet; Take 1 tablet (2 5 mg total) by mouth daily    Anxiety    Gastroesophageal reflux disease without esophagitis    Tendinitis  -     diclofenac sodium (VOLTAREN) 1 %; Apply 2 g topically 4 (four) times a day    Other orders  -     acetaminophen-codeine (TYLENOL #4) 300-60 MG per tablet; Take 2 tablets by mouth daily          Subjective:          Patient ID: Diane Price is a 34 y o  female  Patient is here for regular follow-up  Her blood pressure is okay but she was also started on metoprolol succinate ER 25 mg daily due to tachycardia along with amlodipine 2 5 mg  She is still under the care of a gastroenterologist for chronic gastroesophageal reflux disease and blood pain  She is also under the care of psychiatrist      The following portions of the patient's history were reviewed and updated as appropriate: allergies, current medications, past family history, past medical history, past social history, past surgical history and problem list     Review of Systems   Constitutional: Negative for fatigue and fever  HENT: Negative for congestion, ear discharge, ear pain, postnasal drip, sinus pressure, sore throat, tinnitus and trouble swallowing  Eyes: Negative for discharge, itching and visual disturbance  Respiratory: Negative for cough and shortness of breath      Cardiovascular: Negative for chest pain and palpitations  Gastrointestinal: Positive for abdominal pain  Negative for diarrhea, nausea and vomiting  Endocrine: Negative for cold intolerance and polyuria  Genitourinary: Negative for difficulty urinating, dysuria and urgency  Musculoskeletal: Negative for arthralgias and neck pain  Skin: Negative for rash  Allergic/Immunologic: Negative for environmental allergies  Neurological: Negative for dizziness, weakness and headaches  Psychiatric/Behavioral: Negative for agitation and behavioral problems  The patient is not nervous/anxious            Past Medical History:   Diagnosis Date    Anxiety     Bipolar disorder with depression ([facility] [address] )     Last assessed: 5/11/16    Depression     Endometriosis     Last assessed: 5/11/16    Hypertension     Scoliosis     Varicella          Current Outpatient Medications:     acetaminophen (TYLENOL) 500 mg tablet, Take 500 mg by mouth every 6 (six) hours as needed for mild pain, Disp: , Rfl:     amLODIPine (NORVASC) 2 5 mg tablet, Take 1 tablet (2 5 mg total) by mouth daily, Disp: 90 tablet, Rfl: 1    baclofen 20 mg tablet, , Disp: , Rfl:     buPROPion (WELLBUTRIN XL) 300 mg 24 hr tablet, Take 300 mg by mouth daily, Disp: , Rfl: 2    clonazePAM (KlonoPIN) 1 mg tablet, Take 1 mg by mouth 3 (three) times a day  , Disp: , Rfl:     docusate sodium (DULCOLAX) 100 mg capsule, Take 1 capsule by mouth daily as needed  , Disp: , Rfl:     LIBRAX 5-2 5 MG per capsule, , Disp: , Rfl:     medroxyPROGESTERone (DEPO-PROVERA) 150 mg/mL injection, INJECT EVERY 12 WEEK AS DIRECTED, Disp: 1 mL, Rfl: 3    QUEtiapine (SEROquel) 100 mg tablet, Take 100 mg by mouth daily at bedtime, Disp: , Rfl:     venlafaxine (EFFEXOR-XR) 150 mg 24 hr capsule, Take 150 mg by mouth daily, Disp: , Rfl:     venlafaxine (EFFEXOR-XR) 75 mg 24 hr capsule, , Disp: , Rfl:     acetaminophen-codeine (TYLENOL #4) 300-60 MG per tablet, Take 2 tablets by mouth daily, Disp: , Rfl:     AMITIZA 8 MCG capsule, , Disp: , Rfl:     diclofenac sodium (VOLTAREN) 1 %, Apply 2 g topically 4 (four) times a day, Disp: 1 Tube, Rfl: 1    pantoprazole (PROTONIX) 40 mg tablet, , Disp: , Rfl:     Allergies   Allergen Reactions    Sulfa Antibiotics Anaphylaxis, Other (See Comments) and Edema     swelling  swelling  swelling    Reglan [Metoclopramide] Anxiety       Social History   Past Surgical History:   Procedure Laterality Date     SECTION      COLONOSCOPY      HERNIA REPAIR      MD  DELIVERY ONLY N/A 2016    Procedure:  SECTION (); Surgeon: Jef Steen DO;  [facility];  Service: Obstetrics    MD LAP,CHOLECYSTECTOMY N/A 3/12/2018    Procedure: Paige Yeny;  Surgeon: Rickey Rondon MD;  Location: [facility];  Service: General    TONSILECTOMY AND ADNOIDECTOMY Bilateral     TONSILLECTOMY      WISDOM TOOTH EXTRACTION Bilateral      Family History   Problem Relation Age of Onset    Hypertension Father     Depression Mother        Objective:  /76 (BP Location: Left arm, Patient Position: Sitting, Cuff Size: Adult)   Pulse 94   Temp 98 3 °F (36 8 °C) (Tympanic)   Ht 5' 3 5" (1 613 m)   Wt 67 7 kg (149 lb 3 2 oz)   SpO2 98%   BMI 26 01 kg/m²   Body mass index is 26 01 kg/m²  Physical Exam   Constitutional: She appears well-developed  HENT:   Head: Normocephalic  Mouth/Throat: Oropharynx is clear and moist    Eyes: Pupils are equal, round, and reactive to light  No scleral icterus  Neck: Normal range of motion  Neck supple  No tracheal deviation present  No thyromegaly present  Cardiovascular: Normal rate, regular rhythm and normal heart sounds  Pulmonary/Chest: Effort normal and breath sounds normal  No respiratory distress  She exhibits no tenderness  Abdominal: Soft  Bowel sounds are normal  She exhibits no mass  There is no tenderness  Musculoskeletal: Normal range of motion     Lymphadenopathy:     She has no cervical adenopathy  Neurological: She is alert  No cranial nerve deficit  Skin: Skin is warm  No erythema  Psychiatric: She has a normal mood and affect

## 2019-03-25 ENCOUNTER — TELEPHONE (OUTPATIENT)
Dept: INTERNAL MEDICINE CLINIC | Age: 34
End: 2019-03-25

## 2019-03-25 NOTE — TELEPHONE ENCOUNTER
Patient stated that the pharmacy called to say that the medication Dr Caleb Chase ordered for her tendinitis is not covered by her insurance  Patient did not know the name of the medication  The pharmacy ([address] [place] [address]) instructed her to call our office to begin a prior authorization  Please contact the patient and let her know the status  Thanks

## 2019-05-06 ENCOUNTER — CLINICAL SUPPORT (OUTPATIENT)
Dept: GYNECOLOGY | Facility: CLINIC | Age: 34
End: 2019-05-06
Payer: COMMERCIAL

## 2019-05-06 VITALS
BODY MASS INDEX: 22.81 KG/M2 | DIASTOLIC BLOOD PRESSURE: 72 MMHG | WEIGHT: 133.6 LBS | SYSTOLIC BLOOD PRESSURE: 122 MMHG | HEIGHT: 64 IN

## 2019-05-06 DIAGNOSIS — Z30.42 ENCOUNTER FOR SURVEILLANCE OF INJECTABLE CONTRACEPTIVE: Primary | ICD-10-CM

## 2019-05-06 PROCEDURE — 96372 THER/PROPH/DIAG INJ SC/IM: CPT | Performed by: OBSTETRICS & GYNECOLOGY

## 2019-05-06 RX ORDER — MEDROXYPROGESTERONE ACETATE 150 MG/ML
150 INJECTION, SUSPENSION INTRAMUSCULAR ONCE
Status: COMPLETED | OUTPATIENT
Start: 2019-05-06 | End: 2019-05-06

## 2019-05-06 RX ADMIN — MEDROXYPROGESTERONE ACETATE 150 MG: 150 INJECTION, SUSPENSION INTRAMUSCULAR at 14:32

## 2019-06-13 ENCOUNTER — HOSPITAL ENCOUNTER (OUTPATIENT)
Dept: RADIOLOGY | Facility: HOSPITAL | Age: 34
Discharge: HOME | End: 2019-06-13
Payer: COMMERCIAL

## 2019-06-13 ENCOUNTER — OFFICE VISIT (OUTPATIENT)
Dept: OBGYN CLINIC | Facility: HOSPITAL | Age: 34
End: 2019-06-13
Payer: COMMERCIAL

## 2019-06-13 VITALS
WEIGHT: 140 LBS | HEART RATE: 105 BPM | HEIGHT: 64 IN | DIASTOLIC BLOOD PRESSURE: 89 MMHG | SYSTOLIC BLOOD PRESSURE: 127 MMHG | BODY MASS INDEX: 23.9 KG/M2

## 2019-06-13 DIAGNOSIS — M79.641 RIGHT HAND PAIN: Primary | ICD-10-CM

## 2019-06-13 DIAGNOSIS — M65.4 DE QUERVAIN'S TENOSYNOVITIS, RIGHT: ICD-10-CM

## 2019-06-13 DIAGNOSIS — M79.641 RIGHT HAND PAIN: ICD-10-CM

## 2019-06-13 PROCEDURE — 73130 X-RAY EXAM OF HAND: CPT

## 2019-06-13 PROCEDURE — 99203 OFFICE O/P NEW LOW 30 MIN: CPT | Performed by: PHYSICIAN ASSISTANT

## 2019-06-13 RX ORDER — METOPROLOL SUCCINATE 25 MG/1
25 TABLET, EXTENDED RELEASE ORAL DAILY
COMMUNITY
End: 2019-10-10 | Stop reason: SDUPTHER

## 2019-07-07 ENCOUNTER — HOSPITAL ENCOUNTER (EMERGENCY)
Facility: HOSPITAL | Age: 34
Discharge: HOME | End: 2019-07-07
Attending: EMERGENCY MEDICINE | Admitting: EMERGENCY MEDICINE
Payer: COMMERCIAL

## 2019-07-07 VITALS
SYSTOLIC BLOOD PRESSURE: 133 MMHG | WEIGHT: 140 LBS | HEART RATE: 76 BPM | TEMPERATURE: 98.2 F | HEIGHT: 64 IN | BODY MASS INDEX: 23.9 KG/M2 | DIASTOLIC BLOOD PRESSURE: 79 MMHG | OXYGEN SATURATION: 98 % | RESPIRATION RATE: 18 BRPM

## 2019-07-07 DIAGNOSIS — R11.0 NAUSEA: ICD-10-CM

## 2019-07-07 DIAGNOSIS — R10.32 LEFT LOWER QUADRANT PAIN: Primary | ICD-10-CM

## 2019-07-07 LAB
ALBUMIN SERPL BCP-MCNC: 3.6 G/DL (ref 3.5–5)
ALP SERPL-CCNC: 74 U/L (ref 46–116)
ALT SERPL W P-5'-P-CCNC: 11 U/L (ref 12–78)
ANION GAP SERPL CALCULATED.3IONS-SCNC: 5 MMOL/L (ref 4–13)
AST SERPL W P-5'-P-CCNC: 10 U/L (ref 5–45)
BACTERIA UR QL AUTO: ABNORMAL /HPF
BASOPHILS # BLD AUTO: 0.01 THOUSANDS/ΜL (ref 0–0.1)
BASOPHILS NFR BLD AUTO: 0 % (ref 0–1)
BILIRUB SERPL-MCNC: 0.36 MG/DL (ref 0.2–1)
BILIRUB UR QL STRIP: NEGATIVE
BUN SERPL-MCNC: 6 MG/DL (ref 5–25)
CALCIUM SERPL-MCNC: 8.3 MG/DL (ref 8.3–10.1)
CHLORIDE SERPL-SCNC: 112 MMOL/L (ref 100–108)
CLARITY UR: CLEAR
CO2 SERPL-SCNC: 24 MMOL/L (ref 21–32)
COLOR UR: YELLOW
COLOR, POC: YELLOW
CREAT SERPL-MCNC: 0.65 MG/DL (ref 0.6–1.3)
EOSINOPHIL # BLD AUTO: 0.08 THOUSAND/ΜL (ref 0–0.61)
EOSINOPHIL NFR BLD AUTO: 1 % (ref 0–6)
ERYTHROCYTE [DISTWIDTH] IN BLOOD BY AUTOMATED COUNT: 13.1 % (ref 11.6–15.1)
EXT PREG TEST URINE: NEGATIVE
EXT. CONTROL ED NAV: NORMAL
GFR SERPL CREATININE-BSD FRML MDRD: 119 ML/MIN/1.73SQ M
GLUCOSE SERPL-MCNC: 85 MG/DL (ref 65–140)
GLUCOSE UR STRIP-MCNC: NEGATIVE MG/DL
HCT VFR BLD AUTO: 35.3 % (ref 34.8–46.1)
HGB BLD-MCNC: 11.5 G/DL (ref 11.5–15.4)
HGB UR QL STRIP.AUTO: ABNORMAL
HYALINE CASTS #/AREA URNS LPF: ABNORMAL /LPF
IMM GRANULOCYTES # BLD AUTO: 0.01 THOUSAND/UL (ref 0–0.2)
IMM GRANULOCYTES NFR BLD AUTO: 0 % (ref 0–2)
KETONES UR STRIP-MCNC: NEGATIVE MG/DL
LACTATE SERPL-SCNC: 0.7 MMOL/L (ref 0.5–2)
LEUKOCYTE ESTERASE UR QL STRIP: NEGATIVE
LIPASE SERPL-CCNC: 105 U/L (ref 73–393)
LYMPHOCYTES # BLD AUTO: 2.05 THOUSANDS/ΜL (ref 0.6–4.47)
LYMPHOCYTES NFR BLD AUTO: 36 % (ref 14–44)
MCH RBC QN AUTO: 29.6 PG (ref 26.8–34.3)
MCHC RBC AUTO-ENTMCNC: 32.6 G/DL (ref 31.4–37.4)
MCV RBC AUTO: 91 FL (ref 82–98)
MONOCYTES # BLD AUTO: 0.49 THOUSAND/ΜL (ref 0.17–1.22)
MONOCYTES NFR BLD AUTO: 9 % (ref 4–12)
NEUTROPHILS # BLD AUTO: 3.02 THOUSANDS/ΜL (ref 1.85–7.62)
NEUTS SEG NFR BLD AUTO: 54 % (ref 43–75)
NITRITE UR QL STRIP: NEGATIVE
NON-SQ EPI CELLS URNS QL MICRO: ABNORMAL /HPF
NRBC BLD AUTO-RTO: 0 /100 WBCS
PH UR STRIP.AUTO: 7 [PH] (ref 4.5–8)
PLATELET # BLD AUTO: 297 THOUSANDS/UL (ref 149–390)
PMV BLD AUTO: 10.2 FL (ref 8.9–12.7)
POTASSIUM SERPL-SCNC: 3.7 MMOL/L (ref 3.5–5.3)
PROT SERPL-MCNC: 6.7 G/DL (ref 6.4–8.2)
PROT UR STRIP-MCNC: NEGATIVE MG/DL
RBC # BLD AUTO: 3.88 MILLION/UL (ref 3.81–5.12)
RBC #/AREA URNS AUTO: ABNORMAL /HPF
SODIUM SERPL-SCNC: 141 MMOL/L (ref 136–145)
SP GR UR STRIP.AUTO: 1.02 (ref 1–1.03)
UROBILINOGEN UR QL STRIP.AUTO: 0.2 E.U./DL
WBC # BLD AUTO: 5.66 THOUSAND/UL (ref 4.31–10.16)
WBC #/AREA URNS AUTO: ABNORMAL /HPF

## 2019-07-07 PROCEDURE — 96375 TX/PRO/DX INJ NEW DRUG ADDON: CPT

## 2019-07-07 PROCEDURE — 85025 COMPLETE CBC W/AUTO DIFF WBC: CPT | Performed by: EMERGENCY MEDICINE

## 2019-07-07 PROCEDURE — 96374 THER/PROPH/DIAG INJ IV PUSH: CPT

## 2019-07-07 PROCEDURE — 80053 COMPREHEN METABOLIC PANEL: CPT | Performed by: EMERGENCY MEDICINE

## 2019-07-07 PROCEDURE — 36415 COLL VENOUS BLD VENIPUNCTURE: CPT | Performed by: EMERGENCY MEDICINE

## 2019-07-07 PROCEDURE — 99284 EMERGENCY DEPT VISIT MOD MDM: CPT | Performed by: EMERGENCY MEDICINE

## 2019-07-07 PROCEDURE — 99284 EMERGENCY DEPT VISIT MOD MDM: CPT

## 2019-07-07 PROCEDURE — 81001 URINALYSIS AUTO W/SCOPE: CPT

## 2019-07-07 PROCEDURE — 83605 ASSAY OF LACTIC ACID: CPT | Performed by: EMERGENCY MEDICINE

## 2019-07-07 PROCEDURE — 81025 URINE PREGNANCY TEST: CPT | Performed by: EMERGENCY MEDICINE

## 2019-07-07 PROCEDURE — 96361 HYDRATE IV INFUSION ADD-ON: CPT

## 2019-07-07 PROCEDURE — 83690 ASSAY OF LIPASE: CPT | Performed by: EMERGENCY MEDICINE

## 2019-07-07 RX ORDER — DICYCLOMINE HCL 20 MG
20 TABLET ORAL 2 TIMES DAILY
Qty: 14 TABLET | Refills: 0 | Status: SHIPPED | OUTPATIENT
Start: 2019-07-07 | End: 2020-04-13 | Stop reason: ALTCHOICE

## 2019-07-07 RX ORDER — MAGNESIUM HYDROXIDE/ALUMINUM HYDROXICE/SIMETHICONE 120; 1200; 1200 MG/30ML; MG/30ML; MG/30ML
30 SUSPENSION ORAL ONCE
Status: COMPLETED | OUTPATIENT
Start: 2019-07-07 | End: 2019-07-07

## 2019-07-07 RX ORDER — ONDANSETRON 2 MG/ML
4 INJECTION INTRAMUSCULAR; INTRAVENOUS ONCE
Status: COMPLETED | OUTPATIENT
Start: 2019-07-07 | End: 2019-07-07

## 2019-07-07 RX ORDER — HALOPERIDOL 5 MG/ML
2.5 INJECTION INTRAMUSCULAR ONCE
Status: COMPLETED | OUTPATIENT
Start: 2019-07-07 | End: 2019-07-07

## 2019-07-07 RX ORDER — LIDOCAINE HYDROCHLORIDE 20 MG/ML
15 SOLUTION OROPHARYNGEAL ONCE
Status: COMPLETED | OUTPATIENT
Start: 2019-07-07 | End: 2019-07-07

## 2019-07-07 RX ADMIN — ONDANSETRON 4 MG: 2 INJECTION INTRAMUSCULAR; INTRAVENOUS at 14:24

## 2019-07-07 RX ADMIN — SODIUM CHLORIDE 1000 ML: 0.9 INJECTION, SOLUTION INTRAVENOUS at 14:23

## 2019-07-07 RX ADMIN — ALUMINUM HYDROXIDE, MAGNESIUM HYDROXIDE, AND SIMETHICONE 30 ML: 200; 200; 20 SUSPENSION ORAL at 14:25

## 2019-07-07 RX ADMIN — LIDOCAINE HYDROCHLORIDE 15 ML: 20 SOLUTION ORAL; TOPICAL at 14:25

## 2019-07-07 RX ADMIN — HALOPERIDOL LACTATE 2.5 MG: 5 INJECTION INTRAMUSCULAR at 14:26

## 2019-07-07 NOTE — ED ATTENDING ATTESTATION
Dino Cowart DO, saw and evaluated the patient  I have discussed the patient with the resident/non-physician practitioner and agree with the resident's/non-physician practitioner's findings, Plan of Care, and MDM as documented in the resident's/non-physician practitioner's note, except where noted  All available labs and Radiology studies were reviewed  I was present for key portions of any procedure(s) performed by the resident/non-physician practitioner and I was immediately available to provide assistance  At this point I agree with the current assessment done in the Emergency Department  I have conducted an independent evaluation of this patient a history and physical is as follows:    35 yo female w/hx of what seems like IBS alternating C-D  Reports hx of colitis but pt has had bx recently which showed no evidence of microscopic colitis  Has severe symptoms at times  Pt reports 8/10 pain currently, associated with nausea, diarrhea  Denies blood in stool, fever/chills  No other c/o at this time  Imp: abd pain likely functional based on hx and prior studies  Plan: labs, tx sx, reassess        Critical Care Time  Procedures

## 2019-07-07 NOTE — ED NOTES
Pt states that she is feeling better and would like to go home       Jessica Alejandro RN  07/07/19 1595

## 2019-07-07 NOTE — ED PROVIDER NOTES
History  Chief Complaint   Patient presents with    Abdominal Pain     Pt with abd  pain x 2 years, reports that her colitis is worse today, +n/-v/+d     35 yo F with significant history of colitis in the past for which she is follows with Dr Attila Golden of [facility] in [place] and has an appointment on 7/15  Stating for the last 2 weeks has been having decreased appetite, loose stools approximately 7/day with occasional Bright red blood but not black stool, with associated abdominal pain which she described pressure/sore/tender/bloated mostly LLQ and described as an 8/10 as well as nausea without vomiting  States similar to prior flares  Denies recent illness, fever, night sweats, chills, headache, dizziness, sore throat, cough, chest pain, shortness of breath, dysuria, hematuria  Prior to Admission Medications   Prescriptions Last Dose Informant Patient Reported? Taking?    Dextromethorphan HBr (DEXALONE PO)   Yes Yes   Sig: Take 60 mg by mouth daily   LIBRAX 5-2 5 MG per capsule 7/7/2019 at Unknown time  Yes Yes   Mirtazapine (REMERON PO)   Yes Yes   Sig: Take 30 mg by mouth daily at bedtime   QUEtiapine (SEROquel) 100 mg tablet 7/6/2019 at Unknown time Self Yes Yes   Sig: Take 100 mg by mouth daily at bedtime   acetaminophen (TYLENOL) 500 mg tablet  Self Yes No   Sig: Take 500 mg by mouth every 6 (six) hours as needed for mild pain   acetaminophen-codeine (TYLENOL #4) 300-60 MG per tablet   Yes No   Sig: Take 2 tablets by mouth daily   amLODIPine (NORVASC) 2 5 mg tablet 7/7/2019 at Unknown time  No Yes   Sig: Take 1 tablet (2 5 mg total) by mouth daily   baclofen 20 mg tablet 7/7/2019 at Unknown time  Yes Yes   buPROPion (WELLBUTRIN XL) 300 mg 24 hr tablet 7/7/2019 at Unknown time Self Yes Yes   Sig: Take 300 mg by mouth daily   clonazePAM (KlonoPIN) 1 mg tablet 7/7/2019 at Unknown time Self Yes Yes   Sig: Take 1 mg by mouth 3 (three) times a day     docusate sodium (DULCOLAX) 100 mg capsule 7/7/2019 at Unknown time Self Yes Yes   Sig: Take 1 capsule by mouth daily as needed     medroxyPROGESTERone (DEPO-PROVERA) 150 mg/mL injection 2019  No No   Sig: INJECT EVERY 12 WEEK AS DIRECTED   metoprolol succinate (TOPROL-XL) 25 mg 24 hr tablet 2019 at Unknown time  Yes Yes   Sig: Take 25 mg by mouth daily   venlafaxine (EFFEXOR-XR) 150 mg 24 hr capsule Not Taking at Unknown time Self Yes No   Sig: Take 150 mg by mouth daily   venlafaxine (EFFEXOR-XR) 75 mg 24 hr capsule 2019 at Unknown time  Yes Yes   Si mg 3 (three) times a day       Facility-Administered Medications: None       Past Medical History:   Diagnosis Date    Anxiety     [facility]     Last assessed: 16    Depression     Endometriosis     Last assessed: 16    Hypertension     Scoliosis     Varicella        Past Surgical History:   Procedure Laterality Date     SECTION      COLONOSCOPY      HERNIA REPAIR      HERNIA REPAIR      OTHER SURGICAL HISTORY      Transoral incisionless fundoplication (TIFF)    NE  DELIVERY ONLY N/A 2016    Procedure:  SECTION (); Surgeon: Lisset Lomeli DO;  [facility];  Service: Obstetrics    NE LAP,CHOLECYSTECTOMY N/A 3/12/2018    Procedure: Juanda Null;  Surgeon: Ingris Houser MD;  Location: [facility];  Service: General    TONSILECTOMY AND ADNOIDECTOMY Bilateral     TONSILLECTOMY      WISDOM TOOTH EXTRACTION Bilateral        Family History   Problem Relation Age of Onset    Hypertension Father     Depression Mother      I have reviewed and agree with the history as documented      Social History     Tobacco Use    Smoking status: Former Smoker     Types: Cigarettes     Last attempt to quit: 2016     Years since quitting: 3 1    Smokeless tobacco: Never Used   Substance Use Topics    Alcohol use: Yes     Comment: rare    Drug use: No        Review of Systems   Constitutional: Positive for appetite change  Negative for activity change, chills, diaphoresis, fatigue and fever  HENT: Negative for congestion, nosebleeds, postnasal drip, rhinorrhea, sinus pressure, sinus pain, sneezing and sore throat  Eyes: Negative for redness and visual disturbance  Respiratory: Negative for apnea, cough, chest tightness, shortness of breath, wheezing and stridor  Cardiovascular: Negative for chest pain, palpitations and leg swelling  Gastrointestinal: Positive for abdominal distention, abdominal pain, blood in stool, diarrhea and nausea  Negative for constipation and vomiting  Genitourinary: Negative for difficulty urinating, dysuria, flank pain, frequency, hematuria and urgency  Musculoskeletal: Negative for arthralgias, back pain, gait problem, joint swelling, myalgias, neck pain and neck stiffness  Skin: Negative for color change, pallor, rash and wound  Neurological: Negative for dizziness, syncope, facial asymmetry, weakness, light-headedness, numbness and headaches  Psychiatric/Behavioral: Negative for confusion and decreased concentration  The patient is not nervous/anxious  Physical Exam  ED Triage Vitals [07/07/19 1256]   Temperature Pulse Respirations Blood Pressure SpO2   98 2 °F (36 8 °C) (!) 112 20 (!) 165/111 98 %      Temp Source Heart Rate Source Patient Position - Orthostatic VS BP Location FiO2 (%)   Tympanic Monitor Sitting Left arm --      Pain Score       8             Orthostatic Vital Signs  Vitals:    07/07/19 1315 07/07/19 1455 07/07/19 1500 07/07/19 1600   BP: 152/95 154/98 140/86 133/79   Pulse: 89 76 76 76   Patient Position - Orthostatic VS: Sitting Sitting Sitting Sitting       Physical Exam   Constitutional: She is oriented to person, place, and time  She appears well-developed and well-nourished  Non-toxic appearance  She does not appear ill  No distress  HENT:   Head: Normocephalic and atraumatic     Right Ear: External ear normal    Left Ear: External ear normal    Nose: Nose normal    Eyes: Pupils are equal, round, and reactive to light  Conjunctivae and EOM are normal  Right eye exhibits no discharge  Left eye exhibits no discharge  No scleral icterus  Neck: Normal range of motion  Neck supple  No JVD present  No tracheal deviation present  Cardiovascular: Normal rate, regular rhythm, normal heart sounds and intact distal pulses  Exam reveals no gallop and no friction rub  No murmur heard  Pulmonary/Chest: Effort normal and breath sounds normal  No stridor  No respiratory distress  She has no wheezes  She has no rales  Abdominal: Soft  Normal appearance and bowel sounds are normal  She exhibits no distension and no mass  There is tenderness in the suprapubic area, left upper quadrant and left lower quadrant  There is no rigidity, no rebound, no guarding, no CVA tenderness, no tenderness at McBurney's point and negative Adams's sign  Genitourinary: Rectal exam shows no mass, no tenderness and guaiac negative stool  Musculoskeletal: Normal range of motion  She exhibits no edema, tenderness or deformity  Neurological: She is alert and oriented to person, place, and time  No cranial nerve deficit or sensory deficit  She exhibits normal muscle tone  Skin: Skin is warm and dry  No rash noted  She is not diaphoretic  No erythema  No pallor  Psychiatric: She has a normal mood and affect  Her behavior is normal  Judgment and thought content normal    Nursing note and vitals reviewed        ED Medications  Medications   sodium chloride 0 9 % bolus 1,000 mL (0 mL Intravenous Stopped 7/7/19 1620)   ondansetron (ZOFRAN) injection 4 mg (4 mg Intravenous Given 7/7/19 1424)   aluminum-magnesium hydroxide-simethicone (MYLANTA) 200-200-20 mg/5 mL oral suspension 30 mL (30 mL Oral Given 7/7/19 1425)   haloperidol lactate (HALDOL) injection 2 5 mg (2 5 mg Intravenous Given 7/7/19 1426)   Lidocaine Viscous HCl (XYLOCAINE) 2 % mucosal solution 15 mL (15 mL Swish & Swallow Given 7/7/19 1425)       Diagnostic Studies  Results Reviewed     Procedure Component Value Units Date/Time    Lactic acid, plasma [[id number]]  (Normal) Collected:  07/07/19 1422    Lab Status:  Final result Specimen:  Blood from Arm, Right Updated:  07/07/19 1458     LACTIC ACID 0 7 mmol/L     Narrative:       Result may be elevated if tourniquet was used during collection      Comprehensive metabolic panel [[id number]]  (Abnormal) Collected:  07/07/19 1422    Lab Status:  Final result Specimen:  Blood from Arm, Right Updated:  07/07/19 1453     Sodium 141 mmol/L      Potassium 3 7 mmol/L      Chloride 112 mmol/L      CO2 24 mmol/L      ANION GAP 5 mmol/L      BUN 6 mg/dL      Creatinine 0 65 mg/dL      Glucose 85 mg/dL      Calcium 8 3 mg/dL      AST 10 U/L      ALT 11 U/L      Alkaline Phosphatase 74 U/L      Total Protein 6 7 g/dL      Albumin 3 6 g/dL      Total Bilirubin 0 36 mg/dL      eGFR 119 ml/min/1 73sq m     Narrative:       [place] guidelines for Chronic Kidney Disease (CKD):     Stage 1 with normal or high GFR (GFR > 90 mL/min/1 73 square meters)    Stage 2 Mild CKD (GFR = 60-89 mL/min/1 73 square meters)    Stage 3A Moderate CKD (GFR = 45-59 mL/min/1 73 square meters)    Stage 3B Moderate CKD (GFR = 30-44 mL/min/1 73 square meters)    Stage 4 Severe CKD (GFR = 15-29 mL/min/1 73 square meters)    Stage 5 End Stage CKD (GFR <15 mL/min/1 73 square meters)  Note: GFR calculation is accurate only with a steady state creatinine    Lipase [[id number]]  (Normal) Collected:  07/07/19 1422    Lab Status:  Final result Specimen:  Blood from Arm, Right Updated:  07/07/19 1453     Lipase 105 u/L     CBC and differential [[id number]] Collected:  07/07/19 1422    Lab Status:  Final result Specimen:  Blood from Arm, Right Updated:  07/07/19 1431     WBC 5 66 Thousand/uL      RBC 3 88 Million/uL      Hemoglobin 11 5 g/dL      Hematocrit 35 3 %      MCV 91 fL      MCH 29 6 pg      MCHC 32 6 g/dL      RDW 13 1 %      MPV 10 2 fL      Platelets 318 Thousands/uL      nRBC 0 /100 WBCs      Neutrophils Relative 54 %      Immat GRANS % 0 %      Lymphocytes Relative 36 %      Monocytes Relative 9 %      Eosinophils Relative 1 %      Basophils Relative 0 %      Neutrophils Absolute 3 02 Thousands/µL      Immature Grans Absolute 0 01 Thousand/uL      Lymphocytes Absolute 2 05 Thousands/µL      Monocytes Absolute 0 49 Thousand/µL      Eosinophils Absolute 0 08 Thousand/µL      Basophils Absolute 0 01 Thousands/µL     Urine Microscopic [[id number]]  (Abnormal) Collected:  07/07/19 1421    Lab Status:  Final result Specimen:  Urine, Other Updated:  07/07/19 1430     RBC, UA 4-10 /hpf      WBC, UA None Seen /hpf      Epithelial Cells Occasional /hpf      Bacteria, UA Occasional /hpf      Hyaline Casts, UA None Seen /lpf     POCT urinalysis dipstick [[id number]]  (Normal) Resulted:  07/07/19 1418    Lab Status:  Final result Updated:  07/07/19 1418     Color, UA yellow    POCT pregnancy, urine [[id number]]  (Normal) Resulted:  07/07/19 1418    Lab Status:  Final result Updated:  07/07/19 1418     EXT PREG TEST UR (Ref: Negative) negative     Control valid    ED Urine Macroscopic [[id number]]  (Abnormal) Collected:  07/07/19 1421    Lab Status:  Final result Specimen:  Urine Updated:  07/07/19 1417     Color, UA Yellow     Clarity, UA Clear     pH, UA 7 0     Leukocytes, UA Negative     Nitrite, UA Negative     Protein, UA Negative mg/dl      Glucose, UA Negative mg/dl      Ketones, UA Negative mg/dl      Urobilinogen, UA 0 2 E U /dl      Bilirubin, UA Negative     Blood, UA Small     Specific [place], UA 1 020    Narrative:       CLINITEK RESULT    Lactic acid, plasma [[id number]]     Lab Status:  No result Specimen:  Blood                  No orders to display         Procedures  Procedures        ED Course  ED Course as of Jul 07 1802   Martinez Neil Jul 07, 2019   1529 Patient states feeling mildly better with medications        1559 Has appointment with GI doctor 7/15, Dr Danielle Painter [facility] in [place]      160 Pain and nausea completely relieved patient wants to go home at this time  States she has zofran at home and does not need a precription for it and she is allergic to reglan                                  MDM    Disposition  Final diagnoses:   Left lower quadrant pain   Nausea     Time reflects when diagnosis was documented in both MDM as applicable and the Disposition within this note     Time User Action Codes Description Comment    7/7/2019  4:00 PM [place] Add [R10 32] Left lower quadrant pain     7/7/2019  4:00 PM [place] Add [R11 0] Nausea       ED Disposition     ED Disposition Condition Date/Time Comment    Discharge Stable Sun Jul 7, 2019  4:04 PM Elizabeth Vaughn discharge to home/self care              Follow-up Information     Follow up With Specialties Details Why Contact Info Additional Information    Florette Rubinstein, MD Internal Medicine Go to  As needed [address]       Alexandra Toscano MD Gastroenterology Go in 1 week  66 [facility] 600 [facility] [address] Emergency Department Emergency Medicine  As needed, If symptoms worsen [address]  [phone]  ED, [address] [place] [address], [phone]          Discharge Medication List as of 7/7/2019  4:06 PM      START taking these medications    Details   dicyclomine (BENTYL) 20 mg tablet Take 1 tablet (20 mg total) by mouth 2 (two) times a day for 7 days, Starting Sun 7/7/2019, Until Sun 7/14/2019, Normal         CONTINUE these medications which have NOT CHANGED    Details   amLODIPine (NORVASC) 2 5 mg tablet Take 1 tablet (2 5 mg total) by mouth daily, Starting Tue 3/19/2019, Normal      baclofen 20 mg tablet Starting Fri 1/4/2019, Historical Med      buPROPion [facility] XL) 300 mg 24 hr tablet Take 300 mg by mouth daily, Starting Tue 4/10/2018, Historical Med      clonazePAM (KlonoPIN) 1 mg tablet Take 1 mg by mouth 3 (three) times a day  , Historical Med      Dextromethorphan HBr (DEXALONE PO) Take 60 mg by mouth daily, Historical Med      docusate sodium (DULCOLAX) 100 mg capsule Take 1 capsule by mouth daily as needed  , Historical Med      LIBRAX 5-2 5 MG per capsule Starting Thu 12/20/2018, Historical Med      metoprolol succinate (TOPROL-XL) 25 mg 24 hr tablet Take 25 mg by mouth daily, Historical Med      Mirtazapine (REMERON PO) Take 30 mg by mouth daily at bedtime, Historical Med      QUEtiapine (SEROquel) 100 mg tablet Take 100 mg by mouth daily at bedtime, Historical Med      !! venlafaxine (EFFEXOR-XR) 75 mg 24 hr capsule 75 mg 3 (three) times a day , Starting Fri 8/17/2018, Historical Med      acetaminophen (TYLENOL) 500 mg tablet Take 500 mg by mouth every 6 (six) hours as needed for mild pain, Historical Med      acetaminophen-codeine (TYLENOL #4) 300-60 MG per tablet Take 2 tablets by mouth daily, Starting Mon 1/28/2019, Historical Med      medroxyPROGESTERone (DEPO-PROVERA) 150 mg/mL injection INJECT EVERY 12 WEEK AS DIRECTED, Normal      !! venlafaxine (EFFEXOR-XR) 150 mg 24 hr capsule Take 150 mg by mouth daily, Historical Med       !! - Potential duplicate medications found  Please discuss with provider  No discharge procedures on file  ED Provider  Attending physically available and evaluated Chasidy Bacon I managed the patient along with the ED Attending      Electronically Signed by         Kofi Dietrich DO  07/07/19 2045

## 2019-08-02 ENCOUNTER — OFFICE VISIT (OUTPATIENT)
Dept: OBGYN CLINIC | Facility: HOSPITAL | Age: 34
End: 2019-08-02
Payer: COMMERCIAL

## 2019-08-02 VITALS — HEIGHT: 64 IN | BODY MASS INDEX: 23.9 KG/M2 | WEIGHT: 140 LBS

## 2019-08-02 DIAGNOSIS — M65.4 DE QUERVAIN'S TENOSYNOVITIS, RIGHT: Primary | ICD-10-CM

## 2019-08-02 PROCEDURE — 99213 OFFICE O/P EST LOW 20 MIN: CPT | Performed by: ORTHOPAEDIC SURGERY

## 2019-08-02 PROCEDURE — 20550 NJX 1 TENDON SHEATH/LIGAMENT: CPT | Performed by: ORTHOPAEDIC SURGERY

## 2019-08-02 RX ORDER — LIDOCAINE HYDROCHLORIDE 10 MG/ML
2 INJECTION, SOLUTION INFILTRATION; PERINEURAL
Status: COMPLETED | OUTPATIENT
Start: 2019-08-02 | End: 2019-08-02

## 2019-08-02 RX ORDER — BETAMETHASONE SODIUM PHOSPHATE AND BETAMETHASONE ACETATE 3; 3 MG/ML; MG/ML
6 INJECTION, SUSPENSION INTRA-ARTICULAR; INTRALESIONAL; INTRAMUSCULAR; SOFT TISSUE
Status: COMPLETED | OUTPATIENT
Start: 2019-08-02 | End: 2019-08-02

## 2019-08-02 RX ADMIN — LIDOCAINE HYDROCHLORIDE 2 ML: 10 INJECTION, SOLUTION INFILTRATION; PERINEURAL at 12:10

## 2019-08-02 RX ADMIN — BETAMETHASONE SODIUM PHOSPHATE AND BETAMETHASONE ACETATE 6 MG: 3; 3 INJECTION, SUSPENSION INTRA-ARTICULAR; INTRALESIONAL; INTRAMUSCULAR; SOFT TISSUE at 12:10

## 2019-08-02 NOTE — PROGRESS NOTES
Chief Complaint   Patient presents with    Right Hand - Pain         Assessment:  Right de Quervain's tenosynovitis    Plan:  I explained the diagnosis of de Quervain's tenosynovitis or tendinitis of the wrist to the patient and her mother  I went over conservative versus operative options for her  I always like to try non operative treatment 1st and therefore I injected the 1st dorsal compartment today with lidocaine and Celestone to decrease pain and inflammation  She may use the hand as her pain permits, letting pain be the warning guide to her activity level  She should try to avoid heavy lifting activities as much as possible  She can put ice on the area for 10 minutes 4 times a day and take Advil, Aleve, or Tylenol if needed for pain  These injections are successful in about 60-70% of patients and if not improved, then we can always consider surgical release, which carries a much higher success rate  I will see her back again in 2 months for routine follow-up      HPI:  This 74-year-old white female was seen previously at our [facility] office on 06/13/2019 for right wrist pain  She was diagnosed with de Quervain's tenosynovitis and was treated with a splint and topical analgesics  She comes in now on  08/02/2019  Her symptoms a bother for over a year and she is not getting better  She never use the cream but has tried a thumb spica splint with minimal success  This is her dominant arm  Past medical history, family history,  social history, medication history, and allergies are reviewed  Please see HPI for pertinent review of systems  All other systems reviewed are negative  She does have GERD, history of cholecystitis, colitis, hypertension, bipolar disorder, endometriosis, among others     Exam:   Ht 5' 3 5" (1 613 m)   Wt 63 5 kg (140 lb)   BMI 24 41 kg/m²   She actually had swelling over the 1st dorsal compartment her right wrist today without ecchymosis or redness    She was exquisitely tender over this area and a strongly positive Finkelstein's test   She was not tender over the ulnar border of the wrist   Radial pulse was intact  Finger motion was normal   Sensation to  light touch was normal   She showed good elbow flexion and extension with no antecubital adenopathy  Wrist motion was limited at the extremes especially on flexion and deviation  Studies Reviewed:  I personally reviewed right hand x-rays as well as the radiologist's report  There is no fracture, dislocation, or any degenerative changes  I see no soft tissue calcification       Hand/upper extremity injection: R extensor compartment 1  Date/Time: 8/2/2019 12:10 PM  Consent given by: patient  Site marked: site marked  Supporting Documentation  Indications: pain   Procedure Details  Condition:de Quervain's tenosynovitis Site: R extensor compartment 1   Preparation: Patient was prepped and draped in the usual sterile fashion  Needle size: 25 G  Ultrasound guidance: no  Approach: radial  Medications administered: 2 mL lidocaine 1 %; 6 mg betamethasone acetate-betamethasone sodium phosphate 6 (3-3) mg/mL    Patient tolerance: patient tolerated the procedure well with no immediate complications  Dressing:  Sterile dressing applied

## 2019-08-02 NOTE — PATIENT INSTRUCTIONS
Plan:  I explained the diagnosis of de Quervain's tenosynovitis or tendinitis of the wrist to the patient and her mother  I went over conservative versus operative options for her  I always like to try non operative treatment 1st and therefore I injected the 1st dorsal compartment today with lidocaine and Celestone to decrease pain and inflammation  She may use the hand as her pain permits, letting pain be the warning guide to her activity level  She should try to avoid heavy lifting activities as much as possible  She can put ice on the area for 10 minutes 4 times a day and take Advil, Aleve, or Tylenol if needed for pain  These injections are successful in about 60-70% of patients and if not improved, then we can always consider surgical release, which carries a much higher success rate    I will see her back again in 2 months for routine follow-up

## 2019-09-04 DIAGNOSIS — I10 ESSENTIAL HYPERTENSION: ICD-10-CM

## 2019-09-04 RX ORDER — AMLODIPINE BESYLATE 2.5 MG/1
TABLET ORAL
Qty: 90 TABLET | Refills: 1 | OUTPATIENT
Start: 2019-09-04

## 2019-10-10 DIAGNOSIS — I10 ESSENTIAL HYPERTENSION: ICD-10-CM

## 2019-10-10 RX ORDER — AMLODIPINE BESYLATE 2.5 MG/1
2.5 TABLET ORAL DAILY
Qty: 30 TABLET | Refills: 0 | Status: SHIPPED | OUTPATIENT
Start: 2019-10-10 | End: 2019-11-14 | Stop reason: SDUPTHER

## 2019-10-10 RX ORDER — METOPROLOL SUCCINATE 25 MG/1
25 TABLET, EXTENDED RELEASE ORAL DAILY
Qty: 30 TABLET | Refills: 0 | Status: SHIPPED | OUTPATIENT
Start: 2019-10-10 | End: 2020-03-17 | Stop reason: SDUPTHER

## 2019-10-14 ENCOUNTER — TELEPHONE (OUTPATIENT)
Dept: INTERNAL MEDICINE CLINIC | Facility: CLINIC | Age: 34
End: 2019-10-14

## 2019-10-23 ENCOUNTER — OFFICE VISIT (OUTPATIENT)
Dept: PSYCHIATRY | Facility: CLINIC | Age: 34
End: 2019-10-23
Payer: COMMERCIAL

## 2019-10-23 DIAGNOSIS — F31.81 BIPOLAR 2 DISORDER, MAJOR DEPRESSIVE EPISODE (HCC): Primary | ICD-10-CM

## 2019-10-23 DIAGNOSIS — F33.1 MODERATE EPISODE OF RECURRENT MAJOR DEPRESSIVE DISORDER (HCC): ICD-10-CM

## 2019-10-23 PROCEDURE — 99205 OFFICE O/P NEW HI 60 MIN: CPT | Performed by: NURSE PRACTITIONER

## 2019-10-23 RX ORDER — VENLAFAXINE HYDROCHLORIDE 75 MG/1
75 CAPSULE, EXTENDED RELEASE ORAL 2 TIMES DAILY
Qty: 60 CAPSULE | Refills: 1 | Status: SHIPPED | OUTPATIENT
Start: 2019-10-23 | End: 2020-01-05 | Stop reason: DRUGHIGH

## 2019-10-23 RX ORDER — QUETIAPINE FUMARATE 100 MG/1
200 TABLET, FILM COATED ORAL
Qty: 30 TABLET | Refills: 1 | Status: SHIPPED | OUTPATIENT
Start: 2019-10-23 | End: 2019-11-14 | Stop reason: SDUPTHER

## 2019-10-23 RX ORDER — VENLAFAXINE HYDROCHLORIDE 37.5 MG/1
37.5 CAPSULE, EXTENDED RELEASE ORAL DAILY
Qty: 30 CAPSULE | Refills: 1 | Status: SHIPPED | OUTPATIENT
Start: 2019-10-23 | End: 2019-10-23 | Stop reason: DRUGHIGH

## 2019-10-23 RX ORDER — BUPROPION HYDROCHLORIDE 100 MG/1
100 TABLET, EXTENDED RELEASE ORAL DAILY
Qty: 30 TABLET | Refills: 1 | Status: SHIPPED | OUTPATIENT
Start: 2019-10-23 | End: 2019-11-14 | Stop reason: ALTCHOICE

## 2019-10-23 RX ORDER — BUSPIRONE HYDROCHLORIDE 5 MG/1
5 TABLET ORAL 3 TIMES DAILY
Qty: 90 TABLET | Refills: 1 | Status: SHIPPED | OUTPATIENT
Start: 2019-10-23 | End: 2019-11-14 | Stop reason: SDUPTHER

## 2019-10-23 RX ORDER — MIRTAZAPINE 15 MG/1
15 TABLET, FILM COATED ORAL
Qty: 30 TABLET | Refills: 1 | Status: SHIPPED | OUTPATIENT
Start: 2019-10-23 | End: 2019-11-14 | Stop reason: DRUGHIGH

## 2019-10-23 RX ORDER — CLONAZEPAM 1 MG/1
1 TABLET ORAL 4 TIMES DAILY
Qty: 120 TABLET | Refills: 1 | Status: SHIPPED | OUTPATIENT
Start: 2019-10-23 | End: 2019-12-16 | Stop reason: SDUPTHER

## 2019-10-23 NOTE — PSYCH
[id number]    Name and Date of Birth:  Chelita Mera 30 y o  1985    Date of Visit: 10/23/19    Reason for visit: No chief complaint on file  HPI patient is a 68-year-old female with a long history of depressive disorder, mood disorder, generalized anxiety disorder and difficulty functioning  I had seen her for quite some time at another office and at that time, she had attempted to work several different jobs and just could not  She then applied for social security disability and was awarded without issue  She presents today not feeling very good  She had been seen recently at another practice and is just not feeling well  She is on multiple antidepressants and some anxiolytics but complaining of multiple medical issues which I believe some can be related to anxiety  She has inflammatory bowel disease, she has PTSD from the birth of her 1year-old son she has peptic ulcer disease she has intestinal issues and she is constantly burping  She had been on various medications for this including Reglan which she is allergic to and chlorpromazine which made her feel out of it   Knowing her and her family's psychiatric history, I believe again that a lot of this is related to anxiety, depression rumination and certainly posttraumatic stress disorder  Also, I believe she suffers from postpartum depression  She tells me that she fears her son's birth date which is upcoming in early November  She says she fears it because it brings back so many memories of the birth which she feels was traumatic   She did suffer eclampsia and had been hospitalized medically for the same when she was pregnant  Also, she does have blood pressure issues  So I am taking this into consideration as were prescribing her various medications  Right now, she was prescribed Effexor  mg daily, Wellbutrin  mg daily, Remeron 30 mg daily  All of these medications were prescribed by another provider  Today, I am reducing Wellbutrin  mg daily, Remeron 15 mg HS, and Effexor 150 mg daily using split dose  My goal would be to get her off both the Remeron and the Wellbutrin and use the Effexor since historically, it has been used well in other family members of hers  I will have her return to see me in 1 week for follow-up treatment  I was considering possibly a trial of Vraylar to help with mood and depression  If we added that, I would like to reduce more of her medications  In the past, we use Klonopin 2 mg t i d  To keep her anxiety under control  Today were going to increase the Klonopin to 1 mg q i d  And adding BuSpar 5 mg t i d   Tiera Espinoza is a 30 y o  female with a history of Major Depressive Disorder, Bipolar Disorder type II and Adjustment Disorder who presents for psychiatric evaluation due to depression and anxiety  Primary complaints include DEPRESSIVE SYMPTOMS: depressed mood, helplessness, low energy, low motivation, decreased interest, poor concentration, ruminations, ANXIETY SYMPTOMS: daily anxiety symptoms and MIXED SYMPTOMS OF BIPOLAR DISORDER: poor concentration, difficulty sleeping  Symptoms first started gradual starting Several years ago and progressively worsening over the last several months  Stressors preceding visit included medical problems, chronic pain, social difficulties, everyday stressors and ongoing anxiety  Tiera Espinoza presents today for treatment of mood disorder, major depressive disorder, bipolar depression and generalized anxiety disorder  Historically, the family has a very strong psychiatric history and treatment for mental health issues  She presents today with multiple symptoms of depression and anxiety and PTSD and postpartum depression  Right now, she lives at home with her  and her mother and her 1year-old son  The son looks well    He looks very well nourished and is a typical  little boy with energy and curiosities  She takes fairly good care of him along with the help of her mother     No issues in childcare noted presently  We have discussed treatment options, were going to make medication changes as noted above and I am having her return to see me in 1 week  She denies suicidal ideation, intent or plan at present, denies passive death wish recently  She denies auditory hallucinations, denies visual hallucinations, denies delusional thoughts  She denies any side effects from medications  Lydia WALDEN Appetite Changes and Sleep: fluctuating sleep pattern, disrupted sleep, normal appetite, normal energy level    Psychiatric Review Of Systems:    Sleep changes: yes, decreased  Appetite changes: yes, decreased  Weight changes: yes, decreased  Energy/anergy: yes, decreased  Interest/pleasure/anhedonia: yes, decreased  Somatic symptoms: yes  Anxiety/panic: yes  Simona: no  Guilty/hopeless: no  Self injurious behavior/risky behavior: no  Suicidal ideation: no  Homicidal ideation: no  Auditory hallucinations: no  Visual hallucinations: no  Other hallucinations: no  Delusional thinking: no  Eating disorder history: no  Obsessive/compulsive symptoms: yes    Review Of Systems:    Mood Anxiety and Depression   Behavior Anhedonic   Thought Content Unreasonalbe or Irrational Fears   General Emotional Problems   Personality Dependent   Other Psych Symptoms Very anxious and dependent on others to help with care in the home   Constitutional Gastrointestinal issues   ENT negative   Cardiovascular HTN   Respiratory negative   Gastrointestinal abdominal pain, abdominal discomfort, constipation, heartburn, nausea and diarrhea   Genitourinary negative   Musculoskeletal negative   Integumentary Facial acne   Neurological negative   Endocrine negative   Other Symptoms none       Past Psychiatric History:     Past Inpatient Psychiatric Treatment:   No history of past inpatient psychiatric admissions  Past Outpatient Psychiatric Treatment:    Was in outpatient psychiatric treatment in the past with a psychiatrist  Past Suicide Attempts: no  Past Violent Behavior: no  Past Psychiatric Medication Trials: Zoloft, Effexor XR, Wellbutrin XL, Remeron, Abilify, Seroquel and Thorazine    Family Psychiatric History:     Family History   Problem Relation Age of Onset    Hypertension Father     Depression Mother        Social History     Substance and Sexual Activity   Drug Use No     Social History     Socioeconomic History    Marital status: /Civil Union     Spouse name: Not on file    Number of children: Not on file    Years of education: Not on file    Highest education level: Not on file   Occupational History    Not on file   Social Needs    Financial resource strain: Not on file    Food insecurity:     Worry: Not on file     Inability: Not on file    Transportation needs:     Medical: Not on file     Non-medical: Not on file   Tobacco Use    Smoking status: Former Smoker     Types: Cigarettes     Last attempt to quit: 5/27/2016     Years since quitting: 3 4    Smokeless tobacco: Never Used   Substance and Sexual Activity    Alcohol use: Yes     Comment: rare    Drug use: No    Sexual activity: Yes     Partners: Male     Birth control/protection: Injection     Comment: on depo    Lifestyle    Physical activity:     Days per week: Not on file     Minutes per session: Not on file    Stress: Not on file   Relationships    Social connections:     Talks on phone: Not on file     Gets together: Not on file     Attends Hinduism service: Not on file     Active member of club or organization: Not on file     Attends meetings of clubs or organizations: Not on file     Relationship status: Not on file    Intimate partner violence:     Fear of current or ex partner: Not on file     Emotionally abused: Not on file     Physically abused: Not on file     Forced sexual activity: Not on file   Other Topics Concern    Not on file   Social History Narrative    Daily caffeine consumption, 1 serving a day    Exercises regularly     Social History     Social History Narrative    Daily caffeine consumption, 1 serving a day    Exercises regularly       Traumatic History:     Abuse: emotional: Family  Other Traumatic Events: none    History Review:    normal bulk    OBJECTIVE:     Mental Status Evaluation:    Appearance disheveled   Behavior cooperative   Speech normal volume, normal pitch   Mood depressed, anxious   Affect tearful   Thought Processes tangential   Associations tangential associations   Thought Content mild paranoia   Perceptual Disturbances: none   Abnormal Thoughts  Risk Potential Suicidal ideation - None  Homicidal ideation - None  Potential for aggression - No   Orientation oriented to person, place and time/date   Memory recent and remote memory grossly intact   Cosciousness alert and awake   Attention Span attention span and concentration are age appropriate   Intellect Appears to be of Average Intelligence   Insight fair   Judgement fair   Muscle Strength and  Gait normal muscle strength and normal muscle tone, normal gait and normal balance   Language no difficulty naming common objects   Fund of Knowledge displays adequate knowledge of current events   Pain mild   Pain Scale 4       Laboratory Results: No results found for this or any previous visit  Assessment/Plan:      Diagnoses and all orders for this visit:    Bipolar 2 disorder, major depressive episode ([facility] [[email or web address]ca [facility] )  -     clonazePAM (KlonoPIN) 1 mg tablet; Take 1 tablet (1 mg total) by mouth 4 (four) times a day  -     buPROPion (WELLBUTRIN SR) 100 mg 12 hr tablet; Take 1 tablet (100 mg total) by mouth daily  -     mirtazapine (REMERON) 15 mg tablet; Take 1 tablet (15 mg total) by mouth daily at bedtime  -     Discontinue: venlafaxine (EFFEXOR-XR) 37 5 mg 24 hr capsule;  Take 1 capsule (37 5 mg total) by mouth daily  -     QUEtiapine (SEROquel) 100 mg tablet; Take 2 tablets (200 mg total) by mouth daily at bedtime  -     busPIRone (BUSPAR) 5 mg tablet; Take 1 tablet (5 mg total) by mouth 3 (three) times a day  -     venlafaxine (EFFEXOR-XR) 75 mg 24 hr capsule; Take 1 capsule (75 mg total) by mouth 2 (two) times a day Take 1 in AM and 1 at 5PM    Moderate episode of recurrent major depressive disorder (HCC)          Treatment Recommendations/Precautions:  Increase Klonopin 1 mg q i d  Start BuSpar 5 mg t i d   Reduce Wellbutrin  mg daily  Reduce Remeron 15 mg HS  Increase Seroquel 50 mg b i d  And 100 mg HS  Reduce Effexor XR 75 mg b i d  Next visit consider trial of Latuda or Vraylar  Follow-up in 1 week  Patient to monitor blood pressure  Blood pressure today 136/90        Risks/Benefits      Risks, Benefits And Possible Side Effects Of Medications:    Risks, benefits, and possible side effects of medications explained to patient and patient verbalizes understanding and agreement for treatment      Controlled Medication Discussion:     Julio Snellen has been filling controlled prescriptions on time as prescribed according to [address]  [facility] Josefina Currie

## 2019-11-09 ENCOUNTER — TELEPHONE (OUTPATIENT)
Dept: OTHER | Facility: OTHER | Age: 34
End: 2019-11-09

## 2019-11-09 DIAGNOSIS — I10 ESSENTIAL HYPERTENSION: ICD-10-CM

## 2019-11-09 NOTE — TELEPHONE ENCOUNTER
Dayron Mcgarry 1985  CONFIDENTIALTY NOTICE: This fax transmission is intended only for the addressee  It contains information that is legally privileged,  confidential or otherwise protected from use or disclosure  If you are not the intended recipient, you are strictly prohibited from reviewing,  disclosing, copying using or disseminating any of this information or taking any action in reliance on or regarding this information  If you have  received this fax in error, please notify us immediately by telephone so that we can arrange for its return to us  Page: 1 of 2  Call Id: [id number]  Health Call  Standard Call Report  Health Call  Patient Name: Dayron Mcgarry  Gender: Female  : 1985  Age: 30 Y 2 M 21 D  Return Phone  Number: [phone] (Home)  Address: [address] [place] [address]  City/State/Zip: [facility] [address]  Practice Name: [address] [place] [address] Joseph Barnett  Practice Charged:  Physician:  [facility] Name:  Relationship To  Patient: Self  Return Phone Number: [phone] (Home)  Presenting Problem: "I ran out of amlodipine yesterday  I  feel lightheaded and I am seeing stars "  Service Type: Triage  Charged Service 1: N/A  Pharmacy Name and  Number:  Nurse Assessment  Nurse: Boni Green Date/Time: 2019 4:13:34 PM  Type of assessment required:  ---General (Adult or Child)  Duration of Current S/S  ---Friday  Location/Radiation  ---Unknown  Temperature (F) and route:  ---Denies  Symptom Specific Meds (Dose/Time):  ---None , PT reports out of Amlodipine  Other S/S  ---Patient reporting lightheadedness, nausea, states "seeing stars/spots in my vision"  Patient denies numbness to face and extremities  Patient reports unsure if BP is  elevated, and denies increased heart rate    Pain Scale on scale of 1-10, 10 being the worst:  ---0/10  Symptom progression:  ---worse  Intake and Output  Dayron Mcgarry 1985  CONFIDENTIALTY NOTICE: This fax transmission is intended only for the addressee  It contains information that is legally privileged,  confidential or otherwise protected from use or disclosure  If you are not the intended recipient, you are strictly prohibited from reviewing,  disclosing, copying using or disseminating any of this information or taking any action in reliance on or regarding this information  If you have  received this fax in error, please notify us immediately by telephone so that we can arrange for its return to us  Page: 2 of 2  Call Id: [id number]  Nurse Assessment  ---Drinking and voiding normally  LMP/ Pregnancy:  ---1 year ago, due to Depo injections  Breastfeeding  ---No  Last Exam/Treatment:  ---3/19/2019  Protocols  Protocol Title Nurse Date/Time  Dizziness - Orlene Dec 11/9/2019 4:17:27 PM  Question Caller Affirmed  Disp  Time Disposition Final User  11/9/2019 4:19:06 PM See PCP When Office is Open (within 3  days)  Mattie Sarabia  11/9/2019 4:22:06 PM RN Triaged Yes Lib[place], [address] Advice Given Per Protocol  SEE PCP WITHIN 3 DAYS: * You need to be seen within 2 or 3 days  Call your doctor during regular office hours and make an  appointment  An urgent care center is often the best source of care if your doctor's office is closed or you can't get an appointment  NOTE: If office will be open tomorrow, tell caller to call then, not in 3 days  * IF PATIENT HAS NO PCP: An urgent care center is often  the best source of care if you do not have a regular doctor you can see in the next couple days  NOTE: Try to help caller find a doctor  Is  there a physician referral line or other resource? Having a PCP or 'medical home' means better long-term care  FLUIDS: Drink several  glasses of fruit juice, other clear fluids or water  This will improve hydration and blood glucose  If the weather is hot, make sure the  fluids are cold  REST: Lie down with feet elevated for 1 hour  This will improve circulation and increase blood flow to the brain  FALL  PREVENTION: * Sit at side of bed for several minutes before standing up  Stand up slowly  * Avoid sudden movements of head  CALL  BACK IF: * Passes out (faints) * You become worse  CARE ADVICE given per Dizziness (Adult) guideline    Caller Understands: Yes  Caller Disagree/Comply: Comply  PreDisposition: Unsure

## 2019-11-11 RX ORDER — AMLODIPINE BESYLATE 2.5 MG/1
2.5 TABLET ORAL DAILY
Qty: 30 TABLET | Refills: 0 | Status: CANCELLED | OUTPATIENT
Start: 2019-11-11

## 2019-11-11 NOTE — TELEPHONE ENCOUNTER
JALEN on [phone] requesting a call back to schedule an appt today to address her symptoms and concerns

## 2019-11-14 ENCOUNTER — OFFICE VISIT (OUTPATIENT)
Dept: INTERNAL MEDICINE CLINIC | Age: 34
End: 2019-11-14
Payer: COMMERCIAL

## 2019-11-14 ENCOUNTER — OFFICE VISIT (OUTPATIENT)
Dept: PSYCHIATRY | Facility: CLINIC | Age: 34
End: 2019-11-14
Payer: COMMERCIAL

## 2019-11-14 VITALS
BODY MASS INDEX: 24.16 KG/M2 | SYSTOLIC BLOOD PRESSURE: 126 MMHG | TEMPERATURE: 98.2 F | OXYGEN SATURATION: 99 % | HEIGHT: 65 IN | WEIGHT: 145 LBS | DIASTOLIC BLOOD PRESSURE: 76 MMHG | HEART RATE: 68 BPM

## 2019-11-14 DIAGNOSIS — F31.81 BIPOLAR 2 DISORDER, MAJOR DEPRESSIVE EPISODE (HCC): Primary | ICD-10-CM

## 2019-11-14 DIAGNOSIS — I10 ESSENTIAL HYPERTENSION: ICD-10-CM

## 2019-11-14 DIAGNOSIS — F17.210 CIGARETTE NICOTINE DEPENDENCE WITHOUT COMPLICATION: ICD-10-CM

## 2019-11-14 DIAGNOSIS — Z23 NEED FOR INFLUENZA VACCINATION: ICD-10-CM

## 2019-11-14 DIAGNOSIS — F41.1 GENERALIZED ANXIETY DISORDER: ICD-10-CM

## 2019-11-14 DIAGNOSIS — R00.2 PALPITATIONS: Primary | ICD-10-CM

## 2019-11-14 PROBLEM — F17.200 NICOTINE DEPENDENCE: Status: ACTIVE | Noted: 2019-11-14

## 2019-11-14 PROBLEM — F41.8 ANXIOUS DEPRESSION: Status: ACTIVE | Noted: 2019-11-14

## 2019-11-14 PROCEDURE — 99214 OFFICE O/P EST MOD 30 MIN: CPT | Performed by: NURSE PRACTITIONER

## 2019-11-14 PROCEDURE — 90471 IMMUNIZATION ADMIN: CPT | Performed by: INTERNAL MEDICINE

## 2019-11-14 PROCEDURE — 99406 BEHAV CHNG SMOKING 3-10 MIN: CPT | Performed by: INTERNAL MEDICINE

## 2019-11-14 PROCEDURE — 99213 OFFICE O/P EST LOW 20 MIN: CPT | Performed by: INTERNAL MEDICINE

## 2019-11-14 PROCEDURE — 1036F TOBACCO NON-USER: CPT | Performed by: INTERNAL MEDICINE

## 2019-11-14 PROCEDURE — 90686 IIV4 VACC NO PRSV 0.5 ML IM: CPT | Performed by: INTERNAL MEDICINE

## 2019-11-14 RX ORDER — VENLAFAXINE HYDROCHLORIDE 150 MG/1
150 CAPSULE, EXTENDED RELEASE ORAL DAILY
Qty: 30 CAPSULE | Refills: 1 | Status: SHIPPED | OUTPATIENT
Start: 2019-11-14 | End: 2020-01-05 | Stop reason: SDUPTHER

## 2019-11-14 RX ORDER — BUSPIRONE HYDROCHLORIDE 10 MG/1
10 TABLET ORAL 3 TIMES DAILY
Qty: 90 TABLET | Refills: 1 | Status: SHIPPED | OUTPATIENT
Start: 2019-11-14 | End: 2019-12-10 | Stop reason: DRUGHIGH

## 2019-11-14 RX ORDER — MIRTAZAPINE 7.5 MG/1
7.5 TABLET, FILM COATED ORAL
Qty: 30 TABLET | Refills: 1 | OUTPATIENT
Start: 2019-11-14 | End: 2019-12-10 | Stop reason: SDUPTHER

## 2019-11-14 RX ORDER — AMLODIPINE BESYLATE 2.5 MG/1
2.5 TABLET ORAL DAILY
Qty: 90 TABLET | Refills: 1 | Status: SHIPPED | OUTPATIENT
Start: 2019-11-14 | End: 2020-03-17 | Stop reason: SDUPTHER

## 2019-11-14 RX ORDER — QUETIAPINE FUMARATE 100 MG/1
100 TABLET, FILM COATED ORAL 3 TIMES DAILY
Qty: 90 TABLET | Refills: 2 | Status: SHIPPED | OUTPATIENT
Start: 2019-11-14 | End: 2020-01-05 | Stop reason: SDUPTHER

## 2019-11-14 NOTE — PROGRESS NOTES
Assessment/Plan:    Essential hypertension  -improving  -patient has been counseled to take her blood pressure medication daily as prescribed  -she was counseled on the dangers of uncontrolled blood pressure   -will refill her amlodipine  -she was counseled to eat a low-salt, heart healthy diet  She was also counseled to quit smoking, decrease her caffeine intake and her alcohol intake and also decrease her stress level as much as possible   -patient was also counseled to keep a blood pressure log, she is to measure blood pressure twice daily and to write it down and bring it to her next visit  She was told that the goal blood pressure is 130/80 and below  - will check a TSH  -follow-up in 1 month    Palpitation  - will check a TSH    Nicotine dependence  - smoking cessation counseling given  -I spent about 3 minutes on smoking cessation counseling    Need for influenza vaccination  - patient will get a flu shot today       Diagnoses and all orders for this visit:    Palpitations  -     TSH, 3rd generation with Free T4 reflex; Future    Essential hypertension  -     amLODIPine (NORVASC) 2 5 mg tablet; Take 1 tablet (2 5 mg total) by mouth daily  -     TSH, 3rd generation with Free T4 reflex; Future    Cigarette nicotine dependence without complication          Subjective:      Patient ID: Donovan Mcmanus is a 30 y o  female  HPI  Patient presents with complaints of elevated blood pressure while she was at her psychiatrist's office earlier today  Her systolic blood pressure was up to 150  She states that her blood pressure has been elevated ever since she had her baby about 3 years ago  During that pregnancy she developed eclampsia and had to be delivered at 30 weeks gestation  She has been on blood pressure medications since then because her blood pressure has remained elevated  She does have a family history of hypertension in her grandmother    She states that she ran out of her amlodipine about 6 days ago   Of note, patient states that she usually skips her medications sometimes and does not take it consistently  Also of note, patient started smoking again about 1 month ago  She states that she smokes about 4 sticks of cigarettes daily  She had initially quit and then restarted again  She also states that she drinks about 4 cups of coffee daily  She rarely drinks soda and when she does drink, it is 1 can of ginger ale  She also states that she does not eat salty foods and does not eat junk foods and she does not drink much alcohol  She denies being under undue stress at this time  She admits to occasional palpitations, blurry vision, headaches and nausea but denies fever , chills, night sweats , chest pain , shortness of breath , vomiting, diarrhea , constipation   Of note , she has a history of acid reflux   She admits to family history of thyroid disease  The following portions of the patient's history were reviewed and updated as appropriate:   She  has a past medical history of Anxiety, Bipolar disorder with depression ([address] ), Depression, Endometriosis, Hypertension, Scoliosis, and Varicella    She   Patient Active Problem List    Diagnosis Date Noted    Anxious depression 11/14/2019    Palpitations 11/14/2019    Nicotine dependence 11/14/2019    De Quervain's tenosynovitis, right 08/02/2019    Encounter for management and injection of depo-Provera 01/09/2019    Encounter for other contraceptive management 01/09/2019    Colitis 01/09/2019    Facial lesion 01/09/2019    Encounter for surveillance of injectable contraceptive 10/17/2018    Bloody diarrhea 07/08/2018    Generalized abdominal pain 07/08/2018    Hypokalemia 07/08/2018    Polyp of gallbladder 03/12/2018    Right upper quadrant abdominal pain 03/12/2018    Disease of gallbladder 03/12/2018    Acid reflux disease 11/28/2017    Anxiety 10/29/2016     She  has a past surgical history that includes TONSILECTOMY AND ADNOIDECTOMY (Bilateral); [place] tooth extraction (Bilateral); Hernia repair; pr  delivery only (N/A, 2016); Tonsillectomy;  section; Colonoscopy; pr lap,cholecystectomy (N/A, 3/12/2018); Other surgical history; and Hernia repair ()  Her family history includes Depression in her mother; Hypertension in her father  She  reports that she quit smoking about 3 years ago  Her smoking use included cigarettes  She has never used smokeless tobacco  She reports that she drinks alcohol  She reports that she does not use drugs    Current Outpatient Medications   Medication Sig Dispense Refill    acetaminophen (TYLENOL) 500 mg tablet Take 500 mg by mouth every 6 (six) hours as needed for mild pain      acetaminophen-codeine (TYLENOL #4) 300-60 MG per tablet Take 2 tablets by mouth daily      baclofen 20 mg tablet       clonazePAM (KlonoPIN) 1 mg tablet Take 1 tablet (1 mg total) by mouth 4 (four) times a day 120 tablet 1    docusate sodium (DULCOLAX) 100 mg capsule Take 1 capsule by mouth daily as needed        medroxyPROGESTERone (DEPO-PROVERA) 150 mg/mL injection INJECT EVERY 12 WEEK AS DIRECTED 1 mL 3    metoprolol succinate (TOPROL-XL) 25 mg 24 hr tablet Take 1 tablet (25 mg total) by mouth daily 30 tablet 0    mirtazapine (REMERON) 7 5 MG tablet Take 1 tablet (7 5 mg total) by mouth daily at bedtime 30 tablet 1    QUEtiapine (SEROquel) 100 mg tablet Take 1 tablet (100 mg total) by mouth 3 (three) times a day 90 tablet 2    venlafaxine (EFFEXOR-XR) 150 mg 24 hr capsule Take 1 capsule (150 mg total) by mouth daily 30 capsule 1    venlafaxine (EFFEXOR-XR) 75 mg 24 hr capsule Take 1 capsule (75 mg total) by mouth 2 (two) times a day Take 1 in AM and 1 at 5PM 60 capsule 1    amLODIPine (NORVASC) 2 5 mg tablet Take 1 tablet (2 5 mg total) by mouth daily 90 tablet 1    busPIRone (BUSPAR) 10 mg tablet Take 1 tablet (10 mg total) by mouth 3 (three) times a day (Patient not taking: Reported on 2019) 90 tablet 1    Dextromethorphan HBr (DEXALONE PO) Take 60 mg by mouth daily      dicyclomine (BENTYL) 20 mg tablet Take 1 tablet (20 mg total) by mouth 2 (two) times a day for 7 days 14 tablet 0    LIBRAX 5-2 5 MG per capsule        No current facility-administered medications for this visit        Current Outpatient Medications on File Prior to Visit   Medication Sig    acetaminophen (TYLENOL) 500 mg tablet Take 500 mg by mouth every 6 (six) hours as needed for mild pain    acetaminophen-codeine (TYLENOL #4) 300-60 MG per tablet Take 2 tablets by mouth daily    baclofen 20 mg tablet     clonazePAM (KlonoPIN) 1 mg tablet Take 1 tablet (1 mg total) by mouth 4 (four) times a day    docusate sodium (DULCOLAX) 100 mg capsule Take 1 capsule by mouth daily as needed      medroxyPROGESTERone (DEPO-PROVERA) 150 mg/mL injection INJECT EVERY 12 WEEK AS DIRECTED    metoprolol succinate (TOPROL-XL) 25 mg 24 hr tablet Take 1 tablet (25 mg total) by mouth daily    mirtazapine (REMERON) 7 5 MG tablet Take 1 tablet (7 5 mg total) by mouth daily at bedtime    QUEtiapine (SEROquel) 100 mg tablet Take 1 tablet (100 mg total) by mouth 3 (three) times a day    venlafaxine (EFFEXOR-XR) 150 mg 24 hr capsule Take 1 capsule (150 mg total) by mouth daily    venlafaxine (EFFEXOR-XR) 75 mg 24 hr capsule Take 1 capsule (75 mg total) by mouth 2 (two) times a day Take 1 in AM and 1 at 5PM    busPIRone (BUSPAR) 10 mg tablet Take 1 tablet (10 mg total) by mouth 3 (three) times a day (Patient not taking: Reported on 11/14/2019)    Dextromethorphan HBr (DEXALONE PO) Take 60 mg by mouth daily    dicyclomine (BENTYL) 20 mg tablet Take 1 tablet (20 mg total) by mouth 2 (two) times a day for 7 days    LIBRAX 5-2 5 MG per capsule     [DISCONTINUED] amLODIPine (NORVASC) 2 5 mg tablet Take 1 tablet (2 5 mg total) by mouth daily (Patient not taking: Reported on 11/14/2019)    [DISCONTINUED] buPROPion (WELLBUTRIN SR) 100 mg 12 hr tablet Take 1 tablet (100 mg total) by mouth daily    [DISCONTINUED] busPIRone (BUSPAR) 5 mg tablet Take 1 tablet (5 mg total) by mouth 3 (three) times a day    [DISCONTINUED] mirtazapine (REMERON) 15 mg tablet Take 1 tablet (15 mg total) by mouth daily at bedtime    [DISCONTINUED] QUEtiapine (SEROquel) 100 mg tablet Take 2 tablets (200 mg total) by mouth daily at bedtime     No current facility-administered medications on file prior to visit  She is allergic to sulfa antibiotics and reglan [metoclopramide]       Review of Systems   Constitutional: Negative for activity change, chills, fatigue, fever and unexpected weight change  HENT: Negative for ear pain, postnasal drip, rhinorrhea, sinus pressure and sore throat  Eyes: Negative for pain  Respiratory: Negative for cough, choking, chest tightness, shortness of breath and wheezing  Cardiovascular: Positive for palpitations  Negative for chest pain and leg swelling  Gastrointestinal: Positive for nausea (occasional)  Negative for abdominal pain, constipation, diarrhea and vomiting  Genitourinary: Negative for dysuria and hematuria  Musculoskeletal: Negative for arthralgias, back pain, gait problem, joint swelling, myalgias and neck stiffness  Skin: Negative for pallor and rash  Neurological: Positive for headaches  Negative for dizziness, tremors, seizures, syncope and light-headedness  Hematological: Negative for adenopathy  Psychiatric/Behavioral: Negative for behavioral problems  Objective:      /76 (BP Location: Left arm, Patient Position: Sitting, Cuff Size: Standard)   Pulse 68   Temp 98 2 °F (36 8 °C) (Tympanic)   Ht 5' 4 57" (1 64 m) Comment: shoes on  Wt 65 8 kg (145 lb) Comment: shoes on  SpO2 99%   BMI 24 45 kg/m²          Physical Exam   Constitutional: She is oriented to person, place, and time  She appears well-developed and well-nourished  No distress  HENT:   Head: Normocephalic and atraumatic     Right Ear: External ear normal    Left Ear: External ear normal    Nose: Nose normal    Mouth/Throat: Oropharynx is clear and moist  Mucous membranes are dry (Very dry mucous membranes)  No oropharyngeal exudate  Eyes: Pupils are equal, round, and reactive to light  Conjunctivae and EOM are normal  Right eye exhibits no discharge  Left eye exhibits no discharge  No scleral icterus  Neck: Normal range of motion  Neck supple  No JVD present  No tracheal deviation present  No thyromegaly present  Cardiovascular: Normal rate, regular rhythm, normal heart sounds and intact distal pulses  Exam reveals no gallop and no friction rub  No murmur heard  Pulmonary/Chest: Effort normal and breath sounds normal  No respiratory distress  She has no wheezes  She has no rales  She exhibits no tenderness  Abdominal: Soft  Bowel sounds are normal  She exhibits no distension and no mass  There is tenderness ( positive history of hiatal hernia and GERD status post surgery) in the epigastric area  There is no rebound and no guarding  Musculoskeletal: Normal range of motion  She exhibits no edema, tenderness or deformity  Lymphadenopathy:     She has no cervical adenopathy  Neurological: She is alert and oriented to person, place, and time  She has normal reflexes  No cranial nerve deficit  She exhibits normal muscle tone  Coordination normal    Skin: Skin is warm and dry  No rash noted  She is not diaphoretic  No erythema  No pallor  Psychiatric: She has a normal mood and affect   Her behavior is normal          Admission on 07/07/2019, Discharged on 07/07/2019   Component Date Value Ref Range Status    Color, UA 07/07/2019 yellow   Final    EXT PREG TEST UR (Ref: Negative) 07/07/2019 negative   Final    Control 07/07/2019 valid   Final    WBC 07/07/2019 5 66  4 31 - 10 16 Thousand/uL Final    RBC 07/07/2019 3 88  3 81 - 5 12 Million/uL Final    Hemoglobin 07/07/2019 11 5  11 5 - 15 4 g/dL Final    Hematocrit 07/07/2019 35 3  34 8 - 46 1 % Final    MCV 07/07/2019 91  82 - 98 fL Final    MCH 07/07/2019 29 6  26 8 - 34 3 pg Final    MCHC 07/07/2019 32 6  31 4 - 37 4 g/dL Final    RDW 07/07/2019 13 1  11 6 - 15 1 % Final    MPV 07/07/2019 10 2  8 9 - 12 7 fL Final    Platelets 80/31/7354 297  149 - 390 Thousands/uL Final    nRBC 07/07/2019 0  /100 WBCs Final    Neutrophils Relative 07/07/2019 54  43 - 75 % Final    Immat GRANS % 07/07/2019 0  0 - 2 % Final    Lymphocytes Relative 07/07/2019 36  14 - 44 % Final    Monocytes Relative 07/07/2019 9  4 - 12 % Final    Eosinophils Relative 07/07/2019 1  0 - 6 % Final    Basophils Relative 07/07/2019 0  0 - 1 % Final    Neutrophils Absolute 07/07/2019 3 02  1 85 - 7 62 Thousands/µL Final    Immature Grans Absolute 07/07/2019 0 01  0 00 - 0 20 Thousand/uL Final    Lymphocytes Absolute 07/07/2019 2 05  0 60 - 4 47 Thousands/µL Final    Monocytes Absolute 07/07/2019 0 49  0 17 - 1 22 Thousand/µL Final    Eosinophils Absolute 07/07/2019 0 08  0 00 - 0 61 Thousand/µL Final    Basophils Absolute 07/07/2019 0 01  0 00 - 0 10 Thousands/µL Final    Sodium 07/07/2019 141  136 - 145 mmol/L Final    Potassium 07/07/2019 3 7  3 5 - 5 3 mmol/L Final    Chloride 07/07/2019 112* 100 - 108 mmol/L Final    CO2 07/07/2019 24  21 - 32 mmol/L Final    ANION GAP 07/07/2019 5  4 - 13 mmol/L Final    BUN 07/07/2019 6  5 - 25 mg/dL Final    Creatinine 07/07/2019 0 65  0 60 - 1 30 mg/dL Final    Standardized to IDMS reference method    Glucose 07/07/2019 85  65 - 140 mg/dL Final      If the patient is fasting, the ADA then defines impaired fasting glucose as > 100 mg/dL and diabetes as > or equal to 123 mg/dL  Specimen collection should occur prior to Sulfasalazine administration due to the potential for falsely depressed results  Specimen collection should occur prior to Sulfapyridine administration due to the potential for falsely elevated results      Calcium 07/07/2019 8 3 8 3 - 10 1 mg/dL Final    AST 07/07/2019 10  5 - 45 U/L Final      Specimen collection should occur prior to Sulfasalazine administration due to the potential for falsely depressed results   ALT 07/07/2019 11* 12 - 78 U/L Final      Specimen collection should occur prior to Sulfasalazine and/or Sulfapyridine administration due to the potential for falsely depressed results       Alkaline Phosphatase 07/07/2019 74  46 - 116 U/L Final    Total Protein 07/07/2019 6 7  6 4 - 8 2 g/dL Final    Albumin 07/07/2019 3 6  3 5 - 5 0 g/dL Final    Total Bilirubin 07/07/2019 0 36  0 20 - 1 00 mg/dL Final    eGFR 07/07/2019 119  ml/min/1 73sq m Final    Lipase 07/07/2019 105  73 - 393 u/L Final    LACTIC ACID 07/07/2019 0 7  0 5 - 2 0 mmol/L Final    Color, UA 07/07/2019 Yellow   Final    Clarity, UA 07/07/2019 Clear   Final    pH, UA 07/07/2019 7 0  4 5 - 8 0 Final    Leukocytes, UA 07/07/2019 Negative  Negative Final    Nitrite, UA 07/07/2019 Negative  Negative Final    Protein, UA 07/07/2019 Negative  Negative mg/dl Final    Glucose, UA 07/07/2019 Negative  Negative mg/dl Final    Ketones, UA 07/07/2019 Negative  Negative mg/dl Final    Urobilinogen, UA 07/07/2019 0 2  0 2, 1 0 E U /dl E U /dl Final    Bilirubin, UA 07/07/2019 Negative  Negative Final    Blood, UA 07/07/2019 Small* Negative Final    Specific Gravity, UA 07/07/2019 1 020  1 003 - 1 030 Final    RBC, UA 07/07/2019 4-10* None Seen, 0-5 /hpf Final    WBC, UA 07/07/2019 None Seen  None Seen, 0-5, 5-55, 5-65 /hpf Final    Epithelial Cells 07/07/2019 Occasional  None Seen, Occasional /hpf Final    Bacteria, UA 07/07/2019 Occasional  None Seen, Occasional /hpf Final    Hyaline Casts, UA 07/07/2019 None Seen  None Seen /lpf Final

## 2019-11-14 NOTE — PSYCH
PROGRESS NOTE        [facility]      Name and Date of Birth:  Yaritza Hopkins 30 y o  1985    Date of Visit: 11/14/19    SUBJECTIVE:    Georgie Champagne presents today for treatment of major depressive disorder, bipolar depression, mood disorder and generalized anxiety disorder  On examination today, she continues with her chronic anxiety  She also has a lot of difficulty functioning on a regular basis  Her mother resides with her and is helping her with childcare  Her 1year-old son looks well  He is well nourished and well cared for  Georgie Champagne continues to report anxiety, continuous stomach issues which include ongoing acid reflux along with ongoing burping  I noticed today that the gas pains that she experiences are a little less than they were previously  So were going to continue to work with the medication she is on and increasing it  We will increase BuSpar 10 mg t i d , we will reduce Remeron 7 5 mg HS  Again, the Remeron was at a higher dose but with reduction, I am noting less lethargy and less gas production  Klonopin will continue at 1 mg q i d  And venlafaxine will be increased to 225 mg daily  The other concern is her blood pressure  She had eclampsia when she was pregnant with her 1year-old son and a blood pressure seems to still be fluctuating  She has not seen her PCP a in some time because of missed appointments  I contacted them today with her permission to set up an appointment  They will see her today at 11:45 a m   I also informed them that I have her on Effexor which has been the most helpful to reduce her depression  Previously, she has had a poor response to various agents  I have also asked her to check her blood pressure weekly and recorded so that we could keep a running chart of how her blood pressure is running with the Effexor  Follow-up will be in 1 month      She denies suicidal ideation, intent or plan at present, has no suicidal ideation, intent or plan at present  She denies any auditory hallucinations and visual hallucinations, denies any other delusional thinking, denies any delusional thinking  She denies any side effects from medications    HPI ROS Appetite Changes and Sleep: normal appetite, normal sleep    Review Of Systems:      Constitutional Negative   ENT Negative   Cardiovascular Negative   Respiratory As Noted in HPI   Gastrointestinal Negative   Genitourinary Negative   Musculoskeletal Negative   Integumentary Negative   Neurological Negative   Endocrine Negative   Other Symptoms Negative and None       Laboratory Results: No results found for this or any previous visit      Substance Abuse History:    Social History     Substance and Sexual Activity   Drug Use No       Family Psychiatric History:     Family History   Problem Relation Age of Onset    Hypertension Father     Depression Mother        The following portions of the patient's history were reviewed and updated as appropriate: past family history, past medical history, past social history, past surgical history and problem list     Social History     Socioeconomic History    Marital status: /Civil Union     Spouse name: Not on file    Number of children: Not on file    Years of education: Not on file    Highest education level: Not on file   Occupational History    Not on file   Social Needs    Financial resource strain: Not on file    Food insecurity:     Worry: Not on file     Inability: Not on file    Transportation needs:     Medical: Not on file     Non-medical: Not on file   Tobacco Use    Smoking status: Former Smoker     Types: Cigarettes     Last attempt to quit: 5/27/2016     Years since quitting: 3 4    Smokeless tobacco: Never Used   Substance and Sexual Activity    Alcohol use: Yes     Comment: rare    Drug use: No    Sexual activity: Yes     Partners: Male     Birth control/protection: Injection     Comment: on depo    Lifestyle    Physical activity:     Days per week: Not on file     Minutes per session: Not on file    Stress: Not on file   Relationships    Social connections:     Talks on phone: Not on file     Gets together: Not on file     Attends Anabaptism service: Not on file     Active member of club or organization: Not on file     Attends meetings of clubs or organizations: Not on file     Relationship status: Not on file    Intimate partner violence:     Fear of current or ex partner: Not on file     Emotionally abused: Not on file     Physically abused: Not on file     Forced sexual activity: Not on file   Other Topics Concern    Not on file   Social History Narrative    Daily caffeine consumption, 1 serving a day    Exercises regularly     Social History     Social History Narrative    Daily caffeine consumption, 1 serving a day    Exercises regularly        Social History     Tobacco History     Smoking Status  Former Smoker Quit date  5/27/2016 Smoking Tobacco Type  Cigarettes    Smokeless Tobacco Use  Never Used          Alcohol History     Alcohol Use Status  Yes Comment  rare          Drug Use     Drug Use Status  No          Sexual Activity     Sexually Active  Yes Partners  Male Birth Control/Protection  Injection Comment  on depo           Activities of Daily Living    Not Asked                     OBJECTIVE:     Mental Status Evaluation:    Appearance age appropriate, casually dressed   Behavior pleasant, cooperative   Speech normal volume, normal pitch   Mood Anxious and depressed   Affect Dull   Thought Processes Scattered   Associations Loose   Thought Content Normal   Perceptual Disturbances: None   Abnormal Thoughts  Risk Potential Suicidal ideation - None  Homicidal ideation - None  Potential for aggression - No   Orientation oriented to person, place, time/date and situation   Memory recent and remote memory grossly intact   Cosciousness alert and awake   Attention Span Fair   Intellect Appears to be of Average Intelligence   Insight Fair   Judgement Fair   Muscle Strength and  Gait muscle strength and tone were normal   Language no difficulty naming common objects   Fund of Knowledge displays adequate knowledge of current events   Pain None   Pain Scale 0       Assessment/Plan:       There are no diagnoses linked to this encounter  Treatment Recommendations/Precautions:    Continue current medications:  Increase BuSpar 10 mg t i d  Continue Klonopin 1 mg q i d  Reduce Remeron 7 5 mg HS  Increase Effexor  mg daily  Increase Seroquel 100 mg t i d  To help reduce mood fluctuation and anxiety  Risks/Benefits      Risks, Benefits And Possible Side Effects Of Medications:    Risks, benefits, and possible side effects of medications explained to patient and patient verbalizes understanding and agreement for treatment  Controlled Medication Discussion:  No history of any overuse or abuse of controlled substances      Not applicable    Psychotherapy Provided:     Individual psychotherapy provided: No

## 2019-11-14 NOTE — PATIENT INSTRUCTIONS
Low-Sodium Diet   AMBULATORY CARE:   A low-sodium diet  limits foods that are high in sodium (salt)  You will need to follow a low-sodium diet if you have high blood pressure, kidney disease, or heart failure  You may also need to follow this diet if you have a condition that is causing your body to retain (hold) extra fluid  You may need to limit the amount of sodium you eat to 1,500 mg  Ask your healthcare provider how much sodium you can have each day  How to use food labels to choose foods that are low in sodium:  Read food labels to find the amount of sodium they contain  The amount of sodium is listed in milligrams (mg)  The % Daily Value (DV) column tells you how much of your daily needs are met by 1 serving of the food for each nutrient listed  Choose foods that have less than 5% of the DV of sodium  These foods are considered low in sodium  Foods that have 20% or more of the DV of sodium are considered high in sodium  Some food labels may also list any of the following terms that tell you about the sodium content in the food:  · Sodium-free:  Less than 5 mg in each serving    · Very low sodium:  35 mg of sodium or less in each serving    · Low sodium:  140 mg of sodium or less in each serving    · Reduced sodium: At least 25% less sodium in each serving than the regular type    · Light in sodium:  50% less sodium in each serving    · Unsalted or no added salt:  No extra salt is added during processing (the food may still contain sodium)  Foods to avoid:  Salty foods are high in sodium   You should avoid the following:  · Processed foods:      ¨ Mixes for cornbread, biscuits, cake, and pudding     ¨ Instant foods, such as potatoes, cereals, noodles, and rice     ¨ Packaged foods, such as bread stuffing, rice and pasta mixes, snack dip mixes, and macaroni and cheese     ¨ Canned foods, such as canned vegetables, soups, broths, sauces, and vegetable or tomato juice    ¨ Snack foods, such as salted chips, popcorn, pretzels, pork rinds, salted crackers, and salted nuts    ¨ Frozen foods, such as dinners, entrees, vegetables with sauces, and breaded meats    ¨ Sauerkraut, pickled vegetables, and other foods prepared in brine    · Meats and cheeses:      ¨ Smoked or cured meat, such as corned beef, guevara, ham, hot dogs, and sausage    ¨ Canned meats or spreads, such as potted meats, sardines, anchovies, and imitation seafood    ¨ Deli or lunch meats, such as bologna, ham, turkey, and roast beef    ¨ Processed cheese, such as American cheese and cheese spreads    · Condiments, sauces, and seasonings:      ¨ Salt (¼ teaspoon of salt contains 575 mg of sodium)    ¨ Seasonings made with salt, such as garlic salt, celery salt, onion salt, and seasoned salt    ¨ Regular soy sauce, barbecue sauce, teriyaki sauce, steak sauce, Worcestershire sauce, and most flavored vinegars    ¨ Canned gravy and mixes     ¨ Regular condiments, such as mustard, ketchup, and salad dressings    ¨ Pickles and olives    ¨ Meat tenderizers and monosodium glutamate (MSG)  Foods to include:  Read the food label to find the exact amount of sodium in each serving  · Bread and cereal:  Try to choose breads with less than 80 mg of sodium per serving  ¨ Bread, roll, marry, tortilla, or unsalted crackers  ¨ Ready-to-eat cereals with less than 5% DV of sodium (examples include shredded wheat and puffed rice)    ¨ Pasta    · Vegetables and fruits:      ¨ Unsalted fresh, frozen, or canned vegetables    ¨ Fresh, frozen, or canned fruits    ¨ Fruit juice    · Dairy:  One serving has about 150 mg of sodium  ¨ Milk, all types    ¨ Yogurt    ¨ Hard cheese, such as cheddar, Swiss, [employer], or mozzarella    · Meat and other protein foods:  Some raw meats may have added sodium       ¨ Plain meats, fish, and poultry     ¨ Eggs    · Other foods:      ¨ Homemade pudding    ¨ Unsalted nuts, popcorn, or pretzels    ¨ Unsalted butter or margarine  Ways to decrease sodium:   · Add spices and herbs to foods instead of salt during cooking  Use salt-free seasonings to add flavor to foods  Examples include onion powder, garlic powder, basil, lopez powder, paprika, and parsley  Try lemon or lime juice or vinegar to give foods a tart flavor  Use hot peppers, pepper, or cayenne pepper to add a spicy flavor to foods  · Do not keep a salt shaker at your kitchen table  This may help keep you from adding salt to food at the table  It may take time to get used to enjoying the natural flavor of food instead of adding salt  Talk to your healthcare provider before you use salt substitutes  Some salt substitutes have a high amount of potassium and need to be avoided if you have kidney disease  · Choose low-sodium foods at restaurants  Meals from restaurants are often high in sodium  Some restaurants have nutrition information on the menu that tells you the amount of sodium in their foods  If possible, ask for your food to be prepared with less, or no salt  · Shop for unsalted or low-sodium foods and snacks at the grocery store  Examples include unsalted or low-sodium broths, soups, and canned vegetables  Choose fresh or frozen vegetables instead  Choose unsalted nuts or seeds or fresh fruits or vegetables as snacks  Read food labels and choose salt-free, very low-sodium, or low-sodium foods  © 2017 [employer] Information is for End User's use only and may not be sold, redistributed or otherwise used for commercial purposes  All illustrations and images included in CareNotes® are the copyrighted property of A D A M , Tricia  or Darren Beebe  The above information is an  only  It is not intended as medical advice for individual conditions or treatments  Talk to your doctor, nurse or pharmacist before following any medical regimen to see if it is safe and effective for you    Chronic Hypertension   AMBULATORY CARE:   Hypertension is high blood pressure (BP)  Your BP is the force of your blood moving against the walls of your arteries  Normal BP is less than 120/80  Prehypertension is between 120/80 and 139/89  Hypertension is 140/90 or higher  Hypertension causes your BP to get so high that your heart has to work much harder than normal  This can damage your heart  Chronic hypertension is a long-term condition that you can control with a healthy lifestyle or medicines  A controlled blood pressure helps protect your organs, such as your heart, lungs, brain, and kidneys  Common symptoms include the following:   · Headache     · Blurred vision    · Chest pain     · Dizziness or weakness     · Trouble breathing     · Nosebleeds  Call 911 for any of the following:   · You have discomfort in your chest that feels like squeezing, pressure, fullness, or pain  · You become confused or have difficulty speaking  · You suddenly feel lightheaded or have trouble breathing  · You have pain or discomfort in your back, neck, jaw, stomach, or arm  Seek care immediately if:   · You have a severe headache or vision loss  · You have weakness in an arm or leg  Contact your healthcare provider if:   · You feel faint, dizzy, confused, or drowsy  · You have been taking your BP medicine and your BP is still higher than your healthcare provider says it should be  · You have questions or concerns about your condition or care  Treatment for chronic hypertension  may include medicine to lower your BP and lower your cholesterol level  A low cholesterol level helps prevent heart disease and makes it easier to control your blood pressure  Heart disease can make your blood pressure harder to control  You may also need to make lifestyle changes  Take your medicine exactly as directed  Manage chronic hypertension:  Talk with your healthcare provider about these and other ways to manage hypertension:  · Take your BP at home    Sit and rest for 5 minutes before you take your BP  Extend your arm and support it on a flat surface  Your arm should be at the same level as your heart  Follow the directions that came with your BP monitor  If possible, take at least 2 BP readings each time  Take your BP at least twice a day at the same times each day, such as morning and evening  Keep a record of your BP readings and bring it to your follow-up visits  Ask your healthcare provider what your blood pressure should be  · Limit sodium (salt) as directed  Too much sodium can affect your fluid balance  Check labels to find low-sodium or no-salt-added foods  Some low-sodium foods use potassium salts for flavor  Too much potassium can also cause health problems  Your healthcare provider will tell you how much sodium and potassium are safe for you to have in a day  He or she may recommend that you limit sodium to 2,300 mg a day  · Follow the meal plan recommended by your healthcare provider  A dietitian or your provider can give you more information on low-sodium plans or the DASH (Dietary Approaches to Stop Hypertension) eating plan  The DASH plan is low in sodium, unhealthy fats, and total fat  It is high in potassium, calcium, and fiber  · Exercise to maintain a healthy weight  Exercise at least 30 minutes per day, on most days of the week  This will help decrease your blood pressure  Ask about the best exercise plan for you  · Decrease stress  This may help lower your BP  Learn ways to relax, such as deep breathing or listening to music  · Limit alcohol  Women should limit alcohol to 1 drink a day  Men should limit alcohol to 2 drinks a day  A drink of alcohol is 12 ounces of beer, 5 ounces of wine, or 1½ ounces of liquor  · Do not smoke  Nicotine and other chemicals in cigarettes and cigars can increase your BP and also cause lung damage  Ask your healthcare provider for information if you currently smoke and need help to quit  E-cigarettes or smokeless tobacco still contain nicotine  Talk to your healthcare provider before you use these products  Follow up with your healthcare provider as directed: You will need to return to have your BP checked and to have other lab tests done  Write down your questions so you remember to ask them during your visits  © 2017 [employer] Information is for End User's use only and may not be sold, redistributed or otherwise used for commercial purposes  All illustrations and images included in CareNotes® are the copyrighted property of [employer] , [employer]  or Darren Beebe  The above information is an  only  It is not intended as medical advice for individual conditions or treatments  Talk to your doctor, nurse or pharmacist before following any medical regimen to see if it is safe and effective for you

## 2019-12-08 DIAGNOSIS — Z30.42 DEPOT CONTRACEPTION: ICD-10-CM

## 2019-12-09 RX ORDER — MEDROXYPROGESTERONE ACETATE 150 MG/ML
INJECTION, SUSPENSION INTRAMUSCULAR
Qty: 1 ML | Refills: 0 | Status: SHIPPED | OUTPATIENT
Start: 2019-12-09 | End: 2022-06-27

## 2019-12-10 DIAGNOSIS — F31.81 BIPOLAR 2 DISORDER, MAJOR DEPRESSIVE EPISODE (HCC): ICD-10-CM

## 2019-12-10 RX ORDER — BUPROPION HYDROCHLORIDE 100 MG/1
TABLET, EXTENDED RELEASE ORAL
Qty: 30 TABLET | Refills: 1 | OUTPATIENT
Start: 2019-12-10

## 2019-12-10 RX ORDER — MIRTAZAPINE 7.5 MG/1
7.5 TABLET, FILM COATED ORAL
Qty: 30 TABLET | Refills: 2 | Status: SHIPPED | OUTPATIENT
Start: 2019-12-10 | End: 2020-02-12 | Stop reason: SDUPTHER

## 2019-12-10 RX ORDER — MIRTAZAPINE 15 MG/1
TABLET, FILM COATED ORAL
Qty: 30 TABLET | Refills: 1 | OUTPATIENT
Start: 2019-12-10

## 2019-12-10 RX ORDER — BUSPIRONE HYDROCHLORIDE 5 MG/1
TABLET ORAL
Qty: 90 TABLET | Refills: 1 | OUTPATIENT
Start: 2019-12-10

## 2019-12-15 PROBLEM — I10 ESSENTIAL HYPERTENSION: Status: ACTIVE | Noted: 2019-12-15

## 2019-12-16 DIAGNOSIS — F31.81 BIPOLAR 2 DISORDER, MAJOR DEPRESSIVE EPISODE (HCC): ICD-10-CM

## 2019-12-16 RX ORDER — CLONAZEPAM 1 MG/1
1 TABLET ORAL 4 TIMES DAILY
Qty: 120 TABLET | Refills: 1 | Status: SHIPPED | OUTPATIENT
Start: 2019-12-16 | End: 2020-02-12 | Stop reason: SDUPTHER

## 2019-12-20 ENCOUNTER — OFFICE VISIT (OUTPATIENT)
Dept: OBGYN CLINIC | Facility: HOSPITAL | Age: 34
End: 2019-12-20
Payer: COMMERCIAL

## 2019-12-20 VITALS
DIASTOLIC BLOOD PRESSURE: 77 MMHG | HEART RATE: 75 BPM | HEIGHT: 65 IN | BODY MASS INDEX: 24.16 KG/M2 | SYSTOLIC BLOOD PRESSURE: 109 MMHG | WEIGHT: 145 LBS

## 2019-12-20 DIAGNOSIS — M65.4 DE QUERVAIN'S TENOSYNOVITIS, RIGHT: Primary | ICD-10-CM

## 2019-12-20 PROCEDURE — 99213 OFFICE O/P EST LOW 20 MIN: CPT | Performed by: ORTHOPAEDIC SURGERY

## 2019-12-20 NOTE — PROGRESS NOTES
Chief Complaint   Patient presents with    Right Hand - Follow-up         Assessment:  Right de Quervain's tenosynovitis    Plan:  Unfortunately, the conservative treatment is not helping this patient and her symptoms have persisted  Therefore, I feel that surgical release of the 1st dorsal compartment should be able to quiet this problem for good  I explained the operation of de Quervain's release to the patient and her , as well as its risks, the postop course and alternatives of continued conservative treatment which has been unsuccessful  She also has some vague symptoms of ulnar nerve symptoms possibly due to cubital tunnel syndrome- this will have to be investigated at a later time  I have scheduled this outpatient surgery under local anesthesia with sedation to be done on Thursday 01/09/2020 at [facility] [place]      HPI:  This 72-year-old white female was seen previously at our [facility] office on 06/13/2019 for right wrist pain  She was diagnosed with de Quervain's tenosynovitis and was treated with a splint and topical analgesics  She comes in now on  08/02/2019  Her symptoms a bother for over a year and she is not getting better  She never use the cream but has tried a thumb spica splint with minimal success  This is her dominant arm  She returns on 12/20/2019 for follow-up  At her last visit on 08/02/2019 1st dorsal compartment of the right wrist was injected with cortisone  She states that this did not improve her symptoms and may have even worsen them  She continues to note pain over the 1st dorsal compartment of right wrist and continues with aching symptoms the arm  She also briefly mentions having numbness and tingling to the 4th and 5th fingers which she believes could be related to positioning of the elbow      Past medical history, family history,  social history, medication history, and allergies are reviewed    Please see HPI for pertinent review of systems  All other systems reviewed are negative  She does have GERD, history of cholecystitis, colitis, hypertension, bipolar disorder, endometriosis, among others    Exam:   /77   Pulse 75   Ht 5' 4 57" (1 64 m)   Wt 65 8 kg (145 lb)   BMI 24 45 kg/m²   On today's exam of the right wrist, there was some swelling over the 1st dorsal compartment with no ecchymosis, redness or signs infection  The skin is warm, dry and well perfused  She was tender to palpation over this 1st dorsal compartment with a positive Finkelstein's  She was tender over the medial epicondyle with a negative Tinel's at the elbow today  She is having pain with all wrist motion  Sensation was intact to light touch as well as capillary refill in all fingers  Distal radial pulses intact  There is no antecubital adenopathy or cellulitis noted  Neck:  Supple, good extension  Lungs:  Clear to P and A, no wheezing or rales heard  Heart:  Regular rate and rhythm, no murmurs appreciated  Abdomen:  Soft, nontender, good bowel sounds         Studies Reviewed:  I personally reviewed right hand x-rays as well as the radiologist's report  There is no fracture, dislocation, or any degenerative changes  I see no soft tissue calcification           Scribe Attestation    I,:   Gema Ambrosio MA am acting as a scribe while in the presence of the attending physician :        I,:   Jagdeep Rahman MD personally performed the services described in this documentation    as scribed in my presence :

## 2019-12-20 NOTE — H&P (VIEW-ONLY)
Chief Complaint   Patient presents with    Right Hand - Follow-up         Assessment:  Right de Quervain's tenosynovitis    Plan:  Unfortunately, the conservative treatment is not helping this patient and her symptoms have persisted  Therefore, I feel that surgical release of the 1st dorsal compartment should be able to quiet this problem for good  I explained the operation of de Quervain's release to the patient and her , as well as its risks, the postop course and alternatives of continued conservative treatment which has been unsuccessful  She also has some vague symptoms of ulnar nerve symptoms possibly due to cubital tunnel syndrome- this will have to be investigated at a later time  I have scheduled this outpatient surgery under local anesthesia with sedation to be done on Thursday 01/09/2020 at [facility] [place]      HPI:  This 70-year-old white female was seen previously at our [facility] office on 06/13/2019 for right wrist pain  She was diagnosed with de Quervain's tenosynovitis and was treated with a splint and topical analgesics  She comes in now on  08/02/2019  Her symptoms a bother for over a year and she is not getting better  She never use the cream but has tried a thumb spica splint with minimal success  This is her dominant arm  She returns on 12/20/2019 for follow-up  At her last visit on 08/02/2019 1st dorsal compartment of the right wrist was injected with cortisone  She states that this did not improve her symptoms and may have even worsen them  She continues to note pain over the 1st dorsal compartment of right wrist and continues with aching symptoms the arm  She also briefly mentions having numbness and tingling to the 4th and 5th fingers which she believes could be related to positioning of the elbow      Past medical history, family history,  social history, medication history, and allergies are reviewed    Please see HPI for pertinent review of systems  All other systems reviewed are negative  She does have GERD, history of cholecystitis, colitis, hypertension, bipolar disorder, endometriosis, among others    Exam:   /77   Pulse 75   Ht 5' 4 57" (1 64 m)   Wt 65 8 kg (145 lb)   BMI 24 45 kg/m²   On today's exam of the right wrist, there was some swelling over the 1st dorsal compartment with no ecchymosis, redness or signs infection  The skin is warm, dry and well perfused  She was tender to palpation over this 1st dorsal compartment with a positive Finkelstein's  She was tender over the medial epicondyle with a negative Tinel's at the elbow today  She is having pain with all wrist motion  Sensation was intact to light touch as well as capillary refill in all fingers  Distal radial pulses intact  There is no antecubital adenopathy or cellulitis noted  Neck:  Supple, good extension  Lungs:  Clear to P and A, no wheezing or rales heard  Heart:  Regular rate and rhythm, no murmurs appreciated  Abdomen:  Soft, nontender, good bowel sounds         Studies Reviewed:  I personally reviewed right hand x-rays as well as the radiologist's report  There is no fracture, dislocation, or any degenerative changes  I see no soft tissue calcification           Scribe Attestation    I,:   Rishabh Salas MA am acting as a scribe while in the presence of the attending physician :        I,:   Kee Faustin MD personally performed the services described in this documentation    as scribed in my presence :

## 2019-12-20 NOTE — PATIENT INSTRUCTIONS
Plan:  Unfortunately, the conservative treatment is not helping this patient and her symptoms have persisted  Therefore, I feel that surgical release of the 1st dorsal compartment should be able to quiet this problem for good  I explained the operation of de Quervain's release to the patient and her , as well as its risks, the postop course and alternatives of continued conservative treatment which has been unsuccessful  She also has some vague symptoms of ulnar nerve symptoms possibly due to cubital tunnel syndrome- this will have to be investigated at a later time    I have scheduled this outpatient surgery under local anesthesia with [place]ation [place] [facility] [place]

## 2020-01-02 DIAGNOSIS — F31.81 BIPOLAR 2 DISORDER, MAJOR DEPRESSIVE EPISODE (HCC): ICD-10-CM

## 2020-01-03 ENCOUNTER — TELEPHONE (OUTPATIENT)
Dept: PSYCHIATRY | Facility: CLINIC | Age: 35
End: 2020-01-03

## 2020-01-05 DIAGNOSIS — F31.81 BIPOLAR 2 DISORDER, MAJOR DEPRESSIVE EPISODE (HCC): ICD-10-CM

## 2020-01-05 RX ORDER — QUETIAPINE FUMARATE 100 MG/1
100 TABLET, FILM COATED ORAL 3 TIMES DAILY
Qty: 90 TABLET | Refills: 2 | Status: SHIPPED | OUTPATIENT
Start: 2020-01-05 | End: 2020-02-12 | Stop reason: SDUPTHER

## 2020-01-05 RX ORDER — VENLAFAXINE HYDROCHLORIDE 75 MG/1
75 CAPSULE, EXTENDED RELEASE ORAL DAILY
Qty: 30 CAPSULE | Refills: 2 | Status: SHIPPED | OUTPATIENT
Start: 2020-01-05 | End: 2020-03-13

## 2020-01-05 RX ORDER — VENLAFAXINE HYDROCHLORIDE 150 MG/1
150 CAPSULE, EXTENDED RELEASE ORAL DAILY
Qty: 30 CAPSULE | Refills: 2 | Status: SHIPPED | OUTPATIENT
Start: 2020-01-05 | End: 2020-03-26

## 2020-01-05 RX ORDER — VENLAFAXINE HYDROCHLORIDE 150 MG/1
CAPSULE, EXTENDED RELEASE ORAL
Qty: 30 CAPSULE | Refills: 0 | OUTPATIENT
Start: 2020-01-05

## 2020-01-05 RX ORDER — BUSPIRONE HYDROCHLORIDE 10 MG/1
10 TABLET ORAL 3 TIMES DAILY
Qty: 90 TABLET | Refills: 2 | Status: SHIPPED | OUTPATIENT
Start: 2020-01-05 | End: 2020-01-27 | Stop reason: HOSPADM

## 2020-01-05 RX ORDER — BUSPIRONE HYDROCHLORIDE 10 MG/1
TABLET ORAL
Qty: 90 TABLET | Refills: 0 | OUTPATIENT
Start: 2020-01-05

## 2020-01-08 ENCOUNTER — ANESTHESIA EVENT (OUTPATIENT)
Dept: PERIOP | Facility: HOSPITAL | Age: 35
End: 2020-01-08
Payer: COMMERCIAL

## 2020-01-08 NOTE — PRE-PROCEDURE INSTRUCTIONS
Pre-Surgery Instructions:   Medication Instructions    amLODIPine (NORVASC) 2 5 mg tablet Instructed patient per Anesthesia Guidelines   busPIRone (BUSPAR) 10 mg tablet Instructed patient per Anesthesia Guidelines   clonazePAM (KlonoPIN) 1 mg tablet Instructed patient per Anesthesia Guidelines   metoprolol succinate (TOPROL-XL) 25 mg 24 hr tablet Instructed patient per Anesthesia Guidelines   QUEtiapine (SEROquel) 100 mg tablet Instructed patient per Anesthesia Guidelines   venlafaxine (EFFEXOR-XR) 150 mg 24 hr capsule Instructed patient per Anesthesia Guidelines   venlafaxine (EFFEXOR-XR) 75 mg 24 hr capsule Instructed patient per Anesthesia Guidelines  Pre-op Showering Instructions for Surgery Patients    Before your operation, you play an important role in decreasing your risk for infection by washing with special antiseptic soap  This is an effective way to reduce bacteria on the skin which may help to prevent infections at the surgical site  Please read the following directions in advance  1  In the week before your operation, purchase a 4 ounce bottle of antiseptic soap containing chlorhexidine gluconate (CHG)  4%  Some brand names include: Aplicare®, Endure, and Hibiclens®  The cost is usually less than $5 00   For your convenience, the [employer] carries the soap   It may also be available at your doctors office or pre-admission testing center, and at most retail pharmacies   If you are allergic or sensitive to soaps containing CHG, please let your doctor know so another antiseptic can be suggested   CHG antiseptic soap is for external use only  2   The day before your operation, follow these instructions carefully to get ready   Please clean linens (sheets) on your bed; you should sleep on clean sheets after your evening shower   Get clean towels and washcloth ready - you need enough for 2 showers   Set aside clean underwear, pajamas, and clothing to wear after the showers     Reminders:   DO NOT use any other soap or body rinse on your skin during or after the antiseptic showers   DO NOT use lotion, powder, deodorant, or perfume/aftershave of any kind on your skin after your antiseptic shower   DO NOT shave any body parts in the 24 hours/day before your operation   DO NOT get the antiseptic soap in your eyes, ears, nose, mouth, or vaginal area    3  You will need to shower the night before AND the morning of your surgery  Shower 1:   The first evening before the operation, take the first shower   First, shampoo your hair with regular shampoo and rinse it completely before you use the antiseptic soap  After washing and rinsing your hair, rinse your body   Next, use a clean washcloth to apply the antiseptic soap and wash your body from the neck down to your toes using ½ bottle of the antiseptic soap   You will use the other ½ bottle for the second shower   Clean the area where your incision will be; lather this area well for about 2 minutes   If you are having head or neck surgery, wash areas with the antiseptic soap   Rinse yourself completely with running water   Use a clean towel to dry off   Wear clean underwear and clothing/pajamas  Shower 2   The morning of your operation, take the second shower following the same steps as Shower 1 using the second ½ of the bottle of antiseptic soap   Use clean cloths and towels to wash and dry yourself   Wear clean underwear and clothing

## 2020-01-08 NOTE — ANESTHESIA PREPROCEDURE EVALUATION
Review of Systems/Medical History  Patient summary reviewed  Chart reviewed  No history of anesthetic complications     Cardiovascular  Exercise tolerance (METS): >4,  Hypertension controlled,    Pulmonary  Smoker ex-smoker  , No COPD , No asthma , No sleep apnea ,        GI/Hepatic    GERD well controlled,        Negative  ROS        Endo/Other  Negative endo/other ROS      GYN  Not currently pregnant , Prior pregnancy/OB history : 1 Parity: 1,          Hematology  Negative hematology ROS      Musculoskeletal  Scoliosis ,        Neurology  Negative neurology ROS      Psychology   Psychiatric history, Anxiety, Depression , bipolar disorder, depressed and being treated for depression,              Physical Exam    Airway    Mallampati score: II  TM Distance: >3 FB  Neck ROM: full     Dental       Cardiovascular  Cardiovascular exam normal    Pulmonary  Pulmonary exam normal     Other Findings  Loose upper central incisors  Easily intubated per old chart  Arched palate      Anesthesia Plan  ASA Score- 2     Anesthesia Type- IV sedation with anesthesia with ASA Monitors  Additional Monitors:   Airway Plan:     Comment: Old charts reviewed  Plan Factors-Patient not instructed to abstain from smoking on day of procedure  Patient did not smoke on day of surgery  Induction- intravenous  Postoperative Plan- Plan for postoperative opioid use  Informed Consent- Anesthetic plan and risks discussed with patient and spouse  I personally reviewed this patient with the CRNA  Discussed and agreed on the Anesthesia Plan with the GINO Salinasole

## 2020-01-09 ENCOUNTER — HOSPITAL ENCOUNTER (OUTPATIENT)
Facility: HOSPITAL | Age: 35
Setting detail: OUTPATIENT SURGERY
Discharge: HOME | End: 2020-01-09
Attending: ORTHOPAEDIC SURGERY | Admitting: ORTHOPAEDIC SURGERY
Payer: COMMERCIAL

## 2020-01-09 ENCOUNTER — ANESTHESIA (OUTPATIENT)
Dept: PERIOP | Facility: HOSPITAL | Age: 35
End: 2020-01-09
Payer: COMMERCIAL

## 2020-01-09 VITALS
HEART RATE: 64 BPM | OXYGEN SATURATION: 97 % | SYSTOLIC BLOOD PRESSURE: 109 MMHG | RESPIRATION RATE: 14 BRPM | DIASTOLIC BLOOD PRESSURE: 61 MMHG | TEMPERATURE: 98.1 F

## 2020-01-09 DIAGNOSIS — M65.4 DE QUERVAIN'S TENOSYNOVITIS, RIGHT: Primary | ICD-10-CM

## 2020-01-09 LAB
EXT PREGNANCY TEST URINE: NEGATIVE
EXT. CONTROL: NORMAL

## 2020-01-09 PROCEDURE — 81025 URINE PREGNANCY TEST: CPT | Performed by: ORTHOPAEDIC SURGERY

## 2020-01-09 PROCEDURE — 25000 INCISION OF TENDON SHEATH: CPT | Performed by: PHYSICIAN ASSISTANT

## 2020-01-09 PROCEDURE — 25000 INCISION OF TENDON SHEATH: CPT | Performed by: ORTHOPAEDIC SURGERY

## 2020-01-09 RX ORDER — MIDAZOLAM HYDROCHLORIDE 2 MG/2ML
INJECTION, SOLUTION INTRAMUSCULAR; INTRAVENOUS AS NEEDED
Status: DISCONTINUED | OUTPATIENT
Start: 2020-01-09 | End: 2020-01-09 | Stop reason: SURG

## 2020-01-09 RX ORDER — MAGNESIUM HYDROXIDE 1200 MG/15ML
LIQUID ORAL AS NEEDED
Status: DISCONTINUED | OUTPATIENT
Start: 2020-01-09 | End: 2020-01-09 | Stop reason: HOSPADM

## 2020-01-09 RX ORDER — PROPOFOL 10 MG/ML
INJECTION, EMULSION INTRAVENOUS CONTINUOUS PRN
Status: DISCONTINUED | OUTPATIENT
Start: 2020-01-09 | End: 2020-01-09 | Stop reason: SURG

## 2020-01-09 RX ORDER — FENTANYL CITRATE 50 UG/ML
INJECTION, SOLUTION INTRAMUSCULAR; INTRAVENOUS AS NEEDED
Status: DISCONTINUED | OUTPATIENT
Start: 2020-01-09 | End: 2020-01-09 | Stop reason: SURG

## 2020-01-09 RX ORDER — FENTANYL CITRATE/PF 50 MCG/ML
25 SYRINGE (ML) INJECTION
Status: DISCONTINUED | OUTPATIENT
Start: 2020-01-09 | End: 2020-01-09 | Stop reason: HOSPADM

## 2020-01-09 RX ORDER — LIDOCAINE HYDROCHLORIDE 10 MG/ML
INJECTION, SOLUTION EPIDURAL; INFILTRATION; INTRACAUDAL; PERINEURAL AS NEEDED
Status: DISCONTINUED | OUTPATIENT
Start: 2020-01-09 | End: 2020-01-09 | Stop reason: SURG

## 2020-01-09 RX ORDER — SODIUM CHLORIDE, SODIUM LACTATE, POTASSIUM CHLORIDE, CALCIUM CHLORIDE 600; 310; 30; 20 MG/100ML; MG/100ML; MG/100ML; MG/100ML
75 INJECTION, SOLUTION INTRAVENOUS CONTINUOUS
Status: DISCONTINUED | OUTPATIENT
Start: 2020-01-09 | End: 2020-01-09 | Stop reason: HOSPADM

## 2020-01-09 RX ORDER — DEXAMETHASONE SODIUM PHOSPHATE 4 MG/ML
4 INJECTION, SOLUTION INTRA-ARTICULAR; INTRALESIONAL; INTRAMUSCULAR; INTRAVENOUS; SOFT TISSUE ONCE AS NEEDED
Status: DISCONTINUED | OUTPATIENT
Start: 2020-01-09 | End: 2020-01-09 | Stop reason: HOSPADM

## 2020-01-09 RX ORDER — DIPHENHYDRAMINE HYDROCHLORIDE 50 MG/ML
12.5 INJECTION INTRAMUSCULAR; INTRAVENOUS ONCE AS NEEDED
Status: DISCONTINUED | OUTPATIENT
Start: 2020-01-09 | End: 2020-01-09 | Stop reason: HOSPADM

## 2020-01-09 RX ORDER — ACETAMINOPHEN AND CODEINE PHOSPHATE 60; 300 MG/1; MG/1
1 TABLET ORAL EVERY 4 HOURS PRN
Qty: 10 TABLET | Refills: 0 | Status: SHIPPED | OUTPATIENT
Start: 2020-01-09 | End: 2020-01-27 | Stop reason: HOSPADM

## 2020-01-09 RX ORDER — ROPIVACAINE HYDROCHLORIDE 2 MG/ML
INJECTION, SOLUTION EPIDURAL; INFILTRATION; PERINEURAL AS NEEDED
Status: DISCONTINUED | OUTPATIENT
Start: 2020-01-09 | End: 2020-01-09 | Stop reason: HOSPADM

## 2020-01-09 RX ORDER — MEPERIDINE HYDROCHLORIDE 25 MG/ML
12.5 INJECTION INTRAMUSCULAR; INTRAVENOUS; SUBCUTANEOUS
Status: DISCONTINUED | OUTPATIENT
Start: 2020-01-09 | End: 2020-01-09 | Stop reason: HOSPADM

## 2020-01-09 RX ORDER — PROMETHAZINE HYDROCHLORIDE 25 MG/ML
12.5 INJECTION, SOLUTION INTRAMUSCULAR; INTRAVENOUS ONCE AS NEEDED
Status: DISCONTINUED | OUTPATIENT
Start: 2020-01-09 | End: 2020-01-09 | Stop reason: HOSPADM

## 2020-01-09 RX ORDER — TRAMADOL HYDROCHLORIDE 50 MG/1
50 TABLET ORAL EVERY 6 HOURS PRN
Status: DISCONTINUED | OUTPATIENT
Start: 2020-01-09 | End: 2020-01-09 | Stop reason: HOSPADM

## 2020-01-09 RX ADMIN — SODIUM CHLORIDE, SODIUM LACTATE, POTASSIUM CHLORIDE, AND CALCIUM CHLORIDE: .6; .31; .03; .02 INJECTION, SOLUTION INTRAVENOUS at 07:30

## 2020-01-09 RX ADMIN — LIDOCAINE HYDROCHLORIDE 50 MG: 10 INJECTION, SOLUTION EPIDURAL; INFILTRATION; INTRACAUDAL; PERINEURAL at 07:38

## 2020-01-09 RX ADMIN — FENTANYL CITRATE 100 MCG: 50 INJECTION INTRAMUSCULAR; INTRAVENOUS at 07:35

## 2020-01-09 RX ADMIN — FENTANYL CITRATE 25 MCG: 50 INJECTION, SOLUTION INTRAMUSCULAR; INTRAVENOUS at 08:24

## 2020-01-09 RX ADMIN — FENTANYL CITRATE 25 MCG: 50 INJECTION, SOLUTION INTRAMUSCULAR; INTRAVENOUS at 08:29

## 2020-01-09 RX ADMIN — MIDAZOLAM HYDROCHLORIDE 2 MG: 1 INJECTION, SOLUTION INTRAMUSCULAR; INTRAVENOUS at 07:35

## 2020-01-09 RX ADMIN — TRAMADOL HYDROCHLORIDE 50 MG: 50 TABLET, FILM COATED ORAL at 09:25

## 2020-01-09 RX ADMIN — PROPOFOL 100 MCG/KG/MIN: 10 INJECTION, EMULSION INTRAVENOUS at 07:38

## 2020-01-09 NOTE — NURSING NOTE
Pt returned to [facility] awake,alert,IV infusing, S/O at bedside, no complaints other than mild surgical discomfort,not requiring medication at present, taking po

## 2020-01-09 NOTE — OP NOTE
OPERATIVE REPORT  PATIENT NAME: Lizzie Schultz    :  1985  MRN: [id number]  Pt Location:  OR ROOM 12    SURGERY DATE: 2020    Surgeon(s) and Role:     * Kasandra Hooks MD - Primary     * Naya Chaves PA-C - Assisting    Preop Diagnosis:  De Quervain's tenosynovitis, right [M65 4]    Post-Op Diagnosis Codes:     * De Quervain's tenosynovitis, right [M65 4]    Procedure(s) (LRB):  RELEASE DEQUERVAINS RIGHT WRIST (Right)    Specimen(s):  * No specimens in log *    Estimated Blood Loss:   Minimal    Drains:  * No LDAs found *    Anesthesia Type:   IV Sedation with Anesthesia    Operative Indications:  De Quervain's tenosynovitis, right [M65 4]  Pain, swelling, limited thumb motion    Operative Findings:  Marked thickening 1st dorsal compartment tendon sheath over radial styloid    Complications:   None    Procedure and Technique:  After satisfactory IV sedation was induced, the right hand, wrist, and arm were scrubbed with 3% PCMX and painted with ChloraPrep and draped in a sterile manner  The proposed incision of approximately 3-4 cm  was marked obliquely over the tip of the radial styloid of the wrist   This area was infiltrated with 0 2% Naropin  The limb was then exsanguinated and the pneumatic tourniquet was inflated to 250 mmHg  Then a skin only incision was made to avoid injury to the radial sensory nerve  Longitudinal dissection in the direction of the tendon was done with Metzenbaum scissors and the radial sensory nerve was retracted superiorly  Soft retractors were placed around the subcutaneous tissue and the 1st dorsal compartment and radial styloid came into view  A 64 [place] blade was used to incise the tendon sheath aiming to be slightly superior, rather than inferior  It should be noted that the sheath itself was quite thickened over the radial styloid  The sheath was then incised proximally and  distally for a short distance with Metzenbaum scissors    Both the abductor pollicis longus and extensor pollicis brevis tendons were identified in this sheath and lifted out to make sure there was no further impingement  Once the tendons were totally free, the tourniquet was then deflated  The wound was copiously irrigated with saline solution  Bleeding vessels were clamped and coagulated as necessary  Subcutaneous tissue was closed with 3-0 Vicryl and the skin was closed with a running subcuticular suture of 4- 0 Monocryl reinforced with half-inch Steri-Strips  Adaptic gauze, 4x4s, Kerlex gauze,and Ace wrap were applied and the procedure was terminated  The patient tolerated the procedure well and was transferred to the recovery room in satisfactory stable condition       A physician assistant was required during the procedure for retraction tissue handling,dissection and suturing    Patient Disposition:  PACU     SIGNATURE: Michelle Martinez MD  DATE: January 9, 2020  TIME: 8:17 AM

## 2020-01-09 NOTE — DISCHARGE INSTR - AVS FIRST PAGE
Dr J Luis Garcia   for Patients   following   DeQuervains Release      Apply ice to the wrist using a large trash bag or large bag of frozen peas for 15 minutes, 4 times a day, for 2-3 days may help with pain or swelling  Remove the postoperative dressing on the fifth day after your surgery  Place band-aids over the incision and wrap gently with the ace bandage provided  Sponge baths only for the first seven days and shower carefully thereafter  Pat the wounds dry  DO NOT bathe, soak the wounds or swim until seen in the office  Trash bags leak- do not use them over your wounds  Elevate the arm for the next 4-5 days- rest your elbow on bed or the arm of a chair and put hand above heart; do not let your arm hang down  No lifting or driving for the first week after your surgery  Continue with the Tylenol #4 that your taking already or two Advil or one Aleve] every 4-6 hours, if needed, for pain  [facility]numbers above[facility] to make a post-operative appointment, unless one has been made for you already

## 2020-01-09 NOTE — INTERVAL H&P NOTE
H&P reviewed  After examining the patient I find no changes in the patients condition since the H&P had been written      Vitals:    01/09/20 0605   BP: 142/67   Pulse: 69   Resp: 20   Temp: 98 7 °F (37 1 °C)   SpO2: 97%

## 2020-01-27 ENCOUNTER — OFFICE VISIT (OUTPATIENT)
Dept: OBGYN CLINIC | Facility: OTHER | Age: 35
End: 2020-01-27

## 2020-01-27 VITALS
SYSTOLIC BLOOD PRESSURE: 124 MMHG | HEART RATE: 63 BPM | DIASTOLIC BLOOD PRESSURE: 84 MMHG | WEIGHT: 149.6 LBS | BODY MASS INDEX: 25.23 KG/M2

## 2020-01-27 DIAGNOSIS — M65.4 DE QUERVAIN'S TENOSYNOVITIS, RIGHT: Primary | ICD-10-CM

## 2020-01-27 PROCEDURE — 99024 POSTOP FOLLOW-UP VISIT: CPT | Performed by: ORTHOPAEDIC SURGERY

## 2020-01-27 RX ORDER — METOPROLOL SUCCINATE 25 MG/1
TABLET, EXTENDED RELEASE ORAL
Refills: 2 | COMMUNITY
Start: 2019-11-13 | End: 2020-01-27 | Stop reason: HOSPADM

## 2020-01-27 RX ORDER — ACETAMINOPHEN AND CODEINE PHOSPHATE 300; 30 MG/1; MG/1
1 TABLET ORAL AS NEEDED
COMMUNITY
Start: 2020-01-15 | End: 2021-01-14 | Stop reason: SDUPTHER

## 2020-01-27 RX ORDER — OMEPRAZOLE 40 MG/1
CAPSULE, DELAYED RELEASE ORAL
COMMUNITY
Start: 2019-12-19 | End: 2020-04-13 | Stop reason: ALTCHOICE

## 2020-01-27 RX ORDER — ONDANSETRON 8 MG/1
8 TABLET, ORALLY DISINTEGRATING ORAL AS NEEDED
COMMUNITY
Start: 2020-01-08 | End: 2021-03-02 | Stop reason: SDUPTHER

## 2020-01-27 RX ORDER — MIRTAZAPINE 15 MG/1
15 TABLET, FILM COATED ORAL
Refills: 1 | COMMUNITY
Start: 2019-11-15 | End: 2020-01-27 | Stop reason: HOSPADM

## 2020-01-27 RX ORDER — MIRTAZAPINE 30 MG/1
TABLET, FILM COATED ORAL
Refills: 3 | COMMUNITY
Start: 2019-12-10 | End: 2020-01-27 | Stop reason: HOSPADM

## 2020-01-27 RX ORDER — DIPHENOXYLATE HYDROCHLORIDE AND ATROPINE SULFATE 2.5; .025 MG/1; MG/1
TABLET ORAL
COMMUNITY
Start: 2020-01-15 | End: 2021-08-17

## 2020-01-27 RX ORDER — BUPROPION HYDROCHLORIDE 100 MG/1
100 TABLET, EXTENDED RELEASE ORAL DAILY
Refills: 1 | COMMUNITY
Start: 2019-11-15 | End: 2020-01-27 | Stop reason: HOSPADM

## 2020-01-27 RX ORDER — BUSPIRONE HYDROCHLORIDE 5 MG/1
5 TABLET ORAL 3 TIMES DAILY
Refills: 1 | COMMUNITY
Start: 2019-11-15 | End: 2020-02-12 | Stop reason: SDUPTHER

## 2020-01-27 NOTE — PROGRESS NOTES
Chief Complaint   Patient presents with    Right Wrist - Pain         Assessment:  Right de Quervain's tenosynovitis - status post release 01/09/2020                            Rule out right cubital tunnel syndrome    Plan:   I am happy with her early results from the  De Quervain's tenosynovitis release  I now want her  to wash her hand in warm soapy water for 5 minutes 4 times a day for the next 2 weeks  She should move her  fingers in the water to encourage motion  I then want her to rinse the soap off thoroughly, dry the skin, and then rub cocoa butter or  vitamin E cream firmly into the scar for one minute 4 times a day for the next month to soften the scar  I gave her putty to use on dry land to try to encourage motion  I would like her to work this putty for 1 minute  every hour while awake, as much as she can  She can use the hand for normal activities, but I want her  to hold off on heavy lifting or strenuous activities yet  I will see her back again in  5 weeks for routine follow-up    HPI:  This 61-year-old white female was seen previously at our [facility] office on 06/13/2019 for right wrist pain  She was diagnosed with de Quervain's tenosynovitis and was treated with a splint and topical analgesics  She comes in now on  08/02/2019  Her symptoms a bother for over a year and she is not getting better  She never use the cream but has tried a thumb spica splint with minimal success  This is her dominant arm  She returns on 12/20/2019 for follow-up  At her last visit on 08/02/2019 1st dorsal compartment of the right wrist was injected with cortisone  She states that this did not improve her symptoms and may have even worsen them  She continues to note pain over the 1st dorsal compartment of right wrist and continues with aching symptoms the arm    She also briefly mentions having numbness and tingling to the 4th and 5th fingers which she believes could be related to positioning of the elbow  Because of persistent symptoms I  took her to the operating room at [facility] on 01/09/2020 and performed uneventful de Quervain's release of the 1st dorsal compartment of her right wrist under local anesthesia     She returns for her  1st postop visit now on 01/27/2020  Her pain is markedly diminished  She has no numbness or tingling going into her thumb  She is still bothered by ulnar nerve symptoms behind her elbow which will have to be investigated and treated more aggressively in the future, once this incision heals  Past medical history, family history,  social history, medication history, and allergies are reviewed  Please see HPI for pertinent review of systems  All other systems reviewed are negative  She does have GERD, history of cholecystitis, colitis, hypertension, bipolar disorder, endometriosis, among others    Exam:   /84 (BP Location: Right arm, Patient Position: Sitting, Cuff Size: Standard)   Pulse 63   Wt 67 9 kg (149 lb 9 6 oz)   BMI 25 23 kg/m²    I removed the sutures from her right wrist  and the incision is clean and healing  There is no drainage or discharge  She has some swelling in the area from bleeding as noted postop  She has a negative Finkelstein's test with normal sensation light touch in the thumb and the other 4 fingers  Radial pulse was normal   She had good elbow flexion extension  There was no cellulitis in hand or forearm    Studies Reviewed:  I personally reviewed right hand x-rays as well as the radiologist's report  There is no fracture, dislocation, or any degenerative changes  I see no soft tissue calcification           Scribe Attestation    I,:    am acting as a scribe while in the presence of the attending physician :        I,:    personally performed the services described in this documentation    as scribed in my presence :

## 2020-01-27 NOTE — PATIENT INSTRUCTIONS
Plan:   I am happy with her early results from the  De Quervain's tenosynovitis release  I now want her  to wash her hand in warm soapy water for 5 minutes 4 times a day for the next 2 weeks  She should move her  fingers in the water to encourage motion  I then want her to rinse the soap off thoroughly, dry the skin, and then rub cocoa butter or  vitamin E cream firmly into the scar for one minute 4 times a day for the next month to soften the scar  I gave her putty to use on dry land to try to encourage motion  I would like her to work this putty for 1 minute  every hour while awake, as much as she can  She can use the hand for normal activities, but I want her  to hold off on heavy lifting or strenuous activities yet   I will see her back again in  5 weeks for routine follow-up

## 2020-02-03 ENCOUNTER — TELEPHONE (OUTPATIENT)
Dept: OTHER | Facility: OTHER | Age: 35
End: 2020-02-03

## 2020-02-12 ENCOUNTER — OFFICE VISIT (OUTPATIENT)
Dept: PSYCHIATRY | Facility: CLINIC | Age: 35
End: 2020-02-12
Payer: COMMERCIAL

## 2020-02-12 DIAGNOSIS — F41.1 GENERALIZED ANXIETY DISORDER: Primary | ICD-10-CM

## 2020-02-12 DIAGNOSIS — F31.81 BIPOLAR 2 DISORDER, MAJOR DEPRESSIVE EPISODE (HCC): ICD-10-CM

## 2020-02-12 PROCEDURE — 99214 OFFICE O/P EST MOD 30 MIN: CPT | Performed by: NURSE PRACTITIONER

## 2020-02-12 PROCEDURE — 1036F TOBACCO NON-USER: CPT | Performed by: NURSE PRACTITIONER

## 2020-02-12 PROCEDURE — 3074F SYST BP LT 130 MM HG: CPT | Performed by: NURSE PRACTITIONER

## 2020-02-12 PROCEDURE — 3079F DIAST BP 80-89 MM HG: CPT | Performed by: NURSE PRACTITIONER

## 2020-02-12 RX ORDER — BUSPIRONE HYDROCHLORIDE 10 MG/1
10 TABLET ORAL 3 TIMES DAILY
Qty: 90 TABLET | Refills: 2 | Status: SHIPPED | OUTPATIENT
Start: 2020-02-12 | End: 2020-04-24

## 2020-02-12 RX ORDER — MIRTAZAPINE 15 MG/1
15 TABLET, FILM COATED ORAL
Qty: 30 TABLET | Refills: 2 | Status: SHIPPED | OUTPATIENT
Start: 2020-02-12 | End: 2020-04-24

## 2020-02-12 RX ORDER — QUETIAPINE FUMARATE 100 MG/1
TABLET, FILM COATED ORAL
Qty: 120 TABLET | Refills: 2 | Status: SHIPPED | OUTPATIENT
Start: 2020-02-12 | End: 2020-04-07 | Stop reason: SDUPTHER

## 2020-02-12 RX ORDER — CLONAZEPAM 1 MG/1
1 TABLET ORAL 4 TIMES DAILY
Qty: 120 TABLET | Refills: 2 | Status: SHIPPED | OUTPATIENT
Start: 2020-02-12 | End: 2020-04-24 | Stop reason: SDUPTHER

## 2020-02-12 NOTE — PSYCH
PROGRESS NOTE        [facility]      Name and Date of Birth:  José Antonio Nelson 30 y o  1985    Date of Visit: 02/12/20    SUBJECTIVE:    [facility] presents today for treatment of major depressive disorder, mood disorder and generalized anxiety disorder  She is still having a lot of anxiety and most of it is presenting in gastrointestinal upset  I do note that she is belching less than she had been on her last visit  Her gastroenterologist also noted this with our adjustments in additions of medications to help reduce her anxiety  So we will move forward with increasing BuSpar to 10 mg 3 times a day and the Remeron will be given 15 mg HS  I know she was up to over 30 mg at 1 time but I did bring the dose down to 7 5 mg but I think may be 15 mg Remeron would help her complaints of gastro intestinal upset  We shall see  Also, I did [facility] with her [facility]  She is interested in the concept but she does not know she would be able to commit herself to the daily treatments  For now, we will see how the increases of medication work to help reduce her anxiety and also over the improves her physical health and if not, will discuss TMS further  She denies suicidal ideation, intent or plan at present, has no suicidal ideation, intent or plan at present  She denies any auditory hallucinations and visual hallucinations, denies any other delusional thinking, denies any delusional thinking  She denies any side effects from medications      HPI ROS Appetite Changes and Sleep: normal appetite, normal sleep    Review Of Systems:      Constitutional Negative   ENT Negative   Cardiovascular Negative   Respiratory As Noted in HPI   Gastrointestinal Negative   Genitourinary Negative   Musculoskeletal Negative   Integumentary Negative   Neurological Negative   Endocrine Negative   Other Symptoms Negative and None       Laboratory Results: No results found for this or any previous visit      Substance Abuse History:    Social History     Substance and Sexual Activity   Drug Use No       Family Psychiatric History:     Family History   Problem Relation Age of Onset    Hypertension Father     Depression Mother        The following portions of the patient's history were reviewed and updated as appropriate: past family history, past medical history, past social history, past surgical history and problem list     Social History     Socioeconomic History    Marital status: /Civil Union     Spouse name: Not on file    Number of children: Not on file    Years of education: Not on file    Highest education level: Not on file   Occupational History    Not on file   Social Needs    Financial resource strain: Not on file    Food insecurity:     Worry: Not on file     Inability: Not on file    Transportation needs:     Medical: Not on file     Non-medical: Not on file   Tobacco Use    Smoking status: Former Smoker     Types: Cigarettes     Last attempt to quit: 5/27/2016     Years since quitting: 3 7    Smokeless tobacco: Never Used   Substance and Sexual Activity    Alcohol use: Yes     Comment: rare    Drug use: No    Sexual activity: Yes     Partners: Male     Birth control/protection: Injection     Comment: on depo    Lifestyle    Physical activity:     Days per week: Not on file     Minutes per session: Not on file    Stress: Not on file   Relationships    Social connections:     Talks on phone: Not on file     Gets together: Not on file     Attends Spiritism service: Not on file     Active member of club or organization: Not on file     Attends meetings of clubs or organizations: Not on file     Relationship status: Not on file    Intimate partner violence:     Fear of current or ex partner: Not on file     Emotionally abused: Not on file     Physically abused: Not on file     Forced sexual activity: Not on file   Other Topics Concern    Not on file Social History Narrative    Daily caffeine consumption, 1 serving a day    Exercises regularly     Social History     Social History Narrative    Daily caffeine consumption, 1 serving a day    Exercises regularly        Social History     Tobacco History     Smoking Status  Former Smoker Quit date  5/27/2016 Smoking Tobacco Type  Cigarettes    Smokeless Tobacco Use  Never Used          Alcohol History     Alcohol Use Status  Yes Comment  rare          Drug Use     Drug Use Status  No          Sexual Activity     Sexually Active  Yes Partners  Male Birth Control/Protection  Injection Comment  on depo           Activities of Daily Living    Not Asked                     OBJECTIVE:     Mental Status Evaluation:    Appearance Disheveled   Behavior pleasant, cooperative   Speech normal volume, normal pitch   Mood Slightly less anxious   Affect Constricted   Thought Processes Rumoinates and worries   Associations intact associations   Thought Content Normal   Perceptual Disturbances: None   Abnormal Thoughts  Risk Potential Suicidal ideation - None  Homicidal ideation - None  Potential for aggression - No   Orientation oriented to person, place, time/date and situation   Memory recent and remote memory grossly intact   Cosciousness alert and awake   Attention Span Fair   Intellect Appears to be of Average Intelligence   Insight Fair   Judgement Fair   Muscle Strength and  Gait muscle strength and tone were normal   Language no difficulty naming common objects   Fund of Knowledge displays adequate knowledge of current events   Pain None   Pain Scale 0       Assessment/Plan:       Diagnoses and all orders for this visit:    Generalized anxiety disorder  -     busPIRone (BUSPAR) 10 mg tablet; Take 1 tablet (10 mg total) by mouth 3 (three) times a day    Bipolar 2 disorder, major depressive episode (HCC)  -     mirtazapine (REMERON) 15 mg tablet;  Take 1 tablet (15 mg total) by mouth daily at bedtime  -     QUEtiapine (SEROquel) 100 mg tablet; 1/2 at 1PM, 1 at 5PM and 2 HS  -     clonazePAM (KlonoPIN) 1 mg tablet; Take 1 tablet (1 mg total) by mouth 4 (four) times a day          Treatment Recommendations/Precautions:    Continue current medications:  Increase BuSpar 10 mg t i d   Klonopin 1 mg q i d  Increase Remeron 15 mg HS  Continue Effexor  mg daily  Increase Seroquel 50 mg at 1:00 p m , 100 mg at 5:00 p m  And 200 mg HS  Follow-up in 8 weeks    Risks/Benefits      Risks, Benefits And Possible Side Effects Of Medications:    Risks, benefits, and possible side effects of medications explained to patient and patient verbalizes understanding and agreement for treatment  Controlled Medication Discussion:  No history of any abuse or overuse of controlled substances      Not applicable    Psychotherapy Provided:     Individual psychotherapy provided: No

## 2020-03-02 DIAGNOSIS — F31.81 BIPOLAR 2 DISORDER, MAJOR DEPRESSIVE EPISODE (HCC): ICD-10-CM

## 2020-03-03 RX ORDER — MIRTAZAPINE 7.5 MG/1
TABLET, FILM COATED ORAL
Qty: 30 TABLET | Refills: 2 | OUTPATIENT
Start: 2020-03-03

## 2020-03-13 DIAGNOSIS — F31.81 BIPOLAR 2 DISORDER, MAJOR DEPRESSIVE EPISODE (HCC): ICD-10-CM

## 2020-03-13 RX ORDER — VENLAFAXINE HYDROCHLORIDE 75 MG/1
CAPSULE, EXTENDED RELEASE ORAL
Qty: 30 CAPSULE | Refills: 2 | Status: SHIPPED | OUTPATIENT
Start: 2020-03-13 | End: 2020-04-24

## 2020-03-17 DIAGNOSIS — I10 ESSENTIAL HYPERTENSION: ICD-10-CM

## 2020-03-17 RX ORDER — AMLODIPINE BESYLATE 2.5 MG/1
2.5 TABLET ORAL DAILY
Qty: 30 TABLET | Refills: 0 | Status: SHIPPED | OUTPATIENT
Start: 2020-03-17 | End: 2020-04-13 | Stop reason: SDUPTHER

## 2020-03-17 RX ORDER — METOPROLOL SUCCINATE 25 MG/1
25 TABLET, EXTENDED RELEASE ORAL DAILY
Qty: 30 TABLET | Refills: 0 | Status: SHIPPED | OUTPATIENT
Start: 2020-03-17 | End: 2020-12-22 | Stop reason: SDUPTHER

## 2020-03-26 DIAGNOSIS — F31.81 BIPOLAR 2 DISORDER, MAJOR DEPRESSIVE EPISODE (HCC): ICD-10-CM

## 2020-03-26 RX ORDER — VENLAFAXINE HYDROCHLORIDE 150 MG/1
CAPSULE, EXTENDED RELEASE ORAL
Qty: 30 CAPSULE | Refills: 2 | Status: SHIPPED | OUTPATIENT
Start: 2020-03-26 | End: 2020-03-30 | Stop reason: SDUPTHER

## 2020-03-30 DIAGNOSIS — F31.81 BIPOLAR 2 DISORDER, MAJOR DEPRESSIVE EPISODE (HCC): ICD-10-CM

## 2020-03-30 RX ORDER — VENLAFAXINE HYDROCHLORIDE 150 MG/1
CAPSULE, EXTENDED RELEASE ORAL
Qty: 30 CAPSULE | Refills: 2 | Status: SHIPPED | OUTPATIENT
Start: 2020-03-30 | End: 2020-03-31

## 2020-03-31 DIAGNOSIS — F31.81 BIPOLAR 2 DISORDER, MAJOR DEPRESSIVE EPISODE (HCC): ICD-10-CM

## 2020-03-31 RX ORDER — VENLAFAXINE HYDROCHLORIDE 150 MG/1
CAPSULE, EXTENDED RELEASE ORAL
Qty: 30 CAPSULE | Refills: 2 | Status: SHIPPED | OUTPATIENT
Start: 2020-03-31 | End: 2020-04-24

## 2020-04-07 DIAGNOSIS — F31.81 BIPOLAR 2 DISORDER, MAJOR DEPRESSIVE EPISODE (HCC): ICD-10-CM

## 2020-04-07 RX ORDER — QUETIAPINE FUMARATE 100 MG/1
TABLET, FILM COATED ORAL
Qty: 90 TABLET | Refills: 2 | OUTPATIENT
Start: 2020-04-07

## 2020-04-07 RX ORDER — QUETIAPINE FUMARATE 100 MG/1
TABLET, FILM COATED ORAL
Qty: 120 TABLET | Refills: 5 | Status: SHIPPED | OUTPATIENT
Start: 2020-04-07 | End: 2020-04-09

## 2020-04-08 DIAGNOSIS — F31.81 BIPOLAR 2 DISORDER, MAJOR DEPRESSIVE EPISODE (HCC): ICD-10-CM

## 2020-04-08 RX ORDER — QUETIAPINE FUMARATE 100 MG/1
TABLET, FILM COATED ORAL
Qty: 90 TABLET | Refills: 2 | OUTPATIENT
Start: 2020-04-08

## 2020-04-09 DIAGNOSIS — F31.81 BIPOLAR 2 DISORDER, MAJOR DEPRESSIVE EPISODE (HCC): ICD-10-CM

## 2020-04-09 RX ORDER — QUETIAPINE FUMARATE 100 MG/1
TABLET, FILM COATED ORAL
Qty: 120 TABLET | Refills: 5 | Status: SHIPPED | OUTPATIENT
Start: 2020-04-09 | End: 2020-04-24 | Stop reason: SDUPTHER

## 2020-04-09 RX ORDER — QUETIAPINE FUMARATE 100 MG/1
TABLET, FILM COATED ORAL
Qty: 90 TABLET | Refills: 2 | OUTPATIENT
Start: 2020-04-09

## 2020-04-10 DIAGNOSIS — F31.81 BIPOLAR 2 DISORDER, MAJOR DEPRESSIVE EPISODE (HCC): ICD-10-CM

## 2020-04-13 ENCOUNTER — TELEMEDICINE (OUTPATIENT)
Dept: INTERNAL MEDICINE CLINIC | Age: 35
End: 2020-04-13
Payer: COMMERCIAL

## 2020-04-13 VITALS
HEIGHT: 65 IN | WEIGHT: 148 LBS | BODY MASS INDEX: 24.66 KG/M2 | DIASTOLIC BLOOD PRESSURE: 96 MMHG | TEMPERATURE: 97.7 F | HEART RATE: 80 BPM | SYSTOLIC BLOOD PRESSURE: 124 MMHG

## 2020-04-13 DIAGNOSIS — F41.9 ANXIETY: ICD-10-CM

## 2020-04-13 DIAGNOSIS — I10 ESSENTIAL HYPERTENSION: Primary | ICD-10-CM

## 2020-04-13 DIAGNOSIS — K52.9 COLITIS: ICD-10-CM

## 2020-04-13 PROBLEM — R10.11 RIGHT UPPER QUADRANT ABDOMINAL PAIN: Status: RESOLVED | Noted: 2018-03-12 | Resolved: 2020-04-13

## 2020-04-13 PROCEDURE — 99213 OFFICE O/P EST LOW 20 MIN: CPT | Performed by: INTERNAL MEDICINE

## 2020-04-13 PROCEDURE — 3008F BODY MASS INDEX DOCD: CPT | Performed by: NURSE PRACTITIONER

## 2020-04-13 PROCEDURE — 3074F SYST BP LT 130 MM HG: CPT | Performed by: INTERNAL MEDICINE

## 2020-04-13 PROCEDURE — 3080F DIAST BP >= 90 MM HG: CPT | Performed by: INTERNAL MEDICINE

## 2020-04-13 RX ORDER — QUETIAPINE FUMARATE 100 MG/1
TABLET, FILM COATED ORAL
Qty: 90 TABLET | Refills: 2 | OUTPATIENT
Start: 2020-04-13

## 2020-04-13 RX ORDER — AMLODIPINE BESYLATE 2.5 MG/1
2.5 TABLET ORAL DAILY
Qty: 90 TABLET | Refills: 0 | Status: SHIPPED | OUTPATIENT
Start: 2020-04-13 | End: 2020-06-15 | Stop reason: SDUPTHER

## 2020-04-24 ENCOUNTER — TELEMEDICINE (OUTPATIENT)
Dept: PSYCHIATRY | Facility: CLINIC | Age: 35
End: 2020-04-24
Payer: COMMERCIAL

## 2020-04-24 DIAGNOSIS — F41.1 GENERALIZED ANXIETY DISORDER: ICD-10-CM

## 2020-04-24 DIAGNOSIS — F31.81 BIPOLAR 2 DISORDER, MAJOR DEPRESSIVE EPISODE (HCC): ICD-10-CM

## 2020-04-24 DIAGNOSIS — F31.9 BIPOLAR DEPRESSION (HCC): Primary | ICD-10-CM

## 2020-04-24 PROCEDURE — 99214 OFFICE O/P EST MOD 30 MIN: CPT | Performed by: NURSE PRACTITIONER

## 2020-04-24 RX ORDER — CLONAZEPAM 1 MG/1
1 TABLET ORAL 4 TIMES DAILY
Qty: 120 TABLET | Refills: 2 | Status: SHIPPED | OUTPATIENT
Start: 2020-04-24 | End: 2020-06-25 | Stop reason: SDUPTHER

## 2020-04-24 RX ORDER — MIRTAZAPINE 30 MG/1
30 TABLET, FILM COATED ORAL
Qty: 30 TABLET | Refills: 5 | Status: SHIPPED | OUTPATIENT
Start: 2020-04-24 | End: 2020-10-16

## 2020-04-24 RX ORDER — VENLAFAXINE HYDROCHLORIDE 150 MG/1
150 CAPSULE, EXTENDED RELEASE ORAL DAILY
Qty: 30 CAPSULE | Refills: 5 | Status: SHIPPED | OUTPATIENT
Start: 2020-04-24 | End: 2020-06-25 | Stop reason: SDUPTHER

## 2020-04-24 RX ORDER — BUSPIRONE HYDROCHLORIDE 10 MG/1
10 TABLET ORAL 3 TIMES DAILY
Qty: 90 TABLET | Refills: 5 | Status: SHIPPED | OUTPATIENT
Start: 2020-04-24 | End: 2020-06-25 | Stop reason: SDUPTHER

## 2020-04-24 RX ORDER — QUETIAPINE FUMARATE 100 MG/1
TABLET, FILM COATED ORAL
Qty: 120 TABLET | Refills: 5 | Status: SHIPPED | OUTPATIENT
Start: 2020-04-24 | End: 2020-06-25 | Stop reason: SDUPTHER

## 2020-04-24 RX ORDER — VENLAFAXINE HYDROCHLORIDE 75 MG/1
CAPSULE, EXTENDED RELEASE ORAL
Qty: 30 CAPSULE | Refills: 5 | Status: SHIPPED | OUTPATIENT
Start: 2020-04-24 | End: 2020-06-25 | Stop reason: SDUPTHER

## 2020-05-08 DIAGNOSIS — F31.81 BIPOLAR 2 DISORDER, MAJOR DEPRESSIVE EPISODE (HCC): ICD-10-CM

## 2020-05-08 RX ORDER — MIRTAZAPINE 15 MG/1
TABLET, FILM COATED ORAL
Qty: 30 TABLET | Refills: 2 | OUTPATIENT
Start: 2020-05-08

## 2020-06-15 DIAGNOSIS — I10 ESSENTIAL HYPERTENSION: ICD-10-CM

## 2020-06-15 RX ORDER — AMLODIPINE BESYLATE 2.5 MG/1
2.5 TABLET ORAL DAILY
Qty: 90 TABLET | Refills: 0 | Status: SHIPPED | OUTPATIENT
Start: 2020-06-15 | End: 2020-12-11 | Stop reason: SDUPTHER

## 2020-06-25 ENCOUNTER — TELEMEDICINE (OUTPATIENT)
Dept: PSYCHIATRY | Facility: CLINIC | Age: 35
End: 2020-06-25
Payer: COMMERCIAL

## 2020-06-25 DIAGNOSIS — F31.81 BIPOLAR 2 DISORDER, MAJOR DEPRESSIVE EPISODE (HCC): ICD-10-CM

## 2020-06-25 DIAGNOSIS — F41.1 GENERALIZED ANXIETY DISORDER: ICD-10-CM

## 2020-06-25 PROCEDURE — 99214 OFFICE O/P EST MOD 30 MIN: CPT | Performed by: NURSE PRACTITIONER

## 2020-06-25 RX ORDER — VENLAFAXINE HYDROCHLORIDE 150 MG/1
150 CAPSULE, EXTENDED RELEASE ORAL DAILY
Qty: 30 CAPSULE | Refills: 5 | Status: SHIPPED | OUTPATIENT
Start: 2020-06-25 | End: 2020-10-16

## 2020-06-25 RX ORDER — VENLAFAXINE HYDROCHLORIDE 75 MG/1
CAPSULE, EXTENDED RELEASE ORAL
Qty: 30 CAPSULE | Refills: 5 | Status: SHIPPED | OUTPATIENT
Start: 2020-06-25 | End: 2021-01-07

## 2020-06-25 RX ORDER — BUSPIRONE HYDROCHLORIDE 10 MG/1
10 TABLET ORAL 3 TIMES DAILY
Qty: 90 TABLET | Refills: 5 | Status: SHIPPED | OUTPATIENT
Start: 2020-06-25 | End: 2020-10-16

## 2020-06-25 RX ORDER — QUETIAPINE FUMARATE 100 MG/1
TABLET, FILM COATED ORAL
Qty: 120 TABLET | Refills: 5 | Status: SHIPPED | OUTPATIENT
Start: 2020-06-25 | End: 2020-10-16

## 2020-06-25 RX ORDER — CLONAZEPAM 1 MG/1
1 TABLET ORAL 4 TIMES DAILY
Qty: 120 TABLET | Refills: 2 | Status: SHIPPED | OUTPATIENT
Start: 2020-06-25 | End: 2020-10-19 | Stop reason: SDUPTHER

## 2020-10-16 DIAGNOSIS — F31.81 BIPOLAR 2 DISORDER, MAJOR DEPRESSIVE EPISODE (HCC): ICD-10-CM

## 2020-10-16 DIAGNOSIS — F41.1 GENERALIZED ANXIETY DISORDER: ICD-10-CM

## 2020-10-16 RX ORDER — MIRTAZAPINE 30 MG/1
TABLET, FILM COATED ORAL
Qty: 30 TABLET | Refills: 5 | Status: SHIPPED | OUTPATIENT
Start: 2020-10-16 | End: 2021-03-23 | Stop reason: SDUPTHER

## 2020-10-16 RX ORDER — BUSPIRONE HYDROCHLORIDE 10 MG/1
TABLET ORAL
Qty: 90 TABLET | Refills: 5 | Status: SHIPPED | OUTPATIENT
Start: 2020-10-16 | End: 2021-03-23 | Stop reason: SDUPTHER

## 2020-10-16 RX ORDER — QUETIAPINE FUMARATE 100 MG/1
TABLET, FILM COATED ORAL
Qty: 120 TABLET | Refills: 5 | Status: SHIPPED | OUTPATIENT
Start: 2020-10-16 | End: 2020-10-19 | Stop reason: SDUPTHER

## 2020-10-16 RX ORDER — VENLAFAXINE HYDROCHLORIDE 150 MG/1
CAPSULE, EXTENDED RELEASE ORAL
Qty: 30 CAPSULE | Refills: 5 | Status: SHIPPED | OUTPATIENT
Start: 2020-10-16 | End: 2021-03-23 | Stop reason: SDUPTHER

## 2020-10-17 ENCOUNTER — LAB (OUTPATIENT)
Dept: LAB | Facility: HOSPITAL | Age: 35
End: 2020-10-17
Attending: INTERNAL MEDICINE
Payer: COMMERCIAL

## 2020-10-17 ENCOUNTER — TRANSCRIBE ORDERS (OUTPATIENT)
Dept: LAB | Facility: HOSPITAL | Age: 35
End: 2020-10-17

## 2020-10-17 DIAGNOSIS — R19.7 DIARRHEA, UNSPECIFIED TYPE: Primary | ICD-10-CM

## 2020-10-17 DIAGNOSIS — R19.7 DIARRHEA, UNSPECIFIED TYPE: ICD-10-CM

## 2020-10-17 PROCEDURE — 87045 FECES CULTURE AEROBIC BACT: CPT

## 2020-10-17 PROCEDURE — 87177 OVA AND PARASITES SMEARS: CPT

## 2020-10-17 PROCEDURE — 87493 C DIFF AMPLIFIED PROBE: CPT

## 2020-10-17 PROCEDURE — 87329 GIARDIA AG IA: CPT

## 2020-10-17 PROCEDURE — 87046 STOOL CULTR AEROBIC BACT EA: CPT

## 2020-10-17 PROCEDURE — 36415 COLL VENOUS BLD VENIPUNCTURE: CPT

## 2020-10-17 PROCEDURE — 87205 SMEAR GRAM STAIN: CPT

## 2020-10-17 PROCEDURE — 87427 SHIGA-LIKE TOXIN AG IA: CPT

## 2020-10-17 PROCEDURE — 87209 SMEAR COMPLEX STAIN: CPT

## 2020-10-18 LAB
C DIFF TOX B TCDB STL QL NAA+PROBE: NEGATIVE
WBC STL QL MICRO: NORMAL

## 2020-10-19 ENCOUNTER — TELEMEDICINE (OUTPATIENT)
Dept: PSYCHIATRY | Facility: CLINIC | Age: 35
End: 2020-10-19
Payer: COMMERCIAL

## 2020-10-19 DIAGNOSIS — F31.81 BIPOLAR 2 DISORDER, MAJOR DEPRESSIVE EPISODE (HCC): ICD-10-CM

## 2020-10-19 PROCEDURE — 99214 OFFICE O/P EST MOD 30 MIN: CPT | Performed by: NURSE PRACTITIONER

## 2020-10-19 RX ORDER — QUETIAPINE FUMARATE 200 MG/1
TABLET, FILM COATED ORAL
Qty: 90 TABLET | Refills: 2 | Status: SHIPPED | OUTPATIENT
Start: 2020-10-19 | End: 2021-01-08

## 2020-10-19 RX ORDER — CLONAZEPAM 1 MG/1
1 TABLET ORAL 4 TIMES DAILY
Qty: 120 TABLET | Refills: 2 | Status: SHIPPED | OUTPATIENT
Start: 2020-10-19 | End: 2021-01-15 | Stop reason: SDUPTHER

## 2020-10-20 LAB
G LAMBLIA AG STL QL IA: NEGATIVE
O+P STL CONC: NORMAL

## 2020-10-28 ENCOUNTER — TELEMEDICINE (OUTPATIENT)
Dept: PSYCHIATRY | Facility: CLINIC | Age: 35
End: 2020-10-28
Payer: COMMERCIAL

## 2020-10-28 DIAGNOSIS — F31.32 BIPOLAR DISORDER WITH MODERATE DEPRESSION (HCC): Primary | ICD-10-CM

## 2020-10-28 PROCEDURE — 99214 OFFICE O/P EST MOD 30 MIN: CPT | Performed by: NURSE PRACTITIONER

## 2020-12-11 DIAGNOSIS — I10 ESSENTIAL HYPERTENSION: ICD-10-CM

## 2020-12-11 RX ORDER — AMLODIPINE BESYLATE 2.5 MG/1
2.5 TABLET ORAL DAILY
Qty: 90 TABLET | Refills: 1 | Status: SHIPPED | OUTPATIENT
Start: 2020-12-11 | End: 2021-06-08 | Stop reason: SDUPTHER

## 2020-12-22 ENCOUNTER — TELEMEDICINE (OUTPATIENT)
Dept: INTERNAL MEDICINE CLINIC | Age: 35
End: 2020-12-22
Payer: COMMERCIAL

## 2020-12-22 VITALS
BODY MASS INDEX: 24.66 KG/M2 | DIASTOLIC BLOOD PRESSURE: 85 MMHG | WEIGHT: 148 LBS | HEIGHT: 65 IN | HEART RATE: 78 BPM | SYSTOLIC BLOOD PRESSURE: 130 MMHG

## 2020-12-22 DIAGNOSIS — F41.9 ANXIETY: ICD-10-CM

## 2020-12-22 DIAGNOSIS — K21.9 GASTROESOPHAGEAL REFLUX DISEASE WITHOUT ESOPHAGITIS: Primary | ICD-10-CM

## 2020-12-22 DIAGNOSIS — I10 ESSENTIAL HYPERTENSION: ICD-10-CM

## 2020-12-22 PROCEDURE — 4004F PT TOBACCO SCREEN RCVD TLK: CPT | Performed by: INTERNAL MEDICINE

## 2020-12-22 PROCEDURE — 99213 OFFICE O/P EST LOW 20 MIN: CPT | Performed by: INTERNAL MEDICINE

## 2020-12-22 PROCEDURE — 3079F DIAST BP 80-89 MM HG: CPT | Performed by: INTERNAL MEDICINE

## 2020-12-22 PROCEDURE — 3008F BODY MASS INDEX DOCD: CPT | Performed by: INTERNAL MEDICINE

## 2020-12-22 PROCEDURE — 3075F SYST BP GE 130 - 139MM HG: CPT | Performed by: INTERNAL MEDICINE

## 2020-12-22 RX ORDER — METOPROLOL SUCCINATE 25 MG/1
25 TABLET, EXTENDED RELEASE ORAL DAILY
Qty: 90 TABLET | Refills: 1 | Status: SHIPPED | OUTPATIENT
Start: 2020-12-22 | End: 2021-08-04 | Stop reason: SDUPTHER

## 2020-12-30 ENCOUNTER — TELEMEDICINE (OUTPATIENT)
Dept: PSYCHIATRY | Facility: CLINIC | Age: 35
End: 2020-12-30
Payer: COMMERCIAL

## 2020-12-30 DIAGNOSIS — F31.9 BIPOLAR DEPRESSION (HCC): Primary | ICD-10-CM

## 2020-12-30 DIAGNOSIS — F41.1 GAD (GENERALIZED ANXIETY DISORDER): ICD-10-CM

## 2020-12-30 PROCEDURE — 99214 OFFICE O/P EST MOD 30 MIN: CPT | Performed by: NURSE PRACTITIONER

## 2021-01-07 DIAGNOSIS — F31.81 BIPOLAR 2 DISORDER, MAJOR DEPRESSIVE EPISODE (HCC): ICD-10-CM

## 2021-01-07 RX ORDER — VENLAFAXINE HYDROCHLORIDE 75 MG/1
CAPSULE, EXTENDED RELEASE ORAL
Qty: 30 CAPSULE | Refills: 5 | Status: SHIPPED | OUTPATIENT
Start: 2021-01-07 | End: 2021-01-08

## 2021-01-08 DIAGNOSIS — F31.81 BIPOLAR 2 DISORDER, MAJOR DEPRESSIVE EPISODE (HCC): ICD-10-CM

## 2021-01-08 RX ORDER — VENLAFAXINE HYDROCHLORIDE 75 MG/1
CAPSULE, EXTENDED RELEASE ORAL
Qty: 30 CAPSULE | Refills: 5 | Status: SHIPPED | OUTPATIENT
Start: 2021-01-08 | End: 2021-06-23 | Stop reason: SDUPTHER

## 2021-01-08 RX ORDER — QUETIAPINE FUMARATE 200 MG/1
TABLET, FILM COATED ORAL
Qty: 90 TABLET | Refills: 2 | Status: SHIPPED | OUTPATIENT
Start: 2021-01-08 | End: 2021-03-23 | Stop reason: SDUPTHER

## 2021-01-11 ENCOUNTER — TELEPHONE (OUTPATIENT)
Dept: GASTROENTEROLOGY | Facility: CLINIC | Age: 36
End: 2021-01-11

## 2021-01-11 NOTE — TELEPHONE ENCOUNTER
LVM to inform appt changed from 2:40 to 12:20 due to schedule change  Told pt to call in if does not work for her   ADM

## 2021-01-14 ENCOUNTER — TELEPHONE (OUTPATIENT)
Dept: GASTROENTEROLOGY | Facility: CLINIC | Age: 36
End: 2021-01-14

## 2021-01-14 DIAGNOSIS — R10.13 EPIGASTRIC PAIN: Primary | ICD-10-CM

## 2021-01-14 RX ORDER — ACETAMINOPHEN AND CODEINE PHOSPHATE 300; 30 MG/1; MG/1
1 TABLET ORAL EVERY 6 HOURS PRN
Qty: 60 TABLET | Refills: 0 | Status: SHIPPED | OUTPATIENT
Start: 2021-01-14 | End: 2021-02-09 | Stop reason: SDUPTHER

## 2021-01-15 DIAGNOSIS — F31.81 BIPOLAR 2 DISORDER, MAJOR DEPRESSIVE EPISODE (HCC): ICD-10-CM

## 2021-01-16 RX ORDER — CLONAZEPAM 1 MG/1
1 TABLET ORAL 4 TIMES DAILY
Qty: 120 TABLET | Refills: 2 | Status: SHIPPED | OUTPATIENT
Start: 2021-01-16 | End: 2021-03-23 | Stop reason: SDUPTHER

## 2021-01-22 ENCOUNTER — TELEPHONE (OUTPATIENT)
Dept: GASTROENTEROLOGY | Facility: CLINIC | Age: 36
End: 2021-01-22

## 2021-01-22 ENCOUNTER — TELEMEDICINE (OUTPATIENT)
Dept: GASTROENTEROLOGY | Facility: CLINIC | Age: 36
End: 2021-01-22
Payer: COMMERCIAL

## 2021-01-22 DIAGNOSIS — K31.84 GASTROPARESIS: ICD-10-CM

## 2021-01-22 DIAGNOSIS — K22.70 BARRETT'S ESOPHAGUS WITHOUT DYSPLASIA: ICD-10-CM

## 2021-01-22 DIAGNOSIS — R11.0 NAUSEA: Primary | ICD-10-CM

## 2021-01-22 DIAGNOSIS — K59.1 FUNCTIONAL DIARRHEA: ICD-10-CM

## 2021-01-22 PROCEDURE — 99214 OFFICE O/P EST MOD 30 MIN: CPT | Performed by: INTERNAL MEDICINE

## 2021-01-22 NOTE — PROGRESS NOTES
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             worsening of nausea, postprandial abdominal pain, reflux symptoms and early satiety- patient had a recent EGD and colonoscopy which shows short-segment Mcallister esophagus, no dysplasia, she is status post EGD with transoral incision less fundoplication, fundoplication site appeared normal, there was evidence of mild gastritis, biopsy came back negative for any Helicobacter pylori infection, will order a gastric emptying study, she will continue with Zofran     history of chronic diarrhea- most likely related to irritable bowel syndrome, workup including comprehensive stool panel is negative, colonoscopy with random colon biopsies negative also, continue with Lomotil as needed     frequent burping, 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- continue with baclofen       Reason for visit is   Chief Complaint   Patient presents with    Virtual Regular Visit        Encounter provider Cristy Min MD    Provider located at [address] [id number]      Recent Visits  No visits were found meeting these conditions  Showing recent visits within past 7 days and meeting all other requirements     Today's Visits  Date Type Provider Dept   01/22/21 MD [address]   Showing today's visits and meeting all other requirements     Future Appointments  No visits were found meeting these conditions  Showing future appointments within next 150 days and meeting all other requirements        The patient was identified by name and date of birth   Linnea adkins informed that this is a telemedicine visit and that the visit is being conducted through [employer] and patient was informed that this is a secure, HIPAA-compliant platform  She agrees to proceed     My office door was closed  No one else was in the room  She acknowledged consent and understanding of privacy and security of the video platform  The patient has agreed to participate and understands they can discontinue the visit at any time  Patient is aware this is a billable service  Subjective  Fani Connor is a 37 y o  female  Had a follow-up virtual visit for worsening of nausea, postprandial abdominal pain and early satiety, she had a recent EGD and colonoscopy, EGD shows short-segment Mcallister esophagus, biopsy confirmed evidence of Mcallister esophagus without any dysplasia, fundoplication site appeared normal, no recurrence of hiatal hernia, colonoscopy biopsy result came back negative for any microscopic colitis   HPI     Past Medical History:   Diagnosis Date    Anxiety     Bipolar disorder with depression ([facility] [address] )     Last assessed: 16    Depression     Endometriosis     Last assessed: 16    GERD (gastroesophageal reflux disease)     Hypertension     Inflammatory bowel disease     Scoliosis     Varicella        Past Surgical History:   Procedure Laterality Date     SECTION      CHOLECYSTECTOMY      COLONOSCOPY      HERNIA REPAIR      HERNIA REPAIR      OTHER SURGICAL HISTORY      Transoral incisionless fundoplication (TIFF)    IN  DELIVERY ONLY N/A 2016    Procedure:  SECTION ();   Surgeon: Jhoan Powell DO;  Location: [facility];  Service: Obstetrics    IN INCIS TENDON SHEATH,RADIAL STYLOID Right 2020    Procedure: RELEASE Phil Collar RIGHT WRIST;  Surgeon: Tigre White MD;  Location: [address] [place] [address];  Service: Orthopedics    IN LAP,CHOLECYSTECTOMY N/A 3/12/2018    Procedure: LAPAROSCOPIC CHOLECYSTECTOMY;  Surgeon: Elijah Hernandez Racheal Mack MD;  Location: [address] [place] [address];  Service: General    TONSILECTOMY AND ADNOIDECTOMY Bilateral     TONSILLECTOMY      WISDOM TOOTH EXTRACTION Bilateral        Current Outpatient Medications   Medication Sig Dispense Refill    acetaminophen-codeine (TYLENOL #3) 300-30 mg per tablet Take 1 tablet by mouth every 6 (six) hours as needed for moderate pain 60 tablet 0    amLODIPine (NORVASC) 2 5 mg tablet Take 1 tablet (2 5 mg total) by mouth daily 90 tablet 1    baclofen 20 mg tablet Take 20 mg by mouth 3 (three) times a day       busPIRone (BUSPAR) 10 mg tablet TAKE ONE TABLET BY MOUTH THREE TIMES A DAY 90 tablet 5    clonazePAM (KlonoPIN) 1 mg tablet Take 1 tablet (1 mg total) by mouth 4 (four) times a day Do not take within 4 hours of opioid pain medications 120 tablet 2    diphenoxylate-atropine (LOMOTIL) 2 5-0 025 mg per tablet       docusate sodium (DULCOLAX) 100 mg capsule Take 1 capsule by mouth daily as needed        medroxyPROGESTERone (DEPO-PROVERA) 150 mg/mL injection INJECT EVERY 12 WEEK AS DIRECTED 1 mL 0    metoprolol succinate (TOPROL-XL) 25 mg 24 hr tablet Take 1 tablet (25 mg total) by mouth daily 90 tablet 1    mirtazapine (REMERON) 30 mg tablet TAKE ONE TABLET BY MOUTH AT BEDTIME (Patient taking differently: 15 mg ) 30 tablet 5    ondansetron (ZOFRAN-ODT) 8 mg disintegrating tablet Take 8 mg by mouth as needed       QUEtiapine (SEROquel) 200 mg tablet TAKE ONE TABLET BY MOUTH THREE TIMES A DAY AT NOON, 5:00PM, AND AT BEDTIME 90 tablet 2    venlafaxine (EFFEXOR-XR) 150 mg 24 hr capsule TAKE ONE CAPSULE BY MOUTH EVERY DAY 30 capsule 5    venlafaxine (EFFEXOR-XR) 75 mg 24 hr capsule TAKE ONE CAPSULE BY MOUTH EVERY DAY ALONG WITH 150MG FOR A TOTAL DAILY DOSE OF 225MG 30 capsule 5     No current facility-administered medications for this visit           Allergies   Allergen Reactions    Sulfa Antibiotics Anaphylaxis, Other (See Comments) and Edema     swelling  swelling  swelling    Reglan [Metoclopramide] Anxiety       Review of Systems   Constitutional: Negative  HENT: Negative  Eyes: Negative  Respiratory: Negative  Gastrointestinal: Positive for abdominal distention, abdominal pain and nausea  Genitourinary: Negative  Allergic/Immunologic: Negative  Neurological: Negative  Video Exam    There were no vitals filed for this visit  Physical Exam  Constitutional:       Appearance: Normal appearance  HENT:      Head: Normocephalic  Nose: Nose normal    Eyes:      Extraocular Movements: Extraocular movements intact  Pupils: Pupils are equal, round, and reactive to light  Neck:      Musculoskeletal: Normal range of motion  Cardiovascular:      Rate and Rhythm: Normal rate and regular rhythm  Pulmonary:      Effort: Pulmonary effort is normal       Breath sounds: Normal breath sounds  Abdominal:      General: Abdomen is flat  Bowel sounds are normal  There is no distension  Palpations: Abdomen is soft  Neurological:      Mental Status: She is alert  I spent 35 minutes directly with the patient during this visit      VIRTUAL VISIT DISCLAIMER    Christine Blake acknowledges that she has consented to an online visit or consultation  She understands that the online visit is based solely on information provided by her, and that, in the absence of a face-to-face physical evaluation by the physician, the diagnosis she receives is both limited and provisional in terms of accuracy and completeness  This is not intended to replace a full medical face-to-face evaluation by the physician  Christine Blake understands and accepts these terms

## 2021-01-22 NOTE — TELEPHONE ENCOUNTER
CALLED AND LEFT MESSAGE FOR THE PATIENT TO CALL THE OFFICE BACK AND SCHEDULE AN APPT FOR April WITH DR Margaret Rodriguez

## 2021-02-03 DIAGNOSIS — K63.89 OTHER SPECIFIED DISEASES OF INTESTINE: Primary | ICD-10-CM

## 2021-02-03 RX ORDER — BACLOFEN 20 MG/1
20 TABLET ORAL 3 TIMES DAILY
Qty: 90 TABLET | Refills: 1 | Status: SHIPPED | OUTPATIENT
Start: 2021-02-03 | End: 2021-03-02 | Stop reason: SDUPTHER

## 2021-02-09 ENCOUNTER — TELEPHONE (OUTPATIENT)
Dept: GASTROENTEROLOGY | Facility: CLINIC | Age: 36
End: 2021-02-09

## 2021-02-09 DIAGNOSIS — R10.13 EPIGASTRIC PAIN: ICD-10-CM

## 2021-02-09 RX ORDER — ACETAMINOPHEN AND CODEINE PHOSPHATE 300; 30 MG/1; MG/1
1 TABLET ORAL EVERY 6 HOURS PRN
Qty: 60 TABLET | Refills: 0 | Status: SHIPPED | OUTPATIENT
Start: 2021-02-09 | End: 2021-03-15 | Stop reason: SDUPTHER

## 2021-02-09 NOTE — TELEPHONE ENCOUNTER
DISCHARGE INSTRUCTIONS GIVEN WITH VOICED UNDERSTANDING. IV OUT. DISCHARGED TO HOME. PT LEFT MESSAGE FOR TO REFILL HER TYLENOL# 3, 1 PO Q6H PRN MODERATE PAIN

## 2021-02-12 ENCOUNTER — TELEPHONE (OUTPATIENT)
Dept: INTERNAL MEDICINE CLINIC | Age: 36
End: 2021-02-12

## 2021-02-12 NOTE — TELEPHONE ENCOUNTER
Left message on machine for patient to call me at [facility]  Patient due for an appt with [address]  Please schedule with Dr Veda Castro  Please schedule in [place] office if patient is willing   Can be virtual

## 2021-03-02 ENCOUNTER — TELEPHONE (OUTPATIENT)
Dept: GASTROENTEROLOGY | Facility: CLINIC | Age: 36
End: 2021-03-02

## 2021-03-02 DIAGNOSIS — R11.0 NAUSEA: ICD-10-CM

## 2021-03-02 DIAGNOSIS — K63.89 OTHER SPECIFIED DISEASES OF INTESTINE: ICD-10-CM

## 2021-03-02 DIAGNOSIS — R10.13 EPIGASTRIC PAIN: Primary | ICD-10-CM

## 2021-03-02 RX ORDER — ONDANSETRON 8 MG/1
8 TABLET, ORALLY DISINTEGRATING ORAL AS NEEDED
Qty: 60 TABLET | Refills: 1 | Status: SHIPPED | OUTPATIENT
Start: 2021-03-02 | End: 2021-03-02 | Stop reason: SDUPTHER

## 2021-03-02 RX ORDER — ONDANSETRON 8 MG/1
8 TABLET, ORALLY DISINTEGRATING ORAL AS NEEDED
Qty: 60 TABLET | Refills: 1 | Status: SHIPPED | OUTPATIENT
Start: 2021-03-02 | End: 2021-04-16 | Stop reason: SDUPTHER

## 2021-03-02 RX ORDER — BACLOFEN 20 MG/1
20 TABLET ORAL 3 TIMES DAILY
Qty: 90 TABLET | Refills: 1 | Status: SHIPPED | OUTPATIENT
Start: 2021-03-02 | End: 2021-04-16 | Stop reason: SDUPTHER

## 2021-03-02 RX ORDER — ACETAMINOPHEN AND CODEINE PHOSPHATE 300; 30 MG/1; MG/1
1 TABLET ORAL EVERY 6 HOURS PRN
Qty: 30 TABLET | Refills: 0 | Status: SHIPPED | OUTPATIENT
Start: 2021-03-02 | End: 2021-04-02 | Stop reason: SDUPTHER

## 2021-03-02 NOTE — TELEPHONE ENCOUNTER
PLEASE RESEND REFILL FOR [facility]  PHARMACY CALLED AND NEEDED CLARIFICATION   Wendy Duran

## 2021-03-06 ENCOUNTER — HOSPITAL ENCOUNTER (OUTPATIENT)
Dept: RADIOLOGY | Facility: HOSPITAL | Age: 36
Discharge: HOME | End: 2021-03-06
Attending: INTERNAL MEDICINE
Payer: COMMERCIAL

## 2021-03-06 ENCOUNTER — TRANSCRIBE ORDERS (OUTPATIENT)
Dept: RADIOLOGY | Facility: HOSPITAL | Age: 36
End: 2021-03-06

## 2021-03-06 DIAGNOSIS — K31.84 GASTROPARESIS: ICD-10-CM

## 2021-03-06 DIAGNOSIS — R11.0 NAUSEA: ICD-10-CM

## 2021-03-06 PROCEDURE — 78264 GASTRIC EMPTYING IMG STUDY: CPT

## 2021-03-06 PROCEDURE — G1004 CDSM NDSC: HCPCS

## 2021-03-06 PROCEDURE — A9541 TC99M SULFUR COLLOID: HCPCS

## 2021-03-15 DIAGNOSIS — R10.13 EPIGASTRIC PAIN: Primary | ICD-10-CM

## 2021-03-15 RX ORDER — ACETAMINOPHEN AND CODEINE PHOSPHATE 300; 30 MG/1; MG/1
1 TABLET ORAL EVERY 6 HOURS PRN
Qty: 60 TABLET | Refills: 0 | Status: SHIPPED | OUTPATIENT
Start: 2021-03-15 | End: 2021-04-30 | Stop reason: SDUPTHER

## 2021-03-23 ENCOUNTER — TELEMEDICINE (OUTPATIENT)
Dept: PSYCHIATRY | Facility: CLINIC | Age: 36
End: 2021-03-23
Payer: COMMERCIAL

## 2021-03-23 DIAGNOSIS — F41.1 GENERALIZED ANXIETY DISORDER: ICD-10-CM

## 2021-03-23 DIAGNOSIS — F31.81 BIPOLAR 2 DISORDER, MAJOR DEPRESSIVE EPISODE (HCC): ICD-10-CM

## 2021-03-23 PROCEDURE — 99213 OFFICE O/P EST LOW 20 MIN: CPT | Performed by: NURSE PRACTITIONER

## 2021-03-23 RX ORDER — VENLAFAXINE HYDROCHLORIDE 150 MG/1
150 CAPSULE, EXTENDED RELEASE ORAL DAILY
Qty: 30 CAPSULE | Refills: 5 | Status: SHIPPED | OUTPATIENT
Start: 2021-03-23 | End: 2021-09-19

## 2021-03-23 RX ORDER — CLONAZEPAM 1 MG/1
1 TABLET ORAL 4 TIMES DAILY
Qty: 120 TABLET | Refills: 2 | Status: SHIPPED | OUTPATIENT
Start: 2021-03-23 | End: 2021-04-12

## 2021-03-23 RX ORDER — QUETIAPINE FUMARATE 200 MG/1
TABLET, FILM COATED ORAL
Qty: 90 TABLET | Refills: 2 | Status: SHIPPED | OUTPATIENT
Start: 2021-03-23 | End: 2021-06-18

## 2021-03-23 RX ORDER — MIRTAZAPINE 15 MG/1
15 TABLET, FILM COATED ORAL
Qty: 30 TABLET | Refills: 5 | Status: SHIPPED | OUTPATIENT
Start: 2021-03-23 | End: 2021-06-23 | Stop reason: SDUPTHER

## 2021-03-23 RX ORDER — BUSPIRONE HYDROCHLORIDE 10 MG/1
10 TABLET ORAL 3 TIMES DAILY
Qty: 90 TABLET | Refills: 5 | Status: SHIPPED | OUTPATIENT
Start: 2021-03-23 | End: 2021-09-19

## 2021-03-23 NOTE — PSYCH
TREATMENT PLAN (Medication Management Only)        [facility]    Name and Date of Birth:  America Burnette 37 y o  1985  Date of Treatment Plan: March 23, 2021  Diagnosis/Diagnoses:    1  Generalized anxiety disorder    2  Bipolar 2 disorder, major depressive episode [facility])      Strengths/Personal Resources for Self-Care: supportive family, supportive friends  Area/Areas of need (in own words): "  I am doing okay"  1  Long Term Goal: "  To continue to be able to function at her baseline ",     Target Date: 6 months - 9/23/2021  Person/Persons responsible for completion of goal: Priscila  2  Short Term Objective (s) - How will we reach this goal?:   A  Provider new recommended medication/dosage changes and/or continue medication(s): continue current medications as prescribed  B   N/A  Target Date: 6 months - 9/23/2021  Person/Persons Responsible for Completion of Goal: Priscila  Progress Towards Goals: minimal progress  Treatment Modality: medication management every 3 months  Review due 6 months from date of this plan: 6 months - 9/23/2021  Expected length of service: over 1 year  My Physician/PA/NP and I have developed this plan together and I agree to work on the goals and objectives  I understand the treatment goals that were developed for my treatment

## 2021-03-23 NOTE — PSYCH
Virtual Brief Visit    Assessment/Plan:    Problem List Items Addressed This Visit     None      Visit Diagnoses     Generalized anxiety disorder        Relevant Medications    busPIRone (BUSPAR) 10 mg tablet    mirtazapine (REMERON) 15 mg tablet    QUEtiapine (SEROquel) 200 mg tablet    venlafaxine (EFFEXOR-XR) 150 mg 24 hr capsule    Bipolar 2 disorder, major depressive episode (HCC)        Relevant Medications    busPIRone (BUSPAR) 10 mg tablet    clonazePAM (KlonoPIN) 1 mg tablet    mirtazapine (REMERON) 15 mg tablet    QUEtiapine (SEROquel) 200 mg tablet    venlafaxine (EFFEXOR-XR) 150 mg 24 hr capsule                Reason for visit is   A medication management appointment for treatment of generalized anxiety disorder and bipolar depressive disorder  Encounter provider MIKE Mcclure    Provider located at [address] [id number]    Recent Visits  No visits were found meeting these conditions  Showing recent visits within past 7 days and meeting all other requirements     Future Appointments  No visits were found meeting these conditions  Showing future appointments within next 150 days and meeting all other requirements        After connecting through telephone, the patient was identified by name and date of birth  Timmy Guerrero was informed that this is a telemedicine visit and that the visit is being conducted through telephone  My office door was closed  No one else was in the room  She acknowledged consent and understanding of privacy and security of the platform  The patient has agreed to participate and understands she can discontinue the visit at any time  Patient is aware this is a billable service  Subjective    Timmy Guerrero is a 37 y o  female   Has a history of bipolar depressive disorder and generalized anxiety disorder    This is a young woman who has had a long history and a very strong family history of bipolar depression  She has been on disability for over 7 years  Most of her family member suffer from bipolar depression  Her grandmother has been hospitalized multiple times for the same diagnosis  She has been unable to work, she has been able to do much without the assistance of other family members  Right now, her mother is residing with her so that she could help care for the patient's 3year-old son  The patient functions at a very low baseline  She is able to keep her emotions together this current medication regimen  Otherwise, she stops functioning     Today, she tells me that she is feeling "okay "  Medicine is helping  She has multiple medical issues especially with her gastrointestinal system is following with Gastroenterology very closely  No issues with her current meds  She will continue on BuSpar 10 mg t i d , Klonopin 1 mg q i d , Remeron 15 mg HS, Seroquel 200 mg t i d  and Effexor  mg daily  She tells me at this time, with the medication regimen, she is able to get up, get herself together for the day and perform minimal tasks  Her condition is chronic  Follow-up with me in 3 months  Mental status exam:  Patient is awake and alert and oriented x3  Mood is stable  Patient functions at a low baseline  Patient is not suicidal, not homicidal and not psychotic  Speech is clear but limited as well as thoughts  Attention span is limited also  Impulse control is fair  Judgment and insight are fair  Memory is good       Past Medical History:   Diagnosis Date    Anxiety     Bipolar disorder with depression ([facility] [address] )     Last assessed: 16    Depression     Endometriosis     Last assessed: 16    GERD (gastroesophageal reflux disease)     Hypertension     Inflammatory bowel disease     Scoliosis     Varicella        Past Surgical History:   Procedure Laterality Date     SECTION      CHOLECYSTECTOMY      COLONOSCOPY  HERNIA REPAIR      HERNIA REPAIR      OTHER SURGICAL HISTORY      Transoral incisionless fundoplication (TIFF)    [place]  DELIVERY ONLY N/A 2016    Procedure:  SECTION ();   Surgeon: Timmy Carlton DO;  [facility];  Service: Obstetrics    [place] INCIS TENDON SHEATH,RADIAL STYLOID Right 2020    Procedure: RELEASE DEQUERVAINS RIGHT WRIST;  Surgeon: Michelle Martinez MD;  Location: [address] [place] [address] OR;  Service: Orthopedics    [place] LAP,CHOLECYSTECTOMY N/A 3/12/2018    Procedure: Elizabeth Morris;  Surgeon: Andriy Sanon MD;  Location: [facility];  Service: General    TONSILECTOMY AND ADNOIDECTOMY Bilateral     TONSILLECTOMY      WISDOM TOOTH EXTRACTION Bilateral        Current Outpatient Medications   Medication Sig Dispense Refill    busPIRone (BUSPAR) 10 mg tablet Take 1 tablet (10 mg total) by mouth 3 (three) times a day 90 tablet 5    clonazePAM (KlonoPIN) 1 mg tablet Take 1 tablet (1 mg total) by mouth 4 (four) times a day Do not take within 4 hours of opioid pain medications 120 tablet 2    mirtazapine (REMERON) 15 mg tablet Take 1 tablet (15 mg total) by mouth daily at bedtime 30 tablet 5    QUEtiapine (SEROquel) 200 mg tablet TAKE ONE TABLET BY MOUTH THREE TIMES A DAY AT NOON, 5:00PM, AND AT BEDTIME 90 tablet 2    venlafaxine (EFFEXOR-XR) 150 mg 24 hr capsule Take 1 capsule (150 mg total) by mouth daily 30 capsule 5    venlafaxine (EFFEXOR-XR) 75 mg 24 hr capsule TAKE ONE CAPSULE BY MOUTH EVERY DAY ALONG WITH 150MG FOR A TOTAL DAILY DOSE OF 225MG 30 capsule 5    acetaminophen-codeine (TYLENOL #3) 300-30 mg per tablet Take 1 tablet by mouth every 6 (six) hours as needed for moderate pain 30 tablet 0    acetaminophen-codeine (TYLENOL #3) 300-30 mg per tablet Take 1 tablet by mouth every 6 (six) hours as needed for moderate pain 60 tablet 0    amLODIPine (NORVASC) 2 5 mg tablet Take 1 tablet (2 5 mg total) by mouth daily 90 tablet 1    baclofen 20 mg tablet Take 1 tablet (20 mg total) by mouth 3 (three) times a day 90 tablet 1    diphenoxylate-atropine (LOMOTIL) 2 5-0 025 mg per tablet       docusate sodium (DULCOLAX) 100 mg capsule Take 1 capsule by mouth daily as needed        medroxyPROGESTERone (DEPO-PROVERA) 150 mg/mL injection INJECT EVERY 12 WEEK AS DIRECTED 1 mL 0    metoprolol succinate (TOPROL-XL) 25 mg 24 hr tablet Take 1 tablet (25 mg total) by mouth daily 90 tablet 1    ondansetron (ZOFRAN-ODT) 8 mg disintegrating tablet Take 1 tablet (8 mg total) by mouth as needed for nausea (EVERY 6 HOURS AS NEEDED FOR NAUSEA) 60 tablet 1     No current facility-administered medications for this visit  Allergies   Allergen Reactions    Sulfa Antibiotics Anaphylaxis, Other (See Comments) and Edema     swelling  swelling  swelling    Reglan [Metoclopramide] Anxiety       Review of Systems    There were no vitals filed for this visit  I spent 25 minutes with patient today in which greater than 50% of the time was spent in counseling/coordination of care regarding Medication management and treatment    VIRTUAL VISIT DISCLAIMER    Abner Alicia acknowledges that she has consented to an online visit or consultation  She understands that the online visit is based solely on information provided by her, and that, in the absence of a face-to-face physical evaluation by the physician, the diagnosis she receives is both limited and provisional in terms of accuracy and completeness  This is not intended to replace a full medical face-to-face evaluation by the physician  Abner Alicia understands and accepts these terms

## 2021-04-02 DIAGNOSIS — R10.13 EPIGASTRIC PAIN: Primary | ICD-10-CM

## 2021-04-02 RX ORDER — ACETAMINOPHEN AND CODEINE PHOSPHATE 300; 30 MG/1; MG/1
1 TABLET ORAL EVERY 6 HOURS PRN
Qty: 30 TABLET | Refills: 0 | Status: SHIPPED | OUTPATIENT
Start: 2021-04-02 | End: 2021-04-16 | Stop reason: SDUPTHER

## 2021-04-03 DIAGNOSIS — F31.81 BIPOLAR 2 DISORDER, MAJOR DEPRESSIVE EPISODE (HCC): ICD-10-CM

## 2021-04-03 RX ORDER — QUETIAPINE FUMARATE 100 MG/1
TABLET, FILM COATED ORAL
Qty: 120 TABLET | Refills: 5 | OUTPATIENT
Start: 2021-04-03

## 2021-04-08 DIAGNOSIS — F31.81 BIPOLAR 2 DISORDER, MAJOR DEPRESSIVE EPISODE (HCC): ICD-10-CM

## 2021-04-08 RX ORDER — MIRTAZAPINE 30 MG/1
TABLET, FILM COATED ORAL
Qty: 30 TABLET | Refills: 5 | OUTPATIENT
Start: 2021-04-08

## 2021-04-09 DIAGNOSIS — F31.81 BIPOLAR 2 DISORDER, MAJOR DEPRESSIVE EPISODE (HCC): ICD-10-CM

## 2021-04-09 RX ORDER — MIRTAZAPINE 30 MG/1
TABLET, FILM COATED ORAL
Qty: 30 TABLET | Refills: 5 | OUTPATIENT
Start: 2021-04-09

## 2021-04-12 ENCOUNTER — TELEPHONE (OUTPATIENT)
Dept: GASTROENTEROLOGY | Facility: CLINIC | Age: 36
End: 2021-04-12

## 2021-04-12 DIAGNOSIS — F31.81 BIPOLAR 2 DISORDER, MAJOR DEPRESSIVE EPISODE (HCC): ICD-10-CM

## 2021-04-12 RX ORDER — CLONAZEPAM 1 MG/1
TABLET ORAL
Qty: 120 TABLET | Refills: 0 | Status: SHIPPED | OUTPATIENT
Start: 2021-04-12 | End: 2021-06-23 | Stop reason: SDUPTHER

## 2021-04-16 ENCOUNTER — TELEMEDICINE (OUTPATIENT)
Dept: GASTROENTEROLOGY | Facility: CLINIC | Age: 36
End: 2021-04-16
Payer: COMMERCIAL

## 2021-04-16 DIAGNOSIS — K63.89 OTHER SPECIFIED DISEASES OF INTESTINE: ICD-10-CM

## 2021-04-16 DIAGNOSIS — K31.84 GASTROPARESIS: Primary | ICD-10-CM

## 2021-04-16 DIAGNOSIS — R11.0 NAUSEA: ICD-10-CM

## 2021-04-16 DIAGNOSIS — K21.9 GASTROESOPHAGEAL REFLUX DISEASE WITHOUT ESOPHAGITIS: ICD-10-CM

## 2021-04-16 DIAGNOSIS — R10.13 EPIGASTRIC PAIN: ICD-10-CM

## 2021-04-16 DIAGNOSIS — R11.14 BILIOUS VOMITING WITH NAUSEA: ICD-10-CM

## 2021-04-16 PROCEDURE — 99214 OFFICE O/P EST MOD 30 MIN: CPT | Performed by: INTERNAL MEDICINE

## 2021-04-16 RX ORDER — BACLOFEN 20 MG/1
20 TABLET ORAL 3 TIMES DAILY
Qty: 90 TABLET | Refills: 1 | Status: SHIPPED | OUTPATIENT
Start: 2021-04-16 | End: 2021-05-24 | Stop reason: SDUPTHER

## 2021-04-16 RX ORDER — ACETAMINOPHEN AND CODEINE PHOSPHATE 300; 30 MG/1; MG/1
1 TABLET ORAL EVERY 6 HOURS PRN
Qty: 30 TABLET | Refills: 0 | Status: SHIPPED | OUTPATIENT
Start: 2021-04-16 | End: 2021-12-20 | Stop reason: ALTCHOICE

## 2021-04-16 RX ORDER — ONDANSETRON 8 MG/1
8 TABLET, ORALLY DISINTEGRATING ORAL AS NEEDED
Qty: 60 TABLET | Refills: 1 | Status: SHIPPED | OUTPATIENT
Start: 2021-04-16 | End: 2021-08-19 | Stop reason: SDUPTHER

## 2021-04-16 NOTE — PROGRESS NOTES
[facility] - Outpatient Follow-up Note  Keira Knowles 37 y o  female MRN: [id number]  Encounter: [id number]          ASSESSMENT AND PLAN:      1  Gastroparesis   gastric emptying study result was reviewed which shows mild delayed in gastric emptying, after 4 hour, 11% of the food still  in the stomach, will schedule for EGD with intra pyloric Botox injection, risk and benefit of the procedure was discussed, she is allergic to Reglan and she do not want to get domperidone from [address] [place] [address] J  - EGD; Future    2  Bilious vomiting with nausea   related to underlying history of gastroparesis, gastric emptying study result was reviewed, advised for low fat, low fiber in the diet, small meal and continue with current medication    3  Gastroesophageal reflux disease without esophagitis   continue Protonix 40 mg p o  q day, status post TIF, fundoplication site appeared normal on last endoscopy    4  Epigastric pain    - acetaminophen-codeine (TYLENOL #3) 300-30 mg per tablet; Take 1 tablet by mouth every 6 (six) hours as needed for moderate pain  Dispense: 30 tablet; Refill: 0    5  Other specified diseases of intestine   frequent burping, she responded very well with baclofen which will refill it  - baclofen 20 mg tablet; Take 1 tablet (20 mg total) by mouth 3 (three) times a day  Dispense: 90 tablet; Refill: 1    6  Nausea    - ondansetron (ZOFRAN-ODT) 8 mg disintegrating tablet; Take 1 tablet (8 mg total) by mouth as needed for nausea (EVERY 6 HOURS AS NEEDED FOR NAUSEA)  Dispense: 60 tablet;  Refill: 1    ______________________________________________________________________    SUBJECTIVE:  Patient seen and examined, she had a virtual visit today, she is complaining worsening of nausea, she had a gastric emptying study which is abnormal, she is asking about further treatment option for gastroparesis, she is also asking refill of Tylenol No  3, Zofran and baclofen, she denies any diarrhea or loose stool, she denies any weight loss, no melena or rectal bleeding    REVIEW OF SYSTEMS IS OTHERWISE NEGATIVE  Historical Information   Past Medical History:   Diagnosis Date    Anxiety     Bipolar disorder with depression ([facility] [address] )     Last assessed: 16    Depression     Endometriosis     Last assessed: 16    GERD (gastroesophageal reflux disease)     Hypertension     Inflammatory bowel disease     Scoliosis     Varicella      Past Surgical History:   Procedure Laterality Date     SECTION      CHOLECYSTECTOMY      COLONOSCOPY      HERNIA REPAIR      HERNIA REPAIR      OTHER SURGICAL HISTORY      Transoral incisionless fundoplication (TIFF)    [place]  DELIVERY ONLY N/A 2016    Procedure:  SECTION ();   Surgeon: Codie Zaidi DO;  [facility];  Service: Obstetrics    [place] INCIS TENDON SHEATH,RADIAL STYLOID Right 2020    Procedure: Judithe Calvin RIGHT WRIST;  Surgeon: Gerard Adame MD;  Location: [facility] MAIN OR;  Service: Orthopedics    [place] LAP,CHOLECYSTECTOMY N/A 3/12/2018    Procedure: [place] Gentle;  Surgeon: Sierra Reyes MD;  Location: [facility] MAIN OR;  Service: General    TONSILECTOMY AND ADNOIDECTOMY Bilateral     TONSILLECTOMY      WISDOM TOOTH EXTRACTION Bilateral      Social History   Social History     Substance and Sexual Activity   Alcohol Use Not Currently    Alcohol/week: 0 0 standard drinks    Comment: rare     Social History     Substance and Sexual Activity   Drug Use No     Social History     Tobacco Use   Smoking Status Light Tobacco Smoker    Packs/day: 0 50    Years: 1 00    Pack years: 0 50    Types: Cigarettes    Start date: 2020    Last attempt to quit: 2016    Years since quittin 8   Smokeless Tobacco Never Used     Family History   Problem Relation Age of Onset    Hypertension Father     Depression Mother     Asthma Mother     Anxiety disorder Mother     Bipolar disorder Mother     Depression Maternal Grandmother     Anxiety disorder Maternal Grandmother     Bipolar disorder Maternal Grandmother        Meds/Allergies       Current Outpatient Medications:     acetaminophen-codeine (TYLENOL #3) 300-30 mg per tablet    acetaminophen-codeine (TYLENOL #3) 300-30 mg per tablet    amLODIPine (NORVASC) 2 5 mg tablet    baclofen 20 mg tablet    busPIRone (BUSPAR) 10 mg tablet    clonazePAM (KlonoPIN) 1 mg tablet    diphenoxylate-atropine (LOMOTIL) 2 5-0 025 mg per tablet    docusate sodium (DULCOLAX) 100 mg capsule    medroxyPROGESTERone (DEPO-PROVERA) 150 mg/mL injection    metoprolol succinate (TOPROL-XL) 25 mg 24 hr tablet    mirtazapine (REMERON) 15 mg tablet    ondansetron (ZOFRAN-ODT) 8 mg disintegrating tablet    QUEtiapine (SEROquel) 200 mg tablet    venlafaxine (EFFEXOR-XR) 150 mg 24 hr capsule    venlafaxine (EFFEXOR-XR) 75 mg 24 hr capsule    Allergies   Allergen Reactions    Sulfa Antibiotics Anaphylaxis, Other (See Comments) and Edema     swelling  swelling  swelling    Reglan [Metoclopramide] Anxiety           Objective     not currently breastfeeding  There is no height or weight on file to calculate BMI  PHYSICAL EXAM:      General Appearance:   Alert, cooperative, no distress   HEENT:   Normocephalic, atraumatic, anicteric      Neck:  Supple, symmetrical, trachea midline   Lungs:   Clear to auscultation bilaterally; no rales, rhonchi or wheezing; respirations unlabored    Heart[de-identified]   Regular rate and rhythm; no murmur, rub, or gallop  Abdomen:   Soft, non-tender, non-distended; normal bowel sounds; no masses, no organomegaly    Genitalia:   Deferred    Rectal:   Deferred    Extremities:  No cyanosis, clubbing or edema    Pulses:  2+ and symmetric    Skin:  No jaundice, rashes, or lesions    Lymph nodes:  No palpable cervical lymphadenopathy        Lab Results:   No visits with results within 1 Day(s) from this visit     Latest known visit with results is:   Lab on 10/17/2020   Component Date Value    Ova + Parasite Exam 10/17/2020 No ova, cysts, or parasites seen     One negative specimen does not rule out the possibility of a  parasitic infection   Fecal Leukocytes 10/17/2020 No WBC's     Giardia Ag, Stl 10/17/2020 Negative      C difficile toxin by PC* 10/17/2020 Negative          Radiology Results:   No results found

## 2021-04-28 ENCOUNTER — TELEPHONE (OUTPATIENT)
Dept: GASTROENTEROLOGY | Facility: CLINIC | Age: 36
End: 2021-04-28

## 2021-04-30 DIAGNOSIS — R10.13 EPIGASTRIC PAIN: ICD-10-CM

## 2021-04-30 RX ORDER — ACETAMINOPHEN AND CODEINE PHOSPHATE 300; 30 MG/1; MG/1
1 TABLET ORAL EVERY 6 HOURS PRN
Qty: 60 TABLET | Refills: 0 | Status: SHIPPED | OUTPATIENT
Start: 2021-04-30 | End: 2021-05-24 | Stop reason: SDUPTHER

## 2021-05-04 ENCOUNTER — ANESTHESIA (OUTPATIENT)
Dept: GASTROENTEROLOGY | Facility: HOSPITAL | Age: 36
End: 2021-05-04

## 2021-05-04 ENCOUNTER — HOSPITAL ENCOUNTER (OUTPATIENT)
Dept: GASTROENTEROLOGY | Facility: HOSPITAL | Age: 36
Setting detail: OUTPATIENT SURGERY
Discharge: HOME | End: 2021-05-04
Attending: INTERNAL MEDICINE | Admitting: INTERNAL MEDICINE
Payer: COMMERCIAL

## 2021-05-04 ENCOUNTER — ANESTHESIA EVENT (OUTPATIENT)
Dept: GASTROENTEROLOGY | Facility: HOSPITAL | Age: 36
End: 2021-05-04

## 2021-05-04 VITALS
RESPIRATION RATE: 12 BRPM | SYSTOLIC BLOOD PRESSURE: 119 MMHG | HEART RATE: 62 BPM | OXYGEN SATURATION: 98 % | BODY MASS INDEX: 23.32 KG/M2 | HEIGHT: 65 IN | DIASTOLIC BLOOD PRESSURE: 76 MMHG | WEIGHT: 140 LBS | TEMPERATURE: 97.8 F

## 2021-05-04 DIAGNOSIS — K31.84 GASTROPARESIS: ICD-10-CM

## 2021-05-04 LAB
EXT PREGNANCY TEST URINE: NEGATIVE
EXT. CONTROL: NORMAL

## 2021-05-04 PROCEDURE — 43236 UPPR GI SCOPE W/SUBMUC INJ: CPT | Performed by: INTERNAL MEDICINE

## 2021-05-04 PROCEDURE — 81025 URINE PREGNANCY TEST: CPT | Performed by: ANESTHESIOLOGY

## 2021-05-04 RX ORDER — SODIUM CHLORIDE, SODIUM LACTATE, POTASSIUM CHLORIDE, CALCIUM CHLORIDE 600; 310; 30; 20 MG/100ML; MG/100ML; MG/100ML; MG/100ML
INJECTION, SOLUTION INTRAVENOUS CONTINUOUS PRN
Status: DISCONTINUED | OUTPATIENT
Start: 2021-05-04 | End: 2021-05-04

## 2021-05-04 RX ORDER — PROPOFOL 10 MG/ML
INJECTION, EMULSION INTRAVENOUS AS NEEDED
Status: DISCONTINUED | OUTPATIENT
Start: 2021-05-04 | End: 2021-05-04

## 2021-05-04 RX ORDER — LIDOCAINE HYDROCHLORIDE 10 MG/ML
INJECTION, SOLUTION EPIDURAL; INFILTRATION; INTRACAUDAL; PERINEURAL AS NEEDED
Status: DISCONTINUED | OUTPATIENT
Start: 2021-05-04 | End: 2021-05-04

## 2021-05-04 RX ADMIN — LIDOCAINE HYDROCHLORIDE 30 MG: 10 INJECTION, SOLUTION EPIDURAL; INFILTRATION; INTRACAUDAL; PERINEURAL at 12:54

## 2021-05-04 RX ADMIN — PROPOFOL 50 MG: 10 INJECTION, EMULSION INTRAVENOUS at 12:55

## 2021-05-04 RX ADMIN — SODIUM CHLORIDE, SODIUM LACTATE, POTASSIUM CHLORIDE, AND CALCIUM CHLORIDE: .6; .31; .03; .02 INJECTION, SOLUTION INTRAVENOUS at 12:08

## 2021-05-04 RX ADMIN — ONABOTULINUMTOXINA 200 UNITS: 100 INJECTION, POWDER, LYOPHILIZED, FOR SOLUTION INTRADERMAL; INTRAMUSCULAR at 12:59

## 2021-05-04 RX ADMIN — PROPOFOL 50 MG: 10 INJECTION, EMULSION INTRAVENOUS at 13:00

## 2021-05-04 RX ADMIN — PROPOFOL 200 MG: 10 INJECTION, EMULSION INTRAVENOUS at 12:54

## 2021-05-04 NOTE — H&P
History and Physical - [facility]  Darrell Forbes 37 y o  female MRN: [id number]                  HPI: Darrell Forbes is a 37y o  year old female who presents for gastroparesis      REVIEW OF SYSTEMS: Per the HPI, and otherwise unremarkable  Historical Information   Past Medical History:   Diagnosis Date    Anxiety     Bipolar disorder with depression ([facility] [address] )     Last assessed: 16    Depression     Endometriosis     Last assessed: 16    GERD (gastroesophageal reflux disease)     Hypertension     Inflammatory bowel disease     Scoliosis     Varicella      Past Surgical History:   Procedure Laterality Date     SECTION      CHOLECYSTECTOMY      COLONOSCOPY      HERNIA REPAIR      HERNIA REPAIR      OTHER SURGICAL HISTORY      Transoral incisionless fundoplication (TIFF)    MD  DELIVERY ONLY N/A 2016    Procedure:  SECTION ();   Surgeon: Levy Harris DO;  Location[facility];  Service: Obstetrics    MD INCIS TENDON SHEATH,RADIAL STYLOID Right 2020    Procedure: Ether Solo RIGHT WRIST;  Surgeon: Elida Mccullough MD;  Location: [address] [place] [address] MAIN OR;  Service: Orthopedics    MD LAP,CHOLECYSTECTOMY N/A 3/12/2018    Procedure: Arch Clark;  Surgeon: Dallas Willard MD;  Location: [facility];  Service: General    TONSILECTOMY AND ADNOIDECTOMY Bilateral     TONSILLECTOMY      WISDOM TOOTH EXTRACTION Bilateral      Social History   Social History     Substance and Sexual Activity   Alcohol Use Not Currently    Alcohol/week: 0 0 standard drinks    Comment: rare     Social History     Substance and Sexual Activity   Drug Use No     Social History     Tobacco Use   Smoking Status Light Tobacco Smoker    Packs/day: 0 50    Years: 1 00    Pack years: 0 50    Types: Cigarettes    Start date: 2020    Last attempt to quit: 2016    Years since quittin 9   Smokeless Tobacco Never Used     Family History Problem Relation Age of Onset    Hypertension Father     Depression Mother     Asthma Mother     Anxiety disorder Mother     Bipolar disorder Mother     Depression Maternal Grandmother     Anxiety disorder Maternal Grandmother     Bipolar disorder Maternal Grandmother        Meds/Allergies     (Not in a hospital admission)      Allergies   Allergen Reactions    Sulfa Antibiotics Anaphylaxis, Other (See Comments) and Edema     swelling  swelling  swelling    Reglan [Metoclopramide] Anxiety       Objective     /86   Pulse 87   Temp 99 5 °F (37 5 °C) (Temporal)   Resp 16   Ht 5' 4 5" (1 638 m)   Wt 63 5 kg (140 lb)   SpO2 95%   BMI 23 66 kg/m²       PHYSICAL EXAM    Gen: NAD  CV: RRR  CHEST: Clear  ABD: soft, NT/ND  EXT: no edema      ASSESSMENT/PLAN:  This is a 37y o  year old female here for EGD with Botox injection, and she is stable and optimized for her procedure

## 2021-05-04 NOTE — ANESTHESIA POSTPROCEDURE EVALUATION
Post-Op Assessment Note    No complications documented      /85 (05/04/21 1302)    Temp 98 5 °F (36 9 °C) (05/04/21 1302)    Pulse 74(NSR w/ BBB) (05/04/21 1302)   Resp 20 (05/04/21 1302)    SpO2 95 % (05/04/21 1302)

## 2021-05-04 NOTE — DISCHARGE INSTRUCTIONS
Upper Endoscopy   WHAT YOU NEED TO KNOW:   An upper endoscopy is also called an upper gastrointestinal (GI) endoscopy, or an esophagogastroduodenoscopy (EGD)  You may feel bloated, gassy, or have some abdominal discomfort after your procedure  Your throat may be sore for 24 to 36 hours  You may burp or pass gas from air that is still inside your body  DISCHARGE INSTRUCTIONS:   Call 911 for any of the following:   · You have sudden chest pain or trouble breathing  Seek care immediately if:   · You feel dizzy or faint  · You have trouble swallowing  · Your bowel movements are very dark or black  · Your abdomen is hard and firm and you have severe pain  · You vomit blood  Contact your healthcare provider if:   · You feel full or bloated and cannot burp or pass gas  · You have not had a bowel movement for 3 days after your procedure  · You have neck pain  · You have a fever or chills  · You have nausea or are vomiting  · You have a rash or hives  · You have questions or concerns about your endoscopy  Relieve a sore throat:  Suck on throat lozenges or crushed ice  Gargle with a small amount of warm salt water  Mix 1 teaspoon of salt and 1 cup of warm water to make salt water  Relieve gas and discomfort from bloating:  Lie on your right side with a heating pad on your abdomen  Take short walks to help pass gas  Eat small meals until bloating is relieved  Rest after your procedure: You have been given medicine to relax you  Do not  drive or make important decisions until the day after your procedure  Return to your normal activity as directed  You can usually return to work the day after your procedure  Follow up with your healthcare provider as directed:  Write down your questions so you remember to ask them during your visits     © 2017 [address] is for End User's use only and may not be sold, redistributed or otherwise used for commercial purposes  All illustrations and images included in CareNotes® are the copyrighted property of [employer]  or Darren Beebe  The above information is an  only  It is not intended as medical advice for individual conditions or treatments  Talk to your doctor, nurse or pharmacist before following any medical regimen to see if it is safe and effective for you  Gastroparesis   WHAT YOU NEED TO KNOW:   Gastroparesis is a condition that causes food to move more slowly than normal from the stomach to the intestines  Gastroparesis is not caused by blockage  Often, the cause may not be known  It may be caused by damage to a nerve that controls muscles used to move food to your small intestines  DISCHARGE INSTRUCTIONS:   You or someone else should call 911 if:   · Your heart is beating faster and you are breathing faster than usual     · You cannot be woken up  Seek care immediately if:   · You are confused or have trouble thinking clearly  · You are dizzy or very drowsy  Contact your healthcare provider if:   · You are urinating less than usual     · Your symptoms return or become worse  · The color of your urine is dark yellow  · You have questions or concerns about your condition or care  Medicines:   · Medicines may  be given to control your nausea and vomiting  You may  also receive medicines that help food move through your stomach at a more normal rate  · Take your medicine as directed  Contact your healthcare provider if you think your medicine is not helping or if you have side effects  Tell him or her if you are allergic to any medicine  Keep a list of the medicines, vitamins, and herbs you take  Include the amounts, and when and why you take them  Bring the list or the pill bottles to follow-up visits  Carry your medicine list with you in case of an emergency  Follow up with your healthcare provider as directed:   You may need tests to check if treatment is working  You may need to see a dietitian for help with a nutrition plan  Write down your questions so you remember to ask them during your visits  Self-care: Your healthcare provider may suggest any of the following:  · Change your eating habits  Take small bites of food to make it easier for your body to digest  You may need to eat several small meals low in fiber and fat throughout the day  Ask your dietitian for help with planning meals  · Do not eat raw fruits, vegetables, or whole grains  These can cause you to have undigested food in your stomach  The undigested food can form a blockage that can become life-threatening  · Drink liquids as directed  Liquids will prevent dehydration caused by vomiting  Slowly drink small amounts of liquids at a time  Ask your healthcare provider how much liquid to drink each day, and which liquids are best for you  You may also need to drink an oral rehydration solution (ORS)  An ORS has the right amounts of sugar, salt, and minerals in water to replace body fluids  · Do not lie down for 2 hours after your meals  Walking and sitting after meals help with digestion  · Control your blood sugar levels if you have diabetes  High blood sugar levels may make your symptoms worse  Ask your healthcare provider how to control your blood sugar levels  © Copyright [address] Information is for End User's use only and may not be sold, redistributed or otherwise used for commercial purposes  All illustrations and images included in CareNotes® are the copyrighted property of [employer]  or [facility]   The above information is an  only  It is not intended as medical advice for individual conditions or treatments  Talk to your doctor, nurse or pharmacist before following any medical regimen to see if it is safe and effective for you

## 2021-05-04 NOTE — ANESTHESIA POSTPROCEDURE EVALUATION
Post-Op Assessment Note    CV Status:  Stable  Pain Score: 0    Pain management: adequate     Mental Status:  Alert and awake   Hydration Status:  Euvolemic   PONV Controlled:  None   Airway Patency:  Patent      Post Op Vitals Reviewed: Yes      Staff: Anesthesiologist   Comments: vss        No complications documented      BP      Temp      Pulse     Resp      SpO2

## 2021-05-04 NOTE — ANESTHESIA PREPROCEDURE EVALUATION
Procedure:  EGD    Relevant Problems   CARDIO   (+) Essential hypertension      GI/HEPATIC   (+) Acid reflux disease      NEURO/PSYCH   (+) Anxiety   (+) Anxious depression        Physical Exam    Airway       Dental       Cardiovascular      Pulmonary      Other Findings  edentuolous      Anesthesia Plan  ASA Score- 2     Anesthesia Type- IV sedation with anesthesia with ASA Monitors  Additional Monitors:   Airway Plan:           Plan Factors-Exercise tolerance (METS): >4 METS  Existing labs reviewed  Patient is a current smoker  Induction-     Postoperative Plan-     Informed Consent- Anesthetic plan and risks discussed with patient  I personally reviewed this patient with the CRNA  Discussed and agreed on the Anesthesia Plan with the GINO Jama

## 2021-05-23 DIAGNOSIS — I10 ESSENTIAL HYPERTENSION: ICD-10-CM

## 2021-05-24 DIAGNOSIS — K63.89 OTHER SPECIFIED DISEASES OF INTESTINE: ICD-10-CM

## 2021-05-24 DIAGNOSIS — R10.13 EPIGASTRIC PAIN: ICD-10-CM

## 2021-05-24 RX ORDER — AMLODIPINE BESYLATE 2.5 MG/1
TABLET ORAL
Qty: 90 TABLET | Refills: 1 | OUTPATIENT
Start: 2021-05-24

## 2021-05-25 RX ORDER — ACETAMINOPHEN AND CODEINE PHOSPHATE 300; 30 MG/1; MG/1
1 TABLET ORAL EVERY 6 HOURS PRN
Qty: 60 TABLET | Refills: 0 | Status: SHIPPED | OUTPATIENT
Start: 2021-05-25 | End: 2021-06-21 | Stop reason: SDUPTHER

## 2021-05-25 RX ORDER — BACLOFEN 20 MG/1
20 TABLET ORAL 3 TIMES DAILY
Qty: 90 TABLET | Refills: 0 | Status: SHIPPED | OUTPATIENT
Start: 2021-05-25 | End: 2021-06-21 | Stop reason: SDUPTHER

## 2021-06-04 ENCOUNTER — OFFICE VISIT (OUTPATIENT)
Dept: GASTROENTEROLOGY | Facility: CLINIC | Age: 36
End: 2021-06-04
Payer: COMMERCIAL

## 2021-06-04 VITALS
BODY MASS INDEX: 22.66 KG/M2 | HEART RATE: 109 BPM | SYSTOLIC BLOOD PRESSURE: 141 MMHG | HEIGHT: 65 IN | WEIGHT: 136 LBS | DIASTOLIC BLOOD PRESSURE: 104 MMHG

## 2021-06-04 DIAGNOSIS — K31.84 GASTROPARESIS: Primary | ICD-10-CM

## 2021-06-04 DIAGNOSIS — R11.0 NAUSEA: ICD-10-CM

## 2021-06-04 DIAGNOSIS — K21.9 GASTROESOPHAGEAL REFLUX DISEASE WITHOUT ESOPHAGITIS: ICD-10-CM

## 2021-06-04 DIAGNOSIS — K59.1 FUNCTIONAL DIARRHEA: ICD-10-CM

## 2021-06-04 DIAGNOSIS — K22.70 BARRETT'S ESOPHAGUS WITHOUT DYSPLASIA: ICD-10-CM

## 2021-06-04 PROCEDURE — 4004F PT TOBACCO SCREEN RCVD TLK: CPT | Performed by: INTERNAL MEDICINE

## 2021-06-04 PROCEDURE — 3008F BODY MASS INDEX DOCD: CPT | Performed by: INTERNAL MEDICINE

## 2021-06-04 PROCEDURE — 99214 OFFICE O/P EST MOD 30 MIN: CPT | Performed by: INTERNAL MEDICINE

## 2021-06-04 NOTE — PROGRESS NOTES
[facility] - Outpatient Follow-up Note  Roman Gist 37 y o  female MRN: [id number]  Encounter: [id number]          ASSESSMENT AND PLAN:      1  Gastroparesis   patient with history of gastroparesis but is allergic to Reglan and she is getting EGD with Botox every 3-6 months and she did not have significant benefit from the recent EGD with Botox but her bowel habits have improved  We will plan for EGD with Botox in September  2  Gastroesophageal reflux disease without esophagitis   status post TIF procedure,  Reflux symptoms are controlled without medication,  Does have frequent eructation which is likely related to anxiety    3  Nausea   continue Zofran 8 mg every 8 hours as needed    4  Mcallister's esophagus without dysplasia   patient with history of Mcallister's on previous biopsies and recommended EGD later this year for surveillance biopsies    5  Functional diarrhea   diarrhea has improved and no longer is needing Lomotil and was actually constipated but bowel habits have normalized since she had EGD with Botox and colonoscopy was recommended to be done later this year due to poor prep    The patient was seen examined with Dr Jennifer Cyr    ______________________________________________________________________    SUBJECTIVE:    38 y/o female who presents for follow up  She was complaining worsening of nausea, she had a gastric emptying study which was abnormal,underwent EGD with botox but still with symptoms of nausea, belching, fatigue  Pt remains on tylenol No  3, Zofran and baclofen  She denies any diarrhea or loose stool, she denies any weight loss, no melena or rectal bleeding  Patient his taking the baclofen which has helped with the belching  Patient a longer needs to take any medication for reflux as she is status post TIF procedure    She was taking MiraLax for her constipation which subsequently was discontinued as she has been moving her bowels regularly since EGD with Botox          REVIEW OF SYSTEMS IS OTHERWISE NEGATIVE  Historical Information   Past Medical History:   Diagnosis Date    Anxiety     Bipolar disorder with depression ([facility] [address] )     Last assessed: 16    Depression     Endometriosis     Last assessed: 16    GERD (gastroesophageal reflux disease)     Hypertension     Inflammatory bowel disease     Scoliosis     Varicella      Past Surgical History:   Procedure Laterality Date     SECTION      CHOLECYSTECTOMY      COLONOSCOPY      HERNIA REPAIR      HERNIA REPAIR      OTHER SURGICAL HISTORY      Transoral incisionless fundoplication (TIFF)    [place]  DELIVERY ONLY N/A 2016    Procedure:  SECTION ();   Surgeon: Melany Price DO;  Location: [facility];  Service: Obstetrics    [place] INCIS TENDON SHEATH,RADIAL STYLOID Right 2020    Procedure: Pearlean Gutting RIGHT WRIST;  Surgeon: Wilber Hinton MD;  Location: [address] [place] [address] MAIN OR;  Service: Orthopedics    ME LAP,CHOLECYSTECTOMY N/A 3/12/2018    Procedure: Feliz Guerra;  Surgeon: Mariela Rivers MD;  Location: [place] MAIN [place];  Service: General    TONSILECTOMY AND ADNOIDECTOMY Bilateral     TONSILLECTOMY      WISDOM TOOTH EXTRACTION Bilateral      Social History   Social History     Substance and Sexual Activity   Alcohol Use Not Currently    Alcohol/week: 0 0 standard drinks    Comment: rare     Social History     Substance and Sexual Activity   Drug Use No     Social History     Tobacco Use   Smoking Status Light Tobacco Smoker    Packs/day: 0 50    Years: 1 00    Pack years: 0 50    Types: Cigarettes    Start date: 2020    Last attempt to quit: 2016    Years since quittin 0   Smokeless Tobacco Never Used     Family History   Problem Relation Age of Onset    Hypertension Father     Depression Mother     Asthma Mother     Anxiety disorder Mother     Bipolar disorder Mother     Depression Maternal Grandmother     Anxiety disorder Maternal Grandmother     Bipolar disorder Maternal Grandmother        Meds/Allergies       Current Outpatient Medications:     acetaminophen-codeine (TYLENOL #3) 300-30 mg per tablet    amLODIPine (NORVASC) 2 5 mg tablet    baclofen 20 mg tablet    busPIRone (BUSPAR) 10 mg tablet    clonazePAM (KlonoPIN) 1 mg tablet    diphenoxylate-atropine (LOMOTIL) 2 5-0 025 mg per tablet    docusate sodium (DULCOLAX) 100 mg capsule    medroxyPROGESTERone (DEPO-PROVERA) 150 mg/mL injection    metoprolol succinate (TOPROL-XL) 25 mg 24 hr tablet    mirtazapine (REMERON) 15 mg tablet    ondansetron (ZOFRAN-ODT) 8 mg disintegrating tablet    QUEtiapine (SEROquel) 200 mg tablet    venlafaxine (EFFEXOR-XR) 150 mg 24 hr capsule    venlafaxine (EFFEXOR-XR) 75 mg 24 hr capsule    acetaminophen-codeine (TYLENOL #3) 300-30 mg per tablet    Allergies   Allergen Reactions    Sulfa Antibiotics Anaphylaxis, Other (See Comments) and Edema     swelling  swelling  swelling    Reglan [Metoclopramide] Anxiety           Objective     Blood pressure (!) 141/104, pulse (!) 109, height 5' 4 5" (1 638 m), weight 61 7 kg (136 lb), not currently breastfeeding  Body mass index is 22 98 kg/m²  PHYSICAL EXAM:      General Appearance:   Alert, cooperative, no distress   HEENT:   Normocephalic, atraumatic, anicteric      Neck:  Supple, symmetrical, trachea midline   Lungs:   Clear to auscultation bilaterally; no rales, rhonchi or wheezing; respirations unlabored    Heart[de-identified]   Regular rate and rhythm; no murmur, rub, or gallop  Abdomen:   Soft, non-tender, non-distended; normal bowel sounds; no masses, no organomegaly    Genitalia:   Deferred    Rectal:   Deferred    Extremities:  No cyanosis, clubbing or edema    Pulses:  2+ and symmetric    Skin:  No jaundice, rashes, or lesions    Lymph nodes:  No palpable cervical lymphadenopathy        Lab Results:   No visits with results within 1 Day(s) from this visit     Latest known visit with results is:   Hospital Outpatient Visit on 05/04/2021   Component Date Value    EXT Preg Test, Ur 05/04/2021 Negative     Control 05/04/2021 Valid        EGD-5/4-  IMPRESSION:  1  Gastroparesis with large amount of bile and liquid food in the stomach, status post intra pyloric Botox injection  2  Status post TIF, fundoplication wrap appeared intact  3  Normal esophagus  4  Normal duodenum     RECOMMENDATION:  1  Gastroparesis diet  2   Follow-up office visit in 4-6 weeks

## 2021-06-08 ENCOUNTER — TELEPHONE (OUTPATIENT)
Dept: INTERNAL MEDICINE CLINIC | Facility: CLINIC | Age: 36
End: 2021-06-08

## 2021-06-08 DIAGNOSIS — I10 ESSENTIAL HYPERTENSION: ICD-10-CM

## 2021-06-08 RX ORDER — AMLODIPINE BESYLATE 2.5 MG/1
2.5 TABLET ORAL DAILY
Qty: 90 TABLET | Refills: 1 | Status: SHIPPED | OUTPATIENT
Start: 2021-06-08 | End: 2021-12-03 | Stop reason: SDUPTHER

## 2021-06-08 NOTE — TELEPHONE ENCOUNTER
Alejandro Sabillon called to get a refill for the amLODIPine (NORVASC) 2 5 mg tablet Take 1 tablet (2 5 mg total) by mouth daily send to [address]

## 2021-06-18 DIAGNOSIS — F31.81 BIPOLAR 2 DISORDER, MAJOR DEPRESSIVE EPISODE (HCC): ICD-10-CM

## 2021-06-18 RX ORDER — QUETIAPINE FUMARATE 200 MG/1
TABLET, FILM COATED ORAL
Qty: 90 TABLET | Refills: 2 | Status: SHIPPED | OUTPATIENT
Start: 2021-06-18 | End: 2021-09-12

## 2021-06-21 DIAGNOSIS — K63.89 OTHER SPECIFIED DISEASES OF INTESTINE: ICD-10-CM

## 2021-06-21 DIAGNOSIS — R10.13 EPIGASTRIC PAIN: ICD-10-CM

## 2021-06-21 RX ORDER — BACLOFEN 20 MG/1
20 TABLET ORAL 3 TIMES DAILY
Qty: 90 TABLET | Refills: 0 | Status: SHIPPED | OUTPATIENT
Start: 2021-06-21 | End: 2021-07-15 | Stop reason: SDUPTHER

## 2021-06-21 RX ORDER — ACETAMINOPHEN AND CODEINE PHOSPHATE 300; 30 MG/1; MG/1
1 TABLET ORAL EVERY 6 HOURS PRN
Qty: 60 TABLET | Refills: 0 | Status: SHIPPED | OUTPATIENT
Start: 2021-06-21 | End: 2021-07-15 | Stop reason: SDUPTHER

## 2021-06-23 ENCOUNTER — TELEMEDICINE (OUTPATIENT)
Dept: PSYCHIATRY | Facility: CLINIC | Age: 36
End: 2021-06-23
Payer: COMMERCIAL

## 2021-06-23 DIAGNOSIS — F33.1 MODERATE EPISODE OF RECURRENT MAJOR DEPRESSIVE DISORDER (HCC): Primary | ICD-10-CM

## 2021-06-23 DIAGNOSIS — F31.81 BIPOLAR 2 DISORDER, MAJOR DEPRESSIVE EPISODE (HCC): ICD-10-CM

## 2021-06-23 PROCEDURE — 99442 PR PHYS/QHP TELEPHONE EVALUATION 11-20 MIN: CPT | Performed by: NURSE PRACTITIONER

## 2021-06-23 RX ORDER — CLONAZEPAM 1 MG/1
TABLET ORAL
Qty: 120 TABLET | Refills: 2 | Status: SHIPPED | OUTPATIENT
Start: 2021-06-23 | End: 2021-11-04

## 2021-06-23 RX ORDER — MIRTAZAPINE 15 MG/1
15 TABLET, FILM COATED ORAL
Qty: 30 TABLET | Refills: 5 | Status: SHIPPED | OUTPATIENT
Start: 2021-06-23 | End: 2021-12-29 | Stop reason: SDUPTHER

## 2021-06-23 RX ORDER — VENLAFAXINE HYDROCHLORIDE 75 MG/1
CAPSULE, EXTENDED RELEASE ORAL
Qty: 30 CAPSULE | Refills: 5 | Status: SHIPPED | OUTPATIENT
Start: 2021-06-23 | End: 2021-06-26

## 2021-06-23 NOTE — PSYCH
Virtual Brief Visit    Assessment/Plan:    Problem List Items Addressed This Visit     None      Visit Diagnoses     Moderate episode of recurrent major depressive disorder ([facility] [address] )    -  Primary    Relevant Medications    mirtazapine (REMERON) 15 mg tablet    venlafaxine (EFFEXOR-XR) 75 mg 24 hr capsule    Bipolar 2 disorder, major depressive episode (HCC)        Relevant Medications    clonazePAM (KlonoPIN) 1 mg tablet    mirtazapine (REMERON) 15 mg tablet    venlafaxine (EFFEXOR-XR) 75 mg 24 hr capsule                Reason for visit is   Chief Complaint   Patient presents with    Virtual Brief Visit        Encounter provider MIKE Farnsworth    Provider located at [address] [id number]    Recent Visits  No visits were found meeting these conditions  Showing recent visits within past 7 days and meeting all other requirements  Today's Visits  Date Type Provider Dept   06/23/21 Telemedicine Jose Farnsworth [place] today's visits and meeting all other requirements  Future Appointments  No visits were found meeting these conditions  Showing future appointments within next 150 days and meeting all other requirements       After connecting through telephone, the patient was identified by name and date of birth  Anita Hampton was informed that this is a telemedicine visit and that the visit is being conducted through telephone  My office door was closed  No one else was in the room  She acknowledged consent and understanding of privacy and security of the platform  The patient has agreed to participate and understands she can discontinue the visit at any time  Patient is aware this is a billable service  Subjective    Anita Hampton is a 37 y o  female  Was a history of major depressive disorder, mood disorder bipolar depression    Patient also has significant medical issues and she is followed by Gastroenterology  On examination today, the patient is doing well psychiatrically  Unfortunately, physically she is not doing so well  She had been diagnosed with gastroparesis and is now receiving Botox injections for the same for treatment  So far, the treatment has not helped her so she continues to struggle with food and eating and bloating  Mentally, she is dealing with this as best she can  She also has a 3year-old at home but she has a lot of family support to help  She will continue on: BuSpar 10 mg t i d    Klonopin 1 mg q i d   Most the time, she uses p r n  and use less than 4 tabs per day   Remeron 15 mg HS  Seroquel 200 mg t i d  Effexor  mg daily   Follow-up with me will be in 3 months or sooner if necessary  Patient understands at that time she will be seen on the am well platform  Mental status exam:  Patient is awake alert and oriented x3  Mood is stable  Patient is functioning at a baseline  Patient is not suicidal, not homicidal and not psychotic  Speech is clear and thoughts are organized and coherent  Attention span varies  Patient has moments where she isolates herself from retreat to her room  She has difficulty staying on task  Impulse control is fair  Judgment and insight are fair  Memory is good           Past Medical History:   Diagnosis Date    Anxiety     Bipolar disorder with depression ([facility] [address] )     Last assessed: 16    Depression     Endometriosis     Last assessed: 16    GERD (gastroesophageal reflux disease)     Hypertension     Inflammatory bowel disease     Scoliosis     Varicella        Past Surgical History:   Procedure Laterality Date     SECTION      CHOLECYSTECTOMY      COLONOSCOPY      HERNIA REPAIR      HERNIA REPAIR  2019    OTHER SURGICAL HISTORY      Transoral incisionless fundoplication (TIFF)    [place]  DELIVERY ONLY N/A 2016    Procedure:  SECTION (); Surgeon: Daisy Cervantes DO;  Location[facility];  Service: Obstetrics    [place] INCIS TENDON SHEATH,RADIAL STYLOID Right 2020    Procedure: RELEASE DEQUERVAINS RIGHT WRIST;  Surgeon: Tato Gautam MD;  Location: [address] [place] [address] MAIN OR;  Service: Orthopedics    [place] LAP,CHOLECYSTECTOMY N/A 3/12/2018    Procedure: Nicholas Setter;  Surgeon: Israel Aquino MD;  Location: [address] [place] [address];  Service: General    TONSILECTOMY AND ADNOIDECTOMY Bilateral     TONSILLECTOMY      WISDOM TOOTH EXTRACTION Bilateral        Current Outpatient Medications   Medication Sig Dispense Refill    busPIRone (BUSPAR) 10 mg tablet Take 1 tablet (10 mg total) by mouth 3 (three) times a day 90 tablet 5    clonazePAM (KlonoPIN) 1 mg tablet 1 QID  Do not take within 4 hours of taking opioid pain medication 120 tablet 2    mirtazapine (REMERON) 15 mg tablet Take 1 tablet (15 mg total) by mouth daily at bedtime 30 tablet 5    QUEtiapine (SEROquel) 200 mg tablet TAKE ONE TABLET BY MOUTH THREE TIMES A DAY AT NOON, 5:00PM, AND AT BEDTIME 90 tablet 2    venlafaxine (EFFEXOR-XR) 150 mg 24 hr capsule Take 1 capsule (150 mg total) by mouth daily 30 capsule 5    venlafaxine (EFFEXOR-XR) 75 mg 24 hr capsule 1 Daily 30 capsule 5    acetaminophen-codeine (TYLENOL #3) 300-30 mg per tablet Take 1 tablet by mouth every 6 (six) hours as needed for moderate pain 30 tablet 0    acetaminophen-codeine (TYLENOL #3) 300-30 mg per tablet Take 1 tablet by mouth every 6 (six) hours as needed for moderate pain 60 tablet 0    amLODIPine (NORVASC) 2 5 mg tablet Take 1 tablet (2 5 mg total) by mouth daily 90 tablet 1    baclofen 20 mg tablet Take 1 tablet (20 mg total) by mouth 3 (three) times a day 90 tablet 0    diphenoxylate-atropine (LOMOTIL) 2 5-0 025 mg per tablet       docusate sodium (DULCOLAX) 100 mg capsule Take 1 capsule by mouth daily as needed        medroxyPROGESTERone (DEPO-PROVERA) 150 mg/mL injection INJECT EVERY 12 WEEK AS DIRECTED 1 mL 0    metoprolol succinate (TOPROL-XL) 25 mg 24 hr tablet Take 1 tablet (25 mg total) by mouth daily 90 tablet 1    ondansetron (ZOFRAN-ODT) 8 mg disintegrating tablet Take 1 tablet (8 mg total) by mouth as needed for nausea (EVERY 6 HOURS AS NEEDED FOR NAUSEA) 60 tablet 1     Current Facility-Administered Medications   Medication Dose Route Frequency Provider Last Rate Last Admin    [START ON 9/30/2021] Botulinum Toxin Type A SOLR 200 Units  200 Units Injection Once [employer], PA-C            Allergies   Allergen Reactions    Sulfa Antibiotics Anaphylaxis, Other (See Comments) and Edema     swelling  swelling  swelling    Reglan [Metoclopramide] Anxiety       Review of Systems    There were no vitals filed for this visit  I spent 15 minutes with patient today in which greater than 50% of the time was spent in counseling/coordination of care regarding Medication management and treatment    VIRTUAL VISIT DISCLAIMER    Eric Whipple acknowledges that she has consented to an online visit or consultation  She understands that the online visit is based solely on information provided by her, and that, in the absence of a face-to-face physical evaluation by the physician, the diagnosis she receives is both limited and provisional in terms of accuracy and completeness  This is not intended to replace a full medical face-to-face evaluation by the physician  Eric Whipple understands and accepts these terms

## 2021-06-26 DIAGNOSIS — F31.81 BIPOLAR 2 DISORDER, MAJOR DEPRESSIVE EPISODE (HCC): ICD-10-CM

## 2021-06-26 RX ORDER — VENLAFAXINE HYDROCHLORIDE 75 MG/1
CAPSULE, EXTENDED RELEASE ORAL
Qty: 30 CAPSULE | Refills: 5 | Status: SHIPPED | OUTPATIENT
Start: 2021-06-26 | End: 2021-12-14

## 2021-07-15 DIAGNOSIS — K63.89 OTHER SPECIFIED DISEASES OF INTESTINE: ICD-10-CM

## 2021-07-15 DIAGNOSIS — R10.13 EPIGASTRIC PAIN: ICD-10-CM

## 2021-07-15 RX ORDER — ACETAMINOPHEN AND CODEINE PHOSPHATE 300; 30 MG/1; MG/1
1 TABLET ORAL EVERY 6 HOURS PRN
Qty: 60 TABLET | Refills: 0 | Status: SHIPPED | OUTPATIENT
Start: 2021-07-15 | End: 2021-08-11 | Stop reason: SDUPTHER

## 2021-07-15 RX ORDER — BACLOFEN 20 MG/1
20 TABLET ORAL 3 TIMES DAILY
Qty: 90 TABLET | Refills: 0 | Status: SHIPPED | OUTPATIENT
Start: 2021-07-15 | End: 2021-08-11 | Stop reason: SDUPTHER

## 2021-08-04 DIAGNOSIS — I10 ESSENTIAL HYPERTENSION: ICD-10-CM

## 2021-08-05 RX ORDER — CHLORHEXIDINE GLUCONATE 0.12 MG/ML
RINSE ORAL
COMMUNITY
Start: 2021-07-09 | End: 2021-12-20 | Stop reason: ALTCHOICE

## 2021-08-05 RX ORDER — METOPROLOL SUCCINATE 25 MG/1
25 TABLET, EXTENDED RELEASE ORAL DAILY
Qty: 90 TABLET | Refills: 1 | Status: SHIPPED | OUTPATIENT
Start: 2021-08-05 | End: 2022-02-07 | Stop reason: SDUPTHER

## 2021-08-11 DIAGNOSIS — K63.89 OTHER SPECIFIED DISEASES OF INTESTINE: ICD-10-CM

## 2021-08-11 DIAGNOSIS — R10.13 EPIGASTRIC PAIN: ICD-10-CM

## 2021-08-11 RX ORDER — BACLOFEN 20 MG/1
20 TABLET ORAL 3 TIMES DAILY
Qty: 90 TABLET | Refills: 0 | Status: SHIPPED | OUTPATIENT
Start: 2021-08-11 | End: 2021-09-07 | Stop reason: SDUPTHER

## 2021-08-11 RX ORDER — ACETAMINOPHEN AND CODEINE PHOSPHATE 300; 30 MG/1; MG/1
1 TABLET ORAL EVERY 6 HOURS PRN
Qty: 60 TABLET | Refills: 0 | Status: SHIPPED | OUTPATIENT
Start: 2021-08-11 | End: 2021-09-07 | Stop reason: SDUPTHER

## 2021-08-17 ENCOUNTER — OFFICE VISIT (OUTPATIENT)
Dept: INTERNAL MEDICINE CLINIC | Age: 36
End: 2021-08-17
Payer: COMMERCIAL

## 2021-08-17 VITALS
OXYGEN SATURATION: 98 % | DIASTOLIC BLOOD PRESSURE: 60 MMHG | WEIGHT: 129 LBS | BODY MASS INDEX: 21.49 KG/M2 | HEIGHT: 65 IN | SYSTOLIC BLOOD PRESSURE: 128 MMHG | TEMPERATURE: 98.8 F | HEART RATE: 108 BPM

## 2021-08-17 DIAGNOSIS — F17.210 CIGARETTE NICOTINE DEPENDENCE WITHOUT COMPLICATION: ICD-10-CM

## 2021-08-17 DIAGNOSIS — F31.81 BIPOLAR 2 DISORDER, MAJOR DEPRESSIVE EPISODE (HCC): ICD-10-CM

## 2021-08-17 DIAGNOSIS — I10 ESSENTIAL HYPERTENSION: Primary | ICD-10-CM

## 2021-08-17 DIAGNOSIS — F41.9 ANXIETY: ICD-10-CM

## 2021-08-17 DIAGNOSIS — L98.9 SKIN LESION OF BACK: ICD-10-CM

## 2021-08-17 PROCEDURE — 3008F BODY MASS INDEX DOCD: CPT | Performed by: INTERNAL MEDICINE

## 2021-08-17 PROCEDURE — 99214 OFFICE O/P EST MOD 30 MIN: CPT | Performed by: INTERNAL MEDICINE

## 2021-08-17 PROCEDURE — 4004F PT TOBACCO SCREEN RCVD TLK: CPT | Performed by: INTERNAL MEDICINE

## 2021-08-17 NOTE — PROGRESS NOTES
Assessment/Plan:    1  Essential hypertension  Blood pressure is stable on present regimen of amlodipine and the metoprolol  Continue with healthy lifestyle and exercise    2  GERD/gastroparesis  Being managed by Gastroenterology    3  Bipolar disorder/anxiety  Stable  Being followed by psychiatrist    4  Skin lesion over back  Looks like pigmented nevus but the melanoma cannot be rule out  Will refer patient to dermatologist for further management  Diagnoses and all orders for this visit:    Essential hypertension  -     CBC; Future  -     Comprehensive metabolic panel; Future    Anxiety    Cigarette nicotine dependence without complication    Skin lesion of back  -     Ambulatory referral to Dermatology; Future    Bipolar 2 disorder, major depressive episode (HCC)               Subjective:          Patient ID: Yeny Hines is a 37 y o  female  Patient is here for follow-up  No blood work prior to this visit  The following portions of the patient's history were reviewed and updated as appropriate: allergies, current medications, past family history, past medical history, past social history, past surgical history and problem list     Review of Systems   Constitutional: Negative for fatigue and fever  HENT: Negative for congestion, ear discharge, ear pain, postnasal drip, sinus pressure, sore throat, tinnitus and trouble swallowing  Eyes: Negative for discharge, itching and visual disturbance  Respiratory: Negative for cough and shortness of breath  Cardiovascular: Negative for chest pain and palpitations  Gastrointestinal: Positive for abdominal pain  Negative for diarrhea, nausea and vomiting  Endocrine: Negative for cold intolerance and polyuria  Genitourinary: Negative for difficulty urinating, dysuria and urgency  Musculoskeletal: Negative for arthralgias and neck pain  Skin: Negative for rash  Allergic/Immunologic: Negative for environmental allergies     Neurological: Negative for dizziness, weakness and headaches  Psychiatric/Behavioral: Negative for agitation and behavioral problems  The patient is not nervous/anxious  Past Medical History:   Diagnosis Date    Anxiety     Bipolar 2 disorder, major depressive episode ([facility] ) 8/17/2021    Bipolar disorder with depression ([facility] )     Last assessed: 5/11/16    Depression     Endometriosis     Last assessed: 5/11/16    GERD (gastroesophageal reflux disease)     Hypertension     Inflammatory bowel disease     Scoliosis     Varicella          Current Outpatient Medications:     acetaminophen-codeine (TYLENOL #3) 300-30 mg per tablet, Take 1 tablet by mouth every 6 (six) hours as needed for moderate pain, Disp: 30 tablet, Rfl: 0    amLODIPine (NORVASC) 2 5 mg tablet, Take 1 tablet (2 5 mg total) by mouth daily, Disp: 90 tablet, Rfl: 1    baclofen 20 mg tablet, Take 1 tablet (20 mg total) by mouth 3 (three) times a day, Disp: 90 tablet, Rfl: 0    busPIRone (BUSPAR) 10 mg tablet, Take 1 tablet (10 mg total) by mouth 3 (three) times a day, Disp: 90 tablet, Rfl: 5    clonazePAM (KlonoPIN) 1 mg tablet, 1 QID  Do not take within 4 hours of taking opioid pain medication, Disp: 120 tablet, Rfl: 2    docusate sodium (DULCOLAX) 100 mg capsule, Take 1 capsule by mouth daily as needed  , Disp: , Rfl:     medroxyPROGESTERone (DEPO-PROVERA) 150 mg/mL injection, INJECT EVERY 12 WEEK AS DIRECTED, Disp: 1 mL, Rfl: 0    metoprolol succinate (TOPROL-XL) 25 mg 24 hr tablet, Take 1 tablet (25 mg total) by mouth daily, Disp: 90 tablet, Rfl: 1    mirtazapine (REMERON) 15 mg tablet, Take 1 tablet (15 mg total) by mouth daily at bedtime, Disp: 30 tablet, Rfl: 5    ondansetron (ZOFRAN-ODT) 8 mg disintegrating tablet, Take 1 tablet (8 mg total) by mouth as needed for nausea (EVERY 6 HOURS AS NEEDED FOR NAUSEA), Disp: 60 tablet, Rfl: 1    QUEtiapine (SEROquel) 200 mg tablet, TAKE ONE TABLET BY MOUTH THREE TIMES A DAY AT NOON, 5:00PM, AND AT BEDTIME, Disp: 90 tablet, Rfl: 2    venlafaxine (EFFEXOR-XR) 150 mg 24 hr capsule, Take 1 capsule (150 mg total) by mouth daily, Disp: 30 capsule, Rfl: 5    venlafaxine (EFFEXOR-XR) 75 mg 24 hr capsule, TAKE ONE CAPSULE BY MOUTH EVERY DAY ALONG WITH 150MG CAPSULES FOR A TOTAL DAILY DOSE OF 225MG, Disp: 30 capsule, Rfl: 5    acetaminophen-codeine (TYLENOL #3) 300-30 mg per tablet, Take 1 tablet by mouth every 6 (six) hours as needed for moderate pain (Patient not taking: Reported on 2021), Disp: 60 tablet, Rfl: 0    chlorhexidine (PERIDEX) 0 12 % solution, RINSE MOUTH WITH 15 ML (1 CAPFUL) FOR 30 SECONDS IN THE MORNING AND IN THE EVENING AFTER TOOTHBRUSHING (Patient not taking: Reported on 2021), Disp: , Rfl:     Current Facility-Administered Medications:     [START ON 2021] Botulinum Toxin Type A SOLR 200 Units, 200 Units, Injection, Once, [employer], PA-C    Allergies   Allergen Reactions    Sulfa Antibiotics Anaphylaxis, Other (See Comments) and Edema     swelling  swelling  swelling    Reglan [Metoclopramide] Anxiety       Social History   Past Surgical History:   Procedure Laterality Date     SECTION      CHOLECYSTECTOMY      COLONOSCOPY      HERNIA REPAIR      HERNIA REPAIR      OTHER SURGICAL HISTORY      Transoral incisionless fundoplication (TIFF)    [place]  DEL[place] [facility] N[place] [facility]    Procedure:  SECTION ();   Surgeon: Dale Crouch DO;  [facility];  Service: Obstetrics    [place] INCIS TENDON SHEATH,RADIAL STYLOID Right 2020    Procedure: RELEASE Carmen Pavon RIGHT WRIST;  Surgeon: Sara Jacob MD;  Location: [address] [place] [address] MAIN OR;  Service: Orthopedics    [place] LAP,CHOLECYSTECTOMY N/A 3/12/2018    Procedure: Vivi Jones;  Surgeon: Homer Tracey MD;  Location: [place] MAIN OR;  Service: General    TONSILECTOMY AND ADNOIDECTOMY Bilateral     TONSILLECTOMY      WISDOM TOOTH EXTRACTION Bilateral      Family History   Problem Relation Age of Onset    Hypertension Father     Depression Mother     Asthma Mother     Anxiety disorder Mother     Bipolar disorder Mother     Depression Maternal Grandmother     Anxiety disorder Maternal Grandmother     Bipolar disorder Maternal Grandmother        Objective:  /60 (BP Location: Left arm, Patient Position: Sitting, Cuff Size: Standard)   Pulse (!) 108   Temp 98 8 °F (37 1 °C) (Temporal)   Ht 5' 4 5" (1 638 m)   Wt 58 5 kg (129 lb)   SpO2 98%   BMI 21 80 kg/m²   Body mass index is 21 8 kg/m²  Physical Exam  Constitutional:       Appearance: She is well-developed  HENT:      Head: Normocephalic  Right Ear: External ear normal       Left Ear: External ear normal       Nose: No rhinorrhea  Mouth/Throat:      Pharynx: No posterior oropharyngeal erythema  Eyes:      General: No scleral icterus  Pupils: Pupils are equal, round, and reactive to light  Neck:      Thyroid: No thyromegaly  Trachea: No tracheal deviation  Cardiovascular:      Rate and Rhythm: Normal rate and regular rhythm  Heart sounds: Normal heart sounds  Pulmonary:      Effort: Pulmonary effort is normal  No respiratory distress  Breath sounds: Normal breath sounds  Chest:      Chest wall: No tenderness  Abdominal:      General: Bowel sounds are normal       Palpations: Abdomen is soft  There is no mass  Tenderness: There is no abdominal tenderness  Musculoskeletal:         General: Normal range of motion  Cervical back: Normal range of motion and neck supple  Right lower leg: No edema  Left lower leg: No edema  Lymphadenopathy:      Cervical: No cervical adenopathy  Skin:     General: Skin is warm  Findings: Lesion present  Comments: Multiple dark pigmented lesion noted over back and neck  Neurological:      Mental Status: She is alert and oriented to person, place, and time  Cranial Nerves: No cranial nerve deficit  Psychiatric:         Behavior: Behavior normal

## 2021-08-19 DIAGNOSIS — R11.0 NAUSEA: ICD-10-CM

## 2021-08-19 RX ORDER — ONDANSETRON 8 MG/1
8 TABLET, ORALLY DISINTEGRATING ORAL AS NEEDED
Qty: 60 TABLET | Refills: 0 | Status: SHIPPED | OUTPATIENT
Start: 2021-08-19 | End: 2021-09-28 | Stop reason: SDUPTHER

## 2021-09-02 ENCOUNTER — TELEPHONE (OUTPATIENT)
Dept: GASTROENTEROLOGY | Facility: CLINIC | Age: 36
End: 2021-09-02

## 2021-09-02 ENCOUNTER — PREP FOR PROCEDURE (OUTPATIENT)
Dept: GASTROENTEROLOGY | Facility: CLINIC | Age: 36
End: 2021-09-02

## 2021-09-02 DIAGNOSIS — K22.70 BARRETT'S ESOPHAGUS WITHOUT DYSPLASIA: ICD-10-CM

## 2021-09-02 DIAGNOSIS — K31.84 GASTROPARESIS: Primary | ICD-10-CM

## 2021-09-07 DIAGNOSIS — R10.13 EPIGASTRIC PAIN: ICD-10-CM

## 2021-09-07 DIAGNOSIS — K63.89 OTHER SPECIFIED DISEASES OF INTESTINE: ICD-10-CM

## 2021-09-07 RX ORDER — ACETAMINOPHEN AND CODEINE PHOSPHATE 300; 30 MG/1; MG/1
1 TABLET ORAL EVERY 6 HOURS PRN
Qty: 60 TABLET | Refills: 0 | Status: SHIPPED | OUTPATIENT
Start: 2021-09-07 | End: 2021-09-27 | Stop reason: SDUPTHER

## 2021-09-07 RX ORDER — BACLOFEN 20 MG/1
20 TABLET ORAL 3 TIMES DAILY
Qty: 90 TABLET | Refills: 0 | Status: SHIPPED | OUTPATIENT
Start: 2021-09-07 | End: 2021-09-27 | Stop reason: SDUPTHER

## 2021-09-12 DIAGNOSIS — F31.81 BIPOLAR 2 DISORDER, MAJOR DEPRESSIVE EPISODE (HCC): ICD-10-CM

## 2021-09-12 RX ORDER — QUETIAPINE FUMARATE 200 MG/1
TABLET, FILM COATED ORAL
Qty: 90 TABLET | Refills: 2 | Status: SHIPPED | OUTPATIENT
Start: 2021-09-12 | End: 2021-12-09 | Stop reason: SDUPTHER

## 2021-09-24 ENCOUNTER — TELEPHONE (OUTPATIENT)
Dept: GASTROENTEROLOGY | Facility: HOSPITAL | Age: 36
End: 2021-09-24

## 2021-09-24 ENCOUNTER — TELEMEDICINE (OUTPATIENT)
Dept: PSYCHIATRY | Facility: CLINIC | Age: 36
End: 2021-09-24
Payer: COMMERCIAL

## 2021-09-24 DIAGNOSIS — F31.9 BIPOLAR DEPRESSION (HCC): Primary | ICD-10-CM

## 2021-09-24 PROCEDURE — 99214 OFFICE O/P EST MOD 30 MIN: CPT | Performed by: NURSE PRACTITIONER

## 2021-09-24 NOTE — PSYCH
TREATMENT PLAN (Medication Management Only)        [facility]    Name and Date of Birth:  Jack Knowles 31 y o  1985  Date of Treatment Plan: September 24, 2021  Diagnosis/Diagnoses:    1  Bipolar depression [facility])      Strengths/Personal Resources for Self-Care: supportive family  Area/Areas of need (in own words):  Patient suffers from chronic mental illness  For now, her mood is stable     1  Long Term Goal: "  Continue to stabilize mood and function at baseline level"  Target Date: 6 months - 3/24/2022  Person/Persons responsible for completion of goal: Priscila  2  Short Term Objective (s) - How will we reach this goal?:   A  Provider new recommended medication/dosage changes and/or continue medication(s): continue current medications as prescribed  B   N/A  Target Date: 6 months - 3/24/2022  Person/Persons Responsible for Completion of Goal: Priscila  Progress Towards Goals: stable  Treatment Modality: medication education at every visit  Review due 6 months from date of this plan: 6 months - 3/24/2022  Expected length of service: over 1 year  My Physician/PA/NP and I have developed this plan together and I agree to work on the goals and objectives  I understand the treatment goals that were developed for my treatment

## 2021-09-24 NOTE — PSYCH
Virtual Regular Visit    Verification of patient location:    Patient is located in the following state in which I hold an active license [place]      Assessment/Plan:    Problem List Items Addressed This Visit     None          Goals addressed in session:  Maintain baseline level of functioning         Reason for visit is   Chief Complaint   Patient presents with    Virtual Regular Visit        Encounter provider MIKE Connor    Provider located at [address] [facility] [id number]      Recent Visits  No visits were found meeting these conditions  Showing recent visits within past 7 days and meeting all other requirements  Future Appointments  No visits were found meeting these conditions  Showing future appointments within next 150 days and meeting all other requirements       The patient was identified by name and date of birth  Shruthi Banerjee was informed that this is a telemedicine visit and that the visit is being conducted through[facility] and patient was informed that this is a secure, HIPAA-compliant platform  She agrees to proceed     My office door was closed  No one else was in the room  She acknowledged consent and understanding of privacy and security of the video platform  The patient has agreed to participate and understands they can discontinue the visit at any time  Patient is aware this is a billable service  Subjective  Shruthi Banerjee is a 31 y o  female  Is seen today for medication management appointment for treatment of bipolar depressive disorder  Patient continues on disability  She continues with multiple medical issues related to gastroparesis  At present, her gastroenterologist's are very pleased with her psychiatric medications and what she is on at this point  They believe it is also helping her gastroparesis  Especially the clonazepam in the Remeron    During the day, her anxiety is under better control and she is improving physically overall  She still has assistance from her family which she requires and as necessary  she will continue on BuSpar 10 mg T i d  Clonazepam 1 mg q i d  Remeron 15 mg HS   Seroquel 200 mg t i d  Effexor  mg daily  Follow-up with me in 3 months or sooner if necessary  Mental status exam:  Patient is awake and alert and oriented x3  Mood is stable  Patient is not suicidal, not homicidal not psychotic  She is functioning at her baseline  She requires much assistance from her family who are there to help her  Speech is clear and thoughts are limited but organized  Attention span is fair  Impulse control is fair  Judgment and insight are fair  Memory is good      Past Medical History:   Diagnosis Date    Anxiety     Bipolar 2 disorder, major depressive episode ([facility] ) 2021    Bipolar disorder with depression ([facility] )     Last assessed: 16    Depression     Endometriosis     Last assessed: 16    GERD (gastroesophageal reflux disease)     Hypertension     Inflammatory bowel disease     Scoliosis     Varicella        Past Surgical History:   Procedure Laterality Date     SECTION      CHOLECYSTECTOMY      COLONOSCOPY      HERNIA REPAIR      HERNIA REPAIR      OTHER SURGICAL HISTORY      Transoral incisionless fundoplication (TIFF)    UT  DELIVERY ONLY N/A 2016    Procedure:  SECTION ();   Surgeon: Neda Beard DO;  Location[facility];  Service: Obstetrics    UT INCIS TENDON SHEATH,RADIAL STYLOID Right 2020    Procedure: RELEASE Jami Reins RIGHT WRIST;  Surgeon: Claudean Doss, MD;  Location: [facility] MAIN OR;  Service: Orthopedics    UT LAP,CHOLECYSTECTOMY N/A 3/12/2018    Procedure: Dagoberto Saldana;  Surgeon: Madie Das MD;  Location: [place] MAIN OR;  Service: General    TONSILECTOMY AND ADNOIDECTOMY Bilateral     TONSILLECTOMY      WISDOM TOOTH EXTRACTION Bilateral        Current Outpatient Medications   Medication Sig Dispense Refill    busPIRone (BUSPAR) 10 mg tablet TAKE ONE TABLET BY MOUTH THREE TIMES A DAY 90 tablet 0    clonazePAM (KlonoPIN) 1 mg tablet 1 QID  Do not take within 4 hours of taking opioid pain medication 120 tablet 2    mirtazapine (REMERON) 15 mg tablet Take 1 tablet (15 mg total) by mouth daily at bedtime 30 tablet 5    QUEtiapine (SEROquel) 200 mg tablet TAKE ONE TABLET BY MOUTH THREE TIMES A DAY AT NOON, 5:00PM, AND AT BEDTIME 90 tablet 2    venlafaxine (EFFEXOR-XR) 150 mg 24 hr capsule TAKE ONE CAPSULE BY MOUTH EVERY DAY 30 capsule 0    venlafaxine (EFFEXOR-XR) 75 mg 24 hr capsule TAKE ONE CAPSULE BY MOUTH EVERY DAY ALONG WITH 150MG CAPSULES FOR A TOTAL DAILY DOSE OF 225MG 30 capsule 5    acetaminophen-codeine (TYLENOL #3) 300-30 mg per tablet Take 1 tablet by mouth every 6 (six) hours as needed for moderate pain 30 tablet 0    acetaminophen-codeine (TYLENOL #3) 300-30 mg per tablet Take 1 tablet by mouth every 6 (six) hours as needed for moderate pain 60 tablet 0    amLODIPine (NORVASC) 2 5 mg tablet Take 1 tablet (2 5 mg total) by mouth daily 90 tablet 1    baclofen 20 mg tablet Take 1 tablet (20 mg total) by mouth 3 (three) times a day 90 tablet 0    chlorhexidine (PERIDEX) 0 12 % solution RINSE MOUTH WITH 15 ML (1 CAPFUL) FOR 30 SECONDS IN THE MORNING AND IN THE EVENING AFTER TOOTHBRUSHING (Patient not taking: Reported on 8/17/2021)      docusate sodium (DULCOLAX) 100 mg capsule Take 1 capsule by mouth daily as needed        medroxyPROGESTERone (DEPO-PROVERA) 150 mg/mL injection INJECT EVERY 12 WEEK AS DIRECTED 1 mL 0    metoprolol succinate (TOPROL-XL) 25 mg 24 hr tablet Take 1 tablet (25 mg total) by mouth daily 90 tablet 1    ondansetron (ZOFRAN-ODT) 8 mg disintegrating tablet Take 1 tablet (8 mg total) by mouth as needed for nausea (EVERY 6 HOURS AS NEEDED FOR NAUSEA) 60 tablet 0     Current Facility-Administered Medications   Medication Dose Route Frequency Provider Last Rate Last Admin    [START ON 9/30/2021] Botulinum Toxin Type A SOLR 200 Units  200 Units Injection Once [employer], PA-C            Allergies   Allergen Reactions    Sulfa Antibiotics Anaphylaxis, Other (See Comments) and Edema     swelling  swelling  swelling    Reglan [Metoclopramide] Anxiety       Review of Systems    Video Exam    There were no vitals filed for this visit  Physical Exam     I spent 25 minutes with patient today in which greater than 50% of the time was spent in counseling/coordination of care regarding Medication management, treatment and chart review    VIRTUAL VISIT DISCLAIMER    Alexis Cole verbally agrees to participate in [employer]  Pt is aware that [employer] could be limited without vital signs or the ability to perform a full hands-on physical Tamanna Rodriguez understands she or the provider may request at any time to terminate the video visit and request the patient to seek care or treatment in person

## 2021-09-27 DIAGNOSIS — R10.13 EPIGASTRIC PAIN: ICD-10-CM

## 2021-09-27 DIAGNOSIS — K63.89 OTHER SPECIFIED DISEASES OF INTESTINE: ICD-10-CM

## 2021-09-27 RX ORDER — ACETAMINOPHEN AND CODEINE PHOSPHATE 300; 30 MG/1; MG/1
1 TABLET ORAL EVERY 6 HOURS PRN
Qty: 60 TABLET | Refills: 0 | Status: SHIPPED | OUTPATIENT
Start: 2021-09-27 | End: 2021-10-26 | Stop reason: SDUPTHER

## 2021-09-27 RX ORDER — BACLOFEN 20 MG/1
20 TABLET ORAL 3 TIMES DAILY
Qty: 90 TABLET | Refills: 0 | Status: SHIPPED | OUTPATIENT
Start: 2021-09-27 | End: 2021-09-28 | Stop reason: SDUPTHER

## 2021-09-28 DIAGNOSIS — R11.0 NAUSEA: ICD-10-CM

## 2021-09-28 DIAGNOSIS — K63.89 OTHER SPECIFIED DISEASES OF INTESTINE: ICD-10-CM

## 2021-09-28 RX ORDER — BACLOFEN 20 MG/1
20 TABLET ORAL 3 TIMES DAILY
Qty: 90 TABLET | Refills: 0 | Status: SHIPPED | OUTPATIENT
Start: 2021-09-28 | End: 2021-10-26 | Stop reason: SDUPTHER

## 2021-09-28 RX ORDER — ONDANSETRON 8 MG/1
8 TABLET, ORALLY DISINTEGRATING ORAL AS NEEDED
Qty: 60 TABLET | Refills: 0 | Status: SHIPPED | OUTPATIENT
Start: 2021-09-28 | End: 2022-01-11 | Stop reason: SDUPTHER

## 2021-10-01 ENCOUNTER — TELEPHONE (OUTPATIENT)
Dept: GASTROENTEROLOGY | Facility: CLINIC | Age: 36
End: 2021-10-01

## 2021-10-26 DIAGNOSIS — K63.89 OTHER SPECIFIED DISEASES OF INTESTINE: ICD-10-CM

## 2021-10-26 DIAGNOSIS — R10.13 EPIGASTRIC PAIN: ICD-10-CM

## 2021-10-26 RX ORDER — BACLOFEN 20 MG/1
20 TABLET ORAL 3 TIMES DAILY
Qty: 90 TABLET | Refills: 0 | Status: SHIPPED | OUTPATIENT
Start: 2021-10-26 | End: 2021-11-17 | Stop reason: SDUPTHER

## 2021-10-26 RX ORDER — ACETAMINOPHEN AND CODEINE PHOSPHATE 300; 30 MG/1; MG/1
1 TABLET ORAL EVERY 6 HOURS PRN
Qty: 60 TABLET | Refills: 0 | Status: SHIPPED | OUTPATIENT
Start: 2021-10-26 | End: 2021-11-17 | Stop reason: SDUPTHER

## 2021-11-01 ENCOUNTER — TELEPHONE (OUTPATIENT)
Dept: GASTROENTEROLOGY | Facility: HOSPITAL | Age: 36
End: 2021-11-01

## 2021-11-08 ENCOUNTER — HOSPITAL ENCOUNTER (OUTPATIENT)
Dept: GASTROENTEROLOGY | Facility: HOSPITAL | Age: 36
Setting detail: OUTPATIENT SURGERY
Discharge: HOME | End: 2021-11-08
Attending: INTERNAL MEDICINE | Admitting: INTERNAL MEDICINE
Payer: COMMERCIAL

## 2021-11-08 ENCOUNTER — ANESTHESIA EVENT (OUTPATIENT)
Dept: GASTROENTEROLOGY | Facility: HOSPITAL | Age: 36
End: 2021-11-08

## 2021-11-08 ENCOUNTER — ANESTHESIA (OUTPATIENT)
Dept: GASTROENTEROLOGY | Facility: HOSPITAL | Age: 36
End: 2021-11-08

## 2021-11-08 VITALS
RESPIRATION RATE: 18 BRPM | TEMPERATURE: 97.9 F | DIASTOLIC BLOOD PRESSURE: 80 MMHG | BODY MASS INDEX: 22.88 KG/M2 | WEIGHT: 134 LBS | OXYGEN SATURATION: 99 % | HEIGHT: 64 IN | SYSTOLIC BLOOD PRESSURE: 124 MMHG | HEART RATE: 62 BPM

## 2021-11-08 DIAGNOSIS — K31.84 GASTROPARESIS: ICD-10-CM

## 2021-11-08 DIAGNOSIS — K22.70 BARRETT'S ESOPHAGUS WITHOUT DYSPLASIA: ICD-10-CM

## 2021-11-08 LAB
EXT PREGNANCY TEST URINE: NEGATIVE
EXT. CONTROL: NORMAL

## 2021-11-08 PROCEDURE — 88341 IMHCHEM/IMCYTCHM EA ADD ANTB: CPT | Performed by: PATHOLOGY

## 2021-11-08 PROCEDURE — 88342 IMHCHEM/IMCYTCHM 1ST ANTB: CPT | Performed by: PATHOLOGY

## 2021-11-08 PROCEDURE — 88305 TISSUE EXAM BY PATHOLOGIST: CPT | Performed by: PATHOLOGY

## 2021-11-08 PROCEDURE — 81025 URINE PREGNANCY TEST: CPT | Performed by: ANESTHESIOLOGY

## 2021-11-08 PROCEDURE — 43239 EGD BIOPSY SINGLE/MULTIPLE: CPT | Performed by: INTERNAL MEDICINE

## 2021-11-08 RX ORDER — LIDOCAINE HYDROCHLORIDE 10 MG/ML
INJECTION, SOLUTION EPIDURAL; INFILTRATION; INTRACAUDAL; PERINEURAL AS NEEDED
Status: DISCONTINUED | OUTPATIENT
Start: 2021-11-08 | End: 2021-11-08

## 2021-11-08 RX ORDER — SODIUM CHLORIDE 9 MG/ML
INJECTION, SOLUTION INTRAVENOUS CONTINUOUS PRN
Status: DISCONTINUED | OUTPATIENT
Start: 2021-11-08 | End: 2021-11-08

## 2021-11-08 RX ORDER — PROPOFOL 10 MG/ML
INJECTION, EMULSION INTRAVENOUS AS NEEDED
Status: DISCONTINUED | OUTPATIENT
Start: 2021-11-08 | End: 2021-11-08

## 2021-11-08 RX ADMIN — PROPOFOL 200 MG: 10 INJECTION, EMULSION INTRAVENOUS at 10:45

## 2021-11-08 RX ADMIN — PROPOFOL 50 MG: 10 INJECTION, EMULSION INTRAVENOUS at 10:49

## 2021-11-08 RX ADMIN — LIDOCAINE HYDROCHLORIDE 50 MG: 10 INJECTION, SOLUTION EPIDURAL; INFILTRATION; INTRACAUDAL; PERINEURAL at 10:36

## 2021-11-08 RX ADMIN — SODIUM CHLORIDE: 9 INJECTION, SOLUTION INTRAVENOUS at 10:28

## 2021-11-17 DIAGNOSIS — K63.89 OTHER SPECIFIED DISEASES OF INTESTINE: ICD-10-CM

## 2021-11-17 DIAGNOSIS — R10.13 EPIGASTRIC PAIN: ICD-10-CM

## 2021-11-18 ENCOUNTER — TELEPHONE (OUTPATIENT)
Dept: GASTROENTEROLOGY | Facility: CLINIC | Age: 36
End: 2021-11-18

## 2021-11-19 RX ORDER — ACETAMINOPHEN AND CODEINE PHOSPHATE 300; 30 MG/1; MG/1
1 TABLET ORAL EVERY 6 HOURS PRN
Qty: 60 TABLET | Refills: 0 | Status: SHIPPED | OUTPATIENT
Start: 2021-11-19 | End: 2021-12-10 | Stop reason: SDUPTHER

## 2021-11-19 RX ORDER — BACLOFEN 20 MG/1
20 TABLET ORAL 3 TIMES DAILY
Qty: 90 TABLET | Refills: 0 | Status: SHIPPED | OUTPATIENT
Start: 2021-11-19 | End: 2021-12-10 | Stop reason: SDUPTHER

## 2021-12-02 ENCOUNTER — NURSE TRIAGE (OUTPATIENT)
Dept: OTHER | Facility: OTHER | Age: 36
End: 2021-12-02

## 2021-12-03 ENCOUNTER — TELEMEDICINE (OUTPATIENT)
Dept: INTERNAL MEDICINE CLINIC | Age: 36
End: 2021-12-03
Payer: COMMERCIAL

## 2021-12-03 VITALS — BODY MASS INDEX: 22.88 KG/M2 | WEIGHT: 134 LBS | TEMPERATURE: 98.8 F | HEIGHT: 64 IN

## 2021-12-03 DIAGNOSIS — I10 ESSENTIAL HYPERTENSION: ICD-10-CM

## 2021-12-03 DIAGNOSIS — U07.1 COVID-19 VIRUS INFECTION: ICD-10-CM

## 2021-12-03 DIAGNOSIS — B34.9 ACUTE VIRAL SYNDROME: Primary | ICD-10-CM

## 2021-12-03 PROCEDURE — U0003 INFECTIOUS AGENT DETECTION BY NUCLEIC ACID (DNA OR RNA); SEVERE ACUTE RESPIRATORY SYNDROME CORONAVIRUS 2 (SARS-COV-2) (CORONAVIRUS DISEASE [COVID-19]), AMPLIFIED PROBE TECHNIQUE, MAKING USE OF HIGH THROUGHPUT TECHNOLOGIES AS DESCRIBED BY CMS-2020-01-R: HCPCS | Performed by: INTERNAL MEDICINE

## 2021-12-03 PROCEDURE — 99213 OFFICE O/P EST LOW 20 MIN: CPT | Performed by: INTERNAL MEDICINE

## 2021-12-03 PROCEDURE — 3008F BODY MASS INDEX DOCD: CPT | Performed by: NURSE PRACTITIONER

## 2021-12-03 PROCEDURE — U0005 INFEC AGEN DETEC AMPLI PROBE: HCPCS | Performed by: INTERNAL MEDICINE

## 2021-12-03 RX ORDER — AMLODIPINE BESYLATE 2.5 MG/1
2.5 TABLET ORAL DAILY
Qty: 90 TABLET | Refills: 1 | Status: SHIPPED | OUTPATIENT
Start: 2021-12-03 | End: 2022-05-31 | Stop reason: SDUPTHER

## 2021-12-09 DIAGNOSIS — F31.81 BIPOLAR 2 DISORDER, MAJOR DEPRESSIVE EPISODE (HCC): ICD-10-CM

## 2021-12-09 RX ORDER — QUETIAPINE FUMARATE 200 MG/1
TABLET, FILM COATED ORAL
Qty: 90 TABLET | Refills: 2 | Status: SHIPPED | OUTPATIENT
Start: 2021-12-09 | End: 2021-12-14

## 2021-12-10 DIAGNOSIS — K63.89 OTHER SPECIFIED DISEASES OF INTESTINE: ICD-10-CM

## 2021-12-10 DIAGNOSIS — R10.13 EPIGASTRIC PAIN: ICD-10-CM

## 2021-12-12 ENCOUNTER — NURSE TRIAGE (OUTPATIENT)
Dept: OTHER | Facility: OTHER | Age: 36
End: 2021-12-12

## 2021-12-12 DIAGNOSIS — Z20.828 SARS-ASSOCIATED CORONAVIRUS EXPOSURE: Primary | ICD-10-CM

## 2021-12-13 PROCEDURE — U0003 INFECTIOUS AGENT DETECTION BY NUCLEIC ACID (DNA OR RNA); SEVERE ACUTE RESPIRATORY SYNDROME CORONAVIRUS 2 (SARS-COV-2) (CORONAVIRUS DISEASE [COVID-19]), AMPLIFIED PROBE TECHNIQUE, MAKING USE OF HIGH THROUGHPUT TECHNOLOGIES AS DESCRIBED BY CMS-2020-01-R: HCPCS | Performed by: FAMILY MEDICINE

## 2021-12-13 PROCEDURE — U0005 INFEC AGEN DETEC AMPLI PROBE: HCPCS | Performed by: FAMILY MEDICINE

## 2021-12-13 RX ORDER — ACETAMINOPHEN AND CODEINE PHOSPHATE 300; 30 MG/1; MG/1
1 TABLET ORAL EVERY 6 HOURS PRN
Qty: 60 TABLET | Refills: 0 | Status: SHIPPED | OUTPATIENT
Start: 2021-12-13 | End: 2022-01-14 | Stop reason: SDUPTHER

## 2021-12-13 RX ORDER — BACLOFEN 20 MG/1
20 TABLET ORAL 3 TIMES DAILY
Qty: 90 TABLET | Refills: 0 | Status: SHIPPED | OUTPATIENT
Start: 2021-12-13 | End: 2022-01-14 | Stop reason: SDUPTHER

## 2021-12-14 ENCOUNTER — TELEPHONE (OUTPATIENT)
Dept: OTHER | Facility: OTHER | Age: 36
End: 2021-12-14

## 2021-12-20 ENCOUNTER — TELEMEDICINE (OUTPATIENT)
Dept: INTERNAL MEDICINE CLINIC | Age: 36
End: 2021-12-20
Payer: COMMERCIAL

## 2021-12-20 DIAGNOSIS — U07.1 COVID-19: Primary | ICD-10-CM

## 2021-12-20 PROCEDURE — 99213 OFFICE O/P EST LOW 20 MIN: CPT | Performed by: NURSE PRACTITIONER

## 2021-12-29 ENCOUNTER — TELEMEDICINE (OUTPATIENT)
Dept: PSYCHIATRY | Facility: CLINIC | Age: 36
End: 2021-12-29
Payer: COMMERCIAL

## 2021-12-29 DIAGNOSIS — F31.9 BIPOLAR DISORDER WITH DEPRESSION (HCC): Primary | ICD-10-CM

## 2021-12-29 DIAGNOSIS — F31.81 BIPOLAR 2 DISORDER, MAJOR DEPRESSIVE EPISODE (HCC): ICD-10-CM

## 2021-12-29 PROCEDURE — 4004F PT TOBACCO SCREEN RCVD TLK: CPT | Performed by: NURSE PRACTITIONER

## 2021-12-29 PROCEDURE — 99214 OFFICE O/P EST MOD 30 MIN: CPT | Performed by: NURSE PRACTITIONER

## 2021-12-29 RX ORDER — VENLAFAXINE HYDROCHLORIDE 150 MG/1
150 CAPSULE, EXTENDED RELEASE ORAL DAILY
Qty: 30 CAPSULE | Refills: 5 | Status: SHIPPED | OUTPATIENT
Start: 2021-12-29 | End: 2022-07-09

## 2021-12-29 RX ORDER — CLONAZEPAM 1 MG/1
TABLET ORAL
Qty: 120 TABLET | Refills: 2 | Status: SHIPPED | OUTPATIENT
Start: 2021-12-29 | End: 2022-03-25 | Stop reason: SDUPTHER

## 2021-12-29 RX ORDER — MIRTAZAPINE 15 MG/1
15 TABLET, FILM COATED ORAL
Qty: 30 TABLET | Refills: 5 | Status: SHIPPED | OUTPATIENT
Start: 2021-12-29 | End: 2022-07-25

## 2022-01-10 DIAGNOSIS — R11.0 NAUSEA: ICD-10-CM

## 2022-01-11 DIAGNOSIS — R11.0 NAUSEA: ICD-10-CM

## 2022-01-11 RX ORDER — ONDANSETRON 8 MG/1
8 TABLET, ORALLY DISINTEGRATING ORAL AS NEEDED
Qty: 60 TABLET | Refills: 0 | Status: SHIPPED | OUTPATIENT
Start: 2022-01-11 | End: 2022-04-19 | Stop reason: SDUPTHER

## 2022-01-14 ENCOUNTER — OFFICE VISIT (OUTPATIENT)
Dept: GASTROENTEROLOGY | Facility: CLINIC | Age: 37
End: 2022-01-14
Payer: COMMERCIAL

## 2022-01-14 VITALS
HEART RATE: 115 BPM | BODY MASS INDEX: 21.85 KG/M2 | DIASTOLIC BLOOD PRESSURE: 93 MMHG | HEIGHT: 64 IN | SYSTOLIC BLOOD PRESSURE: 129 MMHG | WEIGHT: 128 LBS

## 2022-01-14 DIAGNOSIS — R19.7 DIARRHEA, UNSPECIFIED TYPE: ICD-10-CM

## 2022-01-14 DIAGNOSIS — K31.84 GASTROPARESIS: Primary | ICD-10-CM

## 2022-01-14 DIAGNOSIS — K63.89 OTHER SPECIFIED DISEASES OF INTESTINE: ICD-10-CM

## 2022-01-14 DIAGNOSIS — R10.13 EPIGASTRIC PAIN: ICD-10-CM

## 2022-01-14 PROCEDURE — 3008F BODY MASS INDEX DOCD: CPT | Performed by: INTERNAL MEDICINE

## 2022-01-14 PROCEDURE — 4004F PT TOBACCO SCREEN RCVD TLK: CPT | Performed by: INTERNAL MEDICINE

## 2022-01-14 PROCEDURE — 99214 OFFICE O/P EST MOD 30 MIN: CPT | Performed by: INTERNAL MEDICINE

## 2022-01-14 RX ORDER — BACLOFEN 20 MG/1
20 TABLET ORAL 3 TIMES DAILY
Qty: 90 TABLET | Refills: 0 | Status: SHIPPED | OUTPATIENT
Start: 2022-01-14 | End: 2022-02-11 | Stop reason: SDUPTHER

## 2022-01-14 RX ORDER — ACETAMINOPHEN AND CODEINE PHOSPHATE 300; 30 MG/1; MG/1
1 TABLET ORAL 2 TIMES DAILY
Qty: 60 TABLET | Refills: 0 | Status: SHIPPED | OUTPATIENT
Start: 2022-01-14 | End: 2022-06-27

## 2022-01-14 NOTE — PROGRESS NOTES
[facility] - Outpatient Follow-up Note  Ellie Shell 31 y o  female MRN: [id number]  Encounter: [id number]          ASSESSMENT AND PLAN:      1  Epigastric pain  History of chronic GERD with gastroparesis, frequent burping and regurgitation, status post TIF which is helping with the reflux symptoms but regurgitation, frequent burping  Still persist, last endoscopy was in November 2022 which shows large amount of bile and liquid food in the stomach  She want to start domperidone, prior to starting domperidone will do EKG to rule out any QT prolongation  She is also getting Tylenol No  3 for chronic abdominal pain  She signed pain management contract with us, she understand risk and benefit of chronic narcotic use  - acetaminophen-codeine (TYLENOL #3) 300-30 mg per tablet; Take 1 tablet by mouth 2 (two) times a day  Dispense: 60 tablet; Refill: 0  - Colonoscopy; Future    2  Other specified diseases of intestine     Persistent regurgitation and burping, on baclofen which is helping her, refill was given  - baclofen 20 mg tablet; Take 1 tablet (20 mg total) by mouth 3 (three) times a day  Dispense: 90 tablet; Refill: 0    3  Gastroparesis   will do  12 lead EKG prior to starting on domperidone  - ECG 12 lead; Future    4  Diarrhea, unspecified type   last year colonoscopy was suboptimal prep, she is due for repeat colonoscopy, will do 2 days bowel prep, patient will stay on clear liquid diet for 2 days and  MiraLax with Gatorade along with 4 tablet of Dulcolax and 1 bottle of Mag citrate for bowel prep  - Colonoscopy; Future    ______________________________________________________________________    SUBJECTIVE:   Patient seen and examined, she come for follow-up  She has a chronic  Abdominal pain secondary to underlying history of gastroparesis, GERD, frequent regurgitation and burping    She is here today to sign pain management contract, she is getting Tylenol No  3 to control her pain   She denies any other narcotic use, she understand that by signing the contract that she will not ask other physician to prescribe narcotics or any control substance  She also want to start on domperidone,  Prior to starting medication will do EKG to rule out any QT prolongation  Her EGD and biopsy result was discussed with the patient, her symptoms are under control with medication, refill for baclofen, Tylenol No  3 was given      REVIEW OF SYSTEMS IS OTHERWISE NEGATIVE  Historical Information   Past Medical History:   Diagnosis Date    Anxiety     Bipolar 2 disorder, major depressive episode ([facility] ) 2021    Bipolar disorder with depression ([facility] )     Last assessed: 16    Depression     Endometriosis     Last assessed: 16    GERD (gastroesophageal reflux disease)     Hypertension     Inflammatory bowel disease     Scoliosis     Varicella      Past Surgical History:   Procedure Laterality Date     SECTION      CHOLECYSTECTOMY      COLONOSCOPY      HERNIA REPAIR      HERNIA REPAIR      OTHER SURGICAL HISTORY      Transoral incisionless fundoplication (TIFF)    [place]  DELIVERY ONLY N/A 2016    Procedure:  SECTION ();   Surgeon: Pedro Pablo Worley DO;  Location: [facility];  Service: Obstetrics    MI INCIS TENDON SHEATH,RADIAL STYLOID Right 2020    Procedure: Kory Borjas RIGHT WRIST;  Surgeon: Kaley Bates MD;  Location: [address] [place] [address] MAIN OR;  Service: Orthopedics    [place] LAP,CHOLECYSTECTOMY N/A 3/12/2018    Procedure: Tenzin Dilling;  Surgeon: Ayesha Mccrary MD;  Location: [place] MAIN OR;  Service: General    TONSILECTOMY AND ADNOIDECTOMY Bilateral     TONSILLECTOMY      WISDOM TOOTH EXTRACTION Bilateral      Social History   Social History     Substance and Sexual Activity   Alcohol Use Not Currently    Alcohol/week: 0 0 standard drinks    Comment: rare     Social History     Substance and Sexual Activity   Drug Use No     Social History     Tobacco Use   Smoking Status Light Tobacco Smoker    Packs/day: 0 50    Years: 1 00    Pack years: 0 50    Types: Cigarettes    Start date: 2020    Last attempt to quit: 2016    Years since quittin 6   Smokeless Tobacco Never Used     Family History   Problem Relation Age of Onset    Hypertension Father     Depression Mother     Asthma Mother     Anxiety disorder Mother     Bipolar disorder Mother     Depression Maternal Grandmother     Anxiety disorder Maternal Grandmother     Bipolar disorder Maternal Grandmother        Meds/Allergies       Current Outpatient Medications:     acetaminophen-codeine (TYLENOL #3) 300-30 mg per tablet    amLODIPine (NORVASC) 2 5 mg tablet    baclofen 20 mg tablet    busPIRone (BUSPAR) 10 mg tablet    clonazePAM (KlonoPIN) 1 mg tablet    docusate sodium (DULCOLAX) 100 mg capsule    medroxyPROGESTERone (DEPO-PROVERA) 150 mg/mL injection    metoprolol succinate (TOPROL-XL) 25 mg 24 hr tablet    mirtazapine (REMERON) 15 mg tablet    ondansetron (ZOFRAN-ODT) 8 mg disintegrating tablet    QUEtiapine (SEROquel) 200 mg tablet    venlafaxine (EFFEXOR-XR) 150 mg 24 hr capsule    venlafaxine (EFFEXOR-XR) 75 mg 24 hr capsule    Current Facility-Administered Medications:     Botulinum Toxin Type A SOLR 200 Units, 200 Units, Injection, Once    Allergies   Allergen Reactions    Sulfa Antibiotics Anaphylaxis, Other (See Comments) and Edema     swelling  swelling  swelling    Reglan [Metoclopramide] Anxiety           Objective     Blood pressure 129/93, pulse (!) 115, height 5' 4" (1 626 m), weight 58 1 kg (128 lb), not currently breastfeeding  Body mass index is 21 97 kg/m²        PHYSICAL EXAM:      General Appearance:   Alert, cooperative, no distress   HEENT:   Normocephalic, atraumatic, anicteric      Neck:  Supple, symmetrical, trachea midline   Lungs:   Clear to auscultation bilaterally; no rales, rhonchi or wheezing; respirations unlabored    Heart[de-identified]   Regular rate and rhythm; no murmur, rub, or gallop  Abdomen:   Soft, non-tender, non-distended; normal bowel sounds; no masses, no organomegaly    Genitalia:   Deferred    Rectal:   Deferred    Extremities:  No cyanosis, clubbing or edema    Pulses:  2+ and symmetric    Skin:  No jaundice, rashes, or lesions    Lymph nodes:  No palpable cervical lymphadenopathy        Lab Results:   No visits with results within 1 Day(s) from this visit  Latest known visit with results is:   Orders Only on 12/13/2021   Component Date Value    SARS-CoV-2 12/13/2021 Positive*         Radiology Results:   No results found

## 2022-01-14 NOTE — PATIENT INSTRUCTIONS
Scheduled date of colonoscopy (as of today): January 25 2022  Physician performing colonoscopy:Dr Mata Reyes  Location of colonoscopy: [place] [address] [facility]  Bowel prep reviewed with patient: Miralax w/ Dulcolax and Mag Citrate( 2 day)  Instructions reviewed with patient by: Thania Dunn  Clearances: None

## 2022-01-25 ENCOUNTER — ANESTHESIA EVENT (OUTPATIENT)
Dept: GASTROENTEROLOGY | Facility: AMBULATORY SURGERY CENTER | Age: 37
End: 2022-01-25

## 2022-01-25 ENCOUNTER — ANESTHESIA (OUTPATIENT)
Dept: GASTROENTEROLOGY | Facility: AMBULATORY SURGERY CENTER | Age: 37
End: 2022-01-25
Payer: COMMERCIAL

## 2022-01-25 ENCOUNTER — HOSPITAL ENCOUNTER (OUTPATIENT)
Dept: GASTROENTEROLOGY | Facility: AMBULATORY SURGERY CENTER | Age: 37
Discharge: HOME | End: 2022-01-25
Payer: COMMERCIAL

## 2022-01-25 VITALS
SYSTOLIC BLOOD PRESSURE: 124 MMHG | HEIGHT: 64 IN | HEART RATE: 63 BPM | WEIGHT: 128 LBS | TEMPERATURE: 98 F | DIASTOLIC BLOOD PRESSURE: 85 MMHG | BODY MASS INDEX: 21.85 KG/M2 | OXYGEN SATURATION: 100 % | RESPIRATION RATE: 18 BRPM

## 2022-01-25 DIAGNOSIS — R19.7 DIARRHEA, UNSPECIFIED TYPE: ICD-10-CM

## 2022-01-25 DIAGNOSIS — R10.13 EPIGASTRIC PAIN: ICD-10-CM

## 2022-01-25 LAB
EXT PREGNANCY TEST URINE: NEGATIVE
EXT. CONTROL: NORMAL

## 2022-01-25 PROCEDURE — 45380 COLONOSCOPY AND BIOPSY: CPT | Performed by: INTERNAL MEDICINE

## 2022-01-25 PROCEDURE — 88305 TISSUE EXAM BY PATHOLOGIST: CPT | Performed by: PATHOLOGY

## 2022-01-25 PROCEDURE — 00811 ANES LWR INTST NDSC NOS: CPT | Performed by: NURSE ANESTHETIST, CERTIFIED REGISTERED

## 2022-01-25 RX ORDER — SODIUM CHLORIDE 9 MG/ML
20 INJECTION, SOLUTION INTRAVENOUS CONTINUOUS
Status: DISCONTINUED | OUTPATIENT
Start: 2022-01-25 | End: 2022-01-29 | Stop reason: HOSPADM

## 2022-01-25 RX ORDER — SODIUM CHLORIDE 9 MG/ML
30 INJECTION, SOLUTION INTRAVENOUS CONTINUOUS
Status: DISCONTINUED | OUTPATIENT
Start: 2022-01-25 | End: 2022-01-29 | Stop reason: HOSPADM

## 2022-01-25 RX ORDER — PROPOFOL 10 MG/ML
INJECTION, EMULSION INTRAVENOUS AS NEEDED
Status: DISCONTINUED | OUTPATIENT
Start: 2022-01-25 | End: 2022-01-25

## 2022-01-25 RX ORDER — LIDOCAINE HYDROCHLORIDE 10 MG/ML
INJECTION, SOLUTION EPIDURAL; INFILTRATION; INTRACAUDAL; PERINEURAL AS NEEDED
Status: DISCONTINUED | OUTPATIENT
Start: 2022-01-25 | End: 2022-01-25

## 2022-01-25 RX ADMIN — PROPOFOL 50 MG: 10 INJECTION, EMULSION INTRAVENOUS at 08:07

## 2022-01-25 RX ADMIN — LIDOCAINE HYDROCHLORIDE 50 MG: 10 INJECTION, SOLUTION EPIDURAL; INFILTRATION; INTRACAUDAL; PERINEURAL at 08:03

## 2022-01-25 RX ADMIN — PROPOFOL 50 MG: 10 INJECTION, EMULSION INTRAVENOUS at 08:17

## 2022-01-25 RX ADMIN — PROPOFOL 20 MG: 10 INJECTION, EMULSION INTRAVENOUS at 08:04

## 2022-01-25 RX ADMIN — SODIUM CHLORIDE: 9 INJECTION, SOLUTION INTRAVENOUS at 08:00

## 2022-01-25 RX ADMIN — PROPOFOL 130 MG: 10 INJECTION, EMULSION INTRAVENOUS at 08:03

## 2022-01-25 RX ADMIN — PROPOFOL 20 MG: 10 INJECTION, EMULSION INTRAVENOUS at 08:10

## 2022-01-25 RX ADMIN — PROPOFOL 30 MG: 10 INJECTION, EMULSION INTRAVENOUS at 08:09

## 2022-01-25 RX ADMIN — PROPOFOL 50 MG: 10 INJECTION, EMULSION INTRAVENOUS at 08:12

## 2022-01-25 NOTE — DISCHARGE INSTRUCTIONS
Colonoscopy   WHAT YOU NEED TO KNOW:   A colonoscopy is a procedure to examine the inside of your colon (intestine) with a scope  Polyps or tissue growths may have been removed during your colonoscopy  It is normal to feel bloated and to have some abdominal discomfort  You should be passing gas  If you have hemorrhoids or you had polyps removed, you may have a small amount of bleeding  DISCHARGE INSTRUCTIONS:   Seek care immediately if:    You have sudden, severe abdominal pain   You have problems swallowing   You have a large amount of black, sticky bowel movements or blood in your bowel movements   You have sudden trouble breathing   You feel weak, lightheaded, or faint or your heart beats faster than normal for you  Contact your healthcare provider if:    You have a fever and chills   You have nausea or are vomiting   Your abdomen is bloated or feels full and hard   You have abdominal pain   You have black, sticky bowel movements or blood in your bowel movements   You have not had a bowel movement for 3 days after your procedure   You have rash or hives   You have questions or concerns about your procedure  Activity:    Do not lift, strain, or run for 24 hours after your procedure   Rest after your procedure  You have been given medicine to relax you  Do not drive or make important decisions until the day after your procedure  Return to your normal activity as directed   Relieve gas and discomfort from bloating by lying on your right side with a heating pad on your abdomen  You may need to take short walks to help the gas move out  Eat small meals until bloating is relieved  Follow up with your healthcare provider as directed: Write down your questions so you remember to ask them during your visits  If you take a blood thinner, please review the specific instructions from your endoscopist about when you should resume it   These can be found in the Recommendation and Your Medication list sections of this After Visit Summary  High Fiber Diet   WHAT YOU NEED TO KNOW:   What is a high-fiber diet? A high-fiber diet includes foods that have a high amount of fiber  Fiber is the part of fruits, vegetables, and grains that is not broken down by your body  Fiber keeps your bowel movements regular  Fiber can also help lower your cholesterol level, control blood sugar in people with diabetes, and relieve constipation  Fiber can also help you control your weight because it helps you feel full faster  Most adults should eat 25 to 35 grams of fiber each day  Talk to your dietitian or healthcare provider about the amount of fiber you need  What foods are good sources of fiber? · Foods with at least 4 grams of fiber per serving:      ? ? to ½ cup of high-fiber cereal (check the nutrition label on the box)    ? ½ cup of blackberries or raspberries    ? 4 dried prunes    ? 1 cooked artichoke    ? ½ cup of cooked legumes, such as lentils, or red, kidney, and hanna beans    · Foods with 1 to 3 grams of fiber per serving:      ? 1 slice of whole-wheat, pumpernickel, or rye bread    ? ½ cup of cooked brown rice    ? 4 whole-wheat crackers    ? 1 cup of oatmeal    ? ½ cup of cereal with 1 to 3 grams of fiber per serving (check the nutrition label on the box)    ? 1 small piece of fruit, such as an apple, banana, pear, kiwi, or orange    ? 3 dates    ? ½ cup of canned apricots, fruit cocktail, peaches, or pears    ? ½ cup of raw or cooked vegetables, such as carrots, cauliflower, cabbage, spinach, squash, or corn    What are some ways that I can increase fiber in my diet? · Choose brown or wild rice instead of white rice  · Use whole wheat flour in recipes instead of white or all-purpose flour  · Add beans and peas to casseroles or soups  · Choose fresh fruit and vegetables with peels or skins on instead of juices      What other guidelines should I follow? · Add fiber to your diet slowly  You may have abdominal discomfort, bloating, and gas if you add fiber to your diet too quickly  · Drink plenty of liquids as you add fiber to your diet  You may have nausea or develop constipation if you do not drink enough water  Ask how much liquid to drink each day and which liquids are best for you  CARE AGREEMENT:   You have the right to help plan your care  Discuss treatment options with your healthcare provider to decide what care you want to receive  You always have the right to refuse treatment  The above information is an  only  It is not intended as medical advice for individual conditions or treatments  Talk to your doctor, nurse or pharmacist before following any medical regimen to see if it is safe and effective for you  © Copyright [employer] 2021 Information is for End User's use only and may not be sold, redistributed or otherwise used for commercial purposes  All illustrations and images included in CareNotes® are the copyrighted property of A D A M , Inc  or [facility]   Hemorrhoids   WHAT YOU NEED TO KNOW:   What are hemorrhoids? Hemorrhoids are swollen blood vessels inside your rectum (internal hemorrhoids) or on your anus (external hemorrhoids)  Sometimes a hemorrhoid may prolapse  This means it extends out of your anus  What increases my risk for hemorrhoids? · Pregnancy or obesity    · Straining or sitting for a long time during bowel movements    · Liver disease    · Weak muscles around the anus caused by older age, rectal surgery, or anal intercourse    · A lack of physical activity    · Chronic diarrhea or constipation    · A low-fiber diet    What are the signs and symptoms of hemorrhoids?    · Pain or itching around your anus or inside your rectum    · Swelling or bumps around your anus    · Bright red blood in your bowel movement, on the toilet paper, or in the toilet bowl    · Tissue bulging out of your anus (prolapsed hemorrhoids)    · Incontinence (poor control over urine or bowel movements)    How are hemorrhoids diagnosed? Your healthcare provider will ask about your symptoms, the foods you eat, and your bowel movements  He or she will examine your anus for external hemorrhoids  You may need the following:  · A digital rectal exam  is a test to check for hemorrhoids  Your healthcare provider will put a gloved finger inside your anus to feel for the hemorrhoids  · An anoscopy  is a test that uses a scope (small tube with a light and camera on the end) to look at your hemorrhoids  How are hemorrhoids treated? Treatment will depend on your symptoms  You may need any of the following:  · Medicines  can help decrease pain and swelling, and soften your bowel movement  The medicine may be a pill, pad, cream, or ointment  · Procedures  may be used to shrink or remove your hemorrhoid  Examples include rubber-band ligation, sclerotherapy, and photocoagulation  These procedures may be done in your healthcare provider's office  Ask your healthcare provider for more information about these procedures  · Surgery  may be needed to shrink or remove your hemorrhoids  How can I manage my symptoms? · Apply ice on your anus for 15 to 20 minutes every hour or as directed  Use an ice pack, or put crushed ice in a plastic bag  Cover it with a towel before you apply it to your anus  Ice helps prevent tissue damage and decreases swelling and pain  · Take a sitz bath  Fill a bathtub with 4 to 6 inches of warm water  You may also use a sitz bath pan that fits inside a toilet bowl  Sit in the sitz bath for 15 minutes  Do this 3 times a day, and after each bowel movement  The warm water can help decrease pain and swelling  · Keep your anal area clean  Gently wash the area with warm water daily  Soap may irritate the area  After a bowel movement, wipe with moist towelettes or wet toilet paper   Dry toilet paper can irritate the area  How can I help prevent hemorrhoids? · Do not strain to have a bowel movement  Do not sit on the toilet too long  These actions can increase pressure on the tissues in your rectum and anus  · Drink plenty of liquids  Liquids can help prevent constipation  Ask how much liquid to drink each day and which liquids are best for you  · Eat a variety of high-fiber foods  Examples include fruits, vegetables, and whole grains  Ask your healthcare provider how much fiber you need each day  You may need to take a fiber supplement  · Exercise as directed  Exercise, such as walking, may make it easier to have a bowel movement  Ask your healthcare provider to help you create an exercise plan  · Do not have anal sex  Anal sex can weaken the skin around your rectum and anus  · Avoid heavy lifting  This can cause straining and increase your risk for another hemorrhoid  When should I seek immediate care? · You have severe pain in your rectum or around your anus  · You have severe pain in your abdomen and you are vomiting  · You have bleeding from your anus that soaks through your underwear  When should I contact my healthcare provider? · You have frequent and painful bowel movements  · Your hemorrhoid looks or feels more swollen than usual      · You do not have a bowel movement for 2 days or more  · You see or feel tissue coming through your anus  · You have questions or concerns about your condition or care  CARE AGREEMENT:   You have the right to help plan your care  Learn about your health condition and how it may be treated  Discuss treatment options with your healthcare providers to decide what care you want to receive  You always have the right to refuse treatment  The above information is an  only  It is not intended as medical advice for individual conditions or treatments   Talk to your doctor, nurse or pharmacist before following any medical regimen to see if it is safe and effective for you  © Copyright [employer] 2021 Information is for End User's use only and may not be sold, redistributed or otherwise used for commercial purposes   All illustrations and images included in CareNotes® are the copyrighted property of A D A M , Inc  or [address] [place], [place] [address]

## 2022-01-25 NOTE — ANESTHESIA PREPROCEDURE EVALUATION
Procedure:  COLONOSCOPY    Relevant Problems   ANESTHESIA (within normal limits)      CARDIO   (+) Essential hypertension      ENDO (within normal limits)      GI/HEPATIC   (+) Acid reflux disease      /RENAL (within normal limits)      GYN (within normal limits)      HEMATOLOGY (within normal limits)      MUSCULOSKELETAL   (+) Scoliosis      NEURO/PSYCH   (+) Anxiety   (+) Anxious depression      PULMONARY (within normal limits)      Other   (+) Bipolar 2 disorder, major depressive episode (HCC)   (+) Colitis   (+) Colon polyp   (+) Gastroparesis   (+) Nicotine dependence   (+) Polyp of gallbladder        Physical Exam    Airway    Mallampati score: II  TM Distance: >3 FB  Neck ROM: full     Dental   lower dentures and upper dentures,     Cardiovascular  Cardiovascular exam normal    Pulmonary  Pulmonary exam normal     Other Findings        Anesthesia Plan  ASA Score- 2     Anesthesia Type- IV sedation with anesthesia with ASA Monitors  Additional Monitors:   Airway Plan:           Plan Factors-Exercise tolerance (METS): >4 METS  Chart reviewed  Patient is a current smoker  Patient instructed to abstain from smoking on day of procedure  Patient smoked on day of surgery  Induction- intravenous  Postoperative Plan-     Informed Consent- Anesthetic plan and risks discussed with patient

## 2022-01-25 NOTE — ANESTHESIA POSTPROCEDURE EVALUATION
Post-Op Assessment Note    CV Status:  Stable  Pain Score: 0    Pain management: adequate     Mental Status:  Alert and awake   Hydration Status:  Stable   PONV Controlled:  None   Airway Patency:  Patent      Post Op Vitals Reviewed: Yes      Staff: CRNA         No complications documented      BP 96/60 (01/25/22 0822)    Temp      Pulse 61 (01/25/22 0822)   Resp 16 (01/25/22 0822)    SpO2 99 % (01/25/22 3338)

## 2022-02-07 DIAGNOSIS — I10 ESSENTIAL HYPERTENSION: ICD-10-CM

## 2022-02-08 RX ORDER — METOPROLOL SUCCINATE 25 MG/1
25 TABLET, EXTENDED RELEASE ORAL DAILY
Qty: 90 TABLET | Refills: 1 | Status: SHIPPED | OUTPATIENT
Start: 2022-02-08 | End: 2022-07-25 | Stop reason: SDUPTHER

## 2022-02-11 ENCOUNTER — TELEPHONE (OUTPATIENT)
Dept: GASTROENTEROLOGY | Facility: CLINIC | Age: 37
End: 2022-02-11

## 2022-02-11 DIAGNOSIS — K63.89 OTHER SPECIFIED DISEASES OF INTESTINE: ICD-10-CM

## 2022-02-11 RX ORDER — BACLOFEN 20 MG/1
20 TABLET ORAL 3 TIMES DAILY
Qty: 90 TABLET | Refills: 0 | Status: SHIPPED | OUTPATIENT
Start: 2022-02-11 | End: 2022-03-07 | Stop reason: SDUPTHER

## 2022-02-18 ENCOUNTER — OFFICE VISIT (OUTPATIENT)
Dept: INTERNAL MEDICINE CLINIC | Age: 37
End: 2022-02-18
Payer: COMMERCIAL

## 2022-02-18 VITALS
SYSTOLIC BLOOD PRESSURE: 124 MMHG | WEIGHT: 127.7 LBS | OXYGEN SATURATION: 98 % | DIASTOLIC BLOOD PRESSURE: 84 MMHG | HEART RATE: 100 BPM | HEIGHT: 64 IN | TEMPERATURE: 98.3 F | BODY MASS INDEX: 21.8 KG/M2

## 2022-02-18 DIAGNOSIS — R94.31 ABNORMAL EKG: ICD-10-CM

## 2022-02-18 DIAGNOSIS — K21.9 GASTROESOPHAGEAL REFLUX DISEASE WITHOUT ESOPHAGITIS: Primary | ICD-10-CM

## 2022-02-18 DIAGNOSIS — I10 ESSENTIAL HYPERTENSION: ICD-10-CM

## 2022-02-18 DIAGNOSIS — F11.20 CONTINUOUS OPIOID DEPENDENCE (HCC): ICD-10-CM

## 2022-02-18 DIAGNOSIS — F41.9 ANXIETY: ICD-10-CM

## 2022-02-18 DIAGNOSIS — F31.81 BIPOLAR 2 DISORDER, MAJOR DEPRESSIVE EPISODE (HCC): ICD-10-CM

## 2022-02-18 DIAGNOSIS — I44.7 LEFT BUNDLE BRANCH BLOCK: ICD-10-CM

## 2022-02-18 DIAGNOSIS — K31.84 GASTROPARESIS: ICD-10-CM

## 2022-02-18 PROCEDURE — 4004F PT TOBACCO SCREEN RCVD TLK: CPT | Performed by: INTERNAL MEDICINE

## 2022-02-18 PROCEDURE — 93000 ELECTROCARDIOGRAM COMPLETE: CPT | Performed by: INTERNAL MEDICINE

## 2022-02-18 PROCEDURE — 99214 OFFICE O/P EST MOD 30 MIN: CPT | Performed by: INTERNAL MEDICINE

## 2022-02-18 PROCEDURE — 3008F BODY MASS INDEX DOCD: CPT | Performed by: INTERNAL MEDICINE

## 2022-02-18 NOTE — PROGRESS NOTES
Assessment/Plan:    1  Essential hypertension  Blood pressure is stable on present regimen  2  Abnormal EKG/left bundle branch block  Patient is on multiple psychiatric meds  Will refer patient for further evaluation and management by cardiologist   Will also request thyroid function test    3  Bipolar disorder with major depression  Stable  Being managed by psychiatrist    4  GERD/gastroparesis  Still with the burping and being managed by gastroenterologist   Routine blood work including CBC, CMP and thyroid function test order prior to next visit  Diagnoses and all orders for this visit:    Gastroesophageal reflux disease without esophagitis  -     CBC; Future  -     Comprehensive metabolic panel; Future    Gastroparesis    Anxiety  -     TSH, 3rd generation with Free T4 reflex; Future    Essential hypertension  -     POCT ECG  -     Comprehensive metabolic panel; Future  -     Lipid Panel with Direct LDL reflex; Future    Left bundle branch block  -     Ambulatory Referral to Cardiology; Future    Abnormal EKG  -     Ambulatory Referral to Cardiology; Future    Continuous opioid dependence (HCC)    Bipolar 2 disorder, major depressive episode (HCC)               Subjective:          Patient ID: Helen Hernandez is a 31 y o  female  Patient is here for follow-up  No blood work prior to this visit she was unable to do which was order before  Still complaining of burping and indigestion and abdominal pain  Presently she is being followed by gastroenterologist       The following portions of the patient's history were reviewed and updated as appropriate: allergies, current medications, past family history, past medical history, past social history, past surgical history and problem list     Review of Systems   Constitutional: Negative for fatigue and fever  HENT: Negative for congestion, ear discharge, ear pain, postnasal drip, sinus pressure, sore throat, tinnitus and trouble swallowing      Eyes: Negative for discharge, itching and visual disturbance  Respiratory: Negative for cough and shortness of breath  Cardiovascular: Negative for chest pain and palpitations  Gastrointestinal: Positive for abdominal pain  Negative for diarrhea, nausea and vomiting  Endocrine: Negative for cold intolerance and polyuria  Genitourinary: Negative for difficulty urinating, dysuria and urgency  Musculoskeletal: Negative for arthralgias and neck pain  Skin: Negative for rash  Allergic/Immunologic: Negative for environmental allergies  Neurological: Negative for dizziness, weakness and headaches  Psychiatric/Behavioral: Negative for agitation  The patient is nervous/anxious  Past Medical History:   Diagnosis Date    Anxiety     Bipolar 2 disorder, major depressive episode ([facility] ) 8/17/2021    Bipolar disorder with depression ([facility] )     Last assessed: 5/11/16    Colon polyp     Depression     Endometriosis     Last assessed: 5/11/16    Gastroparesis     GERD (gastroesophageal reflux disease)     Hypertension     Inflammatory bowel disease     Scoliosis     Scoliosis     Varicella          Current Outpatient Medications:     amLODIPine (NORVASC) 2 5 mg tablet, Take 1 tablet (2 5 mg total) by mouth daily, Disp: 90 tablet, Rfl: 1    baclofen 20 mg tablet, Take 1 tablet (20 mg total) by mouth 3 (three) times a day, Disp: 90 tablet, Rfl: 0    busPIRone (BUSPAR) 10 mg tablet, TAKE 1 TABLET BY MOUTH 3 TIMES A DAY, Disp: 90 tablet, Rfl: 5    clonazePAM (KlonoPIN) 1 mg tablet, TAKE ONE TABLET BY MOUTH FOUR TIMES A DAY    DO NOT TAKE WITHIN 4 HOURS OF OPIOID PAIN MEDICATIONS, Disp: 120 tablet, Rfl: 2    docusate sodium (DULCOLAX) 100 mg capsule, Take 1 capsule by mouth daily as needed  , Disp: , Rfl:     medroxyPROGESTERone (DEPO-PROVERA) 150 mg/mL injection, INJECT EVERY 12 WEEK AS DIRECTED, Disp: 1 mL, Rfl: 0    metoprolol succinate (TOPROL-XL) 25 mg 24 hr tablet, Take 1 tablet (25 mg total) by mouth daily, Disp: 90 tablet, Rfl: 1    mirtazapine (REMERON) 15 mg tablet, Take 1 tablet (15 mg total) by mouth daily at bedtime, Disp: 30 tablet, Rfl: 5    ondansetron (ZOFRAN-ODT) 8 mg disintegrating tablet, Take 1 tablet (8 mg total) by mouth as needed for nausea (EVERY 6 HOURS AS NEEDED FOR NAUSEA), Disp: 60 tablet, Rfl: 0    QUEtiapine (SEROquel) 200 mg tablet, TAKE ONE TABLET BY MOUTH THREE TIMES A DAY AT NOON, 5:00PM, AND AT BEDTIME, Disp: 90 tablet, Rfl: 5    venlafaxine (EFFEXOR-XR) 150 mg 24 hr capsule, Take 1 capsule (150 mg total) by mouth daily, Disp: 30 capsule, Rfl: 5    venlafaxine (EFFEXOR-XR) 75 mg 24 hr capsule, TAKE ONE CAPSULE BY MOUTH EVERY DAY, Disp: 30 capsule, Rfl: 5    acetaminophen-codeine (TYLENOL #3) 300-30 mg per tablet, Take 1 tablet by mouth 2 (two) times a day (Patient not taking: Reported on 2022 ), Disp: 60 tablet, Rfl: 0    Current Facility-Administered Medications:     Botulinum Toxin Type A SOLR 200 Units, 200 Units, Injection, Once, [employer], PA-C    Allergies   Allergen Reactions    Sulfa Antibiotics Anaphylaxis, Other (See Comments) and Edema     swelling  swelling  swelling    Reglan [Metoclopramide] Anxiety       Social History   Past Surgical History:   Procedure Laterality Date     SECTION      CHOLECYSTECTOMY      COLONOSCOPY      HERNIA REPAIR      HERNIA REPAIR      OTHER SURGICAL HISTORY      Transoral incisionless fundoplication (TIFF)    [place]  DELIVERY ONLY N/A 2016    Procedure:  SECTION ();   Surgeon: Bhavesh Andino DO;  [facility];  Service: Obstetrics    [place] INCIS TENDON SHEATH,RADIAL STYLOID Right 2020    Procedure: RELEASE Kennedy Schooner RIGHT WRIST;  Surgeon: Terry Blount MD;  Location: [facility] MAIN OR;  Service: Orthopedics    [place] LAP,CHOLECYSTECTOMY N/A 3/12/2018    Procedure: Maribel Quigley;  Surgeon: Gio Bauman MD;  Location: [facility];  Service: Carol Simpson TONSILECTOMY AND ADNOIDECTOMY Bilateral     TONSILLECTOMY      WISDOM TOOTH EXTRACTION Bilateral      Family History   Problem Relation Age of Onset    Hypertension Father     Depression Mother     Asthma Mother     Anxiety disorder Mother     Bipolar disorder Mother     Depression Maternal Grandmother     Anxiety disorder Maternal Grandmother     Bipolar disorder Maternal Grandmother        Objective:  /84 (BP Location: Left arm, Patient Position: Sitting, Cuff Size: Standard)   Pulse 100   Temp 98 3 °F (36 8 °C) (Temporal)   Ht 5' 4" (1 626 m)   Wt 57 9 kg (127 lb 11 2 oz)   SpO2 98%   BMI 21 92 kg/m²   Body mass index is 21 92 kg/m²  Physical Exam  Constitutional:       Appearance: She is well-developed  HENT:      Head: Normocephalic  Right Ear: External ear normal       Left Ear: External ear normal       Nose: No rhinorrhea  Mouth/Throat:      Pharynx: No posterior oropharyngeal erythema  Eyes:      General: No scleral icterus  Pupils: Pupils are equal, round, and reactive to light  Neck:      Thyroid: No thyromegaly  Trachea: No tracheal deviation  Cardiovascular:      Rate and Rhythm: Normal rate and regular rhythm  Heart sounds: Normal heart sounds  Pulmonary:      Effort: Pulmonary effort is normal  No respiratory distress  Breath sounds: Normal breath sounds  Chest:      Chest wall: No tenderness  Abdominal:      General: Bowel sounds are normal       Palpations: Abdomen is soft  There is no mass  Tenderness: There is no abdominal tenderness  Musculoskeletal:         General: Normal range of motion  Cervical back: Normal range of motion and neck supple  Right lower leg: No edema  Left lower leg: No edema  Lymphadenopathy:      Cervical: No cervical adenopathy  Skin:     General: Skin is warm  Neurological:      Mental Status: She is alert and oriented to person, place, and time        Cranial Nerves: No cranial nerve deficit  Psychiatric:         Behavior: Behavior normal          Thought Content:  Thought content normal

## 2022-02-21 DIAGNOSIS — G89.29 CHRONIC EPIGASTRIC PAIN: Primary | ICD-10-CM

## 2022-02-21 DIAGNOSIS — R10.13 CHRONIC EPIGASTRIC PAIN: Primary | ICD-10-CM

## 2022-03-07 DIAGNOSIS — K63.89 OTHER SPECIFIED DISEASES OF INTESTINE: ICD-10-CM

## 2022-03-07 DIAGNOSIS — F31.81 BIPOLAR 2 DISORDER, MAJOR DEPRESSIVE EPISODE (HCC): ICD-10-CM

## 2022-03-07 RX ORDER — BACLOFEN 20 MG/1
20 TABLET ORAL 3 TIMES DAILY
Qty: 90 TABLET | Refills: 0 | Status: SHIPPED | OUTPATIENT
Start: 2022-03-07 | End: 2022-04-05 | Stop reason: SDUPTHER

## 2022-03-07 RX ORDER — QUETIAPINE FUMARATE 200 MG/1
TABLET, FILM COATED ORAL
Qty: 90 TABLET | Refills: 2 | Status: SHIPPED | OUTPATIENT
Start: 2022-03-07 | End: 2022-06-01

## 2022-03-14 NOTE — PROGRESS NOTES
Cardiology Consultation     Yusra Ashton  [id number]  1985  [facility]ART [facility] VASCULAR [facility] [address] [id number]  1  Essential hypertension  POCT ECG    Echo complete w/ contrast if indicated    Comprehensive metabolic panel    Lipid panel    T4, free    TSH, 3rd generation    LDL cholesterol, direct    Holter monitor   2  Left bundle branch block  Ambulatory Referral to Cardiology    POCT ECG    Echo complete w/ contrast if indicated    Comprehensive metabolic panel    Lipid panel    T4, free    TSH, 3rd generation    LDL cholesterol, direct    Holter monitor   3  Abnormal EKG  Ambulatory Referral to Cardiology    POCT ECG    Echo complete w/ contrast if indicated    Comprehensive metabolic panel    Lipid panel    T4, free    TSH, 3rd generation    LDL cholesterol, direct    Holter monitor   4  Tobacco abuse  POCT ECG    Echo complete w/ contrast if indicated    Comprehensive metabolic panel    Lipid panel    T4, free    TSH, 3rd generation    LDL cholesterol, direct    Holter monitor     Patient Active Problem List   Diagnosis    Anxiety    Acid reflux disease    Polyp of gallbladder    Disease of gallbladder    Bloody diarrhea    Generalized abdominal pain    Hypokalemia    Encounter for surveillance of injectable contraceptive    Encounter for management and injection of depo-Provera    Encounter for other contraceptive management    Colitis    Skin lesion of back    De Quervain's tenosynovitis, right    Anxious depression    Palpitations    Nicotine dependence    Essential hypertension    Bipolar 2 disorder, major depressive episode (HCC)    Acute viral syndrome    COVID-19 virus infection    Scoliosis    Colon polyp    Gastroparesis    Left bundle branch block    Abnormal EKG    Continuous opioid dependence (HCC)       HPI patient is here for a cardiology evaluation    She is referred by her primary care physician  EKG done 2022 demonstrated sinus rhythm with a LBBB with left axis deviation  EKG done 2017 demonstrated sinus rhythm and was a normal tracing  Patient is on a number of psychiatric medications  EKG today demonstrates sinus rhythm with right atrial abnormality and left bundle branch block  Patient had preeclampsia with her first pregnancy  She was hospitalized for about a month prior to the delivery  Her hypertension persisted and she is on amlodipine and metoprolol succinate  She has gastroparesis and was being cleared to start domperidol  An EKG as noted demonstrated a left bundle branch block  She has no cardiac symptoms  She is here today with her significant other  Vital signs are stable  In reference to family history her father has hypertension  There is no coronary disease  Her paternal grandmother  of a brain aneurysm in her 62s  Patient does smoke a half a pack of cigarettes per day  She denies using any nonprescription medication  Psychiatric regimen she is on now she has been on for years  She did have COVID in 2021      PMH-  Past Medical History:   Diagnosis Date    Anxiety     Bipolar 2 disorder, major depressive episode ([facility] ) 2021    Bipolar disorder with depression ([facility] )     Last assessed: 16    Colon polyp     Depression     Endometriosis     Last assessed: 16    Gastroparesis     GERD (gastroesophageal reflux disease)     Hypertension     Inflammatory bowel disease     Scoliosis     Scoliosis     Varicella         SOCIAL HISTORY-  Social History     Socioeconomic History    Marital status: /Civil Union     Spouse name: Not on file    Number of children: Not on file    Years of education: Not on file    Highest education level: Not on file   Occupational History    Not on file   Tobacco Use    Smoking status: Light Tobacco Smoker     Packs/day: 0 50     Years: 1 00     Pack years: 0 50     Types: Cigarettes     Start date: 2020     Last attempt to quit: 2016     Years since quittin 8    Smokeless tobacco: Never Used   Vaping Use    Vaping Use: Never used   Substance and Sexual Activity    Alcohol use: Not Currently     Alcohol/week: 0 0 standard drinks     Comment: rare    Drug use: No    Sexual activity: Yes     Partners: Male     Birth control/protection: Injection     Comment: on depo    Other Topics Concern    Not on file   Social History Narrative    Daily caffeine consumption, 1 serving a day    Exercises regularly     Social Determinants of Health     Financial Resource Strain: Not on file   Food Insecurity: Not on file   Transportation Needs: Not on file   Physical Activity: Not on file   Stress: Not on file   Social Connections: Not on file   Intimate Partner Violence: Not on file   Housing Stability: Not on file        FAMILY HISTORY-  Family History   Problem Relation Age of Onset    Hypertension Father     Depression Mother     Asthma Mother     Anxiety disorder Mother     Bipolar disorder Mother     Depression Maternal Grandmother     Anxiety disorder Maternal Grandmother     Bipolar disorder Maternal Grandmother        SURGICAL HISTORY-  Past Surgical History:   Procedure Laterality Date     SECTION      CHOLECYSTECTOMY      COLONOSCOPY      HERNIA REPAIR      HERNIA REPAIR      OTHER SURGICAL HISTORY      Transoral incisionless fundoplication (TIFF)    CA  DELIVERY ONLY N/A 2016    Procedure:  SECTION ();   Surgeon: Beatriz Hunt DO;  [facility];  Service: Obstetrics    CA INCIS TENDON SHEATH,RADIAL STYLOID Right 2020    Procedure: RELEASE Sarah Read RIGHT WRIST;  Surgeon: Armando Adams MD;  Location: [facility] MAIN OR;  Service: Orthopedics    CA LAP,CHOLECYSTECTOMY N/A 3/12/2018    Procedure: Mccray Model;  Surgeon: Yessenia Fermin MD;  Location: [address] [place] [address]; Service: General    TONSILECTOMY AND ADNOIDECTOMY Bilateral     TONSILLECTOMY      WISDOM TOOTH EXTRACTION Bilateral          Current Outpatient Medications:     amLODIPine (NORVASC) 2 5 mg tablet, Take 1 tablet (2 5 mg total) by mouth daily, Disp: 90 tablet, Rfl: 1    baclofen 20 mg tablet, Take 1 tablet (20 mg total) by mouth 3 (three) times a day, Disp: 90 tablet, Rfl: 0    busPIRone (BUSPAR) 10 mg tablet, TAKE 1 TABLET BY MOUTH 3 TIMES A DAY, Disp: 90 tablet, Rfl: 5    clonazePAM (KlonoPIN) 1 mg tablet, TAKE ONE TABLET BY MOUTH FOUR TIMES A DAY    DO NOT TAKE WITHIN 4 HOURS OF OPIOID PAIN MEDICATIONS, Disp: 120 tablet, Rfl: 2    docusate sodium (DULCOLAX) 100 mg capsule, Take 1 capsule by mouth daily as needed  , Disp: , Rfl:     metoprolol succinate (TOPROL-XL) 25 mg 24 hr tablet, Take 1 tablet (25 mg total) by mouth daily, Disp: 90 tablet, Rfl: 1    mirtazapine (REMERON) 15 mg tablet, Take 1 tablet (15 mg total) by mouth daily at bedtime, Disp: 30 tablet, Rfl: 5    ondansetron (ZOFRAN-ODT) 8 mg disintegrating tablet, Take 1 tablet (8 mg total) by mouth as needed for nausea (EVERY 6 HOURS AS NEEDED FOR NAUSEA), Disp: 60 tablet, Rfl: 0    QUEtiapine (SEROquel) 200 mg tablet, TAKE 1 TABLET BY MOUTH 3 TIMES A DAY (NOON, 5PM, AND BEDTIME), Disp: 90 tablet, Rfl: 2    venlafaxine (EFFEXOR-XR) 150 mg 24 hr capsule, Take 1 capsule (150 mg total) by mouth daily, Disp: 30 capsule, Rfl: 5    venlafaxine (EFFEXOR-XR) 75 mg 24 hr capsule, TAKE ONE CAPSULE BY MOUTH EVERY DAY, Disp: 30 capsule, Rfl: 5    acetaminophen-codeine (TYLENOL #3) 300-30 mg per tablet, Take 1 tablet by mouth 2 (two) times a day (Patient not taking: Reported on 2/18/2022 ), Disp: 60 tablet, Rfl: 0    medroxyPROGESTERone (DEPO-PROVERA) 150 mg/mL injection, INJECT EVERY 12 WEEK AS DIRECTED (Patient not taking: Reported on 3/23/2022), Disp: 1 mL, Rfl: 0    Current Facility-Administered Medications:     Botulinum Toxin Type A SOLR 200 Units, 200 Units, Injection, Once, [employer], PA-C  Allergies   Allergen Reactions    Sulfa Antibiotics Anaphylaxis, Other (See Comments) and Edema     swelling  swelling  swelling    Reglan [Metoclopramide] Anxiety     Vitals:    03/23/22 1453   BP: 132/78   BP Location: Left arm   Patient Position: Sitting   Cuff Size: Adult   Pulse: 92   Resp: 18   SpO2: 98%   Weight: 56 2 kg (124 lb)   Height: 5' 4" (1 626 m)         Review of Systems:  Review of Systems   All other systems reviewed and are negative  Physical Exam:  Physical Exam  Vitals reviewed  Constitutional:       Appearance: She is well-developed  HENT:      Head: Normocephalic and atraumatic  Eyes:      Conjunctiva/sclera: Conjunctivae normal       Pupils: Pupils are equal, round, and reactive to light  Cardiovascular:      Rate and Rhythm: Normal rate  Heart sounds: Normal heart sounds  Pulmonary:      Effort: Pulmonary effort is normal       Breath sounds: Normal breath sounds  Musculoskeletal:      Cervical back: Normal range of motion and neck supple  Skin:     General: Skin is warm and dry  Neurological:      Mental Status: She is alert and oriented to person, place, and time  Discussion/Summary:  Will check an echocardiogram to assess for any structural heart disease  Will assess LV wall thickness and systolic function and valvular structure and function  Will check a Holter monitor to assess for persistence of left bundle branch block versus rate related  Will assess for arrhythmia  Check blood work  I have asked her to call if there is a problem in the interim otherwise I will see her in follow-up in six months time  It is possible that this is related to her psychiatric medication but it is unclear

## 2022-03-23 ENCOUNTER — CONSULT (OUTPATIENT)
Dept: CARDIOLOGY CLINIC | Facility: CLINIC | Age: 37
End: 2022-03-23
Payer: COMMERCIAL

## 2022-03-23 VITALS
HEIGHT: 64 IN | WEIGHT: 124 LBS | DIASTOLIC BLOOD PRESSURE: 78 MMHG | BODY MASS INDEX: 21.17 KG/M2 | SYSTOLIC BLOOD PRESSURE: 132 MMHG | RESPIRATION RATE: 18 BRPM | HEART RATE: 92 BPM | OXYGEN SATURATION: 98 %

## 2022-03-23 DIAGNOSIS — I44.7 LEFT BUNDLE BRANCH BLOCK: ICD-10-CM

## 2022-03-23 DIAGNOSIS — R94.31 ABNORMAL EKG: ICD-10-CM

## 2022-03-23 DIAGNOSIS — I10 ESSENTIAL HYPERTENSION: Primary | ICD-10-CM

## 2022-03-23 DIAGNOSIS — Z72.0 TOBACCO ABUSE: ICD-10-CM

## 2022-03-23 PROCEDURE — 4004F PT TOBACCO SCREEN RCVD TLK: CPT | Performed by: INTERNAL MEDICINE

## 2022-03-23 PROCEDURE — 93000 ELECTROCARDIOGRAM COMPLETE: CPT | Performed by: INTERNAL MEDICINE

## 2022-03-23 PROCEDURE — 3008F BODY MASS INDEX DOCD: CPT | Performed by: INTERNAL MEDICINE

## 2022-03-23 PROCEDURE — 99204 OFFICE O/P NEW MOD 45 MIN: CPT | Performed by: INTERNAL MEDICINE

## 2022-03-25 DIAGNOSIS — F31.81 BIPOLAR 2 DISORDER, MAJOR DEPRESSIVE EPISODE (HCC): ICD-10-CM

## 2022-03-25 RX ORDER — CLONAZEPAM 1 MG/1
TABLET ORAL
Qty: 120 TABLET | Refills: 2 | Status: SHIPPED | OUTPATIENT
Start: 2022-03-25 | End: 2022-06-17 | Stop reason: SDUPTHER

## 2022-03-26 ENCOUNTER — APPOINTMENT (OUTPATIENT)
Dept: LAB | Facility: HOSPITAL | Age: 37
End: 2022-03-26
Attending: INTERNAL MEDICINE
Payer: COMMERCIAL

## 2022-03-26 DIAGNOSIS — R94.31 ABNORMAL EKG: ICD-10-CM

## 2022-03-26 DIAGNOSIS — I10 ESSENTIAL HYPERTENSION: ICD-10-CM

## 2022-03-26 DIAGNOSIS — K21.9 GASTROESOPHAGEAL REFLUX DISEASE WITHOUT ESOPHAGITIS: ICD-10-CM

## 2022-03-26 DIAGNOSIS — Z72.0 TOBACCO ABUSE: ICD-10-CM

## 2022-03-26 DIAGNOSIS — I44.7 LEFT BUNDLE BRANCH BLOCK: ICD-10-CM

## 2022-03-26 DIAGNOSIS — F41.9 ANXIETY: ICD-10-CM

## 2022-03-26 LAB
ALBUMIN SERPL BCP-MCNC: 4.1 G/DL (ref 3.5–5)
ALP SERPL-CCNC: 69 U/L (ref 46–116)
ALT SERPL W P-5'-P-CCNC: 10 U/L (ref 12–78)
ANION GAP SERPL CALCULATED.3IONS-SCNC: 3 MMOL/L (ref 4–13)
AST SERPL W P-5'-P-CCNC: 8 U/L (ref 5–45)
BILIRUB SERPL-MCNC: 0.66 MG/DL (ref 0.2–1)
BUN SERPL-MCNC: 8 MG/DL (ref 5–25)
CALCIUM SERPL-MCNC: 9.1 MG/DL (ref 8.3–10.1)
CHLORIDE SERPL-SCNC: 110 MMOL/L (ref 100–108)
CHOLEST SERPL-MCNC: 186 MG/DL
CO2 SERPL-SCNC: 27 MMOL/L (ref 21–32)
CREAT SERPL-MCNC: 0.92 MG/DL (ref 0.6–1.3)
ERYTHROCYTE [DISTWIDTH] IN BLOOD BY AUTOMATED COUNT: 13.4 % (ref 11.6–15.1)
GFR SERPL CREATININE-BSD FRML MDRD: 81 ML/MIN/1.73SQ M
GLUCOSE P FAST SERPL-MCNC: 116 MG/DL (ref 65–99)
HCT VFR BLD AUTO: 37.2 % (ref 34.8–46.1)
HDLC SERPL-MCNC: 64 MG/DL
HGB BLD-MCNC: 12.2 G/DL (ref 11.5–15.4)
LDLC SERPL CALC-MCNC: 106 MG/DL (ref 0–100)
LDLC SERPL DIRECT ASSAY-MCNC: 102 MG/DL (ref 0–100)
MCH RBC QN AUTO: 29.4 PG (ref 26.8–34.3)
MCHC RBC AUTO-ENTMCNC: 32.8 G/DL (ref 31.4–37.4)
MCV RBC AUTO: 90 FL (ref 82–98)
PLATELET # BLD AUTO: 276 THOUSANDS/UL (ref 149–390)
PMV BLD AUTO: 10.1 FL (ref 8.9–12.7)
POTASSIUM SERPL-SCNC: 3.7 MMOL/L (ref 3.5–5.3)
PROT SERPL-MCNC: 7.1 G/DL (ref 6.4–8.2)
RBC # BLD AUTO: 4.15 MILLION/UL (ref 3.81–5.12)
SODIUM SERPL-SCNC: 140 MMOL/L (ref 136–145)
T4 FREE SERPL-MCNC: 0.8 NG/DL (ref 0.76–1.46)
TRIGL SERPL-MCNC: 82 MG/DL
TSH SERPL DL<=0.05 MIU/L-ACNC: 0.84 UIU/ML (ref 0.36–3.74)
WBC # BLD AUTO: 5.12 THOUSAND/UL (ref 4.31–10.16)

## 2022-03-26 PROCEDURE — 80061 LIPID PANEL: CPT

## 2022-03-26 PROCEDURE — 36415 COLL VENOUS BLD VENIPUNCTURE: CPT

## 2022-03-26 PROCEDURE — 84443 ASSAY THYROID STIM HORMONE: CPT

## 2022-03-26 PROCEDURE — 83721 ASSAY OF BLOOD LIPOPROTEIN: CPT

## 2022-03-26 PROCEDURE — 80053 COMPREHEN METABOLIC PANEL: CPT

## 2022-03-26 PROCEDURE — 85027 COMPLETE CBC AUTOMATED: CPT

## 2022-03-26 PROCEDURE — 84439 ASSAY OF FREE THYROXINE: CPT

## 2022-03-29 ENCOUNTER — HOSPITAL ENCOUNTER (OUTPATIENT)
Dept: NON INVASIVE DIAGNOSTICS | Facility: CLINIC | Age: 37
Discharge: HOME | End: 2022-03-29
Payer: COMMERCIAL

## 2022-03-29 VITALS
WEIGHT: 124 LBS | BODY MASS INDEX: 21.17 KG/M2 | SYSTOLIC BLOOD PRESSURE: 132 MMHG | HEIGHT: 64 IN | HEART RATE: 81 BPM | DIASTOLIC BLOOD PRESSURE: 78 MMHG

## 2022-03-29 DIAGNOSIS — I44.7 LEFT BUNDLE BRANCH BLOCK: ICD-10-CM

## 2022-03-29 DIAGNOSIS — I10 ESSENTIAL HYPERTENSION: ICD-10-CM

## 2022-03-29 DIAGNOSIS — R94.31 ABNORMAL EKG: ICD-10-CM

## 2022-03-29 DIAGNOSIS — Z72.0 TOBACCO ABUSE: ICD-10-CM

## 2022-03-29 LAB
AORTIC ROOT: 3 CM
AORTIC VALVE MEAN VELOCITY: 7.6 M/S
APICAL FOUR CHAMBER EJECTION FRACTION: 49 %
AV LVOT MEAN GRADIENT: 2 MMHG
AV LVOT PEAK GRADIENT: 3 MMHG
AV MEAN GRADIENT: 3 MMHG
AV PEAK GRADIENT: 5 MMHG
AV VELOCITY RATIO: 0.85
DOP CALC AO PEAK VEL: 1.09 M/S
DOP CALC AO VTI: 17.01 CM
DOP CALC LVOT PEAK VEL VTI: 14.99 CM
DOP CALC LVOT PEAK VEL: 0.93 M/S
DOP CALC MV VTI: 12.55 CM
E WAVE DECELERATION TIME: 111 MS
FRACTIONAL SHORTENING: 27 % (ref 28–44)
INTERVENTRICULAR SEPTUM IN DIASTOLE (PARASTERNAL SHORT AXIS VIEW): 0.9 CM
INTERVENTRICULAR SEPTUM: 0.9 CM (ref 0.49–0.91)
LAAS-AP2: 13 CM2
LAAS-AP4: 13 CM2
LEFT ATRIUM SIZE: 2.6 CM
LEFT INTERNAL DIMENSION IN SYSTOLE: 3.2 CM (ref 2.29–3.46)
LEFT VENTRICLE DIASTOLIC VOLUME (MOD BIPLANE): 79 ML (ref 69.7–156.96)
LEFT VENTRICLE SYSTOLIC VOLUME (MOD BIPLANE): 38 ML
LEFT VENTRICULAR INTERNAL DIMENSION IN DIASTOLE: 4.4 CM (ref 3.72–5.54)
LEFT VENTRICULAR POSTERIOR WALL IN END DIASTOLE: 0.8 CM (ref 0.47–0.9)
LEFT VENTRICULAR STROKE VOLUME: 49 ML
LV EF: 51 %
LVSV (TEICH): 49 ML
MV E'TISSUE VEL-LAT: 12 CM/S
MV E'TISSUE VEL-SEP: 9 CM/S
MV MEAN GRADIENT: 1 MMHG
MV PEAK A VEL: 0.54 M/S
MV PEAK E VEL: 59 CM/S
MV PEAK GRADIENT: 2 MMHG
MV STENOSIS PRESSURE HALF TIME: 32 MS
MV VALVE AREA P 1/2 METHOD: 6.88 CM2
RIGHT ATRIAL 2D VOLUME: 24 ML
RIGHT ATRIUM AREA SYSTOLE A4C: 10.6 CM2
RIGHT VENTRICLE ID DIMENSION: 3.1 CM
SL CV LEFT ATRIUM LENGTH A2C: 4.1 CM
SL CV LV DIAS VOL ENDO Z SCORE: -1.57
SL CV LV EF: 50
SL CV PED ECHO LEFT VENTRICLE DIASTOLIC VOLUME (MOD BIPLANE) 2D: 89 ML
SL CV PED ECHO LEFT VENTRICLE SYSTOLIC VOLUME (MOD BIPLANE) 2D: 40 ML
Z-SCORE OF INTERVENTRICULAR SEPTUM IN END DIASTOLE: 1.86
Z-SCORE OF LEFT VENTRICULAR DIMENSION IN END DIASTOLE: -0.31
Z-SCORE OF LEFT VENTRICULAR DIMENSION IN END SYSTOLE: 1.01
Z-SCORE OF LEFT VENTRICULAR POSTERIOR WALL IN END DIASTOLE: 1.05

## 2022-03-29 PROCEDURE — 93226 XTRNL ECG REC<48 HR SCAN A/R: CPT

## 2022-03-29 PROCEDURE — 93225 XTRNL ECG REC<48 HRS REC: CPT

## 2022-03-29 PROCEDURE — 93306 TTE W/DOPPLER COMPLETE: CPT

## 2022-03-29 PROCEDURE — 93306 TTE W/DOPPLER COMPLETE: CPT | Performed by: INTERNAL MEDICINE

## 2022-04-05 ENCOUNTER — TELEPHONE (OUTPATIENT)
Dept: CARDIOLOGY CLINIC | Facility: CLINIC | Age: 37
End: 2022-04-05

## 2022-04-05 DIAGNOSIS — K63.89 OTHER SPECIFIED DISEASES OF INTESTINE: ICD-10-CM

## 2022-04-05 RX ORDER — BACLOFEN 20 MG/1
20 TABLET ORAL 3 TIMES DAILY
Qty: 90 TABLET | Refills: 0 | Status: SHIPPED | OUTPATIENT
Start: 2022-04-05 | End: 2022-05-03 | Stop reason: SDUPTHER

## 2022-04-05 NOTE — TELEPHONE ENCOUNTER
Jan Yu MD  to Me          4/5/22 5:03 PM  Please call her  Everything looks good  Blood sugar mildly up but not a concern  Watch your diet  Cut back on sugar  Echo and monitor are fine   Will see her in f/u  thanks

## 2022-04-12 PROCEDURE — 93227 XTRNL ECG REC<48 HR R&I: CPT | Performed by: INTERNAL MEDICINE

## 2022-04-19 DIAGNOSIS — R11.0 NAUSEA: ICD-10-CM

## 2022-04-19 RX ORDER — ONDANSETRON 8 MG/1
8 TABLET, ORALLY DISINTEGRATING ORAL AS NEEDED
Qty: 60 TABLET | Refills: 0 | Status: SHIPPED | OUTPATIENT
Start: 2022-04-19 | End: 2022-07-13 | Stop reason: SDUPTHER

## 2022-04-22 ENCOUNTER — OFFICE VISIT (OUTPATIENT)
Dept: GASTROENTEROLOGY | Facility: CLINIC | Age: 37
End: 2022-04-22
Payer: COMMERCIAL

## 2022-04-22 VITALS
BODY MASS INDEX: 21.17 KG/M2 | WEIGHT: 124 LBS | HEART RATE: 103 BPM | SYSTOLIC BLOOD PRESSURE: 132 MMHG | DIASTOLIC BLOOD PRESSURE: 100 MMHG | HEIGHT: 64 IN

## 2022-04-22 DIAGNOSIS — R14.2 BURPING: ICD-10-CM

## 2022-04-22 DIAGNOSIS — K59.1 FUNCTIONAL DIARRHEA: ICD-10-CM

## 2022-04-22 DIAGNOSIS — K31.84 GASTROPARESIS: Primary | ICD-10-CM

## 2022-04-22 DIAGNOSIS — K21.9 GASTROESOPHAGEAL REFLUX DISEASE WITHOUT ESOPHAGITIS: ICD-10-CM

## 2022-04-22 PROCEDURE — 3008F BODY MASS INDEX DOCD: CPT | Performed by: NURSE PRACTITIONER

## 2022-04-22 PROCEDURE — 99214 OFFICE O/P EST MOD 30 MIN: CPT | Performed by: INTERNAL MEDICINE

## 2022-04-22 NOTE — PROGRESS NOTES
[facility] - Outpatient Follow-up Note  Wade Ayers 31 y o  female MRN: [id number]  Encounter: [id number]          ASSESSMENT AND PLAN:      1  Gastroparesis  Patient still having persistent postprandial bloating, nausea, she cannot tolerate Reglan because of the side effects, she agree with starting domperidone 10 mg 3 times a day, she had EKG which shows bundle branch block and subsequently Holter monitor was done which came back normal sinus rhythm, Cardiology clear her to start on domperidone, no QT prolongation  Will sent prescription to pharmacy in [place], she will still continue with PPI, Zofran as needed    2  Gastroesophageal reflux disease without esophagitis  Status post TIF, continue with PPI as needed    3  Burping  Continue with baclofen 20 mg 3 times a day    4  Functional diarrhea  Most likely IBS with diarrhea, last colonoscopy with biopsy came back okay  She will take Imodium as needed  ______________________________________________________________________    SUBJECTIVE:  Patient seen and examined, she come for follow-up, she has a gastroparesis and persistent nausea, bloating, abdominal pain, poor appetite and weight loss, she did not tolerate Reglan, she had intra pyloric Botox injection which helped her but insurance did not cover 2nd Botox injection, last office visit we discussed about starting on domperidone 10 mg 3 times a day, she had EKG which shows bundle branch block but then subsequently Holter monitor was done which came back okay, Cardiology clear her to start on domperidone  She denies any chest pain, no shortness of breath, no nausea or vomiting  She is on multiple psychiatric medication including Seroquel, Effexor, mirtazapine, clonazepam and BuSpar  She has a strong family history of depression and anxiety  REVIEW OF SYSTEMS IS OTHERWISE NEGATIVE        Historical Information   Past Medical History:   Diagnosis Date    Anxiety     [facility] 2021    Bipolar disorder with depression ([facility] )     Last assessed: 16    Colon polyp     Depression     Endometriosis     Last assessed: 16    Gastroparesis     GERD (gastroesophageal reflux disease)     Hypertension     Inflammatory bowel disease     Scoliosis     Scoliosis     Varicella      Past Surgical History:   Procedure Laterality Date     SECTION      CHOLECYSTECTOMY      COLONOSCOPY      HERNIA REPAIR      HERNIA REPAIR      OTHER SURGICAL HISTORY      Transoral incisionless fundoplication (TIFF)    [place]  DELIVERY ONLY N/A 2016    Procedure:  SECTION ();   Surgeon: Jad Villeda DO;  Location[facility];  Service: Obstetrics    LA INCIS TENDON SHEATH,RADIAL STYLOID Right 2020    Procedure: Cristine Bowmanard RIGHT WRIST;  Surgeon: Myles Chiu MD;  Location: [address] [place] [address] MAIN OR;  Service: Orthopedics    LA LAP,CHOLECYSTECTOMY N/A 3/12/2018    Procedure: Prosper Wilson;  Surgeon: Colby Guillaume MD;  Location: [place] MAIN OR;  Service: General    TONSILECTOMY AND ADNOIDECTOMY Bilateral     TONSILLECTOMY      UPPER GASTROINTESTINAL ENDOSCOPY      WISDOM TOOTH EXTRACTION Bilateral      Social History   Social History     Substance and Sexual Activity   Alcohol Use Not Currently    Alcohol/week: 0 0 standard drinks    Comment: rare     Social History     Substance and Sexual Activity   Drug Use No     Social History     Tobacco Use   Smoking Status Light Tobacco Smoker    Packs/day: 0 50    Years: 1 00    Pack years: 0 50    Types: Cigarettes    Start date: 2020    Last attempt to quit: 2016    Years since quittin 9   Smokeless Tobacco Never Used     Family History   Problem Relation Age of Onset    Hypertension Father     Depression Mother     Asthma Mother     Anxiety disorder Mother     Bipolar disorder Mother     Depression Maternal Grandmother     Anxiety disorder Maternal Grandmother     Bipolar disorder Maternal Grandmother        Meds/Allergies       Current Outpatient Medications:     amLODIPine (NORVASC) 2 5 mg tablet    baclofen 20 mg tablet    busPIRone (BUSPAR) 10 mg tablet    clonazePAM (KlonoPIN) 1 mg tablet    docusate sodium (DULCOLAX) 100 mg capsule    metoprolol succinate (TOPROL-XL) 25 mg 24 hr tablet    mirtazapine (REMERON) 15 mg tablet    ondansetron (ZOFRAN-ODT) 8 mg disintegrating tablet    QUEtiapine (SEROquel) 200 mg tablet    venlafaxine (EFFEXOR-XR) 150 mg 24 hr capsule    venlafaxine (EFFEXOR-XR) 75 mg 24 hr capsule    acetaminophen-codeine (TYLENOL #3) 300-30 mg per tablet    medroxyPROGESTERone (DEPO-PROVERA) 150 mg/mL injection    Current Facility-Administered Medications:     Botulinum Toxin Type A SOLR 200 Units, 200 Units, Injection, Once    Allergies   Allergen Reactions    Sulfa Antibiotics Anaphylaxis, Other (See Comments) and Edema     swelling  swelling  swelling    Reglan [Metoclopramide] Anxiety           Objective     Blood pressure 132/100, pulse 103, height 5' 4" (1 626 m), weight 56 2 kg (124 lb), not currently breastfeeding  Body mass index is 21 28 kg/m²  PHYSICAL EXAM:      General Appearance:   Alert, cooperative, no distress   HEENT:   Normocephalic, atraumatic, anicteric      Neck:  Supple, symmetrical, trachea midline   Lungs:   Clear to auscultation bilaterally; no rales, rhonchi or wheezing; respirations unlabored    Heart[de-identified]   Regular rate and rhythm; no murmur, rub, or gallop  Abdomen:   Soft, non-tender, non-distended; normal bowel sounds; no masses, no organomegaly    Genitalia:   Deferred    Rectal:   Deferred    Extremities:  No cyanosis, clubbing or edema    Pulses:  2+ and symmetric    Skin:  No jaundice, rashes, or lesions    Lymph nodes:  No palpable cervical lymphadenopathy        Lab Results:   No visits with results within 1 Day(s) from this visit     Latest known visit with results is:   Hospital Outpatient Visit on 03/29/2022   Component Date Value    A4C EF 03/29/2022 49     LV Diastolic Volume (BP) 42/36/7013 79     LV Systolic Volume (BP) 77/71/3180 38     EF 03/29/2022 51     LVIDd 03/29/2022 4 40     LVIDS 03/29/2022 3 20     IVSd 03/29/2022 0 90     LVPWd 03/29/2022 0 80     FS 03/29/2022 27     MV E' Tissue Velocity Se* 03/29/2022 9     MV E' Tissue Velocity La* 03/29/2022 12     E wave deceleration time 03/29/2022 111     MV Peak E Imer 03/29/2022 59     MV Peak A Imer 03/29/2022 0 54     AV LVOT peak gradient 03/29/2022 3     LVOT peak VTI 03/29/2022 14 99     LVOT peak imer 03/29/2022 0 93     RVID d 03/29/2022 3 1     LA size 03/29/2022 2 6     LA length (A2C) 03/29/2022 4 10     RA 2D Volume 03/29/2022 24 0     RAA A4C 03/29/2022 10 6     Ao peak imer retrograde 03/29/2022 1 09     Ao VTI 03/29/2022 17 01     AV mean gradient 03/29/2022 3     LVOT mn grad 03/29/2022 2 0     AV peak gradient 03/29/2022 5     MV mean gradient antegra* 03/29/2022 1     MV peak gradient antegra* 03/29/2022 2     MV VTI 03/29/2022 12 55     MV stenosis pressure 1/2* 03/29/2022 32     MV valve area p 1/2 meth* 03/29/2022 6 88     Ao root 03/29/2022 3 00     Aortic valve mean veloci* 03/29/2022 7 60     Left ventricular stroke * 03/29/2022 49 00     IVS 03/29/2022 0 9     LEFT VENTRICLE SYSTOLIC * 20/23/3559 40     LV DIASTOLIC VOLUME (MOD* 78/18/6288 89     Left Atrium Area-systoli* 03/29/2022 13     Left Atrium Area-systoli* 03/29/2022 13     LVSV, 2D 03/29/2022 49     AV Velocity Ratio 03/29/2022 0 85     ZLVPWD 03/29/2022 1 05     ZLVIDS 03/29/2022 1 01     ZLVIDD 03/29/2022 -0 31     ZIVSD 03/29/2022 1 86     LV BRADFORD VOL ENDO Z SCORE 03/29/2022 -1 57     LV EF 03/29/2022 50          Radiology Results:   Echo complete w/ contrast if indicated    Result Date: 3/29/2022  Narrative: Emaline Folds  Left Ventricle: Left ventricular cavity size is normal  Wall thickness is normal  The left ventricular ejection fraction is 50-55%  Systolic function is low normal  Wall motion is normal  Diastolic function is normal    IVS: There is abnormal septal motion consistent with left bundle branch block    Right Ventricle: Right ventricular cavity size is normal  Systolic function is normal    Aortic Valve: There is no evidence of stenosis    Mitral Valve: There is trace regurgitation    Pericardium: There is no pericardial effusion  Holter monitor    Result Date: 4/12/2022  Narrative: Indication: LBBB Monitoring period: 47 hr 59 min Predominant rhythm: Sinus rhythm Minimum HR: 54 BPM Average HR: 75 BPM Maximum HR: 136 BPM Premature ventricular contractions: 88 beats (<0 1 %), all PVCs Ventricular runs: 0 Supraventricular contractions: 22 beats (<0 1 %) Supraventricular runs: 0 Longest RR: 1 4 sec Arrhythmias: No sustained arrhythmias recorded Symptom diary submitted: yes Impression Patient was predominantly in sinus rhythm with an average heart rate of 75 BPM  No significant supraventricular  or ventricular ectopies were noted  No sustained arrhythmias, or significant pauses were recorded  Patient's documented episode of symptom did not correlate with any arrhythmias  Overall: normal study  Signed by: Travis Suh DO   Attending cardiologist: Carmen Sawyer MD

## 2022-04-27 ENCOUNTER — TELEMEDICINE (OUTPATIENT)
Dept: PSYCHIATRY | Facility: CLINIC | Age: 37
End: 2022-04-27
Payer: COMMERCIAL

## 2022-04-27 DIAGNOSIS — F31.62 BIPOLAR DISORDER, CURRENT EPISODE MIXED, MODERATE (HCC): Primary | ICD-10-CM

## 2022-04-27 PROCEDURE — 99214 OFFICE O/P EST MOD 30 MIN: CPT | Performed by: NURSE PRACTITIONER

## 2022-04-27 PROCEDURE — 4004F PT TOBACCO SCREEN RCVD TLK: CPT | Performed by: NURSE PRACTITIONER

## 2022-04-27 NOTE — PSYCH
TREATMENT PLAN (Medication Management Only)  Treatment Plan done but not signed at time of office visit due to:  Plan reviewed by phone or in person  and verbal consent given due to P ROSE  [address]    Name and Date of Birth:  Iwona Novoa 31 y o  1985  Date of Treatment Plan: April 27, 2022  Diagnosis/Diagnoses:    1  Bipolar disorder, current episode mixed, moderate ([address] )      Strengths/Personal Resources for Self-Care: supportive family, taking medications as prescribed  Area/Areas of need (in own words): anxiety symptoms  1  Long Term Goal: maintain control of anxiety  Target Date: 6 months - 10/27/2022  Person/Persons responsible for completion of goal: Priscila  2  Short Term Objective (s) - How will we reach this goal?:   A  Provider new recommended medication/dosage changes and/or continue medication(s): continue current medications as prescribed  B   N/A  Target Date: 6 months - 10/27/2022  Person/Persons Responsible for Completion of Goal: Priscila  Progress Towards Goals: continuing treatment  Treatment Modality: medication education at every visit  Review due 6 months from date of this plan: 6 months - 10/27/2022  Expected length of service: ongoing treatment unless revised  My Physician/PA/NP and I have developed this plan together and I agree to work on the goals and objectives  I understand the treatment goals that were developed for my treatment

## 2022-04-27 NOTE — PSYCH
PROGRESS NOTE        [facility]      Name and Date of Birth:  Abdoulaye Llamas 31 y o  1985    Date of Visit: 04/27/22    SUBJECTIVE:  Patient is a 44-year-old female with a long history of bipolar depressive disorder  She functions at a minimal baseline  She has family members who assist her every day with her ADLs  She is well maintained on these current medications that she takes daily  She has gastroparesis and is being followed by gastroenterology  Psychiatrically, she is well maintained on her current meds  She reports no breakthrough anxiety or depression  She still requires daily assistance from her family members with her daily needs  But the combination of their help in the meds, she is doing okay  She will continue on this regimen and we will follow up with her in 3 months or sooner if necessary  She denies suicidal ideation, intent or plan at present, has no suicidal ideation, intent or plan at present  She denies any auditory hallucinations and visual hallucinations, denies any other delusional thinking, denies any delusional thinking  She denies any side effects from medications    HPI ROS Appetite Changes and Sleep: normal appetite, normal sleep    Review Of Systems:      Constitutional Negative   ENT Negative   Cardiovascular Negative   Respiratory As Noted in HPI   Gastrointestinal Negative   Genitourinary Negative   Musculoskeletal Negative   Integumentary Negative   Neurological Negative   Endocrine Negative   Other Symptoms Negative and None       Laboratory Results: No results found for this or any previous visit      Substance Abuse History:    Social History     Substance and Sexual Activity   Drug Use No       Family Psychiatric History:     Family History   Problem Relation Age of Onset    Hypertension Father     Depression Mother     Asthma Mother     Anxiety disorder Mother     Bipolar disorder Mother  Depression Maternal Grandmother     Anxiety disorder Maternal Grandmother     Bipolar disorder Maternal Grandmother        The following portions of the patient's history were reviewed and updated as appropriate: past family history, past medical history, past social history, past surgical history and problem list     Social History     Socioeconomic History    Marital status: /Civil Union     Spouse name: Not on file    Number of children: Not on file    Years of education: Not on file    Highest education level: Not on file   Occupational History    Not on file   Tobacco Use    Smoking status: Light Tobacco Smoker     Packs/day: 0 50     Years: 1 00     Pack years: 0 50     Types: Cigarettes     Start date: 2020     Last attempt to quit: 2016     Years since quittin 9    Smokeless tobacco: Never Used   Vaping Use    Vaping Use: Never used   Substance and Sexual Activity    Alcohol use: Not Currently     Alcohol/week: 0 0 standard drinks     Comment: rare    Drug use: No    Sexual activity: Yes     Partners: Male     Birth control/protection: Injection     Comment: on depo    Other Topics Concern    Not on file   Social History Narrative    Daily caffeine consumption, 1 serving a day    Exercises regularly     Social Determinants of Health     Financial Resource Strain: Not on file   Food Insecurity: Not on file   Transportation Needs: Not on file   Physical Activity: Not on file   Stress: Not on file   Social Connections: Not on file   Intimate Partner Violence: Not on file   Housing Stability: Not on file     Social History     Social History Narrative    Daily caffeine consumption, 1 serving a day    Exercises regularly        Social History     Tobacco History     Smoking Status  Light Tobacco Smoker Smoking Start Date  2020 Last attempt to quit  2016 Smoking Frequency  0 5 packs/day for 1 years (0 5 pk yrs)    Smoking Tobacco Type  Cigarettes    Smokeless Tobacco Use  Never Used          Alcohol History     Alcohol Use Status  Not Currently Drinks/Week  0 Glasses of wine, 0 Cans of beer, 0 Shots of liquor, 0 Standard drinks or equivalent per week Amount  0 0 standard drinks of alcohol/wk Comment  rare          Drug Use     Drug Use Status  No          Sexual Activity     Sexually Active  Yes Partners  Male Birth Control/Protection  Injection Comment  on depo           Activities of Daily Living    Not Asked                     OBJECTIVE:     Mental Status Evaluation:    Appearance age appropriate, casually dressed   Behavior pleasant, cooperative   Speech normal volume, normal pitch   Mood Mood is stable   Affect Congruent with mood   Thought Processes Slowed but logical   Associations intact associations   Thought Content No overt delusions   Perceptual Disturbances: None   Abnormal Thoughts  Risk Potential Suicidal ideation - None  Homicidal ideation - None  Potential for aggression - No   Orientation oriented to person, place, time/date and situation   Memory recent and remote memory grossly intact   Cosciousness alert and awake   Attention Span Fair   Intellect Appears to be of Average Intelligence   Insight Fair   Judgement Fair   Muscle Strength and  Gait muscle strength and tone were normal   Language no difficulty naming common objects   Fund of Knowledge displays adequate knowledge of current events   Pain None   Pain Scale 0       Assessment/Plan:       Diagnoses and all orders for this visit:    Bipolar disorder, current episode mixed, moderate (HCC)          Treatment Recommendations/Precautions:    Continue current medications: BuSpar 10 mg t i d    Klonopin 1 mg q i d     To reduce this further for this patient would be detrimental to her health  She follows with gastroenterology for gastroparesis and they are in agreement with this dose of Klonopin  Remeron 15 mg HS  Seroquel 200 mg t i d    Effexor  mg daily  Follow-up in 3 months or sooner if necessary  Risks/Benefits      Risks, Benefits And Possible Side Effects Of Medications:    Risks, benefits, and possible side effects of medications explained to patient and patient verbalizes understanding and agreement for treatment  Risk assessment:  Based on today's interview, the patient is not a risk for self-harm or harm to others  Controlled Medication Discussion:  Patient with no history of any overuse or abuse of controlled substances  She feels all scripts on time          Psychotherapy Provided:     Individual psychotherapy provided: No

## 2022-05-03 DIAGNOSIS — K63.89 OTHER SPECIFIED DISEASES OF INTESTINE: ICD-10-CM

## 2022-05-03 RX ORDER — BACLOFEN 20 MG/1
20 TABLET ORAL 3 TIMES DAILY
Qty: 90 TABLET | Refills: 0 | Status: SHIPPED | OUTPATIENT
Start: 2022-05-03 | End: 2022-05-31 | Stop reason: SDUPTHER

## 2022-05-16 ENCOUNTER — TELEPHONE (OUTPATIENT)
Dept: PSYCHIATRY | Facility: CLINIC | Age: 37
End: 2022-05-16

## 2022-05-16 DIAGNOSIS — F41.1 GENERALIZED ANXIETY DISORDER: ICD-10-CM

## 2022-05-16 RX ORDER — BUSPIRONE HYDROCHLORIDE 10 MG/1
TABLET ORAL
Qty: 90 TABLET | Refills: 5 | Status: SHIPPED | OUTPATIENT
Start: 2022-05-16

## 2022-05-17 ENCOUNTER — TELEPHONE (OUTPATIENT)
Dept: INTERNAL MEDICINE CLINIC | Age: 37
End: 2022-05-17

## 2022-05-17 NOTE — TELEPHONE ENCOUNTER
I called the patient for more information as to the reason that she is not able to serve  She said that her gastroparesis, anxiety and depression are the reasons

## 2022-05-17 NOTE — TELEPHONE ENCOUNTER
Patient is calling to get a letter from her primary care provider to excuse her from [facility]    She stated to them that she is unable to do the [address] [facility] duty due to her medical conditions    Please fax over the letter to clear her from jury duty    Fax [phone]    Please call the patient back when completed to her mobile phone

## 2022-05-31 DIAGNOSIS — K63.89 OTHER SPECIFIED DISEASES OF INTESTINE: ICD-10-CM

## 2022-05-31 DIAGNOSIS — I10 ESSENTIAL HYPERTENSION: ICD-10-CM

## 2022-05-31 RX ORDER — AMLODIPINE BESYLATE 2.5 MG/1
2.5 TABLET ORAL DAILY
Qty: 90 TABLET | Refills: 0 | Status: SHIPPED | OUTPATIENT
Start: 2022-05-31

## 2022-05-31 RX ORDER — BACLOFEN 20 MG/1
20 TABLET ORAL 3 TIMES DAILY
Qty: 90 TABLET | Refills: 1 | Status: SHIPPED | OUTPATIENT
Start: 2022-05-31 | End: 2022-07-22 | Stop reason: SDUPTHER

## 2022-06-08 ENCOUNTER — OFFICE VISIT (OUTPATIENT)
Dept: INTERNAL MEDICINE CLINIC | Age: 37
End: 2022-06-08
Payer: COMMERCIAL

## 2022-06-08 VITALS
BODY MASS INDEX: 19.41 KG/M2 | TEMPERATURE: 99.3 F | WEIGHT: 113.7 LBS | HEART RATE: 98 BPM | HEIGHT: 64 IN | DIASTOLIC BLOOD PRESSURE: 74 MMHG | SYSTOLIC BLOOD PRESSURE: 110 MMHG | OXYGEN SATURATION: 97 %

## 2022-06-08 DIAGNOSIS — K21.9 GASTROESOPHAGEAL REFLUX DISEASE WITHOUT ESOPHAGITIS: ICD-10-CM

## 2022-06-08 DIAGNOSIS — F41.9 ANXIETY: ICD-10-CM

## 2022-06-08 DIAGNOSIS — K31.84 GASTROPARESIS: ICD-10-CM

## 2022-06-08 DIAGNOSIS — I10 ESSENTIAL HYPERTENSION: Primary | ICD-10-CM

## 2022-06-08 PROCEDURE — 99214 OFFICE O/P EST MOD 30 MIN: CPT | Performed by: INTERNAL MEDICINE

## 2022-06-08 NOTE — PROGRESS NOTES
Assessment/Plan:    1  Essential hypertension  Blood pressure is stable on present regimen  2  Anxiety  Doing well on present regimen  3  GERD/gastroparesis  Being managed by gastroenterologist        Diagnoses and all orders for this visit:    Essential hypertension    Anxiety    Gastroparesis    Gastroesophageal reflux disease without esophagitis               Subjective:          Patient ID: Timmy Guerrero is a 31 y o  female  Patient is here for regular follow-up  Still complaining of indigestion/epigastric discomfort  Presently she is under the care of gastroenterologist       The following portions of the patient's history were reviewed and updated as appropriate: allergies, current medications, past family history, past medical history, past social history, past surgical history and problem list     Review of Systems   Constitutional: Negative for fatigue and fever  HENT: Negative for congestion, ear discharge, ear pain, postnasal drip, sinus pressure, sore throat, tinnitus and trouble swallowing  Eyes: Negative for discharge, itching and visual disturbance  Respiratory: Negative for cough and shortness of breath  Cardiovascular: Negative for chest pain and palpitations  Gastrointestinal: Positive for abdominal pain  Negative for diarrhea, nausea and vomiting  Endocrine: Negative for cold intolerance and polyuria  Genitourinary: Negative for difficulty urinating, dysuria and urgency  Musculoskeletal: Negative for arthralgias and neck pain  Skin: Negative for rash  Allergic/Immunologic: Negative for environmental allergies  Neurological: Negative for dizziness, weakness and headaches  Psychiatric/Behavioral: Negative for agitation and behavioral problems  The patient is not nervous/anxious            Past Medical History:   Diagnosis Date    Anxiety     Bipolar 2 disorder, major depressive episode ([facility] ) 8/17/2021    Bipolar disorder with depression ([facility] )     Last assessed: 5/11/16    Colon polyp     Depression     Endometriosis     Last assessed: 5/11/16    Gastroparesis     GERD (gastroesophageal reflux disease)     Hypertension     Inflammatory bowel disease     Scoliosis     Scoliosis     Varicella          Current Outpatient Medications:     amLODIPine (NORVASC) 2 5 mg tablet, Take 1 tablet (2 5 mg total) by mouth daily, Disp: 90 tablet, Rfl: 0    baclofen 20 mg tablet, Take 1 tablet (20 mg total) by mouth 3 (three) times a day, Disp: 90 tablet, Rfl: 1    busPIRone (BUSPAR) 10 mg tablet, TAKE ONE TABLET BY MOUTH THREE TIMES A DAY, Disp: 90 tablet, Rfl: 5    clonazePAM (KlonoPIN) 1 mg tablet, TAKE ONE TABLET BY MOUTH FOUR TIMES A DAY    DO NOT TAKE WITHIN 4 HOURS OF OPIOID PAIN MEDICATIONS, Disp: 120 tablet, Rfl: 2    docusate sodium (COLACE) 100 mg capsule, Take 1 capsule by mouth daily as needed  , Disp: , Rfl:     metoprolol succinate (TOPROL-XL) 25 mg 24 hr tablet, Take 1 tablet (25 mg total) by mouth daily, Disp: 90 tablet, Rfl: 1    mirtazapine (REMERON) 15 mg tablet, Take 1 tablet (15 mg total) by mouth daily at bedtime, Disp: 30 tablet, Rfl: 5    ondansetron (ZOFRAN-ODT) 8 mg disintegrating tablet, Take 1 tablet (8 mg total) by mouth as needed for nausea (EVERY 6 HOURS AS NEEDED FOR NAUSEA), Disp: 60 tablet, Rfl: 0    QUEtiapine (SEROquel) 200 mg tablet, TAKE ONE TABLET BY MOUTH THREE TIMES A DAY (NOON, 5PM, AND BEDTIME), Disp: 90 tablet, Rfl: 0    venlafaxine (EFFEXOR-XR) 150 mg 24 hr capsule, Take 1 capsule (150 mg total) by mouth daily, Disp: 30 capsule, Rfl: 5    venlafaxine (EFFEXOR-XR) 75 mg 24 hr capsule, TAKE ONE CAPSULE BY MOUTH EVERY DAY, Disp: 30 capsule, Rfl: 0    acetaminophen-codeine (TYLENOL #3) 300-30 mg per tablet, Take 1 tablet by mouth 2 (two) times a day (Patient not taking: No sig reported), Disp: 60 tablet, Rfl: 0    medroxyPROGESTERone (DEPO-PROVERA) 150 mg/mL injection, INJECT EVERY 12 WEEK AS DIRECTED (Patient not taking: No sig reported), Disp: 1 mL, Rfl: 0    Current Facility-Administered Medications:     Botulinum Toxin Type A SOLR 200 Units, 200 Units, Injection, Once, Trinity Sims PA-C    Allergies   Allergen Reactions    Sulfa Antibiotics Anaphylaxis, Other (See Comments) and Edema     swelling  swelling  swelling    Reglan [Metoclopramide] Anxiety       Social History   Past Surgical History:   Procedure Laterality Date     SECTION      CHOLECYSTECTOMY      COLONOSCOPY      HERNIA REPAIR      HERNIA REPAIR      OTHER SURGICAL HISTORY      Transoral incisionless fundoplication (TIFF)    [place]  DELIVERY ONLY N/A 2016    Procedure:  SECTION ();   Surgeon: Rosalind Rivera DO;  [facility];  Service: Obstetrics    [place] INCIS TENDON SHEATH,RADIAL STYLOID Right 2020    Procedure: RELEASE DEQUERVAINS RIGHT WRIST;  Surgeon: Batsheva Smyth MD;  Location: [address] [place] [address] MAIN OR;  Service: Orthopedics    [place] LAP,CHOLECYSTECTOMY N/A 3/12/2018    Procedure: Bouchraey Radon;  Surgeon: Matthew Jha MD;  Location: [facility] MAIN OR;  Service: General    TONSILECTOMY AND ADNOIDECTOMY Bilateral     TONSILLECTOMY      UPPER GASTROINTESTINAL ENDOSCOPY      WISDOM TOOTH EXTRACTION Bilateral      Family History   Problem Relation Age of Onset    Hypertension Father     Depression Mother     Asthma Mother     Anxiety disorder Mother     Bipolar disorder Mother     Mental illness Mother     Thyroid disease Mother     Depression Maternal Grandmother     Anxiety disorder Maternal Grandmother     Bipolar disorder Maternal Grandmother     Mental illness Maternal Grandmother     Cancer Paternal Grandfather     Cancer Paternal Uncle        Objective:  /74 (BP Location: Left arm, Patient Position: Sitting, Cuff Size: Standard)   Pulse 98   Temp 99 3 °F (37 4 °C) (Temporal)   Ht 5' 4" (1 626 m)   Wt 51 6 kg (113 lb 11 2 oz)   SpO2 97% Comment: room air  BMI 19 52 kg/m²   Body mass index is 19 52 kg/m²  Physical Exam  Constitutional:       Appearance: She is well-developed  HENT:      Head: Normocephalic  Right Ear: External ear normal       Left Ear: External ear normal       Nose: No rhinorrhea  Mouth/Throat:      Pharynx: No posterior oropharyngeal erythema  Eyes:      General: No scleral icterus  Pupils: Pupils are equal, round, and reactive to light  Neck:      Thyroid: No thyromegaly  Trachea: No tracheal deviation  Cardiovascular:      Rate and Rhythm: Normal rate and regular rhythm  Heart sounds: Normal heart sounds  Pulmonary:      Effort: Pulmonary effort is normal  No respiratory distress  Breath sounds: Normal breath sounds  Chest:      Chest wall: No tenderness  Abdominal:      General: Bowel sounds are normal       Palpations: Abdomen is soft  There is no mass  Tenderness: There is no abdominal tenderness  Musculoskeletal:         General: Normal range of motion  Cervical back: Normal range of motion and neck supple  Right lower leg: No edema  Left lower leg: No edema  Lymphadenopathy:      Cervical: No cervical adenopathy  Skin:     General: Skin is warm  Neurological:      Mental Status: She is alert and oriented to person, place, and time  Cranial Nerves: No cranial nerve deficit     Psychiatric:         Mood and Affect: Mood normal          Behavior: Behavior normal

## 2022-06-17 DIAGNOSIS — F31.81 BIPOLAR 2 DISORDER, MAJOR DEPRESSIVE EPISODE (HCC): ICD-10-CM

## 2022-06-17 DIAGNOSIS — K58.0 IRRITABLE BOWEL SYNDROME WITH DIARRHEA: Primary | ICD-10-CM

## 2022-06-17 RX ORDER — CLONAZEPAM 1 MG/1
TABLET ORAL
Qty: 120 TABLET | Refills: 2 | Status: SHIPPED | OUTPATIENT
Start: 2022-06-17 | End: 2022-07-28 | Stop reason: SDUPTHER

## 2022-06-27 ENCOUNTER — OFFICE VISIT (OUTPATIENT)
Dept: GASTROENTEROLOGY | Facility: CLINIC | Age: 37
End: 2022-06-27
Payer: COMMERCIAL

## 2022-06-27 VITALS
SYSTOLIC BLOOD PRESSURE: 123 MMHG | HEIGHT: 64 IN | HEART RATE: 88 BPM | BODY MASS INDEX: 20.49 KG/M2 | DIASTOLIC BLOOD PRESSURE: 90 MMHG | WEIGHT: 120 LBS

## 2022-06-27 DIAGNOSIS — K21.9 GASTROESOPHAGEAL REFLUX DISEASE WITHOUT ESOPHAGITIS: ICD-10-CM

## 2022-06-27 DIAGNOSIS — K31.84 GASTROPARESIS: ICD-10-CM

## 2022-06-27 DIAGNOSIS — K58.1 IRRITABLE BOWEL SYNDROME WITH CONSTIPATION: Primary | ICD-10-CM

## 2022-06-27 PROCEDURE — 3008F BODY MASS INDEX DOCD: CPT | Performed by: INTERNAL MEDICINE

## 2022-06-27 PROCEDURE — 99214 OFFICE O/P EST MOD 30 MIN: CPT | Performed by: INTERNAL MEDICINE

## 2022-06-27 PROCEDURE — 4004F PT TOBACCO SCREEN RCVD TLK: CPT | Performed by: INTERNAL MEDICINE

## 2022-06-27 RX ORDER — PRUCALOPRIDE 2 MG/1
2 TABLET, FILM COATED ORAL DAILY
Qty: 30 TABLET | Refills: 1 | Status: SHIPPED | OUTPATIENT
Start: 2022-06-27

## 2022-06-27 NOTE — PROGRESS NOTES
[facility] - Outpatient Follow-up Note  José Antonio Nelson 31 y o  female MRN: [id number]  Encounter: [id number]          ASSESSMENT AND PLAN:      1  Irritable bowel syndrome with constipation  History of gastroparesis with chronic nausea, bloating, reflux symptoms and burping, she try domperidone but unable to tolerate medication because of the side effects, in the past she also try Reglan which also was discontinued because of the side effect, will try Motegrity 2 mg p o  q daily, side effect profile including diarrhea, worsening of headache and depression was discussed  - Prucalopride Succinate (Motegrity) 2 MG TABS; Take 2 mg by mouth daily  Dispense: 30 tablet; Refill: 1    2  Gastroparesis  She fail multiple medication including Reglan and domperidone, I discussed in detail about laparoscopic pyloroplasty, she only have mild gastroparesis I personally advised to manage symptoms with diet only    3  Gastroesophageal reflux disease without esophagitis  Status post TIF, symptoms are well controlled , she is off from PPI    ______________________________________________________________________    SUBJECTIVE:  Patient seen and examined, she come for follow-up, last office visit we started on Xifaxan 550 mg 3 times a day for 14 days which is helping her, she is still on antibiotic  She stop taking Lomotil, I also started on domperidone but she was unable to tolerate medication  She had a similar side effect with domperidone as Reglan in the past   Her weight is  slowly trending down, her reflux are well controlled, she is off from PPI, she is using baclofen as needed along the Zofran as needed      REVIEW OF SYSTEMS IS OTHERWISE NEGATIVE        Historical Information   Past Medical History:   Diagnosis Date    Anxiety     B[facility] 2 disorder, major depressive episode ([facility] ) 8/17/2021    Bipolar disorder with depression ([facility] )     Last assessed: 5/11/16    Colon polyp     Depression     Endometriosis     Last assessed: 16    Gastroparesis     GERD (gastroesophageal reflux disease)     Hypertension     Inflammatory bowel disease     Scoliosis     Scoliosis     Varicella      Past Surgical History:   Procedure Laterality Date     SECTION      CHOLECYSTECTOMY      COLONOSCOPY      HERNIA REPAIR      HERNIA REPAIR      OTHER SURGICAL HISTORY      Transoral incisionless fundoplication (TIFF)    [place]  DELIVERY ONLY N/A 2016    Procedure:  SECTION ();   Surgeon: Danielle Ng DO;  Location[facility];  Service: Obstetrics    [place] INCIS TENDON SHEATH,RADIAL STYLOID Right 2020    Procedure: Apolinar Bruno RIGHT WRIST;  Surgeon: Tommy Rodríguez MD;  Location: [address] [place] [address] MAIN OR;  Service: Orthopedics    [place] LAP,CHOLECYSTECTOMY N/A 3/12/2018    Procedure: Carin Kayser;  Surgeon: Sophie Mendoza MD;  Location: [facility] MAIN OR;  Service: General    TONSILECTOMY AND ADNOIDECTOMY Bilateral     TONSILLECTOMY      UPPER GASTROINTESTINAL ENDOSCOPY      WISDOM TOOTH EXTRACTION Bilateral      Social History   Social History     Substance and Sexual Activity   Alcohol Use Not Currently    Comment: rare     Social History     Substance and Sexual Activity   Drug Use No     Social History     Tobacco Use   Smoking Status Light Tobacco Smoker    Packs/day: 0 50    Years: 1 00    Pack years: 0 50    Types: Cigarettes, Cigarettes    Start date: 2020    Last attempt to quit: 2016    Years since quittin 0   Smokeless Tobacco Never Used     Family History   Problem Relation Age of Onset    Hypertension Father     Depression Mother     Asthma Mother     Anxiety disorder Mother     Bipolar disorder Mother     Mental illness Mother     Thyroid disease Mother     Depression Maternal Grandmother     Anxiety disorder Maternal Grandmother     Bipolar disorder Maternal Grandmother     Mental illness Maternal Grandmother     Cancer Paternal Grandfather     Cancer Paternal Uncle        Meds/Allergies       Current Outpatient Medications:     amLODIPine (NORVASC) 2 5 mg tablet    baclofen 20 mg tablet    busPIRone (BUSPAR) 10 mg tablet    clonazePAM (KlonoPIN) 1 mg tablet    docusate sodium (COLACE) 100 mg capsule    metoprolol succinate (TOPROL-XL) 25 mg 24 hr tablet    mirtazapine (REMERON) 15 mg tablet    ondansetron (ZOFRAN-ODT) 8 mg disintegrating tablet    Prucalopride Succinate (Motegrity) 2 MG TABS    QUEtiapine (SEROquel) 200 mg tablet    rifaximin (XIFAXAN) 550 mg tablet    venlafaxine (EFFEXOR-XR) 150 mg 24 hr capsule    venlafaxine (EFFEXOR-XR) 75 mg 24 hr capsule  No current facility-administered medications for this visit  Allergies   Allergen Reactions    Sulfa Antibiotics Anaphylaxis, Other (See Comments) and Edema     swelling  swelling  swelling    Reglan [Metoclopramide] Anxiety           Objective     Blood pressure 123/90, pulse 88, height 5' 4" (1 626 m), weight 54 4 kg (120 lb), not currently breastfeeding  Body mass index is 20 6 kg/m²  PHYSICAL EXAM:      General Appearance:   Alert, cooperative, no distress   HEENT:   Normocephalic, atraumatic, anicteric      Neck:  Supple, symmetrical, trachea midline   Lungs:   Clear to auscultation bilaterally; no rales, rhonchi or wheezing; respirations unlabored    Heart[de-identified]   Regular rate and rhythm; no murmur, rub, or gallop  Abdomen:   Soft, non-tender, non-distended; normal bowel sounds; no masses, no organomegaly    Genitalia:   Deferred    Rectal:   Deferred    Extremities:  No cyanosis, clubbing or edema    Pulses:  2+ and symmetric    Skin:  No jaundice, rashes, or lesions    Lymph nodes:  No palpable cervical lymphadenopathy        Lab Results:   No visits with results within 1 Day(s) from this visit     Latest known visit with results is:   Hospital Outpatient Visit on 03/29/2022   Component Date Value    A4C EF 03/29/2022 49     LV Diastolic Volume (BP) 03/29/2022 79     LV Systolic Volume (BP) 44/49/2478 38     EF 03/29/2022 51     LVIDd 03/29/2022 4 40     LVIDS 03/29/2022 3 20     IVSd 03/29/2022 0 90     LVPWd 03/29/2022 0 80     FS 03/29/2022 27     MV E' Tissue Velocity Se* 03/29/2022 9     MV E' Tissue Velocity La* 03/29/2022 12     E wave deceleration time 03/29/2022 111     MV Peak E Imer 03/29/2022 59     MV Peak A Imer 03/29/2022 0 54     AV LVOT peak gradient 03/29/2022 3     LVOT peak VTI 03/29/2022 14 99     LVOT peak imer 03/29/2022 0 93     RVID d 03/29/2022 3 1     LA size 03/29/2022 2 6     LA length (A2C) 03/29/2022 4 10     RA 2D Volume 03/29/2022 24 0     RAA A4C 03/29/2022 10 6     Ao peak imer retrograde 03/29/2022 1 09     Ao VTI 03/29/2022 17 01     AV mean gradient 03/29/2022 3     LVOT mn grad 03/29/2022 2 0     AV peak gradient 03/29/2022 5     MV mean gradient antegra* 03/29/2022 1     MV peak gradient antegra* 03/29/2022 2     MV VTI 03/29/2022 12 55     MV stenosis pressure 1/2* 03/29/2022 32     MV valve area p 1/2 meth* 03/29/2022 6 88     Ao root 03/29/2022 3 00     Aortic valve mean veloci* 03/29/2022 7 60     Left ventricular stroke * 03/29/2022 49 00     IVS 03/29/2022 0 9     LEFT VENTRICLE SYSTOLIC * 51/03/4523 40     LV DIASTOLIC VOLUME (MOD* 25/03/8944 89     Left Atrium Area-systoli* 03/29/2022 13     Left Atrium Area-systoli* 03/29/2022 13     LVSV, 2D 03/29/2022 49     AV Velocity Ratio 03/29/2022 0 85     ZLVPWD 03/29/2022 1 05     ZLVIDS 03/29/2022 1 01     ZLVIDD 03/29/2022 -0 31     ZIVSD 03/29/2022 1 86     LV BRADFORD VOL ENDO Z SCORE 03/29/2022 -1 57     LV EF 03/29/2022 50          Radiology Results:   No results found

## 2022-06-29 ENCOUNTER — TELEPHONE (OUTPATIENT)
Dept: GASTROENTEROLOGY | Facility: CLINIC | Age: 37
End: 2022-06-29

## 2022-06-29 NOTE — TELEPHONE ENCOUNTER
Motegrity 2mg denied through insurance started a prior authorization through Cover My Meds await response

## 2022-06-30 DIAGNOSIS — F31.81 BIPOLAR 2 DISORDER, MAJOR DEPRESSIVE EPISODE (HCC): ICD-10-CM

## 2022-06-30 RX ORDER — VENLAFAXINE HYDROCHLORIDE 75 MG/1
75 CAPSULE, EXTENDED RELEASE ORAL DAILY
Qty: 30 CAPSULE | Refills: 1 | Status: SHIPPED | OUTPATIENT
Start: 2022-06-30 | End: 2022-07-28 | Stop reason: SDUPTHER

## 2022-06-30 RX ORDER — QUETIAPINE FUMARATE 200 MG/1
TABLET, FILM COATED ORAL
Qty: 90 TABLET | Refills: 1 | Status: SHIPPED | OUTPATIENT
Start: 2022-06-30 | End: 2022-07-28 | Stop reason: SDUPTHER

## 2022-07-09 DIAGNOSIS — F31.81 BIPOLAR 2 DISORDER, MAJOR DEPRESSIVE EPISODE (HCC): ICD-10-CM

## 2022-07-09 RX ORDER — VENLAFAXINE HYDROCHLORIDE 150 MG/1
CAPSULE, EXTENDED RELEASE ORAL
Qty: 30 CAPSULE | Refills: 5 | Status: SHIPPED | OUTPATIENT
Start: 2022-07-09 | End: 2022-07-14 | Stop reason: SDUPTHER

## 2022-07-13 DIAGNOSIS — R11.0 NAUSEA: ICD-10-CM

## 2022-07-13 RX ORDER — ONDANSETRON 8 MG/1
8 TABLET, ORALLY DISINTEGRATING ORAL AS NEEDED
Qty: 60 TABLET | Refills: 0 | Status: SHIPPED | OUTPATIENT
Start: 2022-07-13

## 2022-07-14 DIAGNOSIS — F31.81 BIPOLAR 2 DISORDER, MAJOR DEPRESSIVE EPISODE (HCC): ICD-10-CM

## 2022-07-14 RX ORDER — VENLAFAXINE HYDROCHLORIDE 150 MG/1
CAPSULE, EXTENDED RELEASE ORAL
Qty: 30 CAPSULE | Refills: 5 | Status: SHIPPED | OUTPATIENT
Start: 2022-07-14

## 2022-07-22 DIAGNOSIS — K63.89 OTHER SPECIFIED DISEASES OF INTESTINE: ICD-10-CM

## 2022-07-22 RX ORDER — BACLOFEN 20 MG/1
20 TABLET ORAL 3 TIMES DAILY
Qty: 90 TABLET | Refills: 0 | Status: SHIPPED | OUTPATIENT
Start: 2022-07-22 | End: 2022-08-18 | Stop reason: SDUPTHER

## 2022-07-25 ENCOUNTER — TELEPHONE (OUTPATIENT)
Dept: GASTROENTEROLOGY | Facility: CLINIC | Age: 37
End: 2022-07-25

## 2022-07-25 DIAGNOSIS — I10 ESSENTIAL HYPERTENSION: ICD-10-CM

## 2022-07-25 DIAGNOSIS — F31.81 BIPOLAR 2 DISORDER, MAJOR DEPRESSIVE EPISODE (HCC): ICD-10-CM

## 2022-07-25 RX ORDER — METOPROLOL SUCCINATE 25 MG/1
25 TABLET, EXTENDED RELEASE ORAL DAILY
Qty: 90 TABLET | Refills: 0 | Status: SHIPPED | OUTPATIENT
Start: 2022-07-25 | End: 2022-10-11 | Stop reason: SDUPTHER

## 2022-07-25 RX ORDER — MIRTAZAPINE 15 MG/1
TABLET, FILM COATED ORAL
Qty: 30 TABLET | Refills: 5 | Status: SHIPPED | OUTPATIENT
Start: 2022-07-25 | End: 2022-07-28 | Stop reason: SDUPTHER

## 2022-07-26 ENCOUNTER — TELEPHONE (OUTPATIENT)
Dept: GASTROENTEROLOGY | Facility: CLINIC | Age: 37
End: 2022-07-26

## 2022-07-26 NOTE — TELEPHONE ENCOUNTER
Stephan Lizarraga  Can you please get the patient's insurance to do a peer to peer for her Duarte Acuñaos it has been denied and the appeal was also denied stating that it is not a covered drug Dr Nneka Morfin is asking for the peer to peer

## 2022-07-26 NOTE — TELEPHONE ENCOUNTER
Sent all notes (33 pages) with an urgent expedited appeal request to [phone] in the interim of scheduling a peer to peer

## 2022-07-27 ENCOUNTER — TELEPHONE (OUTPATIENT)
Dept: GASTROENTEROLOGY | Facility: CLINIC | Age: 37
End: 2022-07-27

## 2022-07-27 DIAGNOSIS — K58.1 IRRITABLE BOWEL SYNDROME WITH CONSTIPATION: Primary | ICD-10-CM

## 2022-07-27 RX ORDER — LINACLOTIDE 145 UG/1
145 CAPSULE, GELATIN COATED ORAL DAILY
Qty: 30 CAPSULE | Refills: 1 | Status: SHIPPED | OUTPATIENT
Start: 2022-07-27 | End: 2022-09-20 | Stop reason: SDUPTHER

## 2022-07-27 NOTE — TELEPHONE ENCOUNTER
Spoke with patient and she would like to try the [address] [place] [address]  Will send Dr Kristie Gibson the message

## 2022-07-28 ENCOUNTER — TELEMEDICINE (OUTPATIENT)
Dept: PSYCHIATRY | Facility: CLINIC | Age: 37
End: 2022-07-28
Payer: COMMERCIAL

## 2022-07-28 DIAGNOSIS — F31.9 BIPOLAR DEPRESSION (HCC): Primary | ICD-10-CM

## 2022-07-28 DIAGNOSIS — F31.81 BIPOLAR 2 DISORDER, MAJOR DEPRESSIVE EPISODE (HCC): ICD-10-CM

## 2022-07-28 PROCEDURE — 99214 OFFICE O/P EST MOD 30 MIN: CPT | Performed by: NURSE PRACTITIONER

## 2022-07-28 RX ORDER — QUETIAPINE FUMARATE 200 MG/1
TABLET, FILM COATED ORAL
Qty: 90 TABLET | Refills: 5 | Status: SHIPPED | OUTPATIENT
Start: 2022-07-28

## 2022-07-28 RX ORDER — VENLAFAXINE HYDROCHLORIDE 75 MG/1
75 CAPSULE, EXTENDED RELEASE ORAL DAILY
Qty: 30 CAPSULE | Refills: 5 | Status: SHIPPED | OUTPATIENT
Start: 2022-07-28

## 2022-07-28 RX ORDER — MIRTAZAPINE 15 MG/1
TABLET, FILM COATED ORAL
Qty: 30 TABLET | Refills: 5 | Status: SHIPPED | OUTPATIENT
Start: 2022-07-28 | End: 2022-07-28 | Stop reason: SDUPTHER

## 2022-07-28 RX ORDER — CLONAZEPAM 1 MG/1
TABLET ORAL
Qty: 120 TABLET | Refills: 2 | Status: SHIPPED | OUTPATIENT
Start: 2022-07-28

## 2022-07-28 RX ORDER — MIRTAZAPINE 15 MG/1
15 TABLET, FILM COATED ORAL
Qty: 30 TABLET | Refills: 5 | Status: SHIPPED | OUTPATIENT
Start: 2022-07-28

## 2022-07-28 NOTE — PSYCH
Virtual Regular Visit    Verification of patient location:    Patient is located in the following state in which I hold an active license [place]  Assessment/Plan:    Problem List Items Addressed This Visit        Other    Bipolar 2 disorder, major depressive episode ([facility] )    Relevant Medications    clonazePAM (KlonoPIN) 1 mg tablet    mirtazapine (REMERON) 15 mg tablet    QUEtiapine (SEROquel) 200 mg tablet    venlafaxine (EFFEXOR-XR) 75 mg 24 hr capsule      Other Visit Diagnoses     Bipolar depression ([facility] )    -  Primary    Relevant Medications    mirtazapine (REMERON) 15 mg tablet    QUEtiapine (SEROquel) 200 mg tablet    venlafaxine (EFFEXOR-XR) 75 mg 24 hr capsule          Goals addressed in session:  Main current level of stability psychiatrically  Reason for visit is   Chief Complaint   Patient presents with    Virtual Regular Visit        Encounter provider MIKE Escamilla    Provider located at [address] [address]      Recent Visits  No visits were found meeting these conditions  Showing recent visits within past 7 days and meeting all other requirements  Today's Visits  Date Type Provider Dept   07/28/22 Telemedicine Jose Escamilla Emily today's visits and meeting all other requirements  Future Appointments  No visits were found meeting these conditions  Showing future appointments within next 150 days and meeting all other requirements       The patient was identified by name and date of birth  Yusra Ashton was informed that this is a telemedicine visit and [facility] and [facility] was informed that this is a secure, HIPAA-compliant platform  She agrees to proceed     My office door was closed  No one else was in the room    She acknowledged consent and understanding of privacy and security of the video platform  The patient has agreed to participate and understands they can discontinue the visit at any time  Patient is aware this is a billable service  Subjective  Christina Selby is a 31 y o  female who suffers from bipolar depressive disorder  She was also diagnosed over the last several years with gastroparesis  She is following a certain diet which has been helpful at times and she is on multiple medications to help her condition  As result, she is chronically depressed from the constant nausea and sometimes, she feels full without enjoying any food  Psychiatrically, she is holding her own  She functions at a baseline level of functioning  She requires assistance from her , his family and her family  She has been well maintained on this current medication regimen she will continue on it  She tolerates it well  Sleep has been good  Appetite has been variable secondary to her diagnosis of gastroparesis     The patient will continue on:  Klonopin 1 mg q i d  p r n  Remeron 15 mg HS   Seroquel 200 mg t i d  Effexor  mg daily  Buspirone 10 mg t i d  Follow-up with me in 3 months or sooner if necessary  Mental status exam:  Patient is awake and alert and oriented x3  Mood is stable  Patient is not suicidal, not homicidal not psychotic  Speech is clear and thoughts are well organized and coherent and goal-directed  Patient still requires assistance from family  Attention span is fair  Impulse control is good  Judgment insight are fair    Memory is good    HPI     Past Medical History:   Diagnosis Date    Anxiety     Bipolar 2 disorder, major depressive episode ([facility] ) 8/17/2021    Bipolar disorder with depression ([facility] )     Last assessed: 5/11/16    Colon polyp     Depression     Endometriosis     Last assessed: 5/11/16    Gastroparesis     GERD (gastroesophageal reflux disease)     Hypertension     Inflammatory bowel disease     Scoliosis     Scoliosis     Varicella        Past Surgical History:   Procedure Laterality Date     SECTION      CHOLECYSTECTOMY      COLONOSCOPY      HERNIA REPAIR      HERNIA REPAIR      OTHER SURGICAL HISTORY      Transoral incisionless fundoplication (TIFF)    MI  DELIVERY ONLY N/A 2016    Procedure:  SECTION ();   Surgeon: Millie Lester DO;  Location: [facility];  Service: Obstetrics    MI INCIS TENDON SHEATH,RADIAL STYLOID Right 2020    Procedure: RELEASE DEQUERVAINS RIGHT WRIST;  Surgeon: Benny Tinsley MD;  Location: [address] [place] [address];  Service: Orthopedics    MI LAP,CHOLECYSTECTOMY N/A 3/12/2018    Procedure: LAPAROSCOPIC CHOLECYSTECTOMY;  Surgeon: Bri Johnson MD;  Location: [address] [place] [address];  Service: General    TONSILECTOMY AND ADNOIDECTOMY Bilateral     TONSILLECTOMY      UPPER GASTROINTESTINAL ENDOSCOPY      WISDOM TOOTH EXTRACTION Bilateral        Current Outpatient Medications   Medication Sig Dispense Refill    clonazePAM (KlonoPIN) 1 mg tablet 1  tablet 2    mirtazapine (REMERON) 15 mg tablet Take 1 tablet (15 mg total) by mouth daily at bedtime 30 tablet 5    QUEtiapine (SEROquel) 200 mg tablet TAKE ONE TABLET BY MOUTH THREE TIMES A DAY (NOON, 5PM, AND BEDTIME) 90 tablet 5    venlafaxine (EFFEXOR-XR) 75 mg 24 hr capsule Take 1 capsule (75 mg total) by mouth daily 30 capsule 5    amLODIPine (NORVASC) 2 5 mg tablet Take 1 tablet (2 5 mg total) by mouth daily 90 tablet 0    baclofen 20 mg tablet Take 1 tablet (20 mg total) by mouth 3 (three) times a day 90 tablet 0    busPIRone (BUSPAR) 10 mg tablet TAKE ONE TABLET BY MOUTH THREE TIMES A DAY 90 tablet 5    docusate sodium (COLACE) 100 mg capsule Take 1 capsule by mouth daily as needed        linaCLOtide (Linzess) 145 MCG CAPS Take 1 capsule (145 mcg total) by mouth in the morning 30 capsule 1    metoprolol succinate (TOPROL-XL) 25 mg 24 hr tablet Take 1 tablet (25 mg total) by mouth daily 90 tablet 0    ondansetron (ZOFRAN-ODT) 8 mg disintegrating tablet Take 1 tablet (8 mg total) by mouth as needed for nausea (EVERY 6 HOURS AS NEEDED FOR NAUSEA) 60 tablet 0    Prucalopride Succinate (Motegrity) 2 MG TABS Take 2 mg by mouth daily 30 tablet 1    venlafaxine (EFFEXOR-XR) 150 mg 24 hr capsule TAKE ONE CAPSULE BY MOUTH EVERY DAY 30 capsule 5     No current facility-administered medications for this visit  Allergies   Allergen Reactions    Sulfa Antibiotics Anaphylaxis, Other (See Comments) and Edema     swelling  swelling  swelling    Reglan [Metoclopramide] Anxiety       Review of Systems    Video Exam    There were no vitals filed for this visit  Physical Exam     I spent 25 minutes with patient today in which greater than 50% of the time was spent in counseling/coordination of care regarding Medication management, treatment and chart review    VIRTUAL VISIT DISCLAIMER    Suzie Leon verbally agrees to participate in [employer]  Pt is aware that [employer] could be limited without vital signs or the ability to perform a full hands-on physical Deneice Patience understands she or the provider may request at any time to terminate the video visit and request the patient to seek care or treatment in person

## 2022-08-16 DIAGNOSIS — I10 ESSENTIAL HYPERTENSION: ICD-10-CM

## 2022-08-16 RX ORDER — AMLODIPINE BESYLATE 2.5 MG/1
2.5 TABLET ORAL DAILY
Qty: 90 TABLET | Refills: 1 | Status: SHIPPED | OUTPATIENT
Start: 2022-08-16

## 2022-08-18 DIAGNOSIS — K63.89 OTHER SPECIFIED DISEASES OF INTESTINE: ICD-10-CM

## 2022-08-19 DIAGNOSIS — K63.89 OTHER SPECIFIED DISEASES OF INTESTINE: ICD-10-CM

## 2022-08-21 RX ORDER — BACLOFEN 20 MG/1
20 TABLET ORAL 3 TIMES DAILY
Qty: 90 TABLET | Refills: 0 | Status: SHIPPED | OUTPATIENT
Start: 2022-08-21

## 2022-08-22 RX ORDER — BACLOFEN 20 MG/1
20 TABLET ORAL 3 TIMES DAILY
Qty: 90 TABLET | Refills: 0 | Status: SHIPPED | OUTPATIENT
Start: 2022-08-22 | End: 2022-09-20 | Stop reason: SDUPTHER

## 2022-08-29 DIAGNOSIS — K58.0 IRRITABLE BOWEL SYNDROME WITH DIARRHEA: Primary | ICD-10-CM

## 2022-09-20 ENCOUNTER — TELEPHONE (OUTPATIENT)
Dept: GASTROENTEROLOGY | Facility: CLINIC | Age: 37
End: 2022-09-20

## 2022-09-20 DIAGNOSIS — K58.1 IRRITABLE BOWEL SYNDROME WITH CONSTIPATION: ICD-10-CM

## 2022-09-20 DIAGNOSIS — K63.89 OTHER SPECIFIED DISEASES OF INTESTINE: ICD-10-CM

## 2022-09-20 RX ORDER — BACLOFEN 20 MG/1
20 TABLET ORAL 3 TIMES DAILY
Qty: 90 TABLET | Refills: 0 | Status: SHIPPED | OUTPATIENT
Start: 2022-09-20 | End: 2022-10-18 | Stop reason: SDUPTHER

## 2022-09-20 RX ORDER — LINACLOTIDE 145 UG/1
145 CAPSULE, GELATIN COATED ORAL DAILY
Qty: 30 CAPSULE | Refills: 1 | Status: SHIPPED | OUTPATIENT
Start: 2022-09-20

## 2022-09-20 NOTE — TELEPHONE ENCOUNTER
Received fax refill request on Linzess 145 mcg caps, 30 day supply from [facility] in [place]  Patient has upcoming appt with Dr Nancy Bowman on 9/29/22

## 2022-09-29 ENCOUNTER — TELEPHONE (OUTPATIENT)
Dept: GASTROENTEROLOGY | Facility: CLINIC | Age: 37
End: 2022-09-29

## 2022-09-29 ENCOUNTER — OFFICE VISIT (OUTPATIENT)
Dept: GASTROENTEROLOGY | Facility: CLINIC | Age: 37
End: 2022-09-29
Payer: COMMERCIAL

## 2022-09-29 VITALS
SYSTOLIC BLOOD PRESSURE: 142 MMHG | HEIGHT: 64 IN | HEART RATE: 97 BPM | DIASTOLIC BLOOD PRESSURE: 90 MMHG | BODY MASS INDEX: 19.33 KG/M2 | WEIGHT: 113.2 LBS

## 2022-09-29 DIAGNOSIS — K21.9 GASTROESOPHAGEAL REFLUX DISEASE WITHOUT ESOPHAGITIS: ICD-10-CM

## 2022-09-29 DIAGNOSIS — R11.14 BILIOUS VOMITING WITH NAUSEA: ICD-10-CM

## 2022-09-29 DIAGNOSIS — K58.0 IRRITABLE BOWEL SYNDROME WITH DIARRHEA: Primary | ICD-10-CM

## 2022-09-29 PROCEDURE — 99214 OFFICE O/P EST MOD 30 MIN: CPT | Performed by: INTERNAL MEDICINE

## 2022-09-29 RX ORDER — DIPHENOXYLATE HYDROCHLORIDE AND ATROPINE SULFATE 2.5; .025 MG/1; MG/1
1 TABLET ORAL 2 TIMES DAILY
Qty: 60 TABLET | Refills: 1 | Status: SHIPPED | OUTPATIENT
Start: 2022-09-29

## 2022-09-29 RX ORDER — OCTISALATE, AVOBENZONE, HOMOSALATE, AND OCTOCRYLENE 29.4; 29.4; 49; 25.48 MG/ML; MG/ML; MG/ML; MG/ML
1 LOTION TOPICAL 2 TIMES DAILY
Qty: 60 CAPSULE | Refills: 1 | Status: SHIPPED | OUTPATIENT
Start: 2022-09-29

## 2022-09-29 NOTE — TELEPHONE ENCOUNTER
[facility] Assessment    Name: Shawn Sánchez  YOB: 1985  Last Height: 5' 4" (1 626 m)  Last weight: 51 3 kg (113 lb 3 2 oz)  BMI: 19 43 kg/m²  Procedure: Egd  Diagnosis: see order  Date of procedure: 10/11/22  Prep:   Responsible : yes  Phone#: [phone]  Name completing form: Beti Oates  Date form completed: 09/29/22      If the patient answers yes to any of these questions, schedule in a hospital  Are you pregnant: No  Do you rely on a wheelchair for mobility: No  Have you been diagnosed with End Stage Renal Disease (ESRD): No  Do you need oxygen during the day: No  Have you had a heart attack or stroke within the past three months: No  Have you had a seizure within the past three months: No  Have you ever been informed by anesthesia that you have a difficult airway: No  Additional Questions  Have you had any cardiac testing or are under the care of a Cardiologist (see cardiac list): No  Cardiac list:   Do you have an implanted cardiac defibrillator: No (Comment:  This patient should be scheduled in the hospital)    Have any bleeding problems, such as anemia or hemophilia (If patient has H&H result below 8, schedule in hospital   H&H must be within 30 days of procedure): No    Had an organ transplant within the past 3 months: No    Do you have any present infections: No  Do you get short of breath when walking a few blocks: No  Have you been diagnosed with diabetes: No  Comments (provide cardiac provider information if applicable):

## 2022-09-29 NOTE — PROGRESS NOTES
[facility] - Outpatient Follow-up Note  Sunny Alvarado 30 y o  female MRN: [id number]  Encounter: [id number]          ASSESSMENT AND PLAN:      1  Irritable bowel syndrome with diarrhea  Patient is complaining worsening of diarrhea, she had a colonoscopy last year which was okay, biopsy was negative for microscopic colitis, she was treated with Xifaxan in June and in August with significant improvement in her symptoms but as soon as she stops antibiotics her symptoms recur, with try Lomotil 2 tablets p o  q day along with a probiotic  - diphenoxylate-atropine (LOMOTIL) 2 5-0 025 mg per tablet; Take 1 tablet by mouth 2 (two) times a day  Dispense: 60 tablet; Refill: 1  - Probiotic Product (Align) 4 MG CAPS; Take 1 capsule (4 mg total) by mouth 2 (two) times a day  Dispense: 60 capsule; Refill: 1    2  Bilious vomiting with nausea  She is taking Zofran every 6 hourly but symptoms still persist, she frequently burps and having persistent nausea and vomiting, will prescribe nausea patch , schedule for EGD  - granisetron (SANCUSO) 3 1 MG/24HR; Place 1 patch on the skin once for 1 dose  Dispense: 4 each; Refill: 0  - EGD; Future    3  Gastroesophageal reflux disease without esophagitis  She is off from PPI currently, she had TIF in past  - EGD; Future    ______________________________________________________________________    SUBJECTIVE:  Patient seen and examined, she come for follow-up, she is losing weight, her BMI is 19 only, she is feeling weak, constant nausea and diarrhea and frequent burping, she had EGD and colonoscopy last year which came back okay, she is taking Zofran every 6 hourly, she was given prescription for Xifaxan 550 milligram 3 times a day for 14 days in June and in August, antibiotic help for brief duration and then symptoms recur, currently she is having multiple loose bowel movements throughout the day  Mother is inquiring about GJ-tube        REVIEW OF SYSTEMS IS OTHERWISE NEGATIVE  Historical Information   Past Medical History:   Diagnosis Date    Anxiety     Bipolar 2 disorder, major depressive episode ([facility] ) 2021    Bipolar disorder with depression ([facility] )     Last assessed: 16    Colon polyp     Depression     Endometriosis     Last assessed: 16    Gastroparesis     GERD (gastroesophageal reflux disease)     Hypertension     Inflammatory bowel disease     Scoliosis     Scoliosis     Varicella      Past Surgical History:   Procedure Laterality Date     SECTION      CHOLECYSTECTOMY      COLONOSCOPY      HERNIA REPAIR      HERNIA REPAIR      OTHER SURGICAL HISTORY      Transoral incisionless fundoplication (TIFF)    CO  DELIVERY ONLY N/A 2016    Procedure:  SECTION ();   Surgeon: Julia Werner DO;  Location[facility];  Service: Obstetrics    CO INCIS TENDON SHEATH,RADIAL STYLOID Right 2020    Procedure: Vernestine Means RIGHT WRIST;  Surgeon: Antonio Candelario MD;  [facility] MAIN OR;  Service: Orthopedics    CO LAP,CHOLECYSTECTOMY N/A 3/12/2018    Procedure: Brooklyn Juab;  Surgeon: Marlene Bauer MD;  Location: [facility] MAIN OR;  Service: General    TONSILECTOMY AND ADNOIDECTOMY Bilateral     TONSILLECTOMY      UPPER GASTROINTESTINAL ENDOSCOPY      WISDOM TOOTH EXTRACTION Bilateral      Social History   Social History     Substance and Sexual Activity   Alcohol Use Not Currently    Comment: rare     Social History     Substance and Sexual Activity   Drug Use No     Social History     Tobacco Use   Smoking Status Light Tobacco Smoker    Packs/day: 0 50    Years: 1 00    Pack years: 0 50    Types: Cigarettes, Cigarettes    Start date: 2020    Last attempt to quit: 2016    Years since quittin 3   Smokeless Tobacco Never Used     Family History   Problem Relation Age of Onset    Hypertension Father     Depression Mother     Asthma Mother     Anxiety disorder Mother     Bipolar disorder Mother     Mental illness Mother     Thyroid disease Mother     Depression Maternal Grandmother     Anxiety disorder Maternal Grandmother     Bipolar disorder Maternal Grandmother     Mental illness Maternal Grandmother     Cancer Paternal Grandfather     Cancer Paternal Uncle        Meds/Allergies       Current Outpatient Medications:     amLODIPine (NORVASC) 2 5 mg tablet    baclofen 20 mg tablet    busPIRone (BUSPAR) 10 mg tablet    clonazePAM (KlonoPIN) 1 mg tablet    diphenoxylate-atropine (LOMOTIL) 2 5-0 025 mg per tablet    docusate sodium (COLACE) 100 mg capsule    granisetron (SANCUSO) 3 1 MG/24HR    metoprolol succinate (TOPROL-XL) 25 mg 24 hr tablet    mirtazapine (REMERON) 15 mg tablet    ondansetron (ZOFRAN-ODT) 8 mg disintegrating tablet    Probiotic Product (Align) 4 MG CAPS    QUEtiapine (SEROquel) 200 mg tablet    venlafaxine (EFFEXOR-XR) 150 mg 24 hr capsule    venlafaxine (EFFEXOR-XR) 75 mg 24 hr capsule    baclofen 20 mg tablet    linaCLOtide (Linzess) 145 MCG CAPS    Prucalopride Succinate (Motegrity) 2 MG TABS    Allergies   Allergen Reactions    Sulfa Antibiotics Anaphylaxis, Other (See Comments) and Edema     swelling  swelling  swelling    Reglan [Metoclopramide] Anxiety           Objective     Blood pressure 142/90, pulse 97, height 5' 4" (1 626 m), weight 51 3 kg (113 lb 3 2 oz), not currently breastfeeding  Body mass index is 19 43 kg/m²  PHYSICAL EXAM:      General Appearance:   Alert, cooperative, no distress   HEENT:   Normocephalic, atraumatic, anicteric      Neck:  Supple, symmetrical, trachea midline   Lungs:   Clear to auscultation bilaterally; no rales, rhonchi or wheezing; respirations unlabored    Heart[de-identified]   Regular rate and rhythm; no murmur, rub, or gallop     Abdomen:   Soft, non-tender, non-distended; normal bowel sounds; no masses, no organomegaly    Genitalia:   Deferred    Rectal:   Deferred  Extremities:  No cyanosis, clubbing or edema    Pulses:  2+ and symmetric    Skin:  No jaundice, rashes, or lesions    Lymph nodes:  No palpable cervical lymphadenopathy        Lab Results:   No visits with results within 1 Day(s) from this visit     Latest known visit with results is:   Hospital Outpatient Visit on 03/29/2022   Component Date Value    A4C EF 03/29/2022 49     LV Diastolic Volume (BP) 25/60/2127 79     LV Systolic Volume (BP) 49/30/4245 38     EF 03/29/2022 51     LVIDd 03/29/2022 4 40     LVIDS 03/29/2022 3 20     IVSd 03/29/2022 0 90     LVPWd 03/29/2022 0 80     FS 03/29/2022 27     MV E' Tissue Velocity Se* 03/29/2022 9     MV E' Tissue Velocity La* 03/29/2022 12     E wave deceleration time 03/29/2022 111     MV Peak E Imer 03/29/2022 59     MV Peak A Imer 03/29/2022 0 54     AV LVOT peak gradient 03/29/2022 3     LVOT peak VTI 03/29/2022 14 99     LVOT peak imer 03/29/2022 0 93     RVID d 03/29/2022 3 1     LA size 03/29/2022 2 6     LA length (A2C) 03/29/2022 4 10     RA 2D Volume 03/29/2022 24 0     RAA A4C 03/29/2022 10 6     Aortic valve peak veloci* 03/29/2022 1 09     Ao VTI 03/29/2022 17 01     AV mean gradient 03/29/2022 3     LVOT mn grad 03/29/2022 2 0     AV peak gradient 03/29/2022 5     MV mean gradient antegra* 03/29/2022 1     MV peak gradient antegra* 03/29/2022 2     MV VTI 03/29/2022 12 55     MV stenosis pressure 1/2* 03/29/2022 32     MV valve area p 1/2 meth* 03/29/2022 6 88     Ao root 03/29/2022 3 00     Aortic valve mean veloci* 03/29/2022 7 60     Left ventricular stroke * 03/29/2022 49 00     IVS 03/29/2022 0 9     LEFT VENTRICLE SYSTOLIC * 62/25/6773 40     LV DIASTOLIC VOLUME (MOD* 99/87/0344 89     Left Atrium Area-systoli* 03/29/2022 13     Left Atrium Area-systoli* 03/29/2022 13     LVSV, 2D 03/29/2022 49     Dimensionless velociy in* 03/29/2022 0 85     ZLVPWD 03/29/2022 1 05     ZLVIDS 03/29/2022 1 01     ZLVIDD 03/29/2022 -0 31     ZIVSD 03/29/2022 1 86     LV BRADFORD VOL ENDO Z SCORE 03/29/2022 -1 57     LV EF 03/29/2022 50          Radiology Results:   No results found  Answers for HPI/ROS submitted by the patient on 9/28/2022  Chronicity: chronic  Onset: more than 1 year ago  Onset quality: undetermined  Frequency: daily  Episode duration: 2 months  Progression since onset: waxing and waning  Pain location: generalized abdominal region  Pain - numeric: 7/10  Pain quality: aching, cramping, a sensation of fullness  Radiates to: LLQ, LUQ, RLQ, RUQ, epigastric region, periumbilical region, suprapubic region  anorexia: Yes  arthralgias: No  belching: Yes  constipation: No  diarrhea: Yes  dysuria: No  fever: No  flatus: No  frequency: No  headaches: No  hematochezia: No  hematuria: No  melena: No  myalgias: No  nausea: Yes  weight loss: Yes  vomiting: No  Aggravated by: belching, bowel movement, eating  Relieved by: nothing

## 2022-09-29 NOTE — TELEPHONE ENCOUNTER
Scheduled date of EGD(as of today): 10/11/22  Physician performing EGD: Dr Deepa Owusu   Location of EGD: [facility]  Instructions reviewed with patient by: jed  Clearances: n/a

## 2022-09-30 ENCOUNTER — TELEPHONE (OUTPATIENT)
Dept: OTHER | Facility: OTHER | Age: 37
End: 2022-09-30

## 2022-09-30 DIAGNOSIS — R11.14 BILIOUS VOMITING WITH NAUSEA: ICD-10-CM

## 2022-09-30 NOTE — TELEPHONE ENCOUNTER
PHARMACIST NEEDS CLARIFICATION- SANCUSO 7 DAY PATCH- SEND RX WITH INSTRUCTIONS- CURRENT INSTRUCTION IS FOR 1 DOSE BUT 4 PATCHES ORDERED   Wendy Duran

## 2022-09-30 NOTE — TELEPHONE ENCOUNTER
Pharmacy  Is requesting a call back to get clarification on the medication dosing for granisetron (Sancuso)   Pharmacy states this is urgent

## 2022-10-10 ENCOUNTER — TELEPHONE (OUTPATIENT)
Dept: GASTROENTEROLOGY | Facility: CLINIC | Age: 37
End: 2022-10-10

## 2022-10-10 ENCOUNTER — ANESTHESIA EVENT (OUTPATIENT)
Dept: ANESTHESIOLOGY | Facility: AMBULATORY SURGERY CENTER | Age: 37
End: 2022-10-10

## 2022-10-10 ENCOUNTER — ANESTHESIA (OUTPATIENT)
Dept: ANESTHESIOLOGY | Facility: AMBULATORY SURGERY CENTER | Age: 37
End: 2022-10-10

## 2022-10-10 NOTE — TELEPHONE ENCOUNTER
Patients GI provider:  Dr Jennifer Cyr    Number to return call: [phone]    Reason for call: Pt calling because she woke up with cold symptoms this morning and wants to know if she should reschedule her EGD    Scheduled procedure/appointment date if applicable: Procedure 41/92/68

## 2022-10-11 DIAGNOSIS — I10 ESSENTIAL HYPERTENSION: ICD-10-CM

## 2022-10-11 RX ORDER — METOPROLOL SUCCINATE 25 MG/1
25 TABLET, EXTENDED RELEASE ORAL DAILY
Qty: 90 TABLET | Refills: 1 | Status: SHIPPED | OUTPATIENT
Start: 2022-10-11

## 2022-10-18 DIAGNOSIS — K63.89 OTHER SPECIFIED DISEASES OF INTESTINE: ICD-10-CM

## 2022-10-18 RX ORDER — BACLOFEN 20 MG/1
20 TABLET ORAL 3 TIMES DAILY
Qty: 90 TABLET | Refills: 0 | Status: SHIPPED | OUTPATIENT
Start: 2022-10-18

## 2022-10-26 ENCOUNTER — HOSPITAL ENCOUNTER (OUTPATIENT)
Dept: GASTROENTEROLOGY | Facility: AMBULATORY SURGERY CENTER | Age: 37
Discharge: HOME | End: 2022-10-26

## 2022-10-26 ENCOUNTER — ANESTHESIA (OUTPATIENT)
Dept: GASTROENTEROLOGY | Facility: AMBULATORY SURGERY CENTER | Age: 37
End: 2022-10-26

## 2022-10-26 ENCOUNTER — ANESTHESIA EVENT (OUTPATIENT)
Dept: GASTROENTEROLOGY | Facility: AMBULATORY SURGERY CENTER | Age: 37
End: 2022-10-26

## 2022-10-26 VITALS
BODY MASS INDEX: 19.29 KG/M2 | SYSTOLIC BLOOD PRESSURE: 126 MMHG | WEIGHT: 113 LBS | HEART RATE: 70 BPM | DIASTOLIC BLOOD PRESSURE: 81 MMHG | TEMPERATURE: 98 F | RESPIRATION RATE: 18 BRPM | OXYGEN SATURATION: 100 % | HEIGHT: 64 IN

## 2022-10-26 DIAGNOSIS — K29.60 BILE REFLUX GASTRITIS: Primary | ICD-10-CM

## 2022-10-26 DIAGNOSIS — R11.14 BILIOUS VOMITING WITH NAUSEA: ICD-10-CM

## 2022-10-26 DIAGNOSIS — K21.9 GASTROESOPHAGEAL REFLUX DISEASE WITHOUT ESOPHAGITIS: ICD-10-CM

## 2022-10-26 LAB
EXT PREGNANCY TEST URINE: NEGATIVE
EXT. CONTROL: NORMAL

## 2022-10-26 RX ORDER — SODIUM CHLORIDE, SODIUM LACTATE, POTASSIUM CHLORIDE, CALCIUM CHLORIDE 600; 310; 30; 20 MG/100ML; MG/100ML; MG/100ML; MG/100ML
INJECTION, SOLUTION INTRAVENOUS CONTINUOUS PRN
Status: DISCONTINUED | OUTPATIENT
Start: 2022-10-26 | End: 2022-10-26

## 2022-10-26 RX ORDER — PROPOFOL 10 MG/ML
INJECTION, EMULSION INTRAVENOUS AS NEEDED
Status: DISCONTINUED | OUTPATIENT
Start: 2022-10-26 | End: 2022-10-26

## 2022-10-26 RX ORDER — SUCRALFATE 1 G/1
1 TABLET ORAL 4 TIMES DAILY
Qty: 120 TABLET | Refills: 1 | Status: SHIPPED | OUTPATIENT
Start: 2022-10-26

## 2022-10-26 RX ADMIN — PROPOFOL 100 MG: 10 INJECTION, EMULSION INTRAVENOUS at 10:59

## 2022-10-26 RX ADMIN — SODIUM CHLORIDE, SODIUM LACTATE, POTASSIUM CHLORIDE, CALCIUM CHLORIDE: 600; 310; 30; 20 INJECTION, SOLUTION INTRAVENOUS at 10:44

## 2022-10-26 RX ADMIN — PROPOFOL 100 MG: 10 INJECTION, EMULSION INTRAVENOUS at 11:01

## 2022-10-26 NOTE — ANESTHESIA PREPROCEDURE EVALUATION
Procedure:  EGD    Relevant Problems   CARDIO   (+) Essential hypertension   (+) Left bundle branch block      GI/HEPATIC   (+) Acid reflux disease      MUSCULOSKELETAL   (+) Scoliosis      NEURO/PSYCH   (+) Anxiety   (+) Anxious depression   (+) Continuous opioid dependence (HCC)      +gastroparesis  H/o palpitations  Bipolar disorder Type II    Physical Exam    Airway    Mallampati score: I  TM Distance: >3 FB  Neck ROM: full     Dental   Comment: ednetulous,     Cardiovascular      Pulmonary      Other Findings          ECHO (3/2022)  •  Left Ventricle: Left ventricular cavity size is normal  Wall thickness is normal  The left ventricular ejection fraction is 50-55%  Systolic function is low normal  Wall motion is normal  Diastolic function is normal   •  IVS: There is abnormal septal motion consistent with left bundle branch block  •  Right Ventricle: Right ventricular cavity size is normal  Systolic function is normal   •  Aortic Valve: There is no evidence of stenosis  •  Mitral Valve: There is trace regurgitation  •  Pericardium: There is no pericardial effusion  Anesthesia Plan  ASA Score- 3     Anesthesia Type- IV sedation with anesthesia with ASA Monitors  Additional Monitors:   Airway Plan:     Comment: Npo after MN  Urine hcg = negative  Plan Factors-Exercise tolerance (METS): >4 METS  Chart reviewed  Patient summary reviewed  Patient is a current smoker  Patient instructed to abstain from smoking on day of procedure  Patient smoked on day of surgery  Induction- intravenous  Postoperative Plan-     Informed Consent- Anesthetic plan and risks discussed with patient  I personally reviewed this patient with the CRNA  Discussed and agreed on the Anesthesia Plan with the CRNA  Bj John

## 2022-10-26 NOTE — H&P
History and Physical - [facility]  Leroy Villa 30 y o  female MRN: [id number]                  HPI: Leroy Villa is a 30y o  year old female who presents for epigastric pain, heartburn, history of GERD and gastroparesis      REVIEW OF SYSTEMS: Per the HPI, and otherwise unremarkable  Historical Information   Past Medical History:   Diagnosis Date   • Anxiety    • Bipolar 2 disorder, major depressive episode ([facility] ) 2021   • Bipolar disorder with depression ([facility] )     Last assessed: 16   • Chronic pain disorder     abd   • Colon polyp    • Depression    • Endometriosis     Last assessed: 16   • Gastroparesis    • GERD (gastroesophageal reflux disease)    • Hypertension    • Inflammatory bowel disease    • Scoliosis    • Varicella      Past Surgical History:   Procedure Laterality Date   •  SECTION     • COLONOSCOPY     • HERNIA REPAIR     • HERNIA REPAIR     • OTHER SURGICAL HISTORY      Transoral incisionless fundoplication (TIFF)   • RI  DELIVERY ONLY N/A 2016    Procedure:  SECTION ();   Surgeon: Ashwini Pandey DO;  Location[facility];  Service: Obstetrics   • RI INCIS TENDON SHEATH,RADIAL STYLOID Right 2020    Procedure: Teresita Labs RIGHT WRIST;  Surgeon: Rachael Wilson MD;  Location: [address] [place] [address] MAIN OR;  Service: Orthopedics   • RI LAP,CHOLECYSTECTOMY N/A 2018    Procedure: Suad Oreilly;  Surgeon: Kelli Edmondson MD;  Location: [facility];  Service: General   • TONSILECTOMY AND ADNOIDECTOMY Bilateral    • UPPER GASTROINTESTINAL ENDOSCOPY     • WISDOM TOOTH EXTRACTION Bilateral      Social History   Social History     Substance and Sexual Activity   Alcohol Use Not Currently    Comment: rare     Social History     Substance and Sexual Activity   Drug Use No     Social History     Tobacco Use   Smoking Status Light Tobacco Smoker   • Packs/day: 0 50   • Years: 2 00   • Pack years: 1 00   • Types: Cigarettes   • Start date: 8/13/2020   Smokeless Tobacco Never Used     Family History   Problem Relation Age of Onset   • Hypertension Father    • Depression Mother    • Asthma Mother    • Anxiety disorder Mother    • Bipolar disorder Mother    • Mental illness Mother    • Thyroid disease Mother    • Depression Maternal Grandmother    • Anxiety disorder Maternal Grandmother    • Bipolar disorder Maternal Grandmother    • Mental illness Maternal Grandmother    • Cancer Paternal Grandfather    • Cancer Paternal Uncle        Meds/Allergies     (Not in a hospital admission)      Allergies   Allergen Reactions   • Sulfa Antibiotics Anaphylaxis, Other (See Comments) and Edema     swelling  swelling  swelling   • Reglan [Metoclopramide] Anxiety       Objective     /63   Pulse 75   Temp 98 °F (36 7 °C) (Temporal)   Resp 18   Ht 5' 4" (1 626 m)   Wt 51 3 kg (113 lb)   LMP 10/19/2022 (Approximate)   SpO2 96%   BMI 19 40 kg/m²       PHYSICAL EXAM    Gen: NAD  CV: RRR  CHEST: Clear  ABD: soft, NT/ND  EXT: no edema      ASSESSMENT/PLAN:  This is a 30y o  year old female here for EGD, and she is stable and optimized for her procedure

## 2022-10-31 ENCOUNTER — TELEMEDICINE (OUTPATIENT)
Dept: PSYCHIATRY | Facility: CLINIC | Age: 37
End: 2022-10-31

## 2022-10-31 DIAGNOSIS — F31.81 BIPOLAR 2 DISORDER, MAJOR DEPRESSIVE EPISODE (HCC): ICD-10-CM

## 2022-10-31 DIAGNOSIS — F41.1 GENERALIZED ANXIETY DISORDER: ICD-10-CM

## 2022-10-31 RX ORDER — VENLAFAXINE HYDROCHLORIDE 75 MG/1
75 CAPSULE, EXTENDED RELEASE ORAL DAILY
Qty: 30 CAPSULE | Refills: 5 | Status: SHIPPED | OUTPATIENT
Start: 2022-10-31

## 2022-10-31 RX ORDER — VENLAFAXINE HYDROCHLORIDE 150 MG/1
150 CAPSULE, EXTENDED RELEASE ORAL DAILY
Qty: 30 CAPSULE | Refills: 5 | Status: SHIPPED | OUTPATIENT
Start: 2022-10-31

## 2022-10-31 RX ORDER — MIRTAZAPINE 15 MG/1
15 TABLET, FILM COATED ORAL
Qty: 30 TABLET | Refills: 5 | Status: SHIPPED | OUTPATIENT
Start: 2022-10-31

## 2022-10-31 RX ORDER — BUSPIRONE HYDROCHLORIDE 10 MG/1
10 TABLET ORAL 3 TIMES DAILY
Qty: 90 TABLET | Refills: 5 | Status: SHIPPED | OUTPATIENT
Start: 2022-10-31

## 2022-10-31 RX ORDER — QUETIAPINE FUMARATE 200 MG/1
TABLET, FILM COATED ORAL
Qty: 90 TABLET | Refills: 5 | Status: SHIPPED | OUTPATIENT
Start: 2022-10-31

## 2022-10-31 RX ORDER — CLONAZEPAM 1 MG/1
TABLET ORAL
Qty: 120 TABLET | Refills: 2 | Status: SHIPPED | OUTPATIENT
Start: 2022-10-31

## 2022-10-31 NOTE — PSYCH
TREATMENT PLAN (Medication Management Only)  Treatment Plan done but not signed at time of office visit due to:  Plan reviewed by phone or in person  and verbal consent given due to [address]    Name and Date of Birth:  Karson Don 30 y o  1985  Date of Treatment Plan: October 31, 2022  Diagnosis/Diagnoses:    1  Generalized anxiety disorder    2  Bipolar 2 disorder, major depressive episode [facility])      Strengths/Personal Resources for Self-Care: supportive family, supportive friends  Area/Areas of need (in own words): "I am doing okay  I have a lot of stomach problems "  1  Long Term Goal: "Hopefully, the patient is able to get relief regarding her physical complaints  Psychiatrically she is stable "  Target Date: 6 months - 4/30/2023  Person/Persons responsible for completion of goal: Priscila  2  Short Term Objective (s) - How will we reach this goal?:   A  Provider new recommended medication/dosage changes and/or continue medication(s): continue current medications as prescribed  B   N/A  Target Date: 6 months - 4/30/2023  Person/Persons Responsible for Completion of Goal: Priscila  Progress Towards Goals: stable, continuing treatment  Treatment Modality: medication education at every visit  Review due 6 months from date of this plan: 6 months - 4/30/2023  Expected length of service: ongoing treatment unless revised  My Physician/PA/NP and I have developed this plan together and I agree to work on the goals and objectives  I understand the treatment goals that were developed for my treatment

## 2022-10-31 NOTE — PSYCH
Virtual Regular Visit    Verification of patient location:    Patient is located in the following state in which I hold an active license [place]      Assessment/Plan:    Problem List Items Addressed This Visit        Other    Bipolar 2 disorder, major depressive episode ([address] )    Relevant Medications    busPIRone (BUSPAR) 10 mg tablet    clonazePAM (KlonoPIN) 1 mg tablet    mirtazapine (REMERON) 15 mg tablet    QUEtiapine (SEROquel) 200 mg tablet    venlafaxine (EFFEXOR-XR) 75 mg 24 hr capsule    venlafaxine (EFFEXOR-XR) 150 mg 24 hr capsule      Other Visit Diagnoses     Generalized anxiety disorder        Relevant Medications    busPIRone (BUSPAR) 10 mg tablet    mirtazapine (REMERON) 15 mg tablet    QUEtiapine (SEROquel) 200 mg tablet    venlafaxine (EFFEXOR-XR) 75 mg 24 hr capsule    venlafaxine (EFFEXOR-XR) 150 mg 24 hr capsule          Goals addressed in session:  Maintain current level of stability         Reason for visit is   Chief Complaint   Patient presents with   • Virtual Regular Visit        Encounter provider MIKE Charlton    Provider located at [address] [place] [address]      Recent Visits  No visits were found meeting these conditions  Showing recent visits within past 7 days and meeting all other requirements  Today's Visits  Date Type Provider Dept   10/31/22 Telemedicine Jose Charlton today's visits and meeting all other requirements  Future Appointments  No visits were found meeting these conditions  Showing future appointments within next 150 days and meeting all other requirements       The patient was identified by name and date of birth  Marielena Martinez was informed that this is a telemedicine visit and that the visit is being conducted throughthe [employer] platform  She agrees to proceed     My office door was closed   No one else was in the room   She acknowledged consent and understanding of privacy and security of the video platform  The patient has agreed to participate and understands they can discontinue the visit at any time  Patient is aware this is a billable service  Jesús Villa is a 30 y o  female who suffers from bipolar depressive disorder  We did attempt a video  Unfortunately, my conduction could not be completed  I have a call into IT  On examination today, the patient's mood is stable  She has responded well to current medication regimen  She continues to suffer from physical complaints mostly related to her gastrointestinal system  She has been losing weight over time and according to the patient, her current weight is 113 lb  She still has trouble eating and tells me that when she does eat, most times, foods “hurt my stomach”  She continues to work with gastrointestinal specialist   He is offering no other psychiatric complaints or concerns  She is managing well at home with the current medication regimen  She remains on disability  Her son is now in  and is enjoying  The patient will continue on BuSpar 10 mg t i d    Klonopin 1 mg q i d  Remeron 15 mg HS   Seroquel 200 mg t i d  Effexor  mg daily   Follow-up with me in 3 months or sooner if necessary  Mental status exam:  Patient is awake and alert and oriented x3  Mood is stable  Patient is not suicidal, not homicidal not psychotic  Speech is clear thoughts are well organized, coherent goal-directed  Attention span is good  Impulse control is good  Judgment and insight are good  Memory is good  Boy GONZALEZ     Past Medical History:   Diagnosis Date   • Anxiety    • Bipolar 2 disorder, major depressive episode [facility] ) 08/17/2021   • Bipolar disorder with depression ([facility] )     Last assessed: 5/11/16   • Chronic pain disorder     abd   • Colon polyp    • Depression    • Endometriosis     Last assessed: 5/11/16   • Gastroparesis • GERD (gastroesophageal reflux disease)    • Hypertension    • Inflammatory bowel disease    • Scoliosis    • Varicella        Past Surgical History:   Procedure Laterality Date   •  SECTION     • COLONOSCOPY     • HERNIA REPAIR     • HERNIA REPAIR     • OTHER SURGICAL HISTORY      Transoral incisionless fundoplication (TIFF)   • [place]  DELIVERY ONLY N/A 2016    Procedure:  SECTION ();   Surgeon: Zach Kilpatrick DO;  Location: [facility];  Service: Obstetrics   • [place] INCIS TENDON SHEATH,RADIAL STYLOID Right 2020    Procedure: RELEASE DEQUERVAINS RIGHT WRIST;  Surgeon: Ester Davis MD;  Location: [address] [place] [address];  Service: Orthopedics   • [place] LAP,CHOLECYSTECTOMY N/A 2018    Procedure: LAPAROSCOPIC CHOLECYSTECTOMY;  Surgeon: Rachel Banegas MD;  Location: [address] [place] [address];  Service: General   • TONSILECTOMY AND ADNOIDECTOMY Bilateral    • UPPER GASTROINTESTINAL ENDOSCOPY     • WISDOM TOOTH EXTRACTION Bilateral        Current Outpatient Medications   Medication Sig Dispense Refill   • busPIRone (BUSPAR) 10 mg tablet Take 1 tablet (10 mg total) by mouth 3 (three) times a day 90 tablet 5   • clonazePAM (KlonoPIN) 1 mg tablet 1  tablet 2   • granisetron (SANCUSO) 3 1 MG/24HR Place 1 patch on the skin once for 1 dose 1 each 0   • mirtazapine (REMERON) 15 mg tablet Take 1 tablet (15 mg total) by mouth daily at bedtime 30 tablet 5   • QUEtiapine (SEROquel) 200 mg tablet TAKE ONE TABLET BY MOUTH THREE TIMES A DAY (NOON, 5PM, AND BEDTIME) 90 tablet 5   • venlafaxine (EFFEXOR-XR) 150 mg 24 hr capsule Take 1 capsule (150 mg total) by mouth daily 30 capsule 5   • venlafaxine (EFFEXOR-XR) 75 mg 24 hr capsule Take 1 capsule (75 mg total) by mouth daily 30 capsule 5   • amLODIPine (NORVASC) 2 5 mg tablet Take 1 tablet (2 5 mg total) by mouth daily 90 tablet 1   • baclofen 20 mg tablet Take 1 tablet (20 mg total) by mouth 3 (three) times a day (Patient not taking: Reported on 2022) 90 tablet 0   • baclofen 20 mg tablet Take 1 tablet (20 mg total) by mouth 3 (three) times a day 90 tablet 0   • diphenoxylate-atropine (LOMOTIL) 2 5-0 025 mg per tablet Take 1 tablet by mouth 2 (two) times a day 60 tablet 1   • docusate sodium (COLACE) 100 mg capsule Take 1 capsule by mouth daily as needed       • linaCLOtide (Linzess) 145 MCG CAPS Take 1 capsule (145 mcg total) by mouth in the morning (Patient not taking: Reported on 9/29/2022) 30 capsule 1   • metoprolol succinate (TOPROL-XL) 25 mg 24 hr tablet Take 1 tablet (25 mg total) by mouth daily 90 tablet 1   • ondansetron (ZOFRAN-ODT) 8 mg disintegrating tablet Take 1 tablet (8 mg total) by mouth as needed for nausea (EVERY 6 HOURS AS NEEDED FOR NAUSEA) 60 tablet 0   • Probiotic Product (Align) 4 MG CAPS Take 1 capsule (4 mg total) by mouth 2 (two) times a day 60 capsule 1   • Prucalopride Succinate (Motegrity) 2 MG TABS Take 2 mg by mouth daily (Patient not taking: Reported on 9/29/2022) 30 tablet 1   • sucralfate (CARAFATE) 1 g tablet Take 1 tablet (1 g total) by mouth 4 (four) times a day 120 tablet 1     No current facility-administered medications for this visit  Allergies   Allergen Reactions   • Sulfa Antibiotics Anaphylaxis, Other (See Comments) and Edema     swelling  swelling  swelling   • Reglan [Metoclopramide] Anxiety       Review of Systems    Video Exam    There were no vitals filed for this visit  Physical Exam     Visit started at 10:25a  Visit ended at 10:45a   20 minutes was the duration of the visit    As noted above, we tried to connect via video unfortunately, and actually was lost

## 2022-11-03 DIAGNOSIS — R11.0 NAUSEA: ICD-10-CM

## 2022-11-03 DIAGNOSIS — R11.14 BILIOUS VOMITING WITH NAUSEA: ICD-10-CM

## 2022-11-03 RX ORDER — ONDANSETRON 8 MG/1
8 TABLET, ORALLY DISINTEGRATING ORAL AS NEEDED
Qty: 60 TABLET | Refills: 0 | Status: SHIPPED | OUTPATIENT
Start: 2022-11-03

## 2022-11-14 ENCOUNTER — OFFICE VISIT (OUTPATIENT)
Dept: INTERNAL MEDICINE CLINIC | Age: 37
End: 2022-11-14

## 2022-11-14 VITALS
WEIGHT: 113 LBS | HEIGHT: 64 IN | SYSTOLIC BLOOD PRESSURE: 126 MMHG | TEMPERATURE: 98.6 F | DIASTOLIC BLOOD PRESSURE: 74 MMHG | BODY MASS INDEX: 19.29 KG/M2 | HEART RATE: 90 BPM | OXYGEN SATURATION: 99 %

## 2022-11-14 DIAGNOSIS — F41.9 ANXIETY: ICD-10-CM

## 2022-11-14 DIAGNOSIS — K21.9 GASTROESOPHAGEAL REFLUX DISEASE WITHOUT ESOPHAGITIS: Primary | ICD-10-CM

## 2022-11-14 DIAGNOSIS — K31.84 GASTROPARESIS: ICD-10-CM

## 2022-11-14 DIAGNOSIS — I10 ESSENTIAL HYPERTENSION: ICD-10-CM

## 2022-11-14 DIAGNOSIS — F31.81 BIPOLAR 2 DISORDER, MAJOR DEPRESSIVE EPISODE (HCC): ICD-10-CM

## 2022-11-14 PROBLEM — F11.20 CONTINUOUS OPIOID DEPENDENCE (HCC): Status: RESOLVED | Noted: 2022-02-18 | Resolved: 2022-11-14

## 2022-11-14 NOTE — PROGRESS NOTES
Assessment/Plan:    Essential hypertension  Blood pressure is stable on present regimen of Lopressor and amlodipine    Acid reflux disease  Continue with present regimen  Being followed by gastroenterologist    Anxiety  Stable on present regimen    Bipolar 2 disorder, major depressive episode ([address] )  Stable on present regimen  Being followed by psychiatrist    Gastroparesis  Still with reflex/abdominal pain  Being followed by gastroenterologist       Diagnoses and all orders for this visit:    Gastroesophageal reflux disease without esophagitis  -     CBC; Future    Gastroparesis    Essential hypertension  -     Comprehensive metabolic panel; Future    Anxiety  -     TSH, 3rd generation with Free T4 reflex; Future    Bipolar 2 disorder, major depressive episode ([facility])               Subjective:          Patient ID: Lexx Aguila is a 30 y o  female  Patient is here for follow-up  No blood work prior to this visit  The following portions of the patient's history were reviewed and updated as appropriate: allergies, current medications, past family history, past medical history, past social history, past surgical history and problem list     Review of Systems   Constitutional: Negative for fatigue and fever  HENT: Negative for congestion, ear discharge, ear pain, postnasal drip, sinus pressure, sore throat, tinnitus and trouble swallowing  Eyes: Negative for discharge, itching and visual disturbance  Respiratory: Negative for cough and shortness of breath  Cardiovascular: Negative for chest pain and palpitations  Gastrointestinal: Positive for abdominal pain  Negative for diarrhea, nausea and vomiting  Endocrine: Negative for cold intolerance and polyuria  Genitourinary: Negative for difficulty urinating, dysuria and urgency  Musculoskeletal: Negative for arthralgias and neck pain  Skin: Negative for rash  Allergic/Immunologic: Negative for environmental allergies     Neurological: Negative for dizziness, weakness and headaches  Psychiatric/Behavioral: The patient is nervous/anxious            Past Medical History:   Diagnosis Date   • Anxiety    • Bipolar 2 disorder, major depressive episode ([facility] ) 08/17/2021   • Bipolar disorder with depression ([facility] )     Last assessed: 5/11/16   • Chronic pain disorder     abd   • Colon polyp    • Depression    • Endometriosis     Last assessed: 5/11/16   • Gastroparesis    • GERD (gastroesophageal reflux disease)    • Hypertension    • Inflammatory bowel disease    • Scoliosis    • Varicella          Current Outpatient Medications:   •  amLODIPine (NORVASC) 2 5 mg tablet, Take 1 tablet (2 5 mg total) by mouth daily, Disp: 90 tablet, Rfl: 1  •  baclofen 20 mg tablet, Take 1 tablet (20 mg total) by mouth 3 (three) times a day, Disp: 90 tablet, Rfl: 0  •  busPIRone (BUSPAR) 10 mg tablet, Take 1 tablet (10 mg total) by mouth 3 (three) times a day, Disp: 90 tablet, Rfl: 5  •  clonazePAM (KlonoPIN) 1 mg tablet, 1 QID, Disp: 120 tablet, Rfl: 2  •  diphenoxylate-atropine (LOMOTIL) 2 5-0 025 mg per tablet, Take 1 tablet by mouth 2 (two) times a day, Disp: 60 tablet, Rfl: 1  •  docusate sodium (COLACE) 100 mg capsule, Take 1 capsule by mouth daily as needed  , Disp: , Rfl:   •  granisetron (SANCUSO) 3 1 MG/24HR, Place 1 patch on the skin once for 1 dose, Disp: 1 patch, Rfl: 0  •  metoprolol succinate (TOPROL-XL) 25 mg 24 hr tablet, Take 1 tablet (25 mg total) by mouth daily, Disp: 90 tablet, Rfl: 1  •  mirtazapine (REMERON) 15 mg tablet, Take 1 tablet (15 mg total) by mouth daily at bedtime, Disp: 30 tablet, Rfl: 5  •  ondansetron (ZOFRAN-ODT) 8 mg disintegrating tablet, Take 1 tablet (8 mg total) by mouth as needed for nausea (EVERY 6 HOURS AS NEEDED FOR NAUSEA), Disp: 60 tablet, Rfl: 0  •  Probiotic Product (Align) 4 MG CAPS, Take 1 capsule (4 mg total) by mouth 2 (two) times a day, Disp: 60 capsule, Rfl: 1  •  QUEtiapine (SEROquel) 200 mg tablet, TAKE ONE TABLET BY MOUTH THREE TIMES A DAY (NOON, 5PM, AND BEDTIME), Disp: 90 tablet, Rfl: 5  •  sucralfate (CARAFATE) 1 g tablet, Take 1 tablet (1 g total) by mouth 4 (four) times a day, Disp: 120 tablet, Rfl: 1  •  venlafaxine (EFFEXOR-XR) 150 mg 24 hr capsule, Take 1 capsule (150 mg total) by mouth daily, Disp: 30 capsule, Rfl: 5  •  venlafaxine (EFFEXOR-XR) 75 mg 24 hr capsule, Take 1 capsule (75 mg total) by mouth daily, Disp: 30 capsule, Rfl: 5  •  baclofen 20 mg tablet, Take 1 tablet (20 mg total) by mouth 3 (three) times a day, Disp: 90 tablet, Rfl: 0  •  linaCLOtide (Linzess) 145 MCG CAPS, Take 1 capsule (145 mcg total) by mouth in the morning, Disp: 30 capsule, Rfl: 1  •  Prucalopride Succinate (Motegrity) 2 MG TABS, Take 2 mg by mouth daily (Patient not taking: Reported on 2022), Disp: 30 tablet, Rfl: 1    Allergies   Allergen Reactions   • Sulfa Antibiotics Anaphylaxis, Other (See Comments) and Edema     swelling  swelling  swelling   • Reglan [Metoclopramide] Anxiety       Social History   Past Surgical History:   Procedure Laterality Date   •  SECTION     • COLONOSCOPY     • HERNIA REPAIR     • HERNIA REPAIR     • OTHER SURGICAL HISTORY      Transoral incisionless fundoplication (TIFF)   • [place]  DELIVERY ONLY N/A 2016    Procedure:  SECTION ();   Surgeon: Elaine Madden DO;  Location: [facility];  Service: Obstetrics   • [place] INCIS TENDON SHEATH,RADIAL STYLOID Right 2020    Procedure: Porsha Clarke RIGHT WRIST;  Surgeon: Blair Hernandez MD;  [facility]: [facility];  Service: Orthopedics   • [place] LAP,CHOLECYSTECTOMY N/A 2018    Procedure: Jhony Crystal;  Surgeon: Kat Lemos MD;  Location: [place] MAIN OR;  Service: General   • TONSILECTOMY AND ADNOIDECTOMY Bilateral    • UPPER GASTROINTESTINAL ENDOSCOPY     • WISDOM TOOTH EXTRACTION Bilateral      Family History   Problem Relation Age of Onset   • Hypertension Father    • Depression Mother    • Asthma Mother    • Anxiety disorder Mother    • Bipolar disorder Mother    • Mental illness Mother    • Thyroid disease Mother    • Depression Maternal Grandmother    • Anxiety disorder Maternal Grandmother    • Bipolar disorder Maternal Grandmother    • Mental illness Maternal Grandmother    • Cancer Paternal Grandfather    • Cancer Paternal Uncle        Objective:  /74 (BP Location: Left arm, Patient Position: Sitting, Cuff Size: Standard)   Pulse 90   Temp 98 6 °F (37 °C) (Temporal)   Ht 5' 4" (1 626 m)   Wt 51 3 kg (113 lb)   LMP 10/19/2022 (Approximate)   SpO2 99% Comment: room air  BMI 19 40 kg/m²   Body mass index is 19 4 kg/m²  Physical Exam  Constitutional:       Appearance: She is well-developed  HENT:      Head: Normocephalic  Right Ear: External ear normal       Left Ear: External ear normal       Nose: No rhinorrhea  Mouth/Throat:      Pharynx: No posterior oropharyngeal erythema  Eyes:      General: No scleral icterus  Pupils: Pupils are equal, round, and reactive to light  Neck:      Thyroid: No thyromegaly  Trachea: No tracheal deviation  Cardiovascular:      Rate and Rhythm: Normal rate and regular rhythm  Heart sounds: Normal heart sounds  Pulmonary:      Effort: Pulmonary effort is normal  No respiratory distress  Breath sounds: Normal breath sounds  Chest:      Chest wall: No tenderness  Abdominal:      General: Bowel sounds are normal       Palpations: Abdomen is soft  There is no mass  Tenderness: There is no abdominal tenderness  Musculoskeletal:         General: Normal range of motion  Cervical back: Normal range of motion and neck supple  Right lower leg: No edema  Left lower leg: No edema  Lymphadenopathy:      Cervical: No cervical adenopathy  Skin:     General: Skin is warm  Neurological:      Mental Status: She is alert and oriented to person, place, and time  Cranial Nerves: No cranial nerve deficit  Psychiatric:         Mood and Affect: Mood normal          Behavior: Behavior normal

## 2022-11-15 DIAGNOSIS — K63.89 OTHER SPECIFIED DISEASES OF INTESTINE: ICD-10-CM

## 2022-11-15 RX ORDER — BACLOFEN 20 MG/1
20 TABLET ORAL 3 TIMES DAILY
Qty: 90 TABLET | Refills: 0 | Status: SHIPPED | OUTPATIENT
Start: 2022-11-15

## 2022-11-21 DIAGNOSIS — R11.14 BILIOUS VOMITING WITH NAUSEA: ICD-10-CM

## 2022-11-29 DIAGNOSIS — K58.0 IRRITABLE BOWEL SYNDROME WITH DIARRHEA: ICD-10-CM

## 2022-11-30 RX ORDER — DIPHENOXYLATE HYDROCHLORIDE AND ATROPINE SULFATE 2.5; .025 MG/1; MG/1
1 TABLET ORAL 2 TIMES DAILY
Qty: 60 TABLET | Refills: 0 | Status: SHIPPED | OUTPATIENT
Start: 2022-11-30

## 2022-12-13 DIAGNOSIS — K63.89 OTHER SPECIFIED DISEASES OF INTESTINE: ICD-10-CM

## 2022-12-14 DIAGNOSIS — K58.0 IRRITABLE BOWEL SYNDROME WITH DIARRHEA: Primary | ICD-10-CM

## 2022-12-14 RX ORDER — BACLOFEN 20 MG/1
20 TABLET ORAL 3 TIMES DAILY
Qty: 90 TABLET | Refills: 0 | Status: SHIPPED | OUTPATIENT
Start: 2022-12-14

## 2022-12-20 DIAGNOSIS — K29.60 BILE REFLUX GASTRITIS: ICD-10-CM

## 2022-12-20 RX ORDER — SUCRALFATE 1 G/1
1 TABLET ORAL 4 TIMES DAILY
Qty: 120 TABLET | Refills: 1 | Status: SHIPPED | OUTPATIENT
Start: 2022-12-20

## 2022-12-21 ENCOUNTER — TELEMEDICINE (OUTPATIENT)
Dept: INTERNAL MEDICINE CLINIC | Facility: OTHER | Age: 37
End: 2022-12-21

## 2022-12-21 VITALS — WEIGHT: 113 LBS | HEIGHT: 64 IN | BODY MASS INDEX: 19.29 KG/M2

## 2022-12-21 DIAGNOSIS — U07.1 COVID-19 VIRUS INFECTION: Primary | ICD-10-CM

## 2022-12-21 NOTE — PROGRESS NOTES
COVID-19 Outpatient Progress Note    Assessment/Plan:    Problem List Items Addressed This Visit        Other    COVID-19 virus infection - Primary      Disposition:     Recommended patient to come to the office to test for COVID-19  I recommended COVID-19 PCR test 5 days after close contact exposure  Patient does not need to quarantine and should wear a high quality mask for 10 days  If testing on day 5 reveals you are positive, you should immediately isolate  I have spent 15 minutes directly with the patient  Greater than 50% of this time was spent in counseling/coordination of care regarding: diagnostic results, prognosis, risks and benefits of treatment options, instructions for management, patient and family education, importance of treatment compliance and risk factor reductions  Encounter provider: Nick Cowan MD     Provider located at: [address] [place] [address]     Recent Visits  No visits were found meeting these conditions  Showing recent visits within past 7 days and meeting all other requirements  Today's Visits  Date Type Provider Dept   12/21/22 Telemedicine Nick Cowan MD [facility]   Showing today's visits and meeting all other requirements  Future Appointments  No visits were found meeting these conditions  Showing future appointments within next 150 days and meeting all other requirements     This virtual check-in was done via telephone and she agrees to proceed  Patient agrees to participate in a virtual check in via telephone or video visit instead of presenting to the office to address urgent/immediate medical needs  Patient is aware this is a billable service  She acknowledged consent and understanding of privacy and security of the video platform   The patient has agreed to participate and understands they can discontinue the visit at any time  After connecting through Telephone, the patient was identified by name and date of birth  Agatha Sigala was informed that this was a telemedicine visit and that the exam was being conducted confidentially over secure lines  My office door was closed  No one else was in the room  Agatha Sigala acknowledged consent and understanding of privacy and security of the telemedicine visit  I informed the patient that I have reviewed her record in Epic and presented the opportunity for her to ask any questions regarding the visit today  The patient agreed to participate  It was my intent to perform this visit via video technology but the patient was not able to do a video connection so the visit was completed via audio telephone only  Verification of patient location:  Patient is located in the following state in which I hold an active license: [place]    Subjective:   Agatha Sigala is a 30 y o  female who is concerned about COVID-19  Patient's symptoms include fever, fatigue, nasal congestion, rhinorrhea, sore throat, cough, myalgias and headache  Patient denies shortness of breath, chest tightness, abdominal pain, nausea, vomiting and diarrhea  COVID-19 vaccination status: Not vaccinated    Patient is      Lab Results   Component Value Date    SARSCOV2 Positive (A) 12/13/2021       Review of Systems   Constitutional: Positive for fatigue and fever  HENT: Positive for congestion, rhinorrhea and sore throat  Respiratory: Positive for cough  Negative for chest tightness and shortness of breath  Gastrointestinal: Negative for abdominal pain, diarrhea, nausea and vomiting  Musculoskeletal: Positive for myalgias  Neurological: Positive for headaches       Current Outpatient Medications on File Prior to Visit   Medication Sig   • amLODIPine (NORVASC) 2 5 mg tablet Take 1 tablet (2 5 mg total) by mouth daily   • baclofen 20 mg tablet Take 1 tablet (20 mg total) by mouth 3 (three) times a day   • baclofen 20 mg tablet Take 1 tablet (20 mg total) by mouth 3 (three) times a day   • busPIRone (BUSPAR) 10 mg tablet Take 1 tablet (10 mg total) by mouth 3 (three) times a day   • clonazePAM (KlonoPIN) 1 mg tablet 1 QID (Patient taking differently: Take 1 mg by mouth 4 (four) times a day)   • diphenoxylate-atropine (LOMOTIL) 2 5-0 025 mg per tablet Take 1 tablet by mouth 2 (two) times a day   • docusate sodium (COLACE) 100 mg capsule Take 1 capsule by mouth daily as needed     • linaCLOtide (Linzess) 145 MCG CAPS Take 1 capsule (145 mcg total) by mouth in the morning   • metoprolol succinate (TOPROL-XL) 25 mg 24 hr tablet Take 1 tablet (25 mg total) by mouth daily   • mirtazapine (REMERON) 15 mg tablet Take 1 tablet (15 mg total) by mouth daily at bedtime   • ondansetron (ZOFRAN-ODT) 8 mg disintegrating tablet Take 1 tablet (8 mg total) by mouth as needed for nausea (EVERY 6 HOURS AS NEEDED FOR NAUSEA)   • Probiotic Product (Align) 4 MG CAPS Take 1 capsule (4 mg total) by mouth 2 (two) times a day   • QUEtiapine (SEROquel) 200 mg tablet TAKE ONE TABLET BY MOUTH THREE TIMES A DAY (NOON, 5PM, AND BEDTIME)   • rifaximin (XIFAXAN) 550 mg tablet Take 1 tablet (550 mg total) by mouth every 8 (eight) hours for 14 days   • sucralfate (CARAFATE) 1 g tablet Take 1 tablet (1 g total) by mouth 4 (four) times a day   • venlafaxine (EFFEXOR-XR) 150 mg 24 hr capsule Take 1 capsule (150 mg total) by mouth daily   • venlafaxine (EFFEXOR-XR) 75 mg 24 hr capsule Take 1 capsule (75 mg total) by mouth daily   • granisetron (SANCUSO) 3 1 MG/24HR Place 1 patch on the skin once for 1 dose   • Prucalopride Succinate (Motegrity) 2 MG TABS Take 2 mg by mouth daily (Patient not taking: Reported on 11/14/2022)       Objective:    Ht 5' 4" (1 626 m)   Wt 51 3 kg (113 lb)   BMI 19 40 kg/m²      Physical Exam  Catherine Fox MD

## 2022-12-22 DIAGNOSIS — R11.14 BILIOUS VOMITING WITH NAUSEA: ICD-10-CM

## 2023-01-12 DIAGNOSIS — K63.89 OTHER SPECIFIED DISEASES OF INTESTINE: ICD-10-CM

## 2023-01-12 RX ORDER — BACLOFEN 20 MG/1
20 TABLET ORAL 3 TIMES DAILY
Qty: 90 TABLET | Refills: 0 | Status: SHIPPED | OUTPATIENT
Start: 2023-01-12

## 2023-01-23 ENCOUNTER — OFFICE VISIT (OUTPATIENT)
Dept: GASTROENTEROLOGY | Facility: CLINIC | Age: 38
End: 2023-01-23

## 2023-01-23 VITALS
HEIGHT: 64 IN | SYSTOLIC BLOOD PRESSURE: 130 MMHG | DIASTOLIC BLOOD PRESSURE: 93 MMHG | WEIGHT: 113 LBS | HEART RATE: 107 BPM | BODY MASS INDEX: 19.29 KG/M2

## 2023-01-23 DIAGNOSIS — R11.14 BILIOUS VOMITING WITH NAUSEA: Primary | ICD-10-CM

## 2023-01-23 DIAGNOSIS — K58.0 IRRITABLE BOWEL SYNDROME WITH DIARRHEA: ICD-10-CM

## 2023-01-23 DIAGNOSIS — K31.A11 INTESTINAL METAPLASIA OF ANTRUM OF STOMACH WITHOUT DYSPLASIA: ICD-10-CM

## 2023-01-23 DIAGNOSIS — K21.00 GASTROESOPHAGEAL REFLUX DISEASE WITH ESOPHAGITIS WITHOUT HEMORRHAGE: ICD-10-CM

## 2023-01-23 RX ORDER — DIPHENOXYLATE HYDROCHLORIDE AND ATROPINE SULFATE 2.5; .025 MG/1; MG/1
1 TABLET ORAL 2 TIMES DAILY
Qty: 60 TABLET | Refills: 0 | Status: SHIPPED | OUTPATIENT
Start: 2023-01-23

## 2023-01-23 NOTE — PROGRESS NOTES
[facility] - Outpatient Follow-up Note  Rafita Gill 30 y o  female MRN: [id number]  Encounter: [id number]          ASSESSMENT AND PLAN:      1  Bilious vomiting with nausea  History of mild gastroparesis and bile reflux gastritis, s/p recent EGD which shows mild erythema in the antrum otherwise fundoplication site appeared normal, she is s/p TIF, she is off from PPI and H2 blocker, she is taking Carafate which is helping her, gastric biopsy shows evidence of chronic intestinal metaplasia with goblet cell, will recommended repeat EGD in 1 to 2 years  Last office visit to be started on Sancuso  patch which is helping along with Zofran for nausea and vomiting    2  Irritable bowel syndrome with diarrhea  Xifaxan 550 mg 3 times a day for 14 days was given during last office visit, antibiotic help for a few months and then symptoms recur I think she is risk for SIBO and every 3 to 6-month Xifaxan course will definitely help her, she also take 1-2 Lomotil as needed  - diphenoxylate-atropine (LOMOTIL) 2 5-0 025 mg per tablet; Take 1 tablet by mouth 2 (two) times a day  Dispense: 60 tablet; Refill: 0    3  Gastroesophageal reflux disease with esophagitis without hemorrhage  S/p TIF, currently denying any heartburn or reflux symptoms  History of chronic burping, currently on baclofen 20 mg 3 times a day    4  Intestinal metaplasia of antrum of stomach without dysplasia  EGD with biopsy result was reviewed with the patient, will recommended repeat EGD in 1 to 2 years    ______________________________________________________________________    SUBJECTIVE: Patient seen and examined, she come for follow-up, she is doing well after starting on nausea patch along with Lomotil, she stopped using Motegrity, she tried domperidone which she did not tolerate  She has a history of mild gastroparesis with bile reflux gastritis, recent EGD with biopsy result was reviewed with the patient    She also has a history of IBS with diarrhea currently on Lomotil 1 to 2 tablet every day and Xifaxan antibiotic course every 3-month  Her weight remains stable, all medication are reviewed with the patient, she remained compliance with medication  She denying any melena or rectal bleeding      REVIEW OF SYSTEMS IS OTHERWISE NEGATIVE  Historical Information   Past Medical History:   Diagnosis Date   • Anxiety    • Mcallister esophagus    • Bipolar 2 disorder, major depressive episode ([facility] ) 2021   • Bipolar disorder with depression ([facility] )     Last assessed: 16   • Chronic pain disorder     abd   • Colon polyp    • COVID-19 2022    home test   • Depression    • Endometriosis     Last assessed: 16   • Gastric ulcer    • Gastroparesis    • GERD (gastroesophageal reflux disease)    • Hypertension    • Inflammatory bowel disease    • Irritable bowel syndrome    • Scoliosis    • Varicella      Past Surgical History:   Procedure Laterality Date   • ABDOMINAL SURGERY     •  SECTION     • CHOLECYSTECTOMY     • COLONOSCOPY     • HERNIA REPAIR     • HERNIA REPAIR     • OTHER SURGICAL HISTORY      Transoral incisionless fundoplication (TIFF)   • [place]  DELIVERY ONLY N/A 2016    Procedure:  SECTION ();   Surgeon: Norma Spurling, DO;  [facility];  Service: Obstetrics   • WI INCISION EXTENSOR TENDON SHEATH WRIST Right 2020    Procedure: Aga Lesch RIGHT WRIST;  Surgeon: Jadiel Marino MD;  Location: [facility] MAIN OR;  Service: Orthopedics   • WI LAPAROSCOPY SURG CHOLECYSTECTOMY N/A 2018    Procedure: Larnell Force;  Surgeon: Juan Young MD;  Location: [facility] MAIN OR;  Service: General   • TONSILECTOMY AND ADNOIDECTOMY Bilateral    • UPPER GASTROINTESTINAL ENDOSCOPY     • WISDOM TOOTH EXTRACTION Bilateral      Social History   Social History     Substance and Sexual Activity   Alcohol Use Not Currently    Comment: rare     Social History     Substance and Sexual Activity   Drug Use No     Social History     Tobacco Use   Smoking Status Light Smoker   • Packs/day: 0 50   • Years: 2 00   • Pack years: 1 00   • Types: Cigarettes   • Start date: 2020   • Last attempt to quit: 2016   • Years since quittin 6   Smokeless Tobacco Never     Family History   Problem Relation Age of Onset   • Hypertension Father    • Depression Mother    • Asthma Mother    • Anxiety disorder Mother    • Bipolar disorder Mother    • Mental illness Mother    • Thyroid disease Mother    • Hypothyroidism Mother    • Depression Maternal Grandmother    • Anxiety disorder Maternal Grandmother    • Bipolar disorder Maternal Grandmother    • Mental illness Maternal Grandmother    • Cancer Paternal Grandfather    • Cancer Paternal Uncle        Meds/Allergies       Current Outpatient Medications:   •  amLODIPine (NORVASC) 2 5 mg tablet  •  baclofen 20 mg tablet  •  busPIRone (BUSPAR) 10 mg tablet  •  clonazePAM (KlonoPIN) 1 mg tablet  •  diphenoxylate-atropine (LOMOTIL) 2 5-0 025 mg per tablet  •  docusate sodium (COLACE) 100 mg capsule  •  metoprolol succinate (TOPROL-XL) 25 mg 24 hr tablet  •  mirtazapine (REMERON) 15 mg tablet  •  ondansetron (ZOFRAN-ODT) 8 mg disintegrating tablet  •  Probiotic Product (Align) 4 MG CAPS  •  QUEtiapine (SEROquel) 200 mg tablet  •  sucralfate (CARAFATE) 1 g tablet  •  venlafaxine (EFFEXOR-XR) 150 mg 24 hr capsule  •  venlafaxine (EFFEXOR-XR) 75 mg 24 hr capsule  •  baclofen 20 mg tablet  •  granisetron (SANCUSO) 3 1 MG/24HR  •  linaCLOtide (Linzess) 145 MCG CAPS  •  Prucalopride Succinate (Motegrity) 2 MG TABS    Allergies   Allergen Reactions   • Sulfa Antibiotics Anaphylaxis, Other (See Comments) and Edema     swelling  swelling  swelling   • Reglan [Metoclopramide] Anxiety           Objective     Blood pressure 130/93, pulse (!) 107, height 5' 4" (1 626 m), weight 51 3 kg (113 lb), not currently breastfeeding   Body mass index is 19 4 kg/m²       PHYSICAL EXAM:      General Appearance:   Alert, cooperative, no distress   HEENT:   Normocephalic, atraumatic, anicteric      Neck:  Supple, symmetrical, trachea midline   Lungs:   Clear to auscultation bilaterally; no rales, rhonchi or wheezing; respirations unlabored    Heart[de-identified]   Regular rate and rhythm; no murmur, rub, or gallop  Abdomen:   Soft, non-tender, non-distended; normal bowel sounds; no masses, no organomegaly    Genitalia:   Deferred    Rectal:   Deferred    Extremities:  No cyanosis, clubbing or edema    Pulses:  2+ and symmetric    Skin:  No jaundice, rashes, or lesions    Lymph nodes:  No palpable cervical lymphadenopathy        Lab Results:   No visits with results within 1 Day(s) from this visit  Latest known visit with results is:   Hospital Outpatient Visit on 10/26/2022   Component Date Value   • EXT Preg Test, Ur 10/26/2022 Negative    • Control 10/26/2022 Valid    • Case Report 10/26/2022                      Value:Surgical Pathology Report                         Case: [id number]                                   Authorizing Provider:  Ethel Melendrez MD Collected:           10/26/2022 1105              Ordering Location:     [address] [place] [address] Received:            10/26/2022 5094                                     [address] [place] [address]                                                                  Pathologist:           Rizwan Hernandez MD                                                       Specimens:   A) - Duodenum, DUODENUM   cold bx   diarrhea, check for celiac                                     B) - Stomach, ANTRUM   cold bx   erythema, check for Hpy                                           C) - Esophagus, LOWER ESOPH   cold bx   reflux                                           • Final Diagnosis 10/26/2022                      Value: This result contains rich text formatting which cannot be displayed here     • Note 10/26/2022 Value:This result contains rich text formatting which cannot be displayed here  • Additional Information 10/26/2022                      Value: This result contains rich text formatting which cannot be displayed here  • Gross Description 10/26/2022                      Value: This result contains rich text formatting which cannot be displayed here  Radiology Results:   No results found

## 2023-01-26 DIAGNOSIS — F31.81 BIPOLAR 2 DISORDER, MAJOR DEPRESSIVE EPISODE (HCC): ICD-10-CM

## 2023-01-26 RX ORDER — CLONAZEPAM 1 MG/1
1 TABLET ORAL 4 TIMES DAILY
Qty: 120 TABLET | Refills: 2 | Status: SHIPPED | OUTPATIENT
Start: 2023-01-26

## 2023-02-02 ENCOUNTER — TELEMEDICINE (OUTPATIENT)
Dept: PSYCHIATRY | Facility: CLINIC | Age: 38
End: 2023-02-02

## 2023-02-02 DIAGNOSIS — F33.1 MODERATE EPISODE OF RECURRENT MAJOR DEPRESSIVE DISORDER (HCC): Primary | ICD-10-CM

## 2023-02-02 NOTE — PSYCH
Virtual Regular Visit    Verification of patient location:    Patient is located in the following state in which I hold an active license [place]      Assessment/Plan:    Problem List Items Addressed This Visit    None  Visit Diagnoses     Moderate episode of recurrent major depressive disorder ([facility] )    -  Primary          Goals addressed in session: Continue current level of stability  Continue to function at a baseline level  Reason for visit is   Chief Complaint   Patient presents with   • Virtual Regular Visit        Encounter provider MIKE Horner    Provider located at [address] [address]      Recent Visits  No visits were found meeting these conditions  Showing recent visits within past 7 days and meeting all other requirements  Today's Visits  Date Type Provider Dept   02/02/23 Telemedicine Jose Horner Emily today's visits and meeting all other requirements  Future Appointments  No visits were found meeting these conditions  Showing future appointments within next 150 days and meeting all other requirements       The patient was identified by name and date of birth  Cindy Salguero was informed that this is a telemedicine visit and that the visit is being conducted throughthe [employer] platform  She agrees to proceed     My office door was closed  No one else was in the room  She acknowledged consent and understanding of privacy and security of the video platform  The patient has agreed to participate and understands they can discontinue the visit at any time  Patient is aware this is a billable service  Subjective  Cindy Salguero is a 30 y o  female has a history of bipolar depressive disorder  The patient requires much assistance from family members on a daily basis    She has chronic depression and suffers from gastrointestinal issues for which she is followed by gastroenterology  Overall, she functions at a continuous baseline  She reports her anxiety and depression mood instability are under good control with his current medication regimen  She reports she is eating as best can with her gastrointestinal issues  Current weight has been maintained on 113 pounds  Mood is stable  Patient will continue on his current medication regimen  Mental status exam: Patient is awake and alert and oriented x3  Mood is stable  Patient is not suicidal, not homicidal and not psychotic  Speech is clear and thoughts are well organized, coherent and goal directed attention span is good  Impulse control is good  Judgment and insight are good  Memory is good  The patient will continue on: Demian Hammond BuSpar 10 mg 3 times daily  Klonopin 1 mg 4 times daily  Remeron 15 mg at bedtime  Seroquel 200 mg 3 times daily  Effexor  mg daily  Follow-up with me in 3 months or sooner if necessary  They need be in the refrigerator system            Past Medical History:   Diagnosis Date   • Anxiety    • Mcallister esophagus    • Bipolar 2 disorder, major depressive episode ([facility] ) 2021   • Bipolar disorder with depression ([facility] )     Last assessed: 16   • Chronic pain disorder     abd   • Colon polyp    • COVID-19 2022    home test   • Depression    • Endometriosis     Last assessed: 16   • Gastric ulcer    • Gastroparesis    • GERD (gastroesophageal reflux disease)    • Hypertension    • Inflammatory bowel disease    • Irritable bowel syndrome    • Scoliosis    • Varicella        Past Surgical History:   Procedure Laterality Date   • ABDOMINAL SURGERY     •  SECTION     • CHOLECYSTECTOMY     • COLONOSCOPY     • HERNIA REPAIR     • HERNIA REPAIR     • OTHER SURGICAL HISTORY      Transoral incisionless fundoplication (TIFF)   • [place]  DELIVERY ONLY N/A 2016    Procedure:  SECTION ();   Surgeon: Guillaume Cat DO; [facility];  Service: Obstetrics   • [place] INCISION EXTENSOR TENDON SHEATH WRIST Right 01/09/2020    Procedure: RELEASE DEQUERVAINS RIGHT WRIST;  Surgeon: Mary Lou Short MD;  Location: [facility] MAIN OR;  Service: Orthopedics   • MS LAPAROSCOPY SURG CHOLECYSTECTOMY N/A 03/12/2018    Procedure: LAPAROSCOPIC CHOLECYSTECTOMY;  Surgeon: Irene Ariza MD;  Location: [address] [place] [address];  Service: General   • TONSILECTOMY AND ADNOIDECTOMY Bilateral    • UPPER GASTROINTESTINAL ENDOSCOPY     • WISDOM TOOTH EXTRACTION Bilateral        Current Outpatient Medications   Medication Sig Dispense Refill   • busPIRone (BUSPAR) 10 mg tablet Take 1 tablet (10 mg total) by mouth 3 (three) times a day 90 tablet 5   • clonazePAM (KlonoPIN) 1 mg tablet Take 1 tablet (1 mg total) by mouth 4 (four) times a day 120 tablet 2   • mirtazapine (REMERON) 15 mg tablet Take 1 tablet (15 mg total) by mouth daily at bedtime 30 tablet 5   • QUEtiapine (SEROquel) 200 mg tablet TAKE ONE TABLET BY MOUTH THREE TIMES A DAY (NOON, 5PM, AND BEDTIME) 90 tablet 5   • venlafaxine (EFFEXOR-XR) 150 mg 24 hr capsule Take 1 capsule (150 mg total) by mouth daily 30 capsule 5   • venlafaxine (EFFEXOR-XR) 75 mg 24 hr capsule Take 1 capsule (75 mg total) by mouth daily 30 capsule 5   • amLODIPine (NORVASC) 2 5 mg tablet Take 1 tablet (2 5 mg total) by mouth daily 90 tablet 1   • baclofen 20 mg tablet Take 1 tablet (20 mg total) by mouth 3 (three) times a day 90 tablet 0   • baclofen 20 mg tablet Take 1 tablet (20 mg total) by mouth 3 (three) times a day (Patient not taking: Reported on 1/23/2023) 90 tablet 0   • diphenoxylate-atropine (LOMOTIL) 2 5-0 025 mg per tablet Take 1 tablet by mouth 2 (two) times a day 60 tablet 0   • docusate sodium (COLACE) 100 mg capsule Take 1 capsule by mouth daily as needed       • granisetron (SANCUSO) 3 1 MG/24HR Place 1 patch on the skin once for 1 dose 1 patch 0   • linaCLOtide (Linzess) 145 MCG CAPS Take 1 capsule (145 mcg total) by mouth in the morning (Patient not taking: Reported on 1/23/2023) 30 capsule 1   • metoprolol succinate (TOPROL-XL) 25 mg 24 hr tablet Take 1 tablet (25 mg total) by mouth daily 90 tablet 1   • ondansetron (ZOFRAN-ODT) 8 mg disintegrating tablet Take 1 tablet (8 mg total) by mouth as needed for nausea (EVERY 6 HOURS AS NEEDED FOR NAUSEA) 60 tablet 0   • Probiotic Product (Align) 4 MG CAPS Take 1 capsule (4 mg total) by mouth 2 (two) times a day 60 capsule 1   • Prucalopride Succinate (Motegrity) 2 MG TABS Take 2 mg by mouth daily (Patient not taking: Reported on 11/14/2022) 30 tablet 1   • sucralfate (CARAFATE) 1 g tablet Take 1 tablet (1 g total) by mouth 4 (four) times a day 120 tablet 1     No current facility-administered medications for this visit  Allergies   Allergen Reactions   • Sulfa Antibiotics Anaphylaxis, Other (See Comments) and Edema     swelling  swelling  swelling   • Reglan [Metoclopramide] Anxiety       Review of Systems    Video Exam    There were no vitals filed for this visit  Physical Exam     Visit Time    Visit Start Time; 12:00p  Visit Stop Time: 12:20p  Total Visit Duration: 20 minutes were spent with her  During the visit, we did review the treatment plan, reviewed medications and side effects and completed the progress note

## 2023-02-08 DIAGNOSIS — R11.0 NAUSEA: ICD-10-CM

## 2023-02-08 DIAGNOSIS — I10 ESSENTIAL HYPERTENSION: ICD-10-CM

## 2023-02-08 DIAGNOSIS — R11.14 BILIOUS VOMITING WITH NAUSEA: ICD-10-CM

## 2023-02-08 DIAGNOSIS — K63.89 OTHER SPECIFIED DISEASES OF INTESTINE: ICD-10-CM

## 2023-02-08 RX ORDER — BACLOFEN 20 MG/1
20 TABLET ORAL 3 TIMES DAILY
Qty: 90 TABLET | Refills: 0 | Status: SHIPPED | OUTPATIENT
Start: 2023-02-08

## 2023-02-08 RX ORDER — AMLODIPINE BESYLATE 2.5 MG/1
2.5 TABLET ORAL DAILY
Qty: 90 TABLET | Refills: 1 | Status: SHIPPED | OUTPATIENT
Start: 2023-02-08

## 2023-02-08 RX ORDER — ONDANSETRON 8 MG/1
8 TABLET, ORALLY DISINTEGRATING ORAL AS NEEDED
Qty: 60 TABLET | Refills: 0 | Status: SHIPPED | OUTPATIENT
Start: 2023-02-08

## 2023-02-21 DIAGNOSIS — K29.60 BILE REFLUX GASTRITIS: ICD-10-CM

## 2023-02-21 RX ORDER — SUCRALFATE 1 G/1
1 TABLET ORAL 4 TIMES DAILY
Qty: 120 TABLET | Refills: 1 | Status: SHIPPED | OUTPATIENT
Start: 2023-02-21

## 2023-02-24 DIAGNOSIS — K58.0 IRRITABLE BOWEL SYNDROME WITH DIARRHEA: Primary | ICD-10-CM

## 2023-03-06 DIAGNOSIS — K63.89 OTHER SPECIFIED DISEASES OF INTESTINE: ICD-10-CM

## 2023-03-06 RX ORDER — BACLOFEN 20 MG/1
20 TABLET ORAL 3 TIMES DAILY
Qty: 90 TABLET | Refills: 0 | Status: SHIPPED | OUTPATIENT
Start: 2023-03-06

## 2023-03-29 DIAGNOSIS — I10 ESSENTIAL HYPERTENSION: ICD-10-CM

## 2023-03-30 RX ORDER — METOPROLOL SUCCINATE 25 MG/1
25 TABLET, EXTENDED RELEASE ORAL DAILY
Qty: 90 TABLET | Refills: 1 | Status: SHIPPED | OUTPATIENT
Start: 2023-03-30

## 2023-04-03 DIAGNOSIS — K63.89 OTHER SPECIFIED DISEASES OF INTESTINE: ICD-10-CM

## 2023-04-03 RX ORDER — BACLOFEN 20 MG/1
20 TABLET ORAL 3 TIMES DAILY
Qty: 90 TABLET | Refills: 0 | Status: SHIPPED | OUTPATIENT
Start: 2023-04-03

## 2023-05-01 DIAGNOSIS — K63.89 OTHER SPECIFIED DISEASES OF INTESTINE: ICD-10-CM

## 2023-05-01 DIAGNOSIS — K58.0 IRRITABLE BOWEL SYNDROME WITH DIARRHEA: ICD-10-CM

## 2023-05-01 RX ORDER — DIPHENOXYLATE HYDROCHLORIDE AND ATROPINE SULFATE 2.5; .025 MG/1; MG/1
1 TABLET ORAL 2 TIMES DAILY
Qty: 60 TABLET | Refills: 0 | Status: SHIPPED | OUTPATIENT
Start: 2023-05-01

## 2023-05-01 RX ORDER — BACLOFEN 20 MG/1
20 TABLET ORAL 3 TIMES DAILY
Qty: 90 TABLET | Refills: 0 | Status: SHIPPED | OUTPATIENT
Start: 2023-05-01

## 2023-05-02 ENCOUNTER — TELEMEDICINE (OUTPATIENT)
Dept: PSYCHIATRY | Facility: CLINIC | Age: 38
End: 2023-05-02

## 2023-05-02 DIAGNOSIS — F31.9 BIPOLAR DEPRESSION (HCC): Primary | ICD-10-CM

## 2023-05-02 NOTE — PSYCH
"TREATMENT PLAN (Medication Management Only)  Treatment Plan done but not signed at time of office visit due to:  Plan reviewed by phone or in person  and verbal consent given due to [address]    Name and Date of Birth:  Deb Bernabe 30 y o  1985  Date of Treatment Plan: May 2, 2023  Diagnosis/Diagnoses:    1  Bipolar depression [facility])      Strengths/Personal Resources for Self-Care: supportive family, supportive friends  Area/Areas of need (in own words): \" I am doing okay  I am okay if I just stay at home  \"   1  Long Term Goal: \" To continue to maintain her gains at home  \"  Target Date: 6 months - 11/2/2023  Person/Persons responsible for completion of goal: Priscila  2  Short Term Objective (s) - How will we reach this goal?:   A  Provider new recommended medication/dosage changes and/or continue medication(s): continue current medications as prescribed  B   N/A  Target Date: 6 months - 11/2/2023  Person/Persons Responsible for Completion of Goal: Priscila  Progress Towards Goals: stable, continuing treatment  Treatment Modality: medication education at every visit  Review due 6 months from date of this plan: 6 months - 11/2/2023  Expected length of service: ongoing treatment unless revised  My Physician/PA/NP and I have developed this plan together and I agree to work on the goals and objectives  I understand the treatment goals that were developed for my treatment    "

## 2023-05-02 NOTE — PSYCH
Virtual Regular Visit    Verification of patient location:    Patient is located at Home in the following state in which I hold an active license [place]      Assessment/Plan:    Problem List Items Addressed This Visit    None  Visit Diagnoses     Bipolar depression ([facility] [address] )    -  Primary          Goals addressed in session: Pain current level of stability         Reason for visit is   Chief Complaint   Patient presents with    Virtual Regular Visit        Encounter provider MIKE Means    Provider located at [address] [address]      Recent Visits  No visits were found meeting these conditions  Showing recent visits within past 7 days and meeting all other requirements  Today's Visits  Date Type Provider Dept   05/02/23 Telemedicine Jose Means Witmer today's visits and meeting all other requirements  Future Appointments  No visits were found meeting these conditions  Showing future appointments within next 150 days and meeting all other requirements       The patient was identified by name and date of birth  Katharina Contreras was informed that this is a telemedicine visit and that the visit is being conducted throughthe [employer] platform  She agrees to proceed     My office door was closed  No one else was in the room  She acknowledged consent and understanding of privacy and security of the video platform  The patient has agreed to participate and understands they can discontinue the visit at any time  Patient is aware this is a billable service  Subjective  Katharina Contreras is a 30 y o  female who has a history of bipolar depressive disorder  She continues to function at her baseline  The medications that I am giving her she is able to tolerate and they are able to keep her functioning in her environment    Still, she cannot work or maintain any type of employment outside her home  She becomes very anxious very overwhelmed and quite depressed  Presently, her weight is being maintained at 113 pounds despite history of gastroparesis  In her own environment, she functions fine  So we will continue this current medication regimen because she is sleeping well she is eating well and she is able to manage her young son at home  No other new clinical issues or concerns were voiced by patient  She will follow-up with me in 3 months or sooner if necessary  Mental status exam: Patient is awake and alert and oriented x3  Mood is stable  Patient is not suicidal, not homicidal and not psychotic  Speech is clear and thoughts are organized and less tangential   Associations are intact  No overt delusions  Attention span is fair  Impulse control is good  Judgment and insight are limited  Memory is good     Patient will continue on: BuSpar 10 mg 3 times daily  Klonopin 1 mg 4 times daily  Attempts to reduce his dose have resulted in the patient complaining of severe anxiety and panic to the point where she was vomiting  Remeron 15 mg at bedtime  Seroquel 200 mg 3 times daily  Effexor  mg daily  Follow-up with me in 3 months or sooner if necessary          Past Medical History:   Diagnosis Date    Anxiety     Mcallister esophagus     Bipolar 2 disorder, major depressive episode ([facility] ) 2021    Bipolar disorder with depression ([facility] )     Last assessed: 16    Chronic pain disorder     abd    Colon polyp     COVID-19 2022    home test    Depression     Endometriosis     Last assessed: 16    Gastric ulcer     Gastroparesis     GERD (gastroesophageal reflux disease)     Hypertension     Inflammatory bowel disease     Irritable bowel syndrome     Scoliosis     Varicella        Past Surgical History:   Procedure Laterality Date    ABDOMINAL SURGERY       SECTION      CHOLECYSTECTOMY      COLONOSCOPY      HERNIA REPAIR      HERNIA REPAIR      OTHER SURGICAL HISTORY      Transoral incisionless fundoplication (TIFF)    WI  DELIVERY ONLY N/A 2016    Procedure:  SECTION ();   Surgeon: Yuliet Bobo DO;  [facility];  Service: Obstetrics    WI INCISION EXTENSOR TENDON SHEATH WRIST Right 2020    Procedure: RELEASE DEQUERVAINS RIGHT WRIST;  Surgeon: Dorcas Velez MD;  Location: [facility] MAIN OR;  Service: Orthopedics    WI LAPAROSCOPY SURG CHOLECYSTECTOMY N/A 2018    Procedure: LAPAROSCOPIC CHOLECYSTECTOMY;  Surgeon: Alycia Velez MD;  Location: [address] [place] [address];  Service: General    TONSILECTOMY AND ADNOIDECTOMY Bilateral     UPPER GASTROINTESTINAL ENDOSCOPY      WISDOM TOOTH EXTRACTION Bilateral        Current Outpatient Medications   Medication Sig Dispense Refill    busPIRone (BUSPAR) 10 mg tablet Take 1 tablet (10 mg total) by mouth 3 (three) times a day 90 tablet 5    clonazePAM (KlonoPIN) 1 mg tablet Take 1 tablet (1 mg total) by mouth 4 (four) times a day 120 tablet 2    mirtazapine (REMERON) 15 mg tablet Take 1 tablet (15 mg total) by mouth daily at bedtime 30 tablet 5    QUEtiapine (SEROquel) 200 mg tablet TAKE ONE TABLET BY MOUTH THREE TIMES A DAY (NOON, 5PM, AND BEDTIME) 90 tablet 5    venlafaxine (EFFEXOR-XR) 150 mg 24 hr capsule Take 1 capsule (150 mg total) by mouth daily 30 capsule 5    venlafaxine (EFFEXOR-XR) 75 mg 24 hr capsule Take 1 capsule (75 mg total) by mouth daily 30 capsule 5    amLODIPine (NORVASC) 2 5 mg tablet Take 1 tablet (2 5 mg total) by mouth daily 90 tablet 1    baclofen 20 mg tablet Take 1 tablet (20 mg total) by mouth 3 (three) times a day 90 tablet 0    baclofen 20 mg tablet Take 1 tablet (20 mg total) by mouth 3 (three) times a day 90 tablet 0    diphenoxylate-atropine (LOMOTIL) 2 5-0 025 mg per tablet Take 1 tablet by mouth 2 (two) times a day 60 tablet 0    docusate sodium (COLACE) 100 mg capsule Take 1 capsule by mouth daily as needed        granisetron (SANCUSO) 3 1 MG/24HR Place 1 patch on the skin over 7 days once for 1 dose 4 patch 0    linaCLOtide (Linzess) 145 MCG CAPS Take 1 capsule (145 mcg total) by mouth in the morning (Patient not taking: Reported on 1/23/2023) 30 capsule 1    metoprolol succinate (TOPROL-XL) 25 mg 24 hr tablet Take 1 tablet (25 mg total) by mouth daily 90 tablet 1    ondansetron (ZOFRAN-ODT) 8 mg disintegrating tablet Take 1 tablet (8 mg total) by mouth as needed for nausea (EVERY 6 HOURS AS NEEDED FOR NAUSEA) 60 tablet 0    Probiotic Product (Align) 4 MG CAPS Take 1 capsule (4 mg total) by mouth 2 (two) times a day 60 capsule 1    Prucalopride Succinate (Motegrity) 2 MG TABS Take 2 mg by mouth daily (Patient not taking: Reported on 11/14/2022) 30 tablet 1    sucralfate (CARAFATE) 1 g tablet Take 1 tablet (1 g total) by mouth 4 (four) times a day 120 tablet 1     No current facility-administered medications for this visit  Allergies   Allergen Reactions    Sulfa Antibiotics Anaphylaxis, Other (See Comments) and Edema     swelling  swelling  swelling    Reglan [Metoclopramide] Anxiety       Review of Systems    Video Exam    There were no vitals filed for this visit  Physical Exam     Visit Time    Visit Start Time: 12:00p   Visit Stop Time: 12:20p  Total Visit Duration: The visit lasted 20 minutes    We did review the treatment plan, ordered medications as needed and completed the progress note

## 2023-05-04 ENCOUNTER — OFFICE VISIT (OUTPATIENT)
Dept: GASTROENTEROLOGY | Facility: CLINIC | Age: 38
End: 2023-05-04

## 2023-05-04 VITALS
DIASTOLIC BLOOD PRESSURE: 84 MMHG | HEART RATE: 93 BPM | BODY MASS INDEX: 19.77 KG/M2 | WEIGHT: 115.8 LBS | SYSTOLIC BLOOD PRESSURE: 114 MMHG | HEIGHT: 64 IN

## 2023-05-04 DIAGNOSIS — K58.0 IRRITABLE BOWEL SYNDROME WITH DIARRHEA: ICD-10-CM

## 2023-05-04 DIAGNOSIS — K21.9 GASTROESOPHAGEAL REFLUX DISEASE WITHOUT ESOPHAGITIS: ICD-10-CM

## 2023-05-04 DIAGNOSIS — R11.14 BILIOUS VOMITING WITH NAUSEA: ICD-10-CM

## 2023-05-04 DIAGNOSIS — K31.84 GASTROPARESIS: Primary | ICD-10-CM

## 2023-05-04 NOTE — PROGRESS NOTES
[facility] - Outpatient Follow-up Note  Levar Hagan 30 y o  female MRN: [id number]  Encounter: [id number]          ASSESSMENT AND PLAN:      1  Bilious vomiting with nausea  History of gastroparesis with chronic nausea and vomiting, last office visit we started on  sancuso patch which is helping her, she rarely use Zofran  - granisetron (SANCUSO) 3 1 MG/24HR; Place 1 patch on the skin over 7 days once for 1 dose  Dispense: 4 patch; Refill: 1    2  Gastroparesis  Continue with PPI, sucralfate and Sancuso patch      3  Gastroesophageal reflux disease without esophagitis  Continue with sucralfate and omeprazole, repeat EGD will be in 1 year  Continue with baclofen    4  Irritable bowel syndrome with diarrhea  She was treated with Xifaxan in the past with some improvement but then diarrhea recurred, currently using Lomotil, will schedule for SIBO breath test and then decide about adding neomycin  - Small intestinal bacterial overgrowth    5  Anorexia, malnutrition, her BMI is 19-mirtazapine was started which is helping her    ______________________________________________________________________    SUBJECTIVE: Patient seen and examined, she come for follow-up, overall she is doing well, last office visit we prescribed patch for chronic nausea which is helping her  She also takes Zofran as needed, reflux symptoms are well controlled with sucralfate, PPI as needed and baclofen  She still have frequent burping, still having diarrhea, she was treated with Xifaxan for a month ago, she is due for blood test to check for small bowel bacterial overgrowth which we will schedule it      REVIEW OF SYSTEMS IS OTHERWISE NEGATIVE        Historical Information   Past Medical History:   Diagnosis Date    Anxiety     Mcallister esophagus     Bipolar 2 disorder, [facility] depressive episode ([facility] ) 08/17/2021    [facility]     Last assessed: 5/11/16    Chronic pain disorder abd    Colon polyp     COVID-19 2022    home test    Depression     Endometriosis     Last assessed: 16    Gastric ulcer     Gastroparesis     GERD (gastroesophageal reflux disease)     Hypertension     Inflammatory bowel disease     Irritable bowel syndrome     Scoliosis     Varicella      Past Surgical History:   Procedure Laterality Date    ABDOMINAL SURGERY       SECTION      CHOLECYSTECTOMY      COLONOSCOPY      HERNIA REPAIR      HERNIA REPAIR  2019    OTHER SURGICAL HISTORY      Transoral incisionless fundoplication (TIFF)    [place]  DELIVERY ONLY N/A 2016    Procedure:  SECTION ();   Surgeon: Maya Forbes DO;  Location: [facility];  Service: Obstetrics    [place] INCISION EXTENSOR TENDON SHEATH WRIST Right 2020    Procedure: RELEASE DEQUERVAINS RIGHT WRIST;  Surgeon: Meeta Fleming MD;  Location: [address] [place] [address] MAIN OR;  Service: Orthopedics    [place] LAPAROSCOPY SURG CHOLECYSTECTOMY N/A 2018    Procedure: Duane Stearnsing;  Surgeon: Olivia Warren MD;  Location: [place] MAIN OR;  Service: General    TONSILECTOMY AND ADNOIDECTOMY Bilateral     UPPER GASTROINTESTINAL ENDOSCOPY      WISDOM TOOTH EXTRACTION Bilateral      Social History   Social History     Substance and Sexual Activity   Alcohol Use Not Currently    Comment: rare     Social History     Substance and Sexual Activity   Drug Use Not Currently     Social History     Tobacco Use   Smoking Status Light Smoker    Packs/day: 0 50    Years: 2 00    Pack years: 1 00    Types: Cigarettes    Start date: 2020    Last attempt to quit: 2016    Years since quittin 9   Smokeless Tobacco Never     Family History   Problem Relation Age of Onset    Hypertension Father     Depression Mother     Asthma Mother     Anxiety disorder Mother     Bipolar disorder Mother     Mental illness Mother     Thyroid disease Mother     Hypothyroidism Mother     Depression Maternal "Grandmother     Anxiety disorder Maternal Grandmother     Bipolar disorder Maternal Grandmother     Mental illness Maternal Grandmother     Cancer Paternal Grandfather     Cancer Paternal Uncle        Meds/Allergies       Current Outpatient Medications:     amLODIPine (NORVASC) 2 5 mg tablet    baclofen 20 mg tablet    busPIRone (BUSPAR) 10 mg tablet    clonazePAM (KlonoPIN) 1 mg tablet    diphenoxylate-atropine (LOMOTIL) 2 5-0 025 mg per tablet    docusate sodium (COLACE) 100 mg capsule    granisetron (SANCUSO) 3 1 MG/24HR    metoprolol succinate (TOPROL-XL) 25 mg 24 hr tablet    mirtazapine (REMERON) 15 mg tablet    ondansetron (ZOFRAN-ODT) 8 mg disintegrating tablet    Probiotic Product (Align) 4 MG CAPS    QUEtiapine (SEROquel) 200 mg tablet    sucralfate (CARAFATE) 1 g tablet    venlafaxine (EFFEXOR-XR) 150 mg 24 hr capsule    venlafaxine (EFFEXOR-XR) 75 mg 24 hr capsule    baclofen 20 mg tablet    Allergies   Allergen Reactions    Sulfa Antibiotics Anaphylaxis, Other (See Comments) and Edema     swelling  swelling  swelling    Reglan [Metoclopramide] Anxiety           Objective     Blood pressure 114/84, pulse 93, height 5' 4\" (1 626 m), weight 52 5 kg (115 lb 12 8 oz), not currently breastfeeding  Body mass index is 19 88 kg/m²  PHYSICAL EXAM:      General Appearance:   Alert, cooperative, no distress   HEENT:   Normocephalic, atraumatic, anicteric      Neck:  Supple, symmetrical, trachea midline   Lungs:   Clear to auscultation bilaterally; no rales, rhonchi or wheezing; respirations unlabored    Heart[de-identified]   Regular rate and rhythm; no murmur, rub, or gallop     Abdomen:   Soft, non-tender, non-distended; normal bowel sounds; no masses, no organomegaly    Genitalia:   Deferred    Rectal:   Deferred    Extremities:  No cyanosis, clubbing or edema    Pulses:  2+ and symmetric    Skin:  No jaundice, rashes, or lesions    Lymph nodes:  No palpable cervical lymphadenopathy        Lab " Results:   No visits with results within 1 Day(s) from this visit  Latest known visit with results is:   Hospital Outpatient Visit on 10/26/2022   Component Date Value    EXT Preg Test, Ur 10/26/2022 Negative     Control 10/26/2022 Valid     Case Report 10/26/2022                      Value:Surgical Pathology Report                         Case: [id number]                                   Authorizing Provider:  Narciso Blackwell MD Collected:           10/26/2022 1105              Ordering Location:     [address] [place] [address] Received:            10/26/2022 2134                                     Adeline                                                                  Pathologist:           Elvie Wilkins MD                                                       Specimens:   A) - Duodenum, DUODENUM   cold bx   diarrhea, check for celiac                                     B) - Stomach, ANTRUM   cold bx   erythema, check for Hpy                                           C) - Esophagus, LOWER ESOPH   cold bx   reflux                                            Final Diagnosis 10/26/2022                      Value: This result contains rich text formatting which cannot be displayed here   Note 10/26/2022                      Value: This result contains rich text formatting which cannot be displayed here   Additional Information 10/26/2022                      Value: This result contains rich text formatting which cannot be displayed here  Wood Gross Description 10/26/2022                      Value: This result contains rich text formatting which cannot be displayed here  Radiology Results:   No results found

## 2023-05-15 ENCOUNTER — OFFICE VISIT (OUTPATIENT)
Dept: INTERNAL MEDICINE CLINIC | Age: 38
End: 2023-05-15

## 2023-05-15 VITALS
WEIGHT: 116.4 LBS | OXYGEN SATURATION: 97 % | TEMPERATURE: 97.3 F | BODY MASS INDEX: 19.87 KG/M2 | SYSTOLIC BLOOD PRESSURE: 110 MMHG | HEART RATE: 84 BPM | DIASTOLIC BLOOD PRESSURE: 76 MMHG | HEIGHT: 64 IN

## 2023-05-15 DIAGNOSIS — I10 ESSENTIAL HYPERTENSION: ICD-10-CM

## 2023-05-15 DIAGNOSIS — K21.9 GASTROESOPHAGEAL REFLUX DISEASE WITHOUT ESOPHAGITIS: Primary | ICD-10-CM

## 2023-05-15 DIAGNOSIS — F31.81 BIPOLAR 2 DISORDER, MAJOR DEPRESSIVE EPISODE (HCC): ICD-10-CM

## 2023-05-15 DIAGNOSIS — L98.9 SKIN LESION OF BACK: ICD-10-CM

## 2023-05-15 DIAGNOSIS — F17.210 CIGARETTE NICOTINE DEPENDENCE WITHOUT COMPLICATION: ICD-10-CM

## 2023-05-15 NOTE — ASSESSMENT & PLAN NOTE
Blood pressure is well controlled on amlodipine 2 5 mg daily  Continue to monitor blood pressure at home    She is due for electrolytes to be done as soon as possible

## 2023-05-15 NOTE — PROGRESS NOTES
Assessment/Plan:    Acid reflux disease  Continue with present regimen  Being followed by gastroenterologist    Essential hypertension  Blood pressure is well controlled on amlodipine 2 5 mg daily  Continue to monitor blood pressure at home  She is due for electrolytes to be done as soon as possible    Bipolar 2 disorder, major depressive episode (HCC)  Stable  Followed by psychiatrist    Nicotine dependence  Again advised for smoking cessation but she is not ready yet  We will continue to encourage       Diagnoses and all orders for this visit:    Gastroesophageal reflux disease without esophagitis    Essential hypertension    Bipolar 2 disorder, major depressive episode (HCC)    Cigarette nicotine dependence without complication               Subjective:          Patient ID: Lois He is a 30 y o  female  Patient is here for follow-up  She was unable to blood test which was requested on previous visit in November 2022  Nausea is better  The following portions of the patient's history were reviewed and updated as appropriate: allergies, current medications, past family history, past medical history, past social history, past surgical history and problem list     Review of Systems   Constitutional: Negative for fatigue and fever  HENT: Negative for congestion, ear discharge, ear pain, postnasal drip, sinus pressure, sore throat, tinnitus and trouble swallowing  Eyes: Negative for discharge, itching and visual disturbance  Respiratory: Negative for cough and shortness of breath  Cardiovascular: Negative for chest pain and palpitations  Gastrointestinal: Positive for diarrhea  Negative for abdominal pain, nausea and vomiting  Endocrine: Negative for cold intolerance and polyuria  Genitourinary: Negative for difficulty urinating, dysuria and urgency  Musculoskeletal: Negative for arthralgias and neck pain  Skin: Negative for rash     Allergic/Immunologic: Negative for environmental allergies  Neurological: Negative for dizziness, weakness and headaches  Psychiatric/Behavioral: Negative for agitation and behavioral problems  The patient is not nervous/anxious            Past Medical History:   Diagnosis Date   • Anxiety    • Mcallister esophagus    • Bipolar 2 disorder, major depressive episode ([facility] ) 08/17/2021   • Bipolar disorder with depression ([facility] )     Last assessed: 5/11/16   • Chronic pain disorder     abd   • Colon polyp    • COVID-19 12/21/2022    home test   • Depression    • Endometriosis     Last assessed: 5/11/16   • Gastric ulcer    • Gastroparesis    • GERD (gastroesophageal reflux disease)    • Hypertension    • Inflammatory bowel disease    • Irritable bowel syndrome    • Scoliosis    • Varicella          Current Outpatient Medications:   •  amLODIPine (NORVASC) 2 5 mg tablet, Take 1 tablet (2 5 mg total) by mouth daily, Disp: 90 tablet, Rfl: 1  •  baclofen 20 mg tablet, Take 1 tablet (20 mg total) by mouth 3 (three) times a day, Disp: 90 tablet, Rfl: 0  •  baclofen 20 mg tablet, Take 1 tablet (20 mg total) by mouth 3 (three) times a day, Disp: 90 tablet, Rfl: 0  •  busPIRone (BUSPAR) 10 mg tablet, Take 1 tablet (10 mg total) by mouth 3 (three) times a day, Disp: 90 tablet, Rfl: 5  •  clonazePAM (KlonoPIN) 1 mg tablet, Take 1 tablet (1 mg total) by mouth 4 (four) times a day, Disp: 120 tablet, Rfl: 2  •  diphenoxylate-atropine (LOMOTIL) 2 5-0 025 mg per tablet, Take 1 tablet by mouth 2 (two) times a day, Disp: 60 tablet, Rfl: 0  •  docusate sodium (COLACE) 100 mg capsule, Take 1 capsule by mouth daily as needed  , Disp: , Rfl:   •  granisetron (SANCUSO) 3 1 MG/24HR, Place 1 patch on the skin over 7 days once for 1 dose, Disp: 4 patch, Rfl: 1  •  metoprolol succinate (TOPROL-XL) 25 mg 24 hr tablet, Take 1 tablet (25 mg total) by mouth daily, Disp: 90 tablet, Rfl: 1  •  mirtazapine (REMERON) 15 mg tablet, Take 1 tablet (15 mg total) by mouth daily at bedtime, Disp: 30 tablet, Rfl: 5  •  ondansetron (ZOFRAN-ODT) 8 mg disintegrating tablet, Take 1 tablet (8 mg total) by mouth as needed for nausea (EVERY 6 HOURS AS NEEDED FOR NAUSEA), Disp: 60 tablet, Rfl: 0  •  Probiotic Product (Align) 4 MG CAPS, Take 1 capsule (4 mg total) by mouth 2 (two) times a day, Disp: 60 capsule, Rfl: 1  •  QUEtiapine (SEROquel) 200 mg tablet, TAKE ONE TABLET BY MOUTH THREE TIMES A DAY (NOON, 5PM, AND BEDTIME), Disp: 90 tablet, Rfl: 5  •  sucralfate (CARAFATE) 1 g tablet, Take 1 tablet (1 g total) by mouth 4 (four) times a day, Disp: 120 tablet, Rfl: 1  •  venlafaxine (EFFEXOR-XR) 150 mg 24 hr capsule, Take 1 capsule (150 mg total) by mouth daily, Disp: 30 capsule, Rfl: 5  •  venlafaxine (EFFEXOR-XR) 75 mg 24 hr capsule, Take 1 capsule (75 mg total) by mouth daily, Disp: 30 capsule, Rfl: 5    Allergies   Allergen Reactions   • Sulfa Antibiotics Anaphylaxis, Other (See Comments) and Edema     swelling  swelling  swelling   • Reglan [Metoclopramide] Anxiety       Social History   Past Surgical History:   Procedure Laterality Date   • ABDOMINAL SURGERY     •  SECTION     • CHOLECYSTECTOMY     • COLONOSCOPY     • HERNIA REPAIR     • HERNIA REPAIR     • OTHER SURGICAL HISTORY      Transoral incisionless fundoplication (TIFF)   • [place]  DELIVERY ONLY N/A 2016    Procedure:  SECTION ();   Surgeon: Hannah Card DO;  Location: [facility];  Service: Obstetrics   • [place] INCISION EXTENSOR TENDON SHEATH WRIST Right 2020    Procedure: RELEASE DEQUERVAINS RIGHT WRIST;  Surgeon: Gerhardt Sickles, MD;  Location: [address] [place] [address] MAIN OR;  Service: Orthopedics   • [place] LAPAROSCOPY SURG CHOLECYSTECTOMY N/A 2018    Procedure: Nahomy Horta;  Surgeon: Rashaad Duncan MD;  Location: [facility] MAIN [facility];  Service: General   • TONSILECTOMY AND ADNOIDECTOMY Bilateral    • UPPER GASTROINTESTINAL ENDOSCOPY     • WISDOM TOOTH EXTRACTION Bilateral      Family History   Problem Relation Age of Onset "  • Hypertension Father    • Depression Mother    • Asthma Mother    • Anxiety disorder Mother    • Bipolar disorder Mother    • Mental illness Mother    • Thyroid disease Mother    • Hypothyroidism Mother    • Depression Maternal Grandmother    • Anxiety disorder Maternal Grandmother    • Bipolar disorder Maternal Grandmother    • Mental illness Maternal Grandmother    • Cancer Paternal Grandfather    • Cancer Paternal Uncle        Objective:  /76 (BP Location: Left arm, Patient Position: Sitting, Cuff Size: Standard)   Pulse 84   Temp (!) 97 3 °F (36 3 °C) (Temporal)   Ht 5' 4\" (1 626 m)   Wt 52 8 kg (116 lb 6 4 oz)   SpO2 97%   BMI 19 98 kg/m²   Body mass index is 19 98 kg/m²  Physical Exam  Constitutional:       Appearance: She is well-developed  She is not ill-appearing or diaphoretic  HENT:      Head: Normocephalic  Right Ear: Ear canal and external ear normal  There is no impacted cerumen  Left Ear: Ear canal and external ear normal  There is no impacted cerumen  Nose: No rhinorrhea  Mouth/Throat:      Pharynx: No posterior oropharyngeal erythema  Eyes:      General: No scleral icterus  Pupils: Pupils are equal, round, and reactive to light  Neck:      Thyroid: No thyromegaly  Trachea: No tracheal deviation  Cardiovascular:      Rate and Rhythm: Normal rate and regular rhythm  Heart sounds: Normal heart sounds  No murmur heard  Pulmonary:      Effort: Pulmonary effort is normal  No respiratory distress  Breath sounds: Normal breath sounds  Chest:      Chest wall: No tenderness  Abdominal:      General: Bowel sounds are normal       Palpations: Abdomen is soft  There is no mass  Tenderness: There is no abdominal tenderness  Musculoskeletal:         General: Normal range of motion  Cervical back: Normal range of motion and neck supple  Right lower leg: No edema  Left lower leg: No edema     Lymphadenopathy:      Cervical: " No cervical adenopathy  Skin:     General: Skin is warm  Findings: No lesion or rash  Neurological:      Mental Status: She is alert and oriented to person, place, and time  Cranial Nerves: No cranial nerve deficit     Psychiatric:         Mood and Affect: Mood normal          Behavior: Behavior normal

## 2023-05-16 ENCOUNTER — TELEPHONE (OUTPATIENT)
Dept: GASTROENTEROLOGY | Facility: CLINIC | Age: 38
End: 2023-05-16

## 2023-05-23 ENCOUNTER — OFFICE VISIT (OUTPATIENT)
Dept: GASTROENTEROLOGY | Facility: CLINIC | Age: 38
End: 2023-05-23
Payer: COMMERCIAL

## 2023-05-23 ENCOUNTER — OFFICE VISIT (OUTPATIENT)
Dept: GASTROENTEROLOGY | Facility: CLINIC | Age: 38
End: 2023-05-23

## 2023-05-23 DIAGNOSIS — K59.1 FUNCTIONAL DIARRHEA: ICD-10-CM

## 2023-05-23 DIAGNOSIS — R11.0 NAUSEA: ICD-10-CM

## 2023-05-23 DIAGNOSIS — R11.0 NAUSEA: Primary | ICD-10-CM

## 2023-05-23 DIAGNOSIS — K59.1 FUNCTIONAL DIARRHEA: Primary | ICD-10-CM

## 2023-05-23 NOTE — PROGRESS NOTES
[facility]       Bacterial Overgrowth Analytical Record    Penelope Lucero 30 y o  female MRN: [id number]      Date of Test: 05/16/2023    Substrate Given: Lactulose    Ordering Provider: Dr Miriam Corado Assistant: Andrade Del Toro    Symptoms: Nausea, Diarrhea    The patient presents for bacterial overgrowth testing  Patient fasted overnight  Baseline readings obtained  Breath test performed every 20 min for a total of 3 hr    Sample Clock Time ppmH2 ppmCH4 Co2% Dmitry   Baseline   8:30 3 5 4 4 1 25   #1  20 minutes 8:50 4 8 4 1 1 34   #2  40 minutes 9:10 4 7 4 6 1 19   #3  60 minutes 9:30 21 10 4 5 1 22   #4  80 minutes 9:50 29 10 4 0 1 37   #5  100 minutes 10:00 43 12 3 7 1 48   #6  120 minutes 10:30 75 15 4 0 1 37   #7  140 minutes 10:50 100 15 4 3 1 27   #8  160 minutes 11:00 100 18 4 5 1 22   #9  180 minutes 11:30 94 13 4 4 1 25       Physician interpretation: Breath test is positive for SIBO and methanogen overgrowth

## 2023-05-23 NOTE — PROGRESS NOTES
[facility]       Bacterial Overgrowth Analytical Record    Paula Silva 30 y o  female MRN: [id number]      Date of Test:05/16/2023    Substrate Given: Lactulose    Ordering Provider: Susy Nagy MD    Medical Assistant: Stephy De    Symptoms: nausea and diarrhea    The patient presents for bacterial overgrowth testing  Patient fasted overnight  Baseline readings obtained  Breath test performed every 20 min for a total of 3 hr    Sample Clock Time ppmH2 ppmCH4 Co2% Dmitry   Baseline   08:30 3 5 4 4 1 25   #1  20 minutes 08:50 4 8 4 1 1 34   #2  40 minutes 9:10 4 7 4 6 1 19   #3  60 minutes 09:30 21 10 1 5 1 22   #4  80 minutes 09:50 29 10 4 0 1 37   #5  100 minutes 10:10 43 12 3 7 1 48   #6  120 minutes 10:30 75 15 4 0 1 37   #7  140 minutes 10:50 100 15 4 3 1 27   #8  160 minutes 11:10 100 18 4 5 1 22   ` 11:30 94 13 4 4 1 25       Physician interpretation: Test is positive for SIBO and methanogen overgrowth

## 2023-05-24 DIAGNOSIS — K58.0 IRRITABLE BOWEL SYNDROME WITH DIARRHEA: Primary | ICD-10-CM

## 2023-05-24 DIAGNOSIS — K63.89 SMALL INTESTINAL BACTERIAL OVERGROWTH (SIBO): ICD-10-CM

## 2023-05-24 RX ORDER — NEOMYCIN SULFATE 500 MG/1
500 TABLET ORAL 4 TIMES DAILY
Qty: 40 TABLET | Refills: 0 | Status: SHIPPED | OUTPATIENT
Start: 2023-05-24 | End: 2023-06-03

## 2023-05-25 DIAGNOSIS — R11.0 NAUSEA: ICD-10-CM

## 2023-05-25 RX ORDER — ONDANSETRON 8 MG/1
8 TABLET, ORALLY DISINTEGRATING ORAL AS NEEDED
Qty: 60 TABLET | Refills: 0 | Status: SHIPPED | OUTPATIENT
Start: 2023-05-25

## 2023-05-29 DIAGNOSIS — K63.89 OTHER SPECIFIED DISEASES OF INTESTINE: ICD-10-CM

## 2023-05-30 RX ORDER — BACLOFEN 20 MG/1
20 TABLET ORAL 3 TIMES DAILY
Qty: 90 TABLET | Refills: 0 | Status: SHIPPED | OUTPATIENT
Start: 2023-05-30

## 2023-06-27 DIAGNOSIS — K63.89 OTHER SPECIFIED DISEASES OF INTESTINE: ICD-10-CM

## 2023-06-27 RX ORDER — BACLOFEN 20 MG/1
20 TABLET ORAL 3 TIMES DAILY
Qty: 90 TABLET | Refills: 0 | Status: SHIPPED | OUTPATIENT
Start: 2023-06-27

## 2023-06-28 DIAGNOSIS — K58.0 IRRITABLE BOWEL SYNDROME WITH DIARRHEA: ICD-10-CM

## 2023-06-28 DIAGNOSIS — R11.14 BILIOUS VOMITING WITH NAUSEA: ICD-10-CM

## 2023-06-28 RX ORDER — DIPHENOXYLATE HYDROCHLORIDE AND ATROPINE SULFATE 2.5; .025 MG/1; MG/1
1 TABLET ORAL 2 TIMES DAILY
Qty: 60 TABLET | Refills: 0 | Status: SHIPPED | OUTPATIENT
Start: 2023-06-28

## 2023-06-30 DIAGNOSIS — K63.89 SMALL INTESTINAL BACTERIAL OVERGROWTH (SIBO): Primary | ICD-10-CM

## 2023-06-30 RX ORDER — METRONIDAZOLE 250 MG/1
250 TABLET ORAL EVERY 8 HOURS SCHEDULED
Qty: 30 TABLET | Refills: 0 | Status: SHIPPED | OUTPATIENT
Start: 2023-06-30 | End: 2023-07-10

## 2023-07-25 DIAGNOSIS — K63.89 OTHER SPECIFIED DISEASES OF INTESTINE: ICD-10-CM

## 2023-07-26 DIAGNOSIS — K63.89 OTHER SPECIFIED DISEASES OF INTESTINE: ICD-10-CM

## 2023-07-26 RX ORDER — BACLOFEN 20 MG/1
20 TABLET ORAL 3 TIMES DAILY
Qty: 90 TABLET | Refills: 0 | OUTPATIENT
Start: 2023-07-26

## 2023-07-26 RX ORDER — BACLOFEN 20 MG/1
20 TABLET ORAL 3 TIMES DAILY
Qty: 90 TABLET | Refills: 0 | Status: SHIPPED | OUTPATIENT
Start: 2023-07-26

## 2023-07-26 NOTE — TELEPHONE ENCOUNTER
----- Message from Pérez Ramirez sent at 7/26/2023 10:07 AM EDT -----  Regarding: Refill  Contact: [phone]  Dr. Дмитрий Azar, I need a refill on baclofen please.  Thank you -Pérez Ramirez

## 2023-07-27 DIAGNOSIS — F31.81 BIPOLAR 2 DISORDER, MAJOR DEPRESSIVE EPISODE (HCC): ICD-10-CM

## 2023-07-27 RX ORDER — CLONAZEPAM 1 MG/1
1 TABLET ORAL 4 TIMES DAILY
Qty: 120 TABLET | Refills: 2 | Status: SHIPPED | OUTPATIENT
Start: 2023-07-27

## 2023-07-30 DIAGNOSIS — I10 ESSENTIAL HYPERTENSION: ICD-10-CM

## 2023-07-31 RX ORDER — AMLODIPINE BESYLATE 2.5 MG/1
2.5 TABLET ORAL DAILY
Qty: 90 TABLET | Refills: 1 | Status: SHIPPED | OUTPATIENT
Start: 2023-07-31

## 2023-08-10 RX ORDER — ONDANSETRON 8 MG/1
8 TABLET, ORALLY DISINTEGRATING ORAL AS NEEDED
Qty: 60 TABLET | Refills: 0 | Status: SHIPPED | OUTPATIENT
Start: 2023-08-10

## 2023-08-14 DIAGNOSIS — K58.0 IRRITABLE BOWEL SYNDROME WITH DIARRHEA: ICD-10-CM

## 2023-08-17 ENCOUNTER — TELEMEDICINE (OUTPATIENT)
Dept: PSYCHIATRY | Facility: CLINIC | Age: 38
End: 2023-08-17
Payer: COMMERCIAL

## 2023-08-17 DIAGNOSIS — F31.81 BIPOLAR 2 DISORDER, MAJOR DEPRESSIVE EPISODE (HCC): ICD-10-CM

## 2023-08-17 DIAGNOSIS — F41.1 GENERALIZED ANXIETY DISORDER: ICD-10-CM

## 2023-08-17 PROCEDURE — 99214 OFFICE O/P EST MOD 30 MIN: CPT | Performed by: NURSE PRACTITIONER

## 2023-08-17 RX ORDER — BUSPIRONE HYDROCHLORIDE 10 MG/1
10 TABLET ORAL 3 TIMES DAILY
Qty: 90 TABLET | Refills: 5 | Status: SHIPPED | OUTPATIENT
Start: 2023-08-17

## 2023-08-17 RX ORDER — VENLAFAXINE HYDROCHLORIDE 75 MG/1
75 CAPSULE, EXTENDED RELEASE ORAL DAILY
Qty: 30 CAPSULE | Refills: 5 | Status: SHIPPED | OUTPATIENT
Start: 2023-08-17

## 2023-08-17 RX ORDER — MIRTAZAPINE 15 MG/1
15 TABLET, FILM COATED ORAL
Qty: 30 TABLET | Refills: 5 | Status: SHIPPED | OUTPATIENT
Start: 2023-08-17

## 2023-08-17 RX ORDER — QUETIAPINE FUMARATE 200 MG/1
TABLET, FILM COATED ORAL
Qty: 90 TABLET | Refills: 5 | Status: SHIPPED | OUTPATIENT
Start: 2023-08-17

## 2023-08-17 RX ORDER — VENLAFAXINE HYDROCHLORIDE 150 MG/1
150 CAPSULE, EXTENDED RELEASE ORAL DAILY
Qty: 30 CAPSULE | Refills: 5 | Status: SHIPPED | OUTPATIENT
Start: 2023-08-17

## 2023-08-17 NOTE — PSYCH
Virtual Regular Visit    Verification of patient location:    Patient is located at Home in the following state in which I hold an active license [place]      Assessment/Plan:    Problem List Items Addressed This Visit        Other    Bipolar 2 disorder, major depressive episode ([address])    Relevant Medications    mirtazapine (REMERON) 15 mg tablet    QUEtiapine (SEROquel) 200 mg tablet    venlafaxine (EFFEXOR-XR) 150 mg 24 hr capsule    venlafaxine (EFFEXOR-XR) 75 mg 24 hr capsule    busPIRone (BUSPAR) 10 mg tablet   Other Visit Diagnoses     Generalized anxiety disorder        Relevant Medications    mirtazapine (REMERON) 15 mg tablet    QUEtiapine (SEROquel) 200 mg tablet    venlafaxine (EFFEXOR-XR) 150 mg 24 hr capsule    venlafaxine (EFFEXOR-XR) 75 mg 24 hr capsule    busPIRone (BUSPAR) 10 mg tablet          Goals addressed in session: Maintain current level of functioning. Reason for visit is   Chief Complaint   Patient presents with   • Virtual Regular Visit        Encounter provider MIKE Ta    Provider located at [address] [place] [address] Road [id number]      Recent Visits  No visits were found meeting these conditions. Showing recent visits within past 7 days and meeting all other requirements  Today's Visits  Date Type Provider Dept   08/17/23 Telemedicine Chula Solares, [address] [place] [address] today's visits and meeting all other requirements  Future Appointments  No visits were found meeting these conditions. Showing future appointments within next 150 days and meeting all other requirements       The patient was identified by name and date of birth. Austin Liu was informed that this is a telemedicine visit and that the visit is being conducted throughthe [employer] platform. She agrees to proceed. .  My office door was closed. No one else was in the room.   She acknowledged consent and understanding of privacy and security of the video platform. The patient has agreed to participate and understands they can discontinue the visit at any time. Patient is aware this is a billable service. Jesús Dominguez is a 30 y.o. female has a history of bipolar depressive disorder. Well maintained on her current level of medication while she is on debility. Has had a long history of treatment with minimal response to medication, as a result, has been disabled from time. Has tolerated all medications well. She is sleeping well eating well. No episodes of side effects. No sedation no lethargy noted. Resides with family members to assist her in many of her activities. She has a little boy, who is school-age and with the assistance of family, he is doing very well. No other new clinical issues concerns voiced by patient. Continue current meds and treatment and follow-up in 3 months or sooner if necessary. .  Mental status exam: Patient is awake and alert and oriented x 3. Mood is stable. Patient is not suicidal, not homicidal ". Associations are intact. His control is good. No overt delusions. Speech is clear and thoughts are limited but organized. Attention span is also limited but can manage okay. Judgment and insight are limited. Patient requires assistance from family members most of the time. Memory is good. The patient will continue on: BuSpar 10 mg 3 times daily  Remeron 15 mg at bedtime  Seroquel 200 mg twice daily  Effexor  mg daily  Klonopin 1 mg, 3-4 times daily as needed for anxiety attempts to reduce his medication in the past have failed. .  Patient became anxious, poorly functioning. Follow-up with me in terms or sooner if necessary.             HPI     Past Medical History:   Diagnosis Date   • Anxiety    • Mcallister esophagus    • Bipolar 2 disorder, major depressive episode ([address]) 08/17/2021   • Bipolar disorder with depression ([address])     Last assessed: 16   • Chronic pain disorder     abd   • Colon polyp    • COVID-19 2022    home test   • Depression    • Endometriosis     Last assessed: 16   • Gastric ulcer    • Gastroparesis    • GERD (gastroesophageal reflux disease)    • Hypertension    • Inflammatory bowel disease    • Irritable bowel syndrome    • Scoliosis    • Varicella        Past Surgical History:   Procedure Laterality Date   • ABDOMINAL SURGERY     •  SECTION     • CHOLECYSTECTOMY     • COLONOSCOPY     • HERNIA REPAIR     • HERNIA REPAIR     • OTHER SURGICAL HISTORY      Transoral incisionless fundoplication (TIFF)   • [place]  DELIVERY ONLY N/A 2016    Procedure:  SECTION ();   Surgeon: Marlo Thomas DO;  Location: [facility];  Service: Obstetrics   • PA INCISION EXTENSOR TENDON SHEATH WRIST Right 2020    Procedure: RELEASE DEQUERVAINS RIGHT WRIST;  Surgeon: Jose Dueñas MD;  Location: [address] [place] [address];  Service: Orthopedics   • [place] LAPAROSCOPY SURG CHOLECYSTECTOMY N/A 2018    Procedure: LAPAROSCOPIC CHOLECYSTECTOMY;  Surgeon: Cortez Harley MD;  Location: [facility];  Service: General   • TONSILECTOMY AND ADNOIDECTOMY Bilateral    • UPPER GASTROINTESTINAL ENDOSCOPY     • WISDOM TOOTH EXTRACTION Bilateral        Current Outpatient Medications   Medication Sig Dispense Refill   • busPIRone (BUSPAR) 10 mg tablet Take 1 tablet (10 mg total) by mouth 3 (three) times a day 90 tablet 5   • mirtazapine (REMERON) 15 mg tablet Take 1 tablet (15 mg total) by mouth daily at bedtime 30 tablet 5   • QUEtiapine (SEROquel) 200 mg tablet TAKE ONE TABLET BY MOUTH THREE TIMES A DAY (NOON, 5PM, AND BEDTIME) 90 tablet 5   • venlafaxine (EFFEXOR-XR) 150 mg 24 hr capsule Take 1 capsule (150 mg total) by mouth daily 30 capsule 5   • venlafaxine (EFFEXOR-XR) 75 mg 24 hr capsule Take 1 capsule (75 mg total) by mouth daily 30 capsule 5   • amLODIPine (NORVASC) 2.5 mg tablet Take 1 tablet (2.5 mg total) by mouth daily 90 tablet 1   • baclofen 20 mg tablet Take 1 tablet (20 mg total) by mouth 3 (three) times a day 90 tablet 0   • baclofen 20 mg tablet Take 1 tablet (20 mg total) by mouth 3 (three) times a day 90 tablet 0   • clonazePAM (KlonoPIN) 1 mg tablet Take 1 tablet (1 mg total) by mouth 4 (four) times a day 120 tablet 2   • diphenoxylate-atropine (LOMOTIL) 2.5-0.025 mg per tablet Take 1 tablet by mouth 2 (two) times a day 60 tablet 0   • docusate sodium (COLACE) 100 mg capsule Take 1 capsule by mouth daily as needed       • granisetron (SANCUSO) 3.1 MG/24HR Place 1 patch on the skin over 7 days once for 1 dose 4 patch 1   • metoprolol succinate (TOPROL-XL) 25 mg 24 hr tablet Take 1 tablet (25 mg total) by mouth daily 90 tablet 1   • ondansetron (ZOFRAN-ODT) 8 mg disintegrating tablet Take 1 tablet (8 mg total) by mouth as needed for nausea (EVERY 6 HOURS AS NEEDED FOR NAUSEA) 60 tablet 0   • ondansetron (ZOFRAN-ODT) 8 mg disintegrating tablet Take 1 tablet (8 mg total) by mouth as needed for nausea (EVERY 6 HOURS AS NEEDED FOR NAUSEA) 60 tablet 0   • Probiotic Product (Align) 4 MG CAPS Take 1 capsule (4 mg total) by mouth 2 (two) times a day 60 capsule 1   • sucralfate (CARAFATE) 1 g tablet Take 1 tablet (1 g total) by mouth 4 (four) times a day 120 tablet 1     No current facility-administered medications for this visit. Allergies   Allergen Reactions   • Sulfa Antibiotics Anaphylaxis, Other (See Comments) and Edema     swelling  swelling  swelling   • Reglan [Metoclopramide] Anxiety       Review of Systems    Video Exam    There were no vitals filed for this visit. Physical Exam     Visit Time    Visit Start Time: 11:00a  Visit Stop Time: 11:25a  Total Visit Duration: 25 minutes were spent visit. Time was that reviewing the treatment plan, reviewing medications and ordering medications and completing the progress note.

## 2023-08-18 DIAGNOSIS — R11.14 BILIOUS VOMITING WITH NAUSEA: Primary | ICD-10-CM

## 2023-08-24 DIAGNOSIS — K63.89 OTHER SPECIFIED DISEASES OF INTESTINE: ICD-10-CM

## 2023-08-24 RX ORDER — DIPHENOXYLATE HYDROCHLORIDE AND ATROPINE SULFATE 2.5; .025 MG/1; MG/1
1 TABLET ORAL 2 TIMES DAILY
Qty: 60 TABLET | Refills: 0 | Status: SHIPPED | OUTPATIENT
Start: 2023-08-24

## 2023-08-24 RX ORDER — BACLOFEN 20 MG/1
20 TABLET ORAL 3 TIMES DAILY
Qty: 90 TABLET | Refills: 0 | Status: SHIPPED | OUTPATIENT
Start: 2023-08-24

## 2023-09-12 ENCOUNTER — TELEPHONE (OUTPATIENT)
Dept: GASTROENTEROLOGY | Facility: CLINIC | Age: 38
End: 2023-09-12

## 2023-09-12 NOTE — TELEPHONE ENCOUNTER
Patient will be calling back to confirm if she is able to make tomorrow's appt with Dr. Rossi Moreno in our [place] office.  Please route call to me if possible, thank you

## 2023-09-13 ENCOUNTER — TELEPHONE (OUTPATIENT)
Dept: GASTROENTEROLOGY | Facility: CLINIC | Age: 38
End: 2023-09-13

## 2023-09-13 ENCOUNTER — OFFICE VISIT (OUTPATIENT)
Dept: GASTROENTEROLOGY | Facility: CLINIC | Age: 38
End: 2023-09-13
Payer: COMMERCIAL

## 2023-09-13 VITALS
DIASTOLIC BLOOD PRESSURE: 88 MMHG | HEIGHT: 64 IN | WEIGHT: 120 LBS | BODY MASS INDEX: 20.49 KG/M2 | SYSTOLIC BLOOD PRESSURE: 129 MMHG | HEART RATE: 79 BPM

## 2023-09-13 DIAGNOSIS — K63.89 SMALL INTESTINAL BACTERIAL OVERGROWTH (SIBO): Primary | ICD-10-CM

## 2023-09-13 DIAGNOSIS — K31.84 GASTROPARESIS: ICD-10-CM

## 2023-09-13 DIAGNOSIS — R14.0 BLOATING: ICD-10-CM

## 2023-09-13 DIAGNOSIS — R11.0 NAUSEA: ICD-10-CM

## 2023-09-13 PROCEDURE — 99214 OFFICE O/P EST MOD 30 MIN: CPT | Performed by: INTERNAL MEDICINE

## 2023-09-13 RX ORDER — ONDANSETRON 8 MG/1
8 TABLET, ORALLY DISINTEGRATING ORAL AS NEEDED
Qty: 60 TABLET | Refills: 0 | Status: SHIPPED | OUTPATIENT
Start: 2023-09-13

## 2023-09-13 NOTE — PROGRESS NOTES
[facility] - Outpatient Follow-up Note  Citlalli Mack 41 y.o. female MRN: [id number]  Encounter: [id number]          ASSESSMENT AND PLAN:      1. Small intestinal bacterial overgrowth (SIBO)  Recurrent small bowel bacterial overgrowth treated with multiple course of Xifaxan, neomycin and Flagyl but now having recurrence of symptoms including severe bloating, diarrhea and abdominal pain. We will schedule for EGD with push enteroscopy and jejunal aspiration and depend upon culture sensitivity result we will discuss about further antibiotic course  - EGD with Push Enteroscopy; Future    2. Gastroparesis  Refill for Zofran was given 8 mg every 8 hourly, she is using sancuso patch, she tried Motegrity, Reglan and domperidone but unable to tolerate any motility agent  - EGD with Push Enteroscopy; Future    3. Bloating  Secondary to underlying history of SIBO and gastroparesis  - EGD with Push Enteroscopy; Future    4. Nausea  - ondansetron (ZOFRAN-ODT) 8 mg disintegrating tablet; Take 1 tablet (8 mg total) by mouth as needed for nausea (EVERY 6 HOURS AS NEEDED FOR NAUSEA)  Dispense: 60 tablet; Refill: 0    ______________________________________________________________________    SUBJECTIVE: Patient seen and examined, she come for urgent office visit because of worsening of abdominal pain, bloating and nausea, she has a history of gastroparesis, history of SIBO which was treated with multiple antibiotic including frequent course of Xifaxan, neomycin and Flagyl, but now having flareup of symptoms, antibiotic helped for a brief fulguration and then symptoms recurred. Her reflux symptoms are well controlled, she is asking refill for Zofran, she also take Lomotil for diarrhea as needed, in the past we tried Maye Don she is allergic to it, she also tried domperidone but unable to tolerate. She denying any smoking, no alcohol use, her weight remains stable.   She also complained about frequent burping, she is taking baclofen almost every day      REVIEW OF SYSTEMS IS OTHERWISE NEGATIVE. Historical Information   Past Medical History:   Diagnosis Date   • Anxiety    • Mcallister esophagus    • Bipolar 2 disorder, major depressive episode ([address]) 2021   • Bipolar disorder with depression ([address])     Last assessed: 16   • Chronic pain disorder     abd   • Colon polyp    • COVID-19 2022    home test   • Depression    • Endometriosis     Last assessed: 16   • Gastric ulcer    • Gastroparesis    • GERD (gastroesophageal reflux disease)    • Hypertension    • Inflammatory bowel disease    • Irritable bowel syndrome    • Scoliosis    • Varicella      Past Surgical History:   Procedure Laterality Date   • ABDOMINAL SURGERY     •  SECTION     • CHOLECYSTECTOMY     • COLONOSCOPY     • HERNIA REPAIR     • HERNIA REPAIR     • OTHER SURGICAL HISTORY      Transoral incisionless fundoplication (TIFF)   • [place]  DELIVERY ONLY N/A 2016    Procedure:  SECTION ();   Surgeon: Kendra Ruelas DO;  Location: [facility];  Service: Obstetrics   • [place] INCISION EXTENSOR TENDON SHEATH WRIST Right 2020    Procedure: Cony Silva RIGHT WRIST;  Surgeon: Stephany Jacob MD;  Location: [address] [place] [address] MAIN OR;  Service: Orthopedics   • [place] LAPAROSCOPY SURG CHOLECYSTECTOMY N/A 2018    Procedure: [place];  Surgeon: Ru Casanova MD;  Location: [facility]IN [facility];  Service: General   • TONSILECTOMY AND ADNOIDECTOMY Bilateral    • UPPER GASTROINTESTINAL ENDOSCOPY     • WISDOM TOOTH EXTRACTION Bilateral      Social History   Social History     Substance and Sexual Activity   Alcohol Use Not Currently    Comment: rare     Social History     Substance and Sexual Activity   Drug Use Not Currently     Social History     Tobacco Use   Smoking Status Light Smoker   • Packs/day: 0.50   • Years: 2.00   • Total pack years: 1.00   • Types: Cigarettes   • Start date: 2020 • Last attempt to quit: 2016   • Years since quittin.3   Smokeless Tobacco Never     Family History   Problem Relation Age of Onset   • Hypertension Father    • Depression Mother    • Asthma Mother    • Anxiety disorder Mother    • Bipolar disorder Mother    • Mental illness Mother    • Thyroid disease Mother    • Hypothyroidism Mother    • Depression Maternal Grandmother    • Anxiety disorder Maternal Grandmother    • Bipolar disorder Maternal Grandmother    • Mental illness Maternal Grandmother    • Cancer Paternal Grandfather    • Cancer Paternal Uncle        Meds/Allergies       Current Outpatient Medications:   •  amLODIPine (NORVASC) 2.5 mg tablet  •  baclofen 20 mg tablet  •  busPIRone (BUSPAR) 10 mg tablet  •  clonazePAM (KlonoPIN) 1 mg tablet  •  diphenoxylate-atropine (LOMOTIL) 2.5-0.025 mg per tablet  •  docusate sodium (COLACE) 100 mg capsule  •  granisetron (SANCUSO) 3.1 MG/24HR  •  metoprolol succinate (TOPROL-XL) 25 mg 24 hr tablet  •  mirtazapine (REMERON) 15 mg tablet  •  ondansetron (ZOFRAN-ODT) 8 mg disintegrating tablet  •  ondansetron (ZOFRAN-ODT) 8 mg disintegrating tablet  •  Probiotic Product (Align) 4 MG CAPS  •  QUEtiapine (SEROquel) 200 mg tablet  •  sucralfate (CARAFATE) 1 g tablet  •  venlafaxine (EFFEXOR-XR) 150 mg 24 hr capsule  •  venlafaxine (EFFEXOR-XR) 75 mg 24 hr capsule    Allergies   Allergen Reactions   • Sulfa Antibiotics Anaphylaxis, Other (See Comments) and Edema     swelling  swelling  swelling   • Reglan [Metoclopramide] Anxiety           Objective     Blood pressure 129/88, pulse 79, height 5' 4" (1.626 m), weight 54.4 kg (120 lb), not currently breastfeeding. Body mass index is 20.6 kg/m².       PHYSICAL EXAM:      General Appearance:   Alert, cooperative, no distress   HEENT:   Normocephalic, atraumatic, anicteric.     Neck:  Supple, symmetrical, trachea midline   Lungs:   Clear to auscultation bilaterally; no rales, rhonchi or wheezing; respirations unlabored    Heart[de-identified]   Regular rate and rhythm; no murmur, rub, or gallop. Abdomen:   Soft, non-tender, non-distended; normal bowel sounds; no masses, no organomegaly    Genitalia:   Deferred    Rectal:   Deferred    Extremities:  No cyanosis, clubbing or edema    Pulses:  2+ and symmetric    Skin:  No jaundice, rashes, or lesions    Lymph nodes:  No palpable cervical lymphadenopathy        Lab Results:   No visits with results within 1 Day(s) from this visit. Latest known visit with results is:   Hospital Outpatient Visit on 10/26/2022   Component Date Value   • EXT Preg Test, Ur 10/26/2022 Negative    • Control 10/26/2022 Valid    • Case Report 10/26/2022                      Value:Surgical Pathology Report                         Case: [id number]                                   Authorizing Provider:  En Lee MD Collected:           10/26/2022 1105              Ordering Location:     [address] [place] [address] Received:            10/26/2022 [id number]                                                                  Pathologist:           Tay Apodaca MD                                                       Specimens:   A) - Duodenum, DUODENUM. ..cold bx. ...diarrhea, check for celiac                                     B) - Stomach, ANTRUM. ...cold bx. ..erythema, check for Hpy                                           C) - Esophagus, LOWER ESOPH. ...cold bx. ..reflux                                           • Final Diagnosis 10/26/2022                      Value: This result contains rich text formatting which cannot be displayed here. • Note 10/26/2022                      Value: This result contains rich text formatting which cannot be displayed here. • Additional Information 10/26/2022                      Value: This result contains rich text formatting which cannot be displayed here.    • Gross Description 10/26/2022 Value:This result contains rich text formatting which cannot be displayed here. Radiology Results:   No results found.

## 2023-09-13 NOTE — TELEPHONE ENCOUNTER
Procedure: EGD with Push Enteroscopy  Scheduled date of procedure (as of today):09/15/23  Physician performing procedure:Dr. Monique Fernandez  Location of procedure:[place]  Instructions reviewed with patient by: ti  Clearances: n/a

## 2023-09-15 ENCOUNTER — ANESTHESIA (OUTPATIENT)
Dept: GASTROENTEROLOGY | Facility: HOSPITAL | Age: 38
End: 2023-09-15

## 2023-09-15 ENCOUNTER — HOSPITAL ENCOUNTER (OUTPATIENT)
Dept: GASTROENTEROLOGY | Facility: HOSPITAL | Age: 38
Setting detail: OUTPATIENT SURGERY
End: 2023-09-15
Attending: INTERNAL MEDICINE
Payer: COMMERCIAL

## 2023-09-15 ENCOUNTER — ANESTHESIA EVENT (OUTPATIENT)
Dept: GASTROENTEROLOGY | Facility: HOSPITAL | Age: 38
End: 2023-09-15

## 2023-09-15 VITALS
HEIGHT: 64 IN | OXYGEN SATURATION: 100 % | WEIGHT: 118 LBS | HEART RATE: 75 BPM | RESPIRATION RATE: 13 BRPM | SYSTOLIC BLOOD PRESSURE: 122 MMHG | TEMPERATURE: 98.7 F | BODY MASS INDEX: 20.14 KG/M2 | DIASTOLIC BLOOD PRESSURE: 79 MMHG

## 2023-09-15 DIAGNOSIS — K63.8219 SMALL INTESTINAL BACTERIAL OVERGROWTH (SIBO): ICD-10-CM

## 2023-09-15 DIAGNOSIS — R14.0 BLOATING: ICD-10-CM

## 2023-09-15 DIAGNOSIS — K31.84 GASTROPARESIS: ICD-10-CM

## 2023-09-15 LAB
EXT PREGNANCY TEST URINE: NEGATIVE
EXT. CONTROL: NORMAL

## 2023-09-15 PROCEDURE — 88342 IMHCHEM/IMCYTCHM 1ST ANTB: CPT | Performed by: PATHOLOGY

## 2023-09-15 PROCEDURE — 87070 CULTURE OTHR SPECIMN AEROBIC: CPT | Performed by: INTERNAL MEDICINE

## 2023-09-15 PROCEDURE — 81025 URINE PREGNANCY TEST: CPT | Performed by: INTERNAL MEDICINE

## 2023-09-15 PROCEDURE — 87205 SMEAR GRAM STAIN: CPT | Performed by: INTERNAL MEDICINE

## 2023-09-15 PROCEDURE — 87106 FUNGI IDENTIFICATION YEAST: CPT | Performed by: INTERNAL MEDICINE

## 2023-09-15 PROCEDURE — 88305 TISSUE EXAM BY PATHOLOGIST: CPT | Performed by: PATHOLOGY

## 2023-09-15 RX ORDER — IPRATROPIUM BROMIDE AND ALBUTEROL SULFATE 2.5; .5 MG/3ML; MG/3ML
3 SOLUTION RESPIRATORY (INHALATION) ONCE
Status: COMPLETED | OUTPATIENT
Start: 2023-09-15 | End: 2023-09-15

## 2023-09-15 RX ORDER — SODIUM CHLORIDE, SODIUM LACTATE, POTASSIUM CHLORIDE, CALCIUM CHLORIDE 600; 310; 30; 20 MG/100ML; MG/100ML; MG/100ML; MG/100ML
INJECTION, SOLUTION INTRAVENOUS CONTINUOUS PRN
Status: DISCONTINUED | OUTPATIENT
Start: 2023-09-15 | End: 2023-09-15

## 2023-09-15 RX ORDER — PROPOFOL 10 MG/ML
INJECTION, EMULSION INTRAVENOUS AS NEEDED
Status: DISCONTINUED | OUTPATIENT
Start: 2023-09-15 | End: 2023-09-15

## 2023-09-15 RX ORDER — LIDOCAINE HYDROCHLORIDE 10 MG/ML
INJECTION, SOLUTION EPIDURAL; INFILTRATION; INTRACAUDAL; PERINEURAL AS NEEDED
Status: DISCONTINUED | OUTPATIENT
Start: 2023-09-15 | End: 2023-09-15

## 2023-09-15 RX ORDER — HYOSCYAMINE SULFATE 0.125 MG
0.12 TABLET ORAL EVERY 4 HOURS PRN
Qty: 30 TABLET | Refills: 0 | Status: SHIPPED | OUTPATIENT
Start: 2023-09-15

## 2023-09-15 RX ADMIN — LIDOCAINE HYDROCHLORIDE 100 MG: 10 INJECTION, SOLUTION EPIDURAL; INFILTRATION; INTRACAUDAL at 10:03

## 2023-09-15 RX ADMIN — IPRATROPIUM BROMIDE AND ALBUTEROL SULFATE 3 ML: .5; 2.5 SOLUTION RESPIRATORY (INHALATION) at 09:36

## 2023-09-15 RX ADMIN — PROPOFOL 150 MG: 10 INJECTION, EMULSION INTRAVENOUS at 10:03

## 2023-09-15 RX ADMIN — SODIUM CHLORIDE, SODIUM LACTATE, POTASSIUM CHLORIDE, AND CALCIUM CHLORIDE: .6; .31; .03; .02 INJECTION, SOLUTION INTRAVENOUS at 10:00

## 2023-09-15 NOTE — H&P
History and Physical - [facility]  Nilay Samuel 41 y.o. female MRN: [id number]                  HPI: Nilay Samuel is a 41y.o. year old female who presents for abdominal pain, bloating and diarrhea, history of SIBO and gastroparesis      REVIEW OF SYSTEMS: Per the HPI, and otherwise unremarkable. Historical Information   Past Medical History:   Diagnosis Date   • Anxiety    • Mcallister esophagus    • Bipolar 2 disorder, major depressive episode ([address]) 2021   • Bipolar disorder with depression ([address])     Last assessed: 16   • Chronic pain disorder     abd   • Colon polyp    • COVID-19 2022    home test   • Depression    • Endometriosis     Last assessed: 16   • Gastric ulcer    • Gastroparesis    • GERD (gastroesophageal reflux disease)    • Hypertension    • Inflammatory bowel disease    • Irritable bowel syndrome    • Scoliosis    • Varicella      Past Surgical History:   Procedure Laterality Date   • ABDOMINAL SURGERY     •  SECTION     • CHOLECYSTECTOMY     • COLONOSCOPY     • HERNIA REPAIR     • HERNIA REPAIR     • OTHER SURGICAL HISTORY      Transoral incisionless fundoplication (TIFF)   • [place]  DELIVERY ONLY N/A 2016    Procedure:  SECTION ();   Surgeon: Erica Roberts DO;  Location: [facility];  Service: Obstetrics   • [place] INCISION EXTENSOR TENDON SHEATH WRIST Right 2020    Procedure: Leala Coila RIGHT WRIST;  Surgeon: Stefan Gonzalez MD;  Location: [address] [place] [address] MAIN OR;  Service: Orthopedics   • [place] LAPAROSCOPY SURG CHOLECYSTECTOMY N/A 2018    Procedure: Adali Lyndasins;  Surgeon: Antonina Fish MD;  Location: [facility];  Service: General   • TONSILECTOMY AND ADNOIDECTOMY Bilateral    • UPPER GASTROINTESTINAL ENDOSCOPY     • WISDOM TOOTH EXTRACTION Bilateral      Social History   Social History     Substance and Sexual Activity   Alcohol Use Not Currently    Comment: rare     Social History     Substance and Sexual Activity   Drug Use Not Currently     Social History     Tobacco Use   Smoking Status Every Day   • Packs/day: 0.50   • Years: 2.00   • Total pack years: 1.00   • Types: Cigarettes   • Start date: 2020   • Last attempt to quit: 2016   • Years since quittin.3   Smokeless Tobacco Never     Family History   Problem Relation Age of Onset   • Hypertension Father    • Depression Mother    • Asthma Mother    • Anxiety disorder Mother    • Bipolar disorder Mother    • Mental illness Mother    • Thyroid disease Mother    • Hypothyroidism Mother    • Depression Maternal Grandmother    • Anxiety disorder Maternal Grandmother    • Bipolar disorder Maternal Grandmother    • Mental illness Maternal Grandmother    • Cancer Paternal Grandfather    • Cancer Paternal Uncle        Meds/Allergies     (Not in a hospital admission)      Allergies   Allergen Reactions   • Sulfa Antibiotics Anaphylaxis, Other (See Comments) and Edema     swelling  swelling  swelling   • Reglan [Metoclopramide] Anxiety       Objective     /79   Pulse 75   Temp 98.5 °F (36.9 °C) (Temporal)   Resp 22   Ht 5' 4" (1.626 m)   Wt 53.5 kg (118 lb)   LMP 2023 (Approximate)   SpO2 98%   Breastfeeding No   BMI 20.25 kg/m²       PHYSICAL EXAM    Gen: NAD  CV: RRR  CHEST: Clear  ABD: soft, NT/ND  EXT: no edema      ASSESSMENT/PLAN:  This is a 41y.o. year old female here for push enteroscopy with jejunal aspiration, and she is stable and optimized for her procedure.

## 2023-09-15 NOTE — ANESTHESIA POSTPROCEDURE EVALUATION
Post-Op Assessment Note    CV Status:  Stable    Pain management: adequate     Mental Status:  Sleepy and arousable   Hydration Status:  Stable   PONV Controlled:  None   Airway Patency:  Patent and adequate      Post Op Vitals Reviewed: Yes      Staff: Anesthesiologist, CRNA         There were no known notable events for this encounter.     /65 (09/15/23 1016)    Temp 98.1 °F (36.7 °C) (09/15/23 1016)    Pulse 71 (09/15/23 1016)   Resp 14 (09/15/23 1016)    SpO2 97 % (09/15/23 1016)

## 2023-09-15 NOTE — ANESTHESIA PREPROCEDURE EVALUATION
Procedure:  EGD WITH PUSH ENTEROSCOPY    Relevant Problems   CARDIO   (+) Essential hypertension   (+) Left bundle branch block      GI/HEPATIC   (+) Acid reflux disease      MUSCULOSKELETAL   (+) Scoliosis      NEURO/PSYCH   (+) Anxiety   (+) Anxious depression        Physical Exam    Airway    Mallampati score: II  TM Distance: >3 FB  Neck ROM: full     Dental       Cardiovascular  Rhythm: regular, Rate: normal, Cardiovascular exam normal    Pulmonary  Pulmonary exam normal Breath sounds clear to auscultation,     Other Findings  Intercisor Distance > 3cm          Anesthesia Plan  ASA Score- 2     Anesthesia Type- IV sedation with anesthesia with ASA Monitors. Additional Monitors:   Airway Plan:     Comment: NPO appropriate. Discussed benefits/risks of monitored anesthetic care which involves providing a dynamic level of mild to deep sedation. Complications include awareness and/or airway obstruction/aspiration which may necessitate conversion to general anesthesia. All questions answered. Patient understands and wishes to proceed. .       Plan Factors-Exercise tolerance (METS): >4 METS. Chart reviewed. EKG reviewed. Existing labs reviewed. Induction-     Postoperative Plan- Plan for postoperative opioid use. Informed Consent- Anesthetic plan and risks discussed with patient. I personally reviewed this patient with the CRNA. Discussed and agreed on the Anesthesia Plan with the CRNA. Roger Lawrence

## 2023-09-17 LAB
BACTERIA SPEC BFLD CULT: ABNORMAL
GRAM STN SPEC: ABNORMAL

## 2023-09-18 LAB
BACTERIA SPEC BFLD CULT: ABNORMAL
BACTERIA SPEC BFLD CULT: ABNORMAL
GRAM STN SPEC: ABNORMAL

## 2023-09-20 DIAGNOSIS — B37.9 CANDIDA INFECTION: ICD-10-CM

## 2023-09-20 DIAGNOSIS — K63.8219 SMALL INTESTINAL BACTERIAL OVERGROWTH (SIBO): Primary | ICD-10-CM

## 2023-09-20 PROCEDURE — 88305 TISSUE EXAM BY PATHOLOGIST: CPT | Performed by: PATHOLOGY

## 2023-09-20 PROCEDURE — 88342 IMHCHEM/IMCYTCHM 1ST ANTB: CPT | Performed by: PATHOLOGY

## 2023-09-20 RX ORDER — FLUCONAZOLE 100 MG/1
100 TABLET ORAL DAILY
Qty: 7 TABLET | Refills: 0 | Status: SHIPPED | OUTPATIENT
Start: 2023-09-20 | End: 2023-09-27

## 2023-09-20 RX ORDER — DOXYCYCLINE 100 MG/1
100 TABLET ORAL 2 TIMES DAILY
Qty: 20 TABLET | Refills: 0 | Status: SHIPPED | OUTPATIENT
Start: 2023-09-20 | End: 2023-09-30

## 2023-09-21 DIAGNOSIS — I10 ESSENTIAL HYPERTENSION: ICD-10-CM

## 2023-09-21 RX ORDER — METOPROLOL SUCCINATE 25 MG/1
25 TABLET, EXTENDED RELEASE ORAL DAILY
Qty: 90 TABLET | Refills: 0 | Status: SHIPPED | OUTPATIENT
Start: 2023-09-21

## 2023-09-25 DIAGNOSIS — K63.89 OTHER SPECIFIED DISEASES OF INTESTINE: ICD-10-CM

## 2023-09-25 RX ORDER — BACLOFEN 20 MG/1
20 TABLET ORAL 3 TIMES DAILY
Qty: 90 TABLET | Refills: 0 | Status: SHIPPED | OUTPATIENT
Start: 2023-09-25

## 2023-10-04 DIAGNOSIS — R11.14 BILIOUS VOMITING WITH NAUSEA: ICD-10-CM

## 2023-10-04 RX ORDER — GRANISETRON 3.1 MG/24H
PATCH TRANSDERMAL
Qty: 4 PATCH | Refills: 1 | Status: SHIPPED | OUTPATIENT
Start: 2023-10-04

## 2023-10-23 ENCOUNTER — TELEPHONE (OUTPATIENT)
Dept: GASTROENTEROLOGY | Facility: CLINIC | Age: 38
End: 2023-10-23

## 2023-10-23 NOTE — TELEPHONE ENCOUNTER
LM patient needs to reschedule 10/27/23 appt.  Offered appt with Dr. Harvinder Nathan or Dr. Amadeo Quiles

## 2023-10-27 DIAGNOSIS — F31.81 BIPOLAR 2 DISORDER, MAJOR DEPRESSIVE EPISODE (HCC): ICD-10-CM

## 2023-10-27 NOTE — TELEPHONE ENCOUNTER
Pt called in to confirm her appt for today with Dr. Nam Cole. Advised that we tried to reach her to reschedule with another provider. Offered her 2 appts for today, one with Dr. Livia Bragg and the other with Dr. Martha Torres. Pt stated she has to look at her schedule and will call us back.

## 2023-10-27 NOTE — TELEPHONE ENCOUNTER
Patient contacted the office to schedule a follow up visit with provider. Patient is now scheduled for 10/30  at 3:30 virtually. MED REFILL REQUEST:    Medication: clonazepam    Dose/Frequency: 1mg 4 times daily    Quantity: 120    [facility] [place]    Does the patient have enough for 3 days?    [x] Yes   [] No - Send as HP to POD

## 2023-10-28 RX ORDER — CLONAZEPAM 1 MG/1
1 TABLET ORAL 4 TIMES DAILY
Qty: 120 TABLET | Refills: 2 | Status: SHIPPED | OUTPATIENT
Start: 2023-10-28

## 2023-10-30 ENCOUNTER — TELEMEDICINE (OUTPATIENT)
Dept: PSYCHIATRY | Facility: CLINIC | Age: 38
End: 2023-10-30
Payer: COMMERCIAL

## 2023-10-30 DIAGNOSIS — F31.9 BIPOLAR DEPRESSION (HCC): Primary | ICD-10-CM

## 2023-10-30 PROCEDURE — 99214 OFFICE O/P EST MOD 30 MIN: CPT | Performed by: NURSE PRACTITIONER

## 2023-10-31 NOTE — PSYCH
Virtual Regular Visit    Verification of patient location:    Patient is located at Home in the following state in which I hold an active license [place]      Assessment/Plan:    Problem List Items Addressed This Visit    None      Goals addressed in session: Maintain current level of stability. Patient functioning at her baseline level. Reason for visit is   Chief Complaint   Patient presents with    Virtual Regular Visit          Encounter provider MIKE Earl    Provider located at [address] [place] [id number]      Recent Visits  Date Type Provider Dept   10/30/23 Telemedicine Oleksandr Savage, [facility] recent visits within past 7 days and meeting all other requirements  Future Appointments  No visits were found meeting these conditions. Showing future appointments within next 150 days and meeting all other requirements       The patient was identified by name and date of birth. Julia Cherry was informed that this is a telemedicine visit and that the visit is being conducted throughthe [employer] platform. She agrees to proceed. .  My office door was closed. No one else was in the room. She acknowledged consent and understanding of privacy and security of the video platform. The patient has agreed to participate and understands they can discontinue the visit at any time. Patient is aware this is a billable service. Subjective  Julia Cherry is a 41 y.o. female who suffers from bipolar depressive disorder. She has been disabled for quite some time. She resides at home with her , her mother and her school-age child. Overall, she is functioning at her baseline. She has a lot of difficulty when increased stressors come into play. She needs the assistance of her family members.   Presently, she is offering no new clinical issues or concerns or complaints. So we will continue her current medication regimen and follow-up with me in 3 months or sooner if necessary. Mental status exam: Patient is awake and alert and oriented x 3. Mood is stable. Patient functioning at her baseline. Associations are intact. No overt delusions. Speech is clear. Thoughts are limited but organized. Patient is not suicidal, not homicidal and not psychotic. Attention span is limited. Impulse control is good. Judgment and insight are limited. Memory is good. The patient will continue on: BuSpar 10 mg 3 times daily  Klonopin 1 mg 3 times daily to 4 times daily as needed for anxiety/panic  Remeron 15 mg at bedtime  Venlafaxine XR  225 mg daily  Seroquel 200 mg 3 times daily  Follow-up with me in 3 months or sooner if necessary. Controlled substance discussion: Patient fills all prescriptions on time. No evidence of any lethargy of sedation. No evidence of dependence or addiction. Crisis plan: Patient is aware of the 24-hour on-call service offered by [address] [place] [address] [facility]. Patient is also aware of the 24-hour crisis service offered by [facility]. 3    Past Medical History:   Diagnosis Date    Anxiety     Mcallister esophagus     Bipolar 2 disorder, major depressive episode ([address]) 2021    Bipolar disorder with depression ([address])     Last assessed: 16    Chronic pain disorder     abd    Colon polyp     COVID-19 2022    home test    Depression     Endometriosis     Last assessed: 16    Gastric ulcer     Gastroparesis     GERD (gastroesophageal reflux disease)     Hypertension     Inflammatory bowel disease     Irritable bowel syndrome     Scoliosis     Varicella        Past Surgical History:   Procedure Laterality Date    ABDOMINAL SURGERY       SECTION      CHOLECYSTECTOMY      COLONOSCOPY      HERNIA REPAIR      HERNIA REPAIR  2019    OTHER SURGICAL HISTORY      Transoral incisionless fundoplication (TIFF)    MD  DELIVERY ONLY N/A 2016    Procedure:  SECTION ();   Surgeon: Gab Berg DO;  Location: [facility];  Service: Obstetrics    WA INCISION EXTENSOR TENDON SHEATH WRIST Right 2020    Procedure: RELEASE DEQUERVAINS RIGHT WRIST;  Surgeon: Ike Arroyo MD;  Location: [address] [place] [address];  Service: Orthopedics    WA LAPAROSCOPY SURG CHOLECYSTECTOMY N/A 2018    Procedure: LAPAROSCOPIC CHOLECYSTECTOMY;  Surgeon: Waldo Tobias MD;  [facility];  Service: General    TONSILECTOMY AND ADNOIDECTOMY Bilateral     UPPER GASTROINTESTINAL ENDOSCOPY      WISDOM TOOTH EXTRACTION Bilateral        Current Outpatient Medications   Medication Sig Dispense Refill    baclofen 20 mg tablet Take 1 tablet (20 mg total) by mouth 3 (three) times a day 90 tablet 0    amLODIPine (NORVASC) 2.5 mg tablet Take 1 tablet (2.5 mg total) by mouth daily 90 tablet 1    busPIRone (BUSPAR) 10 mg tablet Take 1 tablet (10 mg total) by mouth 3 (three) times a day 90 tablet 5    clonazePAM (KlonoPIN) 1 mg tablet Take 1 tablet (1 mg total) by mouth 4 (four) times a day 120 tablet 2    diphenoxylate-atropine (LOMOTIL) 2.5-0.025 mg per tablet Take 1 tablet by mouth 2 (two) times a day 60 tablet 0    docusate sodium (COLACE) 100 mg capsule Take 1 capsule by mouth daily as needed        hyoscyamine (ANASPAZ,LEVSIN) 0.125 MG tablet Take 1 tablet (0.125 mg total) by mouth every 4 (four) hours as needed for cramping 30 tablet 0    metoprolol succinate (TOPROL-XL) 25 mg 24 hr tablet Take 1 tablet (25 mg total) by mouth daily 90 tablet 0    mirtazapine (REMERON) 15 mg tablet Take 1 tablet (15 mg total) by mouth daily at bedtime 30 tablet 5    ondansetron (ZOFRAN-ODT) 8 mg disintegrating tablet Take 1 tablet (8 mg total) by mouth as needed for nausea (EVERY 6 HOURS AS NEEDED FOR NAUSEA) 60 tablet 0    ondansetron (ZOFRAN-ODT) 8 mg disintegrating tablet Take 1 tablet (8 mg total) by mouth as needed for nausea (EVERY 6 HOURS AS NEEDED FOR NAUSEA) 60 tablet 0 Probiotic Product (Align) 4 MG CAPS Take 1 capsule (4 mg total) by mouth 2 (two) times a day 60 capsule 1    QUEtiapine (SEROquel) 200 mg tablet TAKE ONE TABLET BY MOUTH THREE TIMES A DAY (NOON, 5PM, AND BEDTIME) 90 tablet 5    Sancuso 3.1 MG/24HR PLACE 1 PATCH ON THE SKIN OVER 7 DAYS 4 patch 1    sucralfate (CARAFATE) 1 g tablet Take 1 tablet (1 g total) by mouth 4 (four) times a day 120 tablet 1    venlafaxine (EFFEXOR-XR) 150 mg 24 hr capsule Take 1 capsule (150 mg total) by mouth daily 30 capsule 5    venlafaxine (EFFEXOR-XR) 75 mg 24 hr capsule Take 1 capsule (75 mg total) by mouth daily 30 capsule 5     No current facility-administered medications for this visit. Allergies   Allergen Reactions    Sulfa Antibiotics Anaphylaxis, Other (See Comments) and Edema     swelling  swelling  swelling    Reglan [Metoclopramide] Anxiety       Review of Systems    Video Exam    There were no vitals filed for this visit. Physical Exam     Visit Time    Visit Start Time:3:30p  Visit Stop Time: 3:50p  Total Visit Duration: The visit lasted for approximately 20 minutes. Time was spent reviewing the treatment plan, reviewing medications and ordering the same and completing the progress note.

## 2023-11-08 PROBLEM — Z01.419 ENCOUNTER FOR ANNUAL ROUTINE GYNECOLOGICAL EXAMINATION: Status: ACTIVE | Noted: 2023-11-08

## 2023-11-21 DIAGNOSIS — K63.89 OTHER SPECIFIED DISEASES OF INTESTINE: ICD-10-CM

## 2023-11-21 RX ORDER — BACLOFEN 20 MG/1
20 TABLET ORAL 3 TIMES DAILY
Qty: 90 TABLET | Refills: 0 | Status: SHIPPED | OUTPATIENT
Start: 2023-11-21

## 2023-12-06 DIAGNOSIS — R11.14 BILIOUS VOMITING WITH NAUSEA: ICD-10-CM

## 2023-12-06 RX ORDER — GRANISETRON 3.1 MG/24H
PATCH TRANSDERMAL
Qty: 4 PATCH | Refills: 1 | Status: SHIPPED | OUTPATIENT
Start: 2023-12-06

## 2023-12-13 NOTE — PROGRESS NOTES
Assessment/Plan:  NGE  BCM- Condoms. Contraception discussed. Previous use of Depo-Provera. Recommended Mirena for long-term use. Information given. For now she will start with Depo-Provera. Estrogen-containing products contraindicated  Right periumbilical abdominal mass -patient to contact GI today, this was stressed. HTN, Smoking 0.5 ppd   Co Testing q 5 yrs - now  RTO 1yr. SBA monthly  Exercise 3/wk  Calcium 1,000 mg/d with Vit D     Depression Screen: Neg        Diagnoses and all orders for this visit:    Encounter for annual routine gynecological examination  -     Liquid-based pap, screening    Screening for cervical cancer    Essential hypertension    Cigarette nicotine dependence without complication    Periumbilical mass    Encounter for initial prescription of injectable contraceptive              Subjective:        Patient ID: Cherie Gold is a 41 y.o. female. Yessy Benjamin returns for an annual visit. She was last seen 4-1/2 years ago. At the time she was on Depo-Provera. She has hypertension and smokes half a pack of cigarettes a day. Since last seen she has developed gastroparesis, colitis, and SIBO. The SIBO is frequent and yeast sometimes is present. She has irritable bowel syndrome and alternates between constipation and diarrhea. She has had no recent changes in bowel habits or weight. Her menses are regular. Her last menstrual period was November 20. Bleeding is heavy for the first 2 days and she bleeds for total of 4 to 5 days. They are using condoms for contraception. [place] is infrequent. They are 9year-old old son sleeps in their bed.         The following portions of the patient's history were reviewed and updated as appropriate: She  has a past medical history of Anxiety, Mcallister esophagus, Bipolar 2 disorder, major depressive episode ([address]) (08/17/2021), Bipolar disorder with depression ([address]), Chronic pain disorder, Colon polyp, COVID-19 (12/21/2022), Depression, Endometriosis, Gastric ulcer, Gastroparesis, GERD (gastroesophageal reflux disease), Hypertension, Inflammatory bowel disease, Irritable bowel syndrome, Scoliosis, and Varicella. 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/2019    Palpitations 11/14/2019    Nicotine dependence 11/14/2019    De Quervain's tenosynovitis, right 08/02/2019    Encounter for management and injection of depo-Provera 01/09/2019    Encounter for other contraceptive management 01/09/2019    Colitis 01/09/2019    Skin lesion of back 01/09/2019    Encounter for surveillance of injectable contraceptive 10/17/2018    Bloody diarrhea 07/08/2018    Generalized abdominal pain 07/08/2018    Hypokalemia 07/08/2018    Polyp of gallbladder 03/12/2018    Disease of gallbladder 03/12/2018    Acid reflux disease 11/28/2017    Anxiety 10/29/2016   PMH:  T&A  Scoliosis  Menarche 15  [place] teeth extraction  Anxiety  Bipolar Depression  Tobacco abuse  Endometriosis  G1, P1; C/S at 30 wks. Preeclampsia with Severe Features, SHERRY during cervical ripening. Prolonged admission to control BP and Anxiety. M 3# 3 oz.  Discharged POD 8  HTN  PUD, Stomach Polyps - EGD 6/17  Constipation '17  PTSD from Delivery - began w son's 1st birthday 11/17  Cholecystectomy 2/18  Hiatal hernia 2/18- initially observed but now getting ready to have a TIF procedure 2/19  Colitis- 3 EGDs and 3 colonoscopies in the past year- intermittent constipation/diarrhea and chronic belching  Facial lesions- 2nd half '18, soft lesions developed which break open, drain and scar  Gastroparesis '20  SIBO -infections every 4 to 6 months including yeast '21  Covid   She  has a past surgical history that includes TONSILECTOMY AND ADNOIDECTOMY (Bilateral); [place] tooth extraction (Bilateral); Hernia repair; pr  delivery only (N/A, 2016);  section; Colonoscopy; pr laparoscopy surg cholecystectomy (N/A, 2018); Other surgical history; Hernia repair (2019); pr incision extensor tendon sheath wrist (Right, 2020); Upper gastrointestinal endoscopy; Abdominal surgery; and Cholecystectomy. Her family history includes Anxiety disorder in her maternal grandmother and mother; Asthma in her mother; Bipolar disorder in her maternal grandmother and mother; Cancer in her paternal grandfather and paternal uncle; Depression in her maternal grandmother and mother; Hypertension in her father; Hypothyroidism in her mother; Mental illness in her maternal grandmother and mother; Thyroid disease in her mother. FH:  M - Depression  F - HTN  She  reports that she has been smoking cigarettes. She started smoking about 3 years ago. She has a 1.0 pack-year smoking history. She has never used smokeless tobacco. She reports that she does not currently use alcohol. She reports that she does not currently use drugs. SH:  Son is doing well.  Birthday 18 , lives with her  Donald Castro and JOMAR Pradhan delivered  Current Outpatient Medications   Medication Sig Dispense Refill    amLODIPine (NORVASC) 2.5 mg tablet Take 1 tablet (2.5 mg total) by mouth daily 90 tablet 1    baclofen 20 mg tablet Take 1 tablet (20 mg total) by mouth 3 (three) times a day 90 tablet 0    busPIRone (BUSPAR) 10 mg tablet Take 1 tablet (10 mg total) by mouth 3 (three) times a day 90 tablet 5    clonazePAM (KlonoPIN) 1 mg tablet Take 1 tablet (1 mg total) by mouth 4 (four) times a day 120 tablet 2    diphenoxylate-atropine (LOMOTIL) 2.5-0.025 mg per tablet Take 1 tablet by mouth 2 (two) times a day 60 tablet 0    docusate sodium (COLACE) 100 mg capsule Take 1 capsule by mouth daily as needed        metoprolol succinate (TOPROL-XL) 25 mg 24 hr tablet Take 1 tablet (25 mg total) by mouth daily 90 tablet 0    mirtazapine (REMERON) 15 mg tablet Take 1 tablet (15 mg total) by mouth daily at bedtime 30 tablet 5    ondansetron (ZOFRAN-ODT) 8 mg disintegrating tablet Take 1 tablet (8 mg total) by mouth as needed for nausea (EVERY 6 HOURS AS NEEDED FOR NAUSEA) 60 tablet 0    ondansetron (ZOFRAN-ODT) 8 mg disintegrating tablet Take 1 tablet (8 mg total) by mouth as needed for nausea (EVERY 6 HOURS AS NEEDED FOR NAUSEA) 60 tablet 0    Probiotic Product (Align) 4 MG CAPS Take 1 capsule (4 mg total) by mouth 2 (two) times a day 60 capsule 1    QUEtiapine (SEROquel) 200 mg tablet TAKE ONE TABLET BY MOUTH THREE TIMES A DAY (NOON, 5PM, AND BEDTIME) 90 tablet 5    Sancuso 3.1 MG/24HR PLACE 1 PATCH ON THE SKIN OVER 7 DAYS 4 patch 1    sucralfate (CARAFATE) 1 g tablet Take 1 tablet (1 g total) by mouth 4 (four) times a day 120 tablet 1    venlafaxine (EFFEXOR-XR) 150 mg 24 hr capsule Take 1 capsule (150 mg total) by mouth daily 30 capsule 5    venlafaxine (EFFEXOR-XR) 75 mg 24 hr capsule Take 1 capsule (75 mg total) by mouth daily 30 capsule 5    hyoscyamine (ANASPAZ,LEVSIN) 0.125 MG tablet Take 1 tablet (0.125 mg total) by mouth every 4 (four) hours as needed for cramping (Patient not taking: Reported on 12/14/2023) 30 tablet 0     No current facility-administered medications for this visit.      Current Outpatient Medications on File Prior to Visit   Medication Sig    amLODIPine (NORVASC) 2.5 mg tablet Take 1 tablet (2.5 mg total) by mouth daily    baclofen 20 mg tablet Take 1 tablet (20 mg total) by mouth 3 (three) times a day    busPIRone (BUSPAR) 10 mg tablet Take 1 tablet (10 mg total) by mouth 3 (three) times a day    clonazePAM (KlonoPIN) 1 mg tablet Take 1 tablet (1 mg total) by mouth 4 (four) times a day    diphenoxylate-atropine (LOMOTIL) 2.5-0.025 mg per tablet Take 1 tablet by mouth 2 (two) times a day    docusate sodium (COLACE) 100 mg capsule Take 1 capsule by mouth daily as needed      metoprolol succinate (TOPROL-XL) 25 mg 24 hr tablet Take 1 tablet (25 mg total) by mouth daily    mirtazapine (REMERON) 15 mg tablet Take 1 tablet (15 mg total) by mouth daily at bedtime    ondansetron (ZOFRAN-ODT) 8 mg disintegrating tablet Take 1 tablet (8 mg total) by mouth as needed for nausea (EVERY 6 HOURS AS NEEDED FOR NAUSEA)    ondansetron (ZOFRAN-ODT) 8 mg disintegrating tablet Take 1 tablet (8 mg total) by mouth as needed for nausea (EVERY 6 HOURS AS NEEDED FOR NAUSEA)    Probiotic Product (Align) 4 MG CAPS Take 1 capsule (4 mg total) by mouth 2 (two) times a day    QUEtiapine (SEROquel) 200 mg tablet TAKE ONE TABLET BY MOUTH THREE TIMES A DAY (NOON, 5PM, AND BEDTIME)    Sancuso 3.1 MG/24HR PLACE 1 PATCH ON THE SKIN OVER 7 DAYS    sucralfate (CARAFATE) 1 g tablet Take 1 tablet (1 g total) by mouth 4 (four) times a day    venlafaxine (EFFEXOR-XR) 150 mg 24 hr capsule Take 1 capsule (150 mg total) by mouth daily    venlafaxine (EFFEXOR-XR) 75 mg 24 hr capsule Take 1 capsule (75 mg total) by mouth daily    hyoscyamine (ANASPAZ,LEVSIN) 0.125 MG tablet Take 1 tablet (0.125 mg total) by mouth every 4 (four) hours as needed for cramping (Patient not taking: Reported on 12/14/2023)     No current facility-administered medications on file prior to visit. She is allergic to sulfa antibiotics and reglan [metoclopramide]. .    Review of Systems   Constitutional:  Negative for activity change, appetite change, fatigue and unexpected weight change. Eyes:  Negative for visual disturbance. Respiratory:  Negative for cough, chest tightness, shortness of breath and wheezing. Cardiovascular:  Negative for chest pain, palpitations and leg swelling. Breast: Patient denies tenderness, nipple discharge, masses, or erythema.    Gastrointestinal:  Positive for constipation and diarrhea. Negative for abdominal distention, abdominal pain, blood in stool, nausea and vomiting. Endocrine: Negative for cold intolerance and heat intolerance. Genitourinary:  Negative for decreased urine volume, difficulty urinating, dyspareunia, dysuria, frequency, hematuria, menstrual problem, pelvic pain, urgency, vaginal bleeding, vaginal discharge and vaginal pain. Musculoskeletal:  Negative for arthralgias. Skin:  Negative for rash. Neurological:  Negative for weakness, light-headedness, numbness and headaches. Hematological:  Does not bruise/bleed easily. Psychiatric/Behavioral:  Positive for dysphoric mood. Negative for agitation, behavioral problems and sleep disturbance. The patient is nervous/anxious. Objective:    Vitals:    12/14/23 0904   BP: 120/80   BP Location: Right arm   Patient Position: Sitting   Cuff Size: Standard   Weight: 58.2 kg (128 lb 3.2 oz)   Height: 5' 3" (1.6 m)            Physical Exam  Vitals and nursing note reviewed. Exam conducted with a chaperone present. Constitutional:       General: She is not in acute distress. Appearance: Normal appearance. She is well-developed. HENT:      Head: Normocephalic and atraumatic. Eyes:      General: No scleral icterus. Right eye: No discharge. Left eye: No discharge. Extraocular Movements: Extraocular movements intact. Conjunctiva/sclera: Conjunctivae normal.   Neck:      Thyroid: No thyromegaly. Trachea: No tracheal deviation. Cardiovascular:      Rate and Rhythm: Normal rate and regular rhythm. Heart sounds: Normal heart sounds. No murmur heard. Pulmonary:      Effort: Pulmonary effort is normal. No respiratory distress. Breath sounds: Normal breath sounds. No wheezing. Chest:   Breasts:     Breasts are symmetrical.      Right: No inverted nipple, mass, nipple discharge, skin change or tenderness.       Left: No inverted nipple, mass, nipple discharge, skin change or tenderness. Abdominal:      General: Abdomen is flat. Bowel sounds are normal. There is no distension. Palpations: Abdomen is soft. There is mass (10 cm mildly tender irregular mobile mass to the right and just below the umbilicus. ). Tenderness: There is no abdominal tenderness. There is no guarding or rebound. Genitourinary:     General: Normal vulva. Labia:         Right: No rash, tenderness or lesion. Left: No rash, tenderness or lesion. Vagina: Normal.      Cervix: No cervical motion tenderness or discharge. Uterus: Not deviated, not enlarged and not tender. Adnexa:         Right: No mass, tenderness or fullness. Left: No mass, tenderness or fullness. Rectum: No external hemorrhoid. Comments: Urethral meatus within normal limits. Perineum within normal limits. Bladder well supported. The vagina and cervix are unremarkable. The uterus is normal size and anteverted. The adnexa is bilaterally unremarkable. There are no palpable masses. I believe I can palpate the right ovary. There is no connection between the pelvis and the abdominal mass. Musculoskeletal:         General: No tenderness. Normal range of motion. Cervical back: Normal range of motion and neck supple. Lymphadenopathy:      Cervical: No cervical adenopathy. Skin:     General: Skin is warm and dry. Neurological:      Mental Status: She is alert and oriented to person, place, and time. Psychiatric:         Mood and Affect: Mood normal.         Behavior: Behavior normal.         Thought Content:  Thought content normal.         Judgment: Judgment normal.

## 2023-12-14 ENCOUNTER — OFFICE VISIT (OUTPATIENT)
Dept: OBGYN CLINIC | Facility: CLINIC | Age: 38
End: 2023-12-14
Payer: COMMERCIAL

## 2023-12-14 VITALS
DIASTOLIC BLOOD PRESSURE: 80 MMHG | BODY MASS INDEX: 22.71 KG/M2 | SYSTOLIC BLOOD PRESSURE: 120 MMHG | HEIGHT: 63 IN | WEIGHT: 128.2 LBS

## 2023-12-14 DIAGNOSIS — F17.210 CIGARETTE NICOTINE DEPENDENCE WITHOUT COMPLICATION: ICD-10-CM

## 2023-12-14 DIAGNOSIS — Z30.013 ENCOUNTER FOR INITIAL PRESCRIPTION OF INJECTABLE CONTRACEPTIVE: ICD-10-CM

## 2023-12-14 DIAGNOSIS — Z01.419 ENCOUNTER FOR ANNUAL ROUTINE GYNECOLOGICAL EXAMINATION: Primary | ICD-10-CM

## 2023-12-14 DIAGNOSIS — Z12.4 SCREENING FOR CERVICAL CANCER: ICD-10-CM

## 2023-12-14 DIAGNOSIS — R19.05 PERIUMBILICAL MASS: ICD-10-CM

## 2023-12-14 DIAGNOSIS — I10 ESSENTIAL HYPERTENSION: ICD-10-CM

## 2023-12-14 PROCEDURE — G0476 HPV COMBO ASSAY CA SCREEN: HCPCS | Performed by: OBSTETRICS & GYNECOLOGY

## 2023-12-14 PROCEDURE — S0610 ANNUAL GYNECOLOGICAL EXAMINA: HCPCS | Performed by: OBSTETRICS & GYNECOLOGY

## 2023-12-14 PROCEDURE — G0145 SCR C/V CYTO,THINLAYER,RESCR: HCPCS | Performed by: OBSTETRICS & GYNECOLOGY

## 2023-12-14 RX ORDER — MEDROXYPROGESTERONE ACETATE 150 MG/ML
150 INJECTION, SUSPENSION INTRAMUSCULAR
Qty: 1 ML | Refills: 3 | Status: SHIPPED | OUTPATIENT
Start: 2023-12-14

## 2023-12-15 LAB
HPV HR 12 DNA CVX QL NAA+PROBE: NEGATIVE
HPV16 DNA CVX QL NAA+PROBE: NEGATIVE
HPV18 DNA CVX QL NAA+PROBE: NEGATIVE

## 2023-12-17 DIAGNOSIS — I10 ESSENTIAL HYPERTENSION: ICD-10-CM

## 2023-12-18 DIAGNOSIS — K63.89 OTHER SPECIFIED DISEASES OF INTESTINE: ICD-10-CM

## 2023-12-18 DIAGNOSIS — K58.0 IRRITABLE BOWEL SYNDROME WITH DIARRHEA: ICD-10-CM

## 2023-12-18 RX ORDER — METOPROLOL SUCCINATE 25 MG/1
25 TABLET, EXTENDED RELEASE ORAL DAILY
Qty: 90 TABLET | Refills: 0 | Status: SHIPPED | OUTPATIENT
Start: 2023-12-18

## 2023-12-19 RX ORDER — BACLOFEN 20 MG/1
20 TABLET ORAL 3 TIMES DAILY
Qty: 90 TABLET | Refills: 0 | Status: SHIPPED | OUTPATIENT
Start: 2023-12-19

## 2023-12-19 RX ORDER — DIPHENOXYLATE HYDROCHLORIDE AND ATROPINE SULFATE 2.5; .025 MG/1; MG/1
1 TABLET ORAL 2 TIMES DAILY
Qty: 60 TABLET | Refills: 0 | Status: SHIPPED | OUTPATIENT
Start: 2023-12-19

## 2023-12-20 LAB
LAB AP GYN PRIMARY INTERPRETATION: NORMAL
Lab: NORMAL

## 2023-12-21 ENCOUNTER — OFFICE VISIT (OUTPATIENT)
Dept: GASTROENTEROLOGY | Facility: CLINIC | Age: 38
End: 2023-12-21
Payer: COMMERCIAL

## 2023-12-21 VITALS
HEART RATE: 89 BPM | SYSTOLIC BLOOD PRESSURE: 138 MMHG | BODY MASS INDEX: 22.68 KG/M2 | DIASTOLIC BLOOD PRESSURE: 97 MMHG | HEIGHT: 63 IN | WEIGHT: 128 LBS

## 2023-12-21 DIAGNOSIS — R19.04 LEFT LOWER QUADRANT ABDOMINAL MASS: Primary | ICD-10-CM

## 2023-12-21 PROCEDURE — 99213 OFFICE O/P EST LOW 20 MIN: CPT | Performed by: INTERNAL MEDICINE

## 2023-12-21 NOTE — PROGRESS NOTES
[facility] - Outpatient Follow-up Note  Priscila Watkins 38 y.o. female MRN: [id number]  Encounter: [id number]          ASSESSMENT AND PLAN:      1. Left lower quadrant abdominal mass  No mass felt in the right lower quadrant but mass now felt in the left lower quadrant.  Likely stool burden.  Will evaluate further with CT of the abdomen and pelvis.  If significant stool buildup can use MiraLAX    - CT abdomen pelvis w contrast; Future    ______________________________________________________________________    SUBJECTIVE: Patient with a complicated history of multiple GI issues including mixed IBS, gastroparesis, small intestinal bacterial/fungal overgrowth now presents after a visit to her gynecologist where she reports he felt a mobile mass in her right lower quadrant.  She has had regular bowel movements without blood no significant abdominal pain and is actually feeling considerably better after recent course of antibacterial and antifungal.      REVIEW OF SYSTEMS:    ROS Answers submitted by the patient for this visit:  Abdominal Pain Questionnaire (Submitted on 12/20/2023)  Chief Complaint: Abdominal pain  Chronicity: chronic  Onset: more than 1 year ago  Onset quality: gradual  Frequency: daily  Episode duration: 7 Days  Progression since onset: waxing and waning  Pain location: RLQ, epigastric region, generalized abdominal region, suprapubic region  Pain - numeric: 6/10  Pain quality: aching, cramping, a sensation of fullness, tearing  Radiates to: RLQ, epigastric region, periumbilical region, suprapubic region  arthralgias: No  belching: Yes  Aggravated by: belching, bowel movement, certain positions, eating  Relieved by: being still, palpation, sitting up  Diagnostic workup: GI consult        Historical Information   Past Medical History:   Diagnosis Date    Anxiety     Mcallister esophagus     Bipolar 2 disorder, major depressive episode (HCC) 08/17/2021    Bipolar disorder with  depression (HCC)     Last assessed: 16    Chronic pain disorder     abd    Colon polyp     COVID-19 2022    home test    Depression     Endometriosis     Last assessed: 16    Gastric ulcer     Gastroparesis     GERD (gastroesophageal reflux disease)     Hypertension     Inflammatory bowel disease     Irritable bowel syndrome     Scoliosis     Varicella      Past Surgical History:   Procedure Laterality Date    ABDOMINAL SURGERY       SECTION      CHOLECYSTECTOMY      COLONOSCOPY      HERNIA REPAIR      HERNIA REPAIR      OTHER SURGICAL HISTORY      Transoral incisionless fundoplication (TIFF)    [place]  DELIVERY ONLY N/A 2016    Procedure:  SECTION ();  Surgeon: Jarvis Garcia DO;  Location: [facility];  Service: Obstetrics    [place] INCISION EXTENSOR TENDON SHEATH WRIST Right 2020    Procedure: RELEASE DEQUERVAINS RIGHT WRIST;  Surgeon: Smith Martins MD;  Location:  MAIN OR;  Service: Orthopedics    [place] LAPAROSCOPY SURG CHOLECYSTECTOMY N/A 2018    Procedure: LAPAROSCOPIC CHOLECYSTECTOMY;  Surgeon: Aniceto Meneses MD;  Location: [place] [facility];  Service: General    TONSILECTOMY AND ADNOIDECTOMY Bilateral     UPPER GASTROINTESTINAL ENDOSCOPY      WISDOM TOOTH EXTRACTION Bilateral      Social History   Social History     Substance and Sexual Activity   Alcohol Use Not Currently    Comment: rare     Social History     Substance and Sexual Activity   Drug Use Not Currently     Social History     Tobacco Use   Smoking Status Every Day    Current packs/day: 0.50    Average packs/day: 0.5 packs/day for 13.6 years (6.8 ttl pk-yrs)    Types: Cigarettes    Start date: 2020    Passive exposure: Never   Smokeless Tobacco Never     Family History   Problem Relation Age of Onset    Hypertension Father     Depression Mother     Asthma Mother     Anxiety disorder Mother     Bipolar disorder Mother     Mental illness Mother     Thyroid disease Mother     Hypothyroidism  "Mother     Depression Maternal Grandmother     Anxiety disorder Maternal Grandmother     Bipolar disorder Maternal Grandmother     Mental illness Maternal Grandmother     Cancer Paternal Grandfather     Cancer Paternal Uncle        Meds/Allergies       Current Outpatient Medications:     amLODIPine (NORVASC) 2.5 mg tablet    baclofen 20 mg tablet    busPIRone (BUSPAR) 10 mg tablet    clonazePAM (KlonoPIN) 1 mg tablet    diphenoxylate-atropine (LOMOTIL) 2.5-0.025 mg per tablet    docusate sodium (COLACE) 100 mg capsule    medroxyPROGESTERone (DEPO-PROVERA) 150 mg/mL injection    metoprolol succinate (TOPROL-XL) 25 mg 24 hr tablet    mirtazapine (REMERON) 15 mg tablet    ondansetron (ZOFRAN-ODT) 8 mg disintegrating tablet    Probiotic Product (Align) 4 MG CAPS    QUEtiapine (SEROquel) 200 mg tablet    Sancuso 3.1 MG/24HR    sucralfate (CARAFATE) 1 g tablet    venlafaxine (EFFEXOR-XR) 150 mg 24 hr capsule    venlafaxine (EFFEXOR-XR) 75 mg 24 hr capsule    hyoscyamine (ANASPAZ,LEVSIN) 0.125 MG tablet    ondansetron (ZOFRAN-ODT) 8 mg disintegrating tablet    Allergies   Allergen Reactions    Sulfa Antibiotics Anaphylaxis, Other (See Comments) and Edema     swelling  swelling  swelling    Reglan [Metoclopramide] Anxiety           Objective     Blood pressure 138/97, pulse 89, height 5' 3\" (1.6 m), weight 58.1 kg (128 lb), last menstrual period 11/20/2023, not currently breastfeeding. Body mass index is 22.67 kg/m².      PHYSICAL EXAM:      Physical Exam  Vitals and nursing note reviewed.   Constitutional:       General: She is not in acute distress.     Appearance: She is not ill-appearing.   HENT:      Head: Normocephalic and atraumatic.   Eyes:      General: No scleral icterus.     Extraocular Movements: Extraocular movements intact.   Cardiovascular:      Rate and Rhythm: Normal rate.   Pulmonary:      Effort: Pulmonary effort is normal. No respiratory distress.   Abdominal:      General: There is no distension.      " Palpations: Abdomen is soft. There is mass.      Tenderness: There is no abdominal tenderness. There is no guarding or rebound.      Comments: Mobile painless mass in the left lower quadrant suggestive of stool   Skin:     General: Skin is warm and dry.      Coloration: Skin is not cyanotic.      Findings: No erythema.   Neurological:      General: No focal deficit present.      Mental Status: She is alert and oriented to person, place, and time.   Psychiatric:         Mood and Affect: Mood normal.         Behavior: Behavior normal.          Lab Results:   No visits with results within 1 Day(s) from this visit.   Latest known visit with results is:   Office Visit on 12/14/2023   Component Date Value    Case Report 12/14/2023                      Value:Gynecologic Cytology Report                       Case: [id number]                                  Authorizing Provider:  Evan Clark MD          Collected:           12/14/2023 0917              Ordering Location:     [facility]      Received:            12/14/2023 0917                                     [facility]                                                                               [place]                                                                    First Screen:          [place]                                                                Specimen:    LIQUID-BASED PAP, SCREENING, Cervix                                                        Primary Interpretation 12/14/2023 Negative for intraepithelial lesion or malignancy     Specimen Adequacy 12/14/2023 Satisfactory for evaluation. Endocervical/transformation zone component present.     Additional Information 12/14/2023                      Value:This result contains rich text formatting which cannot be displayed here.    HPV Other HR 12/14/2023 Negative     HPV16 12/14/2023 Negative     HPV18 12/14/2023 Negative          Radiology Results:   No results found.

## 2023-12-26 ENCOUNTER — TELEPHONE (OUTPATIENT)
Age: 38
End: 2023-12-26

## 2023-12-26 DIAGNOSIS — R19.04 LEFT LOWER QUADRANT ABDOMINAL MASS: Primary | ICD-10-CM

## 2023-12-26 NOTE — TELEPHONE ENCOUNTER
Patients GI provider:  Dr. Cee    Number to return call: [phone]    Reason for call: Glenda from the cat scan department calling in to requesting blood work prior to patients scheduled cat scan on 12/30/2023. Glenda is requesting a basic metabolic panel  and is requesting patient to be notified of this order, thank you.    Scheduled procedure/appointment date if applicable: Apt/procedure 02/05/2024

## 2023-12-28 ENCOUNTER — TELEPHONE (OUTPATIENT)
Dept: RADIOLOGY | Facility: HOSPITAL | Age: 38
End: 2023-12-28

## 2023-12-28 NOTE — TELEPHONE ENCOUNTER
Called and left patient a voicemail to get labs done prior to ct appointment, script is in epic for labs and that we need results before we can scan her -- 12/28/23 0910      Spoke with Mara at Gastro office to have labs ordered and to let the patient know she needs them done prior to appointment, office also called and left patient a message to get labs done before appointment -- 12/26/23 0910

## 2024-01-03 ENCOUNTER — TELEPHONE (OUTPATIENT)
Dept: INTERNAL MEDICINE CLINIC | Age: 39
End: 2024-01-03

## 2024-01-03 ENCOUNTER — TELEPHONE (OUTPATIENT)
Dept: INTERNAL MEDICINE CLINIC | Facility: OTHER | Age: 39
End: 2024-01-03

## 2024-01-03 DIAGNOSIS — R11.0 NAUSEA: ICD-10-CM

## 2024-01-03 NOTE — TELEPHONE ENCOUNTER
Patient left message on machine asking for a note to get her out of Jury Duty.    Is this something we are able to write for her?    Please advise.

## 2024-01-03 NOTE — LETTER
Date: 1/3/2024    To whom it may concern:     This is to certify that Priscila Watkins has been under my care for the following diagnosis: gastroparesis, major depression and bipolar disorder       I feel that Priscila Watkins is unable to serve on Jury Duty at this time for the above mentioned medical reasons.                  Sincerely,        Tayler Douglas MD

## 2024-01-03 NOTE — TELEPHONE ENCOUNTER
Patient calling because she was summoned for jury duty and was wondering if a letter could be provided to excuse her from jury duty due to gastroparesis, major depression and bipolar disorder.  Would need to be faxed to [phone]

## 2024-01-03 NOTE — TELEPHONE ENCOUNTER
From: Priscila Watkins  To: Office of Emmanuel Tran  Sent: 1/3/2024 7:11 AM EST  Subject: Medication Renewal Request    Refills have been requested for the following medications:     ondansetron (ZOFRAN-ODT) 8 mg disintegrating tablet [Emmanuel Tran]    Preferred pharmacy: [address] - [address] [facility].

## 2024-01-04 RX ORDER — ONDANSETRON 8 MG/1
8 TABLET, ORALLY DISINTEGRATING ORAL AS NEEDED
Qty: 60 TABLET | Refills: 0 | Status: SHIPPED | OUTPATIENT
Start: 2024-01-04 | End: 2024-01-05 | Stop reason: SDUPTHER

## 2024-01-05 RX ORDER — ONDANSETRON 8 MG/1
8 TABLET, ORALLY DISINTEGRATING ORAL AS NEEDED
Qty: 60 TABLET | Refills: 0 | Status: SHIPPED | OUTPATIENT
Start: 2024-01-05

## 2024-01-05 NOTE — TELEPHONE ENCOUNTER
Transmission to pharmacy error occurred. Medication resent to pharmacy. Transmission continue to fail. Forwarding to office to contact pharmacy.

## 2024-01-05 NOTE — TELEPHONE ENCOUNTER
Transmission to pharmacy error occurred. Medication resent to pharmacy.     E-Prescribing Status: Transmission to pharmacy failed (1/5/2024  4:51 PM EST)     Earlier note in this encounter states office has been notified

## 2024-01-07 PROBLEM — Z01.419 ENCOUNTER FOR ANNUAL ROUTINE GYNECOLOGICAL EXAMINATION: Status: RESOLVED | Noted: 2023-11-08 | Resolved: 2024-01-07

## 2024-01-12 ENCOUNTER — TELEPHONE (OUTPATIENT)
Age: 39
End: 2024-01-12

## 2024-01-12 DIAGNOSIS — R11.0 NAUSEA: ICD-10-CM

## 2024-01-12 RX ORDER — ONDANSETRON 8 MG/1
8 TABLET, ORALLY DISINTEGRATING ORAL AS NEEDED
Qty: 60 TABLET | Refills: 0 | Status: SHIPPED | OUTPATIENT
Start: 2024-01-12

## 2024-01-12 RX ORDER — ONDANSETRON 8 MG/1
8 TABLET, ORALLY DISINTEGRATING ORAL AS NEEDED
Qty: 60 TABLET | Refills: 0 | Status: SHIPPED | OUTPATIENT
Start: 2024-01-12 | End: 2024-01-12

## 2024-01-12 NOTE — TELEPHONE ENCOUNTER
Not a dup/ Patient states that pharmacy still did not receive script. Says confirmed on our end, but they have not received anything    Reason for call:   [x] Refill   [] Prior Auth  [] Other:     Office:   [] PCP/Provider -   [x] Specialty/Provider - Gastro    Medication: Zofran    Dose/Frequency: 8 mg disintegrating tablet    Quantity: #60    [facility]    Does the patient have enough for 3 days?   [] Yes   [x] No - Send as HP to POD

## 2024-01-12 NOTE — TELEPHONE ENCOUNTER
Patient called refill line again in regards to this medication. [facility] [place] [facility]. It looks like escribing failing multiple times. Please call in to pharmacy. Patient is out of medication.

## 2024-01-15 NOTE — TELEPHONE ENCOUNTER
SPOKE WITH PHARMACIST, RX FOR ZOFRAN HAS NOT BEEN RECEIVED DESPITE SEVERAL ATTEMPTS. PLEASE PHONE IN, THANK YOU.

## 2024-01-15 NOTE — TELEPHONE ENCOUNTER
JASWINDER- I called patient and left message per consent asking her if she picked up her zofran medication and to c/b or send my chart message letting us know.

## 2024-01-15 NOTE — TELEPHONE ENCOUNTER
Please call patient to confirm that she picked up to prescription.  I spoke to pharmacy today and I was informed by them that patient picked up the prescription for Zofran 8 mg disintegrating tablets on 1/12. Thank you

## 2024-01-16 DIAGNOSIS — K63.89 OTHER SPECIFIED DISEASES OF INTESTINE: ICD-10-CM

## 2024-01-16 RX ORDER — BACLOFEN 20 MG/1
20 TABLET ORAL 3 TIMES DAILY
Qty: 90 TABLET | Refills: 0 | Status: SHIPPED | OUTPATIENT
Start: 2024-01-16

## 2024-01-17 DIAGNOSIS — I10 ESSENTIAL HYPERTENSION: ICD-10-CM

## 2024-01-17 DIAGNOSIS — K63.8219 SMALL INTESTINAL BACTERIAL OVERGROWTH (SIBO): Primary | ICD-10-CM

## 2024-01-17 RX ORDER — AMLODIPINE BESYLATE 2.5 MG/1
2.5 TABLET ORAL DAILY
Qty: 90 TABLET | Refills: 0 | Status: SHIPPED | OUTPATIENT
Start: 2024-01-17

## 2024-01-18 DIAGNOSIS — F31.81 BIPOLAR 2 DISORDER, MAJOR DEPRESSIVE EPISODE (HCC): ICD-10-CM

## 2024-01-18 RX ORDER — CLONAZEPAM 1 MG/1
1 TABLET ORAL 4 TIMES DAILY
Qty: 120 TABLET | Refills: 2 | OUTPATIENT
Start: 2024-01-18

## 2024-01-18 RX ORDER — CLONAZEPAM 1 MG/1
1 TABLET ORAL 4 TIMES DAILY
Qty: 120 TABLET | Refills: 2 | Status: SHIPPED | OUTPATIENT
Start: 2024-01-18

## 2024-01-29 ENCOUNTER — TELEMEDICINE (OUTPATIENT)
Dept: PSYCHIATRY | Facility: CLINIC | Age: 39
End: 2024-01-29
Payer: COMMERCIAL

## 2024-01-29 DIAGNOSIS — F31.81 BIPOLAR II DISORDER (HCC): Primary | ICD-10-CM

## 2024-01-29 PROCEDURE — 99213 OFFICE O/P EST LOW 20 MIN: CPT | Performed by: NURSE PRACTITIONER

## 2024-01-31 NOTE — PSYCH
Virtual Regular Visit    Verification of patient location:    Patient is located at Home in the following state in which I hold an active license [place]      Assessment/Plan:    Problem List Items Addressed This Visit    None      Goals addressed in session: Maintain current level of stability and functioning         Reason for visit is   Chief Complaint   Patient presents with    Virtual Regular Visit          Encounter provider MIKE Crenshaw    Provider located at [address] [place] [address]      Recent Visits  Date Type Provider Dept   01/29/24 Telemedicine MIKE Crenshaw  Psychiatric [facility]   Showing recent visits within past 7 days and meeting all other requirements  Future Appointments  No visits were found meeting these conditions.  Showing future appointments within next 150 days and meeting all other requirements       The patient was identified by name and date of birth. Priscila Watkins was informed that this is a telemedicine visit and that the visit is being conducted throughthe [employer] platform. She agrees to proceed..  My office door was closed. No one else was in the room.  She acknowledged consent and understanding of privacy and security of the video platform. The patient has agreed to participate and understands they can discontinue the visit at any time.    Patient is aware this is a billable service.     Subjective  Priscila Watkins is a 38 y.o. female has a history of depressive disorder and mood disorder.  Again, functioning at her baseline without any issues.  She has been stable on this current medication regimen for quite some time.  Also, her health issues have stabilized and she is overall doing better physically and maintaining her mental health.  She will continue on his current medication regimen and follow-up with me will be in 3 months or sooner if necessary.  Mental  status exam: Patient is awake and alert and oriented x 3.  Mood is stable.  Patient has improved physically over the last 6 months.  She is functioning at  her baseline.  She is on disability and cannot work.  Her bipolar disorder and mood disorder and depressive disorder prevented her from doing so.  Associations are intact.  No overt delusions.  Speech is clear and thoughts are well organized, coherent and goal directed.  Attention span is good.  Impulse control is good.  Judgment and insight are limited.  Patient requires much assistance from her family members.  Memory is good.  The patient will continue on:  BuSpar 10 mg 3 times daily  Klonopin 1 mg 3 times daily to 4 times daily as needed for anxiety  Remeron 15 mg at bedtime   Seroquel 200 mg 3 times daily  Effexor  mg daily  Follow-up with me in 3 to 6 months or sooner if necessary    Safety plan: Patient is aware of the 24-hour on-call service offered by [facility].  Patient is also aware of the 24-hour crisis call service offered by [facility].  Patient agrees to contact 911 or go to the nearest emergency room if she ever experiences any suicidal ideation.    Controlled substance discussion: The patient did follow prescriptions on time.  She resides with family members to assist her with all aspects of her life.  There is no evidence of any lethargy or sedation.      HPI     Past Medical History:   Diagnosis Date    Anxiety     Mcallister esophagus     Bipolar 2 disorder, major depressive episode (HCC) 08/17/2021    Bipolar disorder with depression (HCC)     Last assessed: 5/11/16    Chronic pain disorder     abd    Colon polyp     COVID-19 12/21/2022    home test    Depression     Endometriosis     Last assessed: 5/11/16    Gastric ulcer     Gastroparesis     GERD (gastroesophageal reflux disease)     Hypertension     Inflammatory bowel disease     Irritable bowel syndrome     Scoliosis     Varicella        Past Surgical History:   Procedure Laterality  Date    ABDOMINAL SURGERY       SECTION      CHOLECYSTECTOMY      COLONOSCOPY      HERNIA REPAIR      HERNIA REPAIR      OTHER SURGICAL HISTORY      Transoral incisionless fundoplication (TIFF)    [place]  DELIVERY ONLY N/A 2016    Procedure:  SECTION ();  Surgeon: Jarvis Garcia DO;  Location: [facility];  Service: Obstetrics    [place] INCISION EXTENSOR TENDON SHEATH WRIST Right 2020    Procedure: RELEASE DEQUERVAINS RIGHT WRIST;  Surgeon: Smith Martins MD;  Location:  MAIN OR;  Service: Orthopedics    [place] LAPAROSCOPY SURG CHOLECYSTECTOMY N/A 2018    Procedure: LAPAROSCOPIC CHOLECYSTECTOMY;  Surgeon: Aniceto Meneses MD;  Location: [facility];  Service: General    TONSILECTOMY AND ADNOIDECTOMY Bilateral     UPPER GASTROINTESTINAL ENDOSCOPY      WISDOM TOOTH EXTRACTION Bilateral        Current Outpatient Medications   Medication Sig Dispense Refill    amLODIPine (NORVASC) 2.5 mg tablet Take 1 tablet (2.5 mg total) by mouth daily 90 tablet 0    baclofen 20 mg tablet Take 1 tablet (20 mg total) by mouth 3 (three) times a day 90 tablet 0    busPIRone (BUSPAR) 10 mg tablet Take 1 tablet (10 mg total) by mouth 3 (three) times a day 90 tablet 5    clonazePAM (KlonoPIN) 1 mg tablet Take 1 tablet (1 mg total) by mouth 4 (four) times a day 120 tablet 2    diphenoxylate-atropine (LOMOTIL) 2.5-0.025 mg per tablet Take 1 tablet by mouth 2 (two) times a day 60 tablet 0    docusate sodium (COLACE) 100 mg capsule Take 1 capsule by mouth daily as needed        hyoscyamine (ANASPAZ,LEVSIN) 0.125 MG tablet Take 1 tablet (0.125 mg total) by mouth every 4 (four) hours as needed for cramping (Patient not taking: Reported on 2023) 30 tablet 0    medroxyPROGESTERone (DEPO-PROVERA) 150 mg/mL injection Inject 1 mL (150 mg total) into a muscle every 3 (three) months 1 mL 3    metoprolol succinate (TOPROL-XL) 25 mg 24 hr tablet Take 1 tablet (25 mg total) by mouth daily 90 tablet 0    mirtazapine  (REMERON) 15 mg tablet Take 1 tablet (15 mg total) by mouth daily at bedtime 30 tablet 5    ondansetron (ZOFRAN-ODT) 8 mg disintegrating tablet Take 1 tablet (8 mg total) by mouth as needed for nausea (EVERY 6 HOURS AS NEEDED FOR NAUSEA) 60 tablet 0    Probiotic Product (Align) 4 MG CAPS Take 1 capsule (4 mg total) by mouth 2 (two) times a day 60 capsule 1    QUEtiapine (SEROquel) 200 mg tablet TAKE ONE TABLET BY MOUTH THREE TIMES A DAY (NOON, 5PM, AND BEDTIME) 90 tablet 5    rifaximin (XIFAXAN) 550 mg tablet Take 1 tablet (550 mg total) by mouth every 8 (eight) hours for 14 days 42 tablet 0    Sancuso 3.1 MG/24HR PLACE 1 PATCH ON THE SKIN OVER 7 DAYS 4 patch 1    sucralfate (CARAFATE) 1 g tablet Take 1 tablet (1 g total) by mouth 4 (four) times a day 120 tablet 1    venlafaxine (EFFEXOR-XR) 150 mg 24 hr capsule Take 1 capsule (150 mg total) by mouth daily 30 capsule 5    venlafaxine (EFFEXOR-XR) 75 mg 24 hr capsule Take 1 capsule (75 mg total) by mouth daily 30 capsule 5     No current facility-administered medications for this visit.        Allergies   Allergen Reactions    Sulfa Antibiotics Anaphylaxis, Other (See Comments) and Edema     swelling  swelling  swelling    Reglan [Metoclopramide] Anxiety       Review of Systems    Video Exam    There were no vitals filed for this visit.    Physical Exam     Visit Time    Visit Start Time: 11:00a  Visit Stop Time: 11:20a  Total Visit Duration: 20-minute visit.  Time spent reviewing the treatment plan reviewing medications, and completing the progress note.

## 2024-01-31 NOTE — PSYCH
"TREATMENT PLAN (Medication Management Only)        [facility]    Name and Date of Birth:  Priscila Watkins 38 y.o. 1985  Date of Treatment Plan: January 30, 2024  Diagnosis/Diagnoses:    1. Bipolar II disorder (HCC)      Strengths/Personal Resources for Self-Care: supportive family, supportive friends.  Area/Areas of need (in own words): \" Doing okay\".  1. Long Term Goal: \" To continue to function at her baseline\".  She receives assistance from others.   Target Date: 6 months - 7/30/2024  Person/Persons responsible for completion of goal: Priscila  2.  Short Term Objective (s) - How will we reach this goal?:   A.  Provider new recommended medication/dosage changes and/or continue medication(s): continue current medications as prescribed.  B.  N/A.    Target Date: 6 months - 7/30/2024  Person/Persons Responsible for Completion of Goal: Priscila  Progress Towards Goals: stable, continuing treatment  Treatment Modality: medication education at every visit  Review due 6 months from date of this plan: 6 months - 7/30/2024  Expected length of service: ongoing treatment unless revised  My Physician/PA/NP and I have developed this plan together and I agree to work on the goals and objectives. I understand the treatment goals that were developed for my treatment.  "

## 2024-02-03 DIAGNOSIS — R11.14 BILIOUS VOMITING WITH NAUSEA: ICD-10-CM

## 2024-02-03 DIAGNOSIS — F41.1 GENERALIZED ANXIETY DISORDER: ICD-10-CM

## 2024-02-04 RX ORDER — BUSPIRONE HYDROCHLORIDE 10 MG/1
10 TABLET ORAL 3 TIMES DAILY
Qty: 90 TABLET | Refills: 5 | Status: SHIPPED | OUTPATIENT
Start: 2024-02-04

## 2024-02-05 RX ORDER — GRANISETRON 3.1 MG/24H
PATCH TRANSDERMAL
Qty: 4 PATCH | Refills: 1 | Status: SHIPPED | OUTPATIENT
Start: 2024-02-05

## 2024-02-13 DIAGNOSIS — K58.0 IRRITABLE BOWEL SYNDROME WITH DIARRHEA: ICD-10-CM

## 2024-02-13 DIAGNOSIS — K63.89 OTHER SPECIFIED DISEASES OF INTESTINE: ICD-10-CM

## 2024-02-13 RX ORDER — BACLOFEN 20 MG/1
20 TABLET ORAL 3 TIMES DAILY
Qty: 90 TABLET | Refills: 0 | Status: SHIPPED | OUTPATIENT
Start: 2024-02-13

## 2024-02-13 RX ORDER — DIPHENOXYLATE HYDROCHLORIDE AND ATROPINE SULFATE 2.5; .025 MG/1; MG/1
1 TABLET ORAL 2 TIMES DAILY
Qty: 60 TABLET | Refills: 0 | Status: SHIPPED | OUTPATIENT
Start: 2024-02-13

## 2024-02-21 PROBLEM — R19.7 BLOODY DIARRHEA: Status: RESOLVED | Noted: 2018-07-08 | Resolved: 2024-02-21

## 2024-03-01 DIAGNOSIS — F31.81 BIPOLAR 2 DISORDER, MAJOR DEPRESSIVE EPISODE (HCC): ICD-10-CM

## 2024-03-01 RX ORDER — VENLAFAXINE HYDROCHLORIDE 150 MG/1
150 CAPSULE, EXTENDED RELEASE ORAL DAILY
Qty: 30 CAPSULE | Refills: 5 | Status: SHIPPED | OUTPATIENT
Start: 2024-03-01

## 2024-03-01 RX ORDER — QUETIAPINE FUMARATE 200 MG/1
TABLET, FILM COATED ORAL
Qty: 90 TABLET | Refills: 5 | Status: SHIPPED | OUTPATIENT
Start: 2024-03-01

## 2024-03-01 RX ORDER — VENLAFAXINE HYDROCHLORIDE 75 MG/1
75 CAPSULE, EXTENDED RELEASE ORAL DAILY
Qty: 30 CAPSULE | Refills: 5 | Status: SHIPPED | OUTPATIENT
Start: 2024-03-01

## 2024-03-01 RX ORDER — MIRTAZAPINE 15 MG/1
15 TABLET, FILM COATED ORAL
Qty: 30 TABLET | Refills: 5 | Status: SHIPPED | OUTPATIENT
Start: 2024-03-01

## 2024-03-11 DIAGNOSIS — R11.0 NAUSEA: ICD-10-CM

## 2024-03-11 DIAGNOSIS — K58.0 IRRITABLE BOWEL SYNDROME WITH DIARRHEA: ICD-10-CM

## 2024-03-11 DIAGNOSIS — K63.89 OTHER SPECIFIED DISEASES OF INTESTINE: ICD-10-CM

## 2024-03-11 RX ORDER — DIPHENOXYLATE HYDROCHLORIDE AND ATROPINE SULFATE 2.5; .025 MG/1; MG/1
1 TABLET ORAL 2 TIMES DAILY
Qty: 60 TABLET | Refills: 0 | Status: SHIPPED | OUTPATIENT
Start: 2024-03-11

## 2024-03-11 RX ORDER — ONDANSETRON 8 MG/1
8 TABLET, ORALLY DISINTEGRATING ORAL AS NEEDED
Qty: 60 TABLET | Refills: 0 | Status: SHIPPED | OUTPATIENT
Start: 2024-03-11

## 2024-03-11 RX ORDER — BACLOFEN 20 MG/1
20 TABLET ORAL 3 TIMES DAILY
Qty: 90 TABLET | Refills: 0 | Status: SHIPPED | OUTPATIENT
Start: 2024-03-11

## 2024-03-18 DIAGNOSIS — I10 ESSENTIAL HYPERTENSION: ICD-10-CM

## 2024-03-19 DIAGNOSIS — I10 ESSENTIAL HYPERTENSION: ICD-10-CM

## 2024-03-19 RX ORDER — METOPROLOL SUCCINATE 25 MG/1
25 TABLET, EXTENDED RELEASE ORAL DAILY
Qty: 30 TABLET | Refills: 0 | Status: SHIPPED | OUTPATIENT
Start: 2024-03-19

## 2024-03-19 RX ORDER — METOPROLOL SUCCINATE 25 MG/1
25 TABLET, EXTENDED RELEASE ORAL DAILY
Qty: 30 TABLET | Refills: 0 | Status: SHIPPED | OUTPATIENT
Start: 2024-03-19 | End: 2024-03-19 | Stop reason: SDUPTHER

## 2024-03-19 RX ORDER — METOPROLOL SUCCINATE 25 MG/1
25 TABLET, EXTENDED RELEASE ORAL DAILY
Qty: 90 TABLET | Refills: 0 | OUTPATIENT
Start: 2024-03-19

## 2024-03-21 ENCOUNTER — APPOINTMENT (OUTPATIENT)
Dept: LAB | Facility: CLINIC | Age: 39
End: 2024-03-21
Payer: COMMERCIAL

## 2024-03-21 DIAGNOSIS — R19.04 LEFT LOWER QUADRANT ABDOMINAL MASS: ICD-10-CM

## 2024-03-21 LAB
ANION GAP SERPL CALCULATED.3IONS-SCNC: 9 MMOL/L (ref 4–13)
BUN SERPL-MCNC: 10 MG/DL (ref 5–25)
CALCIUM SERPL-MCNC: 9 MG/DL (ref 8.4–10.2)
CHLORIDE SERPL-SCNC: 104 MMOL/L (ref 96–108)
CO2 SERPL-SCNC: 23 MMOL/L (ref 21–32)
CREAT SERPL-MCNC: 0.84 MG/DL (ref 0.6–1.3)
GFR SERPL CREATININE-BSD FRML MDRD: 89 ML/MIN/1.73SQ M
GLUCOSE P FAST SERPL-MCNC: 99 MG/DL (ref 65–99)
POTASSIUM SERPL-SCNC: 4.1 MMOL/L (ref 3.5–5.3)
SODIUM SERPL-SCNC: 136 MMOL/L (ref 135–147)

## 2024-03-21 PROCEDURE — 80048 BASIC METABOLIC PNL TOTAL CA: CPT

## 2024-03-21 PROCEDURE — 36415 COLL VENOUS BLD VENIPUNCTURE: CPT

## 2024-03-24 DIAGNOSIS — R11.14 BILIOUS VOMITING WITH NAUSEA: ICD-10-CM

## 2024-03-26 ENCOUNTER — OFFICE VISIT (OUTPATIENT)
Dept: INTERNAL MEDICINE CLINIC | Facility: OTHER | Age: 39
End: 2024-03-26
Payer: COMMERCIAL

## 2024-03-26 VITALS
DIASTOLIC BLOOD PRESSURE: 84 MMHG | BODY MASS INDEX: 23.22 KG/M2 | TEMPERATURE: 99.3 F | OXYGEN SATURATION: 99 % | WEIGHT: 136 LBS | HEIGHT: 64 IN | HEART RATE: 77 BPM | SYSTOLIC BLOOD PRESSURE: 122 MMHG

## 2024-03-26 DIAGNOSIS — K21.9 GASTROESOPHAGEAL REFLUX DISEASE WITHOUT ESOPHAGITIS: ICD-10-CM

## 2024-03-26 DIAGNOSIS — R73.9 HYPERGLYCEMIA: ICD-10-CM

## 2024-03-26 DIAGNOSIS — F31.81 BIPOLAR 2 DISORDER, MAJOR DEPRESSIVE EPISODE (HCC): ICD-10-CM

## 2024-03-26 DIAGNOSIS — F17.210 CIGARETTE NICOTINE DEPENDENCE WITHOUT COMPLICATION: ICD-10-CM

## 2024-03-26 DIAGNOSIS — I10 ESSENTIAL HYPERTENSION: Primary | ICD-10-CM

## 2024-03-26 PROBLEM — R00.2 PALPITATIONS: Status: RESOLVED | Noted: 2019-11-14 | Resolved: 2024-03-26

## 2024-03-26 PROBLEM — R10.84 GENERALIZED ABDOMINAL PAIN: Status: RESOLVED | Noted: 2018-07-08 | Resolved: 2024-03-26

## 2024-03-26 PROBLEM — B34.9 ACUTE VIRAL SYNDROME: Status: RESOLVED | Noted: 2021-12-03 | Resolved: 2024-03-26

## 2024-03-26 PROBLEM — E87.6 HYPOKALEMIA: Status: RESOLVED | Noted: 2018-07-08 | Resolved: 2024-03-26

## 2024-03-26 PROCEDURE — 99214 OFFICE O/P EST MOD 30 MIN: CPT | Performed by: INTERNAL MEDICINE

## 2024-03-26 RX ORDER — GRANISETRON 3.1 MG/24H
PATCH TRANSDERMAL
Qty: 4 PATCH | Refills: 1 | Status: SHIPPED | OUTPATIENT
Start: 2024-03-26

## 2024-03-26 NOTE — PROGRESS NOTES
Assessment/Plan:    Essential hypertension  Blood pressure well-controlled on present dose of amlodipine.  Continue with low-salt diet    Acid reflux disease  Continue with present regimen and managed by gastroenterologist    Bipolar 2 disorder, major depressive episode (HCC)  Stable on present regimen.  Managed by psychiatrist    Nicotine dependence  Still smoke about half a pack per day.  Again advised for smoking cessation but not ready yet.  Will continue to encourage    Hyperglycemia  Blood sugar slightly elevated.  Will request hemoglobin A1c.  Advised for low sugar/low-carb diet and exercise       Diagnoses and all orders for this visit:    Essential hypertension  -     CBC; Future  -     Comprehensive metabolic panel; Future    Gastroesophageal reflux disease without esophagitis  -     CBC; Future    Bipolar 2 disorder, major depressive episode (HCC)    Hyperglycemia  -     Hemoglobin A1C; Future    Cigarette nicotine dependence without complication            Tobacco Cessation Counseling: Tobacco cessation counseling was provided. The patient is sincerely urged to quit consumption of tobacco. She is not ready to quit tobacco.         Subjective:          Patient ID: Priscila Watkins is a 38 y.o. female.    Patient is here for follow-up.  Also have blood work done would like to discuss results.  Otherwise no new complaints.  Nausea is better.  Also under the care of gastroenterologist        The following portions of the patient's history were reviewed and updated as appropriate: allergies, current medications, past family history, past medical history, past social history, past surgical history, and problem list.    Review of Systems   Constitutional:  Negative for fatigue and fever.   HENT:  Negative for congestion, ear discharge, ear pain, postnasal drip, sinus pressure, sore throat, tinnitus and trouble swallowing.    Eyes:  Negative for discharge, itching and visual disturbance.   Respiratory:  Negative  for cough and shortness of breath.    Cardiovascular:  Negative for chest pain and palpitations.   Gastrointestinal:  Negative for abdominal pain, diarrhea, nausea and vomiting.   Endocrine: Negative for cold intolerance and polyuria.   Genitourinary:  Negative for difficulty urinating, dysuria and urgency.   Musculoskeletal:  Negative for arthralgias and neck pain.   Skin:  Negative for rash.   Allergic/Immunologic: Negative for environmental allergies.   Neurological:  Negative for dizziness, weakness and headaches.   Psychiatric/Behavioral:  Negative for agitation and behavioral problems. The patient is not nervous/anxious.          Past Medical History:   Diagnosis Date    Anxiety     Mcallister esophagus     Bipolar 2 disorder, major depressive episode (HCC) 08/17/2021    Bipolar disorder with depression (HCC)     Last assessed: 5/11/16    Chronic pain disorder     abd    Colon polyp     COVID-19 12/21/2022    home test    Depression     Endometriosis     Last assessed: 5/11/16    Gastric ulcer     Gastroparesis     GERD (gastroesophageal reflux disease)     Hypertension     Inflammatory bowel disease     Irritable bowel syndrome     Scoliosis     Varicella          Current Outpatient Medications:     amLODIPine (NORVASC) 2.5 mg tablet, Take 1 tablet (2.5 mg total) by mouth daily, Disp: 90 tablet, Rfl: 0    baclofen 20 mg tablet, Take 1 tablet (20 mg total) by mouth 3 (three) times a day, Disp: 90 tablet, Rfl: 0    busPIRone (BUSPAR) 10 mg tablet, TAKE ONE TABLET BY MOUTH THREE TIMES A DAY, Disp: 90 tablet, Rfl: 5    clonazePAM (KlonoPIN) 1 mg tablet, Take 1 tablet (1 mg total) by mouth 4 (four) times a day, Disp: 120 tablet, Rfl: 2    diphenoxylate-atropine (LOMOTIL) 2.5-0.025 mg per tablet, Take 1 tablet by mouth 2 (two) times a day, Disp: 60 tablet, Rfl: 0    medroxyPROGESTERone (DEPO-PROVERA) 150 mg/mL injection, Inject 1 mL (150 mg total) into a muscle every 3 (three) months, Disp: 1 mL, Rfl: 3    metoprolol  succinate (TOPROL-XL) 25 mg 24 hr tablet, Take 1 tablet (25 mg total) by mouth daily, Disp: 30 tablet, Rfl: 0    mirtazapine (REMERON) 15 mg tablet, TAKE ONE TABLET BY MOUTH AT BEDTIME, Disp: 30 tablet, Rfl: 5    ondansetron (ZOFRAN-ODT) 8 mg disintegrating tablet, Take 1 tablet (8 mg total) by mouth as needed for nausea (EVERY 6 HOURS AS NEEDED FOR NAUSEA), Disp: 60 tablet, Rfl: 0    Probiotic Product (Align) 4 MG CAPS, Take 1 capsule (4 mg total) by mouth 2 (two) times a day, Disp: 60 capsule, Rfl: 1    QUEtiapine (SEROquel) 200 mg tablet, TAKE 1 TABLET BY MOUTH 3 TIMES A DAY (NOON, 5:00PM, AND BEDTIME), Disp: 90 tablet, Rfl: 5    Sancuso 3.1 MG/24HR, PLACE 1 PATCH ON THE SKIN OVER 7 DAYS, Disp: 4 patch, Rfl: 1    sucralfate (CARAFATE) 1 g tablet, Take 1 tablet (1 g total) by mouth 4 (four) times a day, Disp: 120 tablet, Rfl: 1    venlafaxine (EFFEXOR-XR) 150 mg 24 hr capsule, TAKE ONE CAPSULE BY MOUTH EVERY DAY, Disp: 30 capsule, Rfl: 5    venlafaxine (EFFEXOR-XR) 75 mg 24 hr capsule, TAKE ONE CAPSULE BY MOUTH EVERY DAY, Disp: 30 capsule, Rfl: 5    Allergies   Allergen Reactions    Sulfa Antibiotics Anaphylaxis, Other (See Comments) and Edema     swelling  swelling  swelling    Reglan [Metoclopramide] Anxiety       Social History   Past Surgical History:   Procedure Laterality Date    ABDOMINAL SURGERY       SECTION      CHOLECYSTECTOMY      COLONOSCOPY      HERNIA REPAIR      HERNIA REPAIR      OTHER SURGICAL HISTORY      Transoral incisionless fundoplication (TIFF)    MI  DELIVERY ONLY N/A 2016    Procedure:  SECTION ();  Surgeon: Jarvis Garcia DO;  Location: [facility] ;  Service: Obstetrics    [place] INCISION EXTENSOR TENDON SHEATH WRIST Right 2020    Procedure: RELEASE DEQUERVAINS RIGHT WRIST;  Surgeon: Smith Martins MD;  Location:  MAIN OR;  Service: Orthopedics    MI LAPAROSCOPY SURG CHOLECYSTECTOMY N/A 2018    Procedure: LAPAROSCOPIC CHOLECYSTECTOMY;   "Surgeon: Aniceto Meneses MD;  Location: [facility];  Service: General    TONSILECTOMY AND ADNOIDECTOMY Bilateral     UPPER GASTROINTESTINAL ENDOSCOPY      WISDOM TOOTH EXTRACTION Bilateral      Family History   Problem Relation Age of Onset    Hypertension Father     Depression Mother     Asthma Mother     Anxiety disorder Mother     Bipolar disorder Mother     Mental illness Mother     Thyroid disease Mother     Hypothyroidism Mother     Depression Maternal Grandmother     Anxiety disorder Maternal Grandmother     Bipolar disorder Maternal Grandmother     Mental illness Maternal Grandmother     Cancer Paternal Grandfather     Cancer Paternal Uncle     Breast cancer Cousin     Cancer Maternal Uncle        Objective:  /84 (BP Location: Right arm, Patient Position: Sitting, Cuff Size: Adult)   Pulse 77   Temp 99.3 °F (37.4 °C) (Temporal)   Ht 5' 4\" (1.626 m)   Wt 61.7 kg (136 lb)   SpO2 99%   BMI 23.34 kg/m²   Body mass index is 23.34 kg/m².     Physical Exam  Constitutional:       General: She is not in acute distress.     Appearance: She is well-developed.   HENT:      Head: Normocephalic.      Right Ear: External ear normal. There is no impacted cerumen.      Left Ear: External ear normal. There is no impacted cerumen.      Nose: No congestion or rhinorrhea.      Mouth/Throat:      Pharynx: No oropharyngeal exudate or posterior oropharyngeal erythema.   Eyes:      General: No scleral icterus.     Pupils: Pupils are equal, round, and reactive to light.   Neck:      Thyroid: No thyromegaly.      Trachea: No tracheal deviation.   Cardiovascular:      Rate and Rhythm: Normal rate and regular rhythm.      Heart sounds: Normal heart sounds. No murmur heard.  Pulmonary:      Effort: Pulmonary effort is normal. No respiratory distress.      Breath sounds: Normal breath sounds.   Chest:      Chest wall: No tenderness.   Abdominal:      General: Bowel sounds are normal.      Palpations: Abdomen is soft. There is " no mass.      Tenderness: There is no abdominal tenderness.   Musculoskeletal:         General: Normal range of motion.      Cervical back: Normal range of motion and neck supple. No rigidity.      Right lower leg: No edema.      Left lower leg: No edema.   Lymphadenopathy:      Cervical: No cervical adenopathy.   Skin:     General: Skin is warm.      Findings: No erythema or rash.   Neurological:      Mental Status: She is alert and oriented to person, place, and time.      Cranial Nerves: No cranial nerve deficit.   Psychiatric:         Mood and Affect: Mood normal.         Behavior: Behavior normal.

## 2024-03-26 NOTE — ASSESSMENT & PLAN NOTE
Still smoke about half a pack per day.  Again advised for smoking cessation but not ready yet.  Will continue to encourage

## 2024-03-26 NOTE — ASSESSMENT & PLAN NOTE
Blood sugar slightly elevated.  Will request hemoglobin A1c.  Advised for low sugar/low-carb diet and exercise

## 2024-04-09 DIAGNOSIS — K63.89 OTHER SPECIFIED DISEASES OF INTESTINE: ICD-10-CM

## 2024-04-09 RX ORDER — BACLOFEN 20 MG/1
20 TABLET ORAL 3 TIMES DAILY
Qty: 90 TABLET | Refills: 5 | Status: SHIPPED | OUTPATIENT
Start: 2024-04-09

## 2024-04-13 DIAGNOSIS — I10 ESSENTIAL HYPERTENSION: ICD-10-CM

## 2024-04-15 RX ORDER — AMLODIPINE BESYLATE 2.5 MG/1
2.5 TABLET ORAL DAILY
Qty: 90 TABLET | Refills: 1 | Status: SHIPPED | OUTPATIENT
Start: 2024-04-15

## 2024-04-15 RX ORDER — METOPROLOL SUCCINATE 25 MG/1
25 TABLET, EXTENDED RELEASE ORAL DAILY
Qty: 90 TABLET | Refills: 1 | Status: SHIPPED | OUTPATIENT
Start: 2024-04-15

## 2024-04-16 DIAGNOSIS — F31.81 BIPOLAR 2 DISORDER, MAJOR DEPRESSIVE EPISODE (HCC): ICD-10-CM

## 2024-04-16 RX ORDER — CLONAZEPAM 1 MG/1
1 TABLET ORAL 4 TIMES DAILY
Qty: 120 TABLET | Refills: 2 | Status: SHIPPED | OUTPATIENT
Start: 2024-04-16

## 2024-04-29 DIAGNOSIS — R11.14 BILIOUS VOMITING WITH NAUSEA: ICD-10-CM

## 2024-04-30 ENCOUNTER — TELEMEDICINE (OUTPATIENT)
Dept: PSYCHIATRY | Facility: CLINIC | Age: 39
End: 2024-04-30

## 2024-04-30 DIAGNOSIS — F31.81 BIPOLAR II DISORDER (HCC): Primary | ICD-10-CM

## 2024-04-30 RX ORDER — GRANISETRON 3.1 MG/24H
1 PATCH TRANSDERMAL ONCE
Qty: 4 PATCH | Refills: 0 | Status: SHIPPED | OUTPATIENT
Start: 2024-04-30 | End: 2024-04-30

## 2024-04-30 NOTE — PSYCH
TREATMENT PLAN (Medication Management Only)        [facility]    Name and Date of Birth:  Priscila Watkins 38 y.o. 1985  Date of Treatment Plan: April 30, 2024  Diagnosis/Diagnoses:    1. Bipolar II disorder (HCC)      Strengths/Personal Resources for Self-Care: supportive family, supportive friends.  Area/Areas of need (in own words): anxiety symptoms, depressive symptoms.  1. Long Term Goal: maintain control of depression.   Target Date: 6 months - 10/30/2024  Person/Persons responsible for completion of goal: Priscila  2.  Short Term Objective (s) - How will we reach this goal?:   A.  Provider new recommended medication/dosage changes and/or continue medication(s): continue current medications as prescribed.  B.  N/A.    Target Date: 6 months - 10/30/2024  Person/Persons Responsible for Completion of Goal: Priscila  Progress Towards Goals: continuing treatment, limited progress  Treatment Modality: medication education at every visit  Review due 6 months from date of this plan: 6 months - 10/30/2024  Expected length of service: ongoing treatment unless revised  My Physician/PA/NP and I have developed this plan together and I agree to work on the goals and objectives. I understand the treatment goals that were developed for my treatment.

## 2024-04-30 NOTE — PSYCH
Virtual Regular Visit    Verification of patient location:    Patient is located at Home in the following state in which I hold an active license [place]      Assessment/Plan:    Problem List Items Addressed This Visit    None      Goals addressed in session: Maintain current level of stability.  Patient is functioning at her baseline.         Reason for visit is   Chief Complaint   Patient presents with    Virtual Regular Visit          Encounter provider MIKE Crenshaw      Recent Visits  No visits were found meeting these conditions.  Showing recent visits within past 7 days and meeting all other requirements  Today's Visits  Date Type Provider Dept   04/30/24 Telemedicine MIKE Crenshaw  [facility]   Showing today's visits and meeting all other requirements  Future Appointments  No visits were found meeting these conditions.  Showing future appointments within next 150 days and meeting all other requirements       The patient was identified by name and date of birth. Priscila Watkins was informed that this is a telemedicine visit and that the visit is being conducted throughthe [employer] platform. She agrees to proceed..  My office door was closed. No one else was in the room.  She acknowledged consent and understanding of privacy and security of the video platform. The patient has agreed to participate and understands they can discontinue the visit at any time.    Patient is aware this is a billable service.     Subjective  Priscila Watkins is a 38 y.o. female who has a history of bipolar depressive disorder.  Patient is maintaining at her baseline.  We see her every 3 months and she is stable on her current medication regimen.  She is on 100% disability.  She cannot work.  She tells me she is sleeping fairly well and she is eating well.  Still functions at a level where she requires assistance from all of her family members.  Today, she does tell me that she is somewhat  concerned about her mother and the grief process she is going through.  Sadly, she lost a brother last month and another brother is not doing well.  I have encouraged her to tell her mother to give the office call.  Otherwise, we will continue Priscila on the same medication regimen.  We will follow up with each other in 3 months or sooner if necessary.  Mental status exam: Patient is awake and alert and oriented x 3.  Mood is stable.  Patient is functioning at her baseline with assistance from others.  Associations are intact.  No overt delusions.  Speech is clear and thoughts are well organized, coherent and goal directed.  Attention span is fair.  Impulse control is good.  Judgment and insight are limited.  Memory is good.  The patient will continue on:  BuSpar 10 mg 3 times daily  Klonopin 1 mg 4 times daily.  We cannot reduce his medication for this patient.  She becomes very anxious very easily overwhelmed and stops functioning.  Does well with 4 tabs per day.  Remeron 15 mg at bedtime  Seroquel 200 mg 3 times daily  Follow-up with me in 3 months or sooner if necessary.    Safety Plan: Patient is aware of the 24-hour on-call service offered by [facility].  Patient is also aware of the 24-hour crisis call service offered by [facility].  The patient agreed to contact 911 and go directly to the emergency room if ever in the event she experiences any suicidal ideation.  The patient has a very good support system with her  and her family.    Controlled substance discussion: The patient fills all prescriptions on time.  No evidence of any overuse or abuse of any controlled substance.  Patient takes all medicines as prescribed.        HPI     Past Medical History:   Diagnosis Date    Anxiety     Mcallister esophagus     Bipolar 2 disorder, major depressive episode (HCC) 08/17/2021    Bipolar disorder with depression (HCC)     Last assessed: 5/11/16    Chronic pain disorder     abd    Colon polyp      COVID-19 2022    home test    Depression     Endometriosis     Last assessed: 16    Gastric ulcer     Gastroparesis     GERD (gastroesophageal reflux disease)     Hypertension     Inflammatory bowel disease     Irritable bowel syndrome     Scoliosis     Varicella        Past Surgical History:   Procedure Laterality Date    ABDOMINAL SURGERY       SECTION      CHOLECYSTECTOMY      COLONOSCOPY      HERNIA REPAIR      HERNIA REPAIR      OTHER SURGICAL HISTORY      Transoral incisionless fundoplication (TIFF)    PA  DELIVERY ONLY N/A 2016    Procedure:  SECTION ();  Surgeon: Jarvis Garcia DO;  Location: [facility];  Service: Obstetrics    PA INCISION EXTENSOR TENDON SHEATH WRIST Right 2020    Procedure: RELEASE DEQUERVAINS RIGHT WRIST;  Surgeon: Smith Martins MD;  Location:  MAIN OR;  Service: Orthopedics    PA LAPAROSCOPY SURG CHOLECYSTECTOMY N/A 2018    Procedure: LAPAROSCOPIC CHOLECYSTECTOMY;  Surgeon: Aniceto Meneses MD;  Location: [facility];  Service: General    TONSILECTOMY AND ADNOIDECTOMY Bilateral     UPPER GASTROINTESTINAL ENDOSCOPY      WISDOM TOOTH EXTRACTION Bilateral        Current Outpatient Medications   Medication Sig Dispense Refill    metoprolol succinate (TOPROL-XL) 25 mg 24 hr tablet Take 1 tablet (25 mg total) by mouth daily 90 tablet 1    amLODIPine (NORVASC) 2.5 mg tablet Take 1 tablet (2.5 mg total) by mouth daily 90 tablet 1    baclofen 20 mg tablet Take 1 tablet (20 mg total) by mouth 3 (three) times a day 90 tablet 5    busPIRone (BUSPAR) 10 mg tablet TAKE ONE TABLET BY MOUTH THREE TIMES A DAY 90 tablet 5    clonazePAM (KlonoPIN) 1 mg tablet Take 1 tablet (1 mg total) by mouth 4 (four) times a day 120 tablet 2    diphenoxylate-atropine (LOMOTIL) 2.5-0.025 mg per tablet Take 1 tablet by mouth 2 (two) times a day 60 tablet 0    medroxyPROGESTERone (DEPO-PROVERA) 150 mg/mL injection Inject 1 mL (150 mg total) into a muscle every 3  (three) months 1 mL 3    mirtazapine (REMERON) 15 mg tablet TAKE ONE TABLET BY MOUTH AT BEDTIME 30 tablet 5    ondansetron (ZOFRAN-ODT) 8 mg disintegrating tablet Take 1 tablet (8 mg total) by mouth as needed for nausea (EVERY 6 HOURS AS NEEDED FOR NAUSEA) 60 tablet 0    Probiotic Product (Align) 4 MG CAPS Take 1 capsule (4 mg total) by mouth 2 (two) times a day 60 capsule 1    QUEtiapine (SEROquel) 200 mg tablet TAKE 1 TABLET BY MOUTH 3 TIMES A DAY (NOON, 5:00PM, AND BEDTIME) 90 tablet 5    Sancuso 3.1 MG/24HR PLACE 1 PATCH ON THE SKIN OVER 7 DAYS 4 patch 1    sucralfate (CARAFATE) 1 g tablet Take 1 tablet (1 g total) by mouth 4 (four) times a day 120 tablet 1    venlafaxine (EFFEXOR-XR) 150 mg 24 hr capsule TAKE ONE CAPSULE BY MOUTH EVERY DAY 30 capsule 5    venlafaxine (EFFEXOR-XR) 75 mg 24 hr capsule TAKE ONE CAPSULE BY MOUTH EVERY DAY 30 capsule 5     No current facility-administered medications for this visit.        Allergies   Allergen Reactions    Sulfa Antibiotics Anaphylaxis, Other (See Comments) and Edema     swelling  swelling  swelling    Reglan [Metoclopramide] Anxiety       Review of Systems    Video Exam    There were no vitals filed for this visit.    Physical Exam     Visit Time    Visit Start Time: 10:30a  Visit Stop Time: 10:55a  Total Visit Duration: 25 minutes were spent in the visit.  Time was spent reviewing the treatment plan, reviewing medications, ordering medications and completing the progress note.

## 2024-05-08 DIAGNOSIS — K63.89 OTHER SPECIFIED DISEASES OF INTESTINE: ICD-10-CM

## 2024-05-08 RX ORDER — BACLOFEN 20 MG/1
20 TABLET ORAL 3 TIMES DAILY
Qty: 90 TABLET | Refills: 0 | Status: SHIPPED | OUTPATIENT
Start: 2024-05-08

## 2024-05-26 DIAGNOSIS — K58.0 IRRITABLE BOWEL SYNDROME WITH DIARRHEA: ICD-10-CM

## 2024-05-28 ENCOUNTER — NURSE TRIAGE (OUTPATIENT)
Age: 39
End: 2024-05-28

## 2024-05-28 DIAGNOSIS — K58.0 IRRITABLE BOWEL SYNDROME WITH DIARRHEA: Primary | ICD-10-CM

## 2024-05-28 NOTE — TELEPHONE ENCOUNTER
----- Message from Vivien WALTON sent at 5/28/2024 10:37 AM EDT -----  Patient is calling stating she is having SIBO symptoms.   Richar message in chart, please return her call to advise.

## 2024-05-28 NOTE — TELEPHONE ENCOUNTER
Please call and inform patient that I sent a prescription into [facility] pharmacy for Xifaxan 550 mg every 8 hours for 14 days.  Thank you

## 2024-05-28 NOTE — TELEPHONE ENCOUNTER
"LOV 12/21/23 Dr. Cee (SIBO, IBS, gastroparesis), Procedure EGD w/push enteroscopy 9/15/23    I spoke with patient, she states her symptoms started last Thursday are the same as previous with SIBO(bloating, generalized abd pain, nausea, gas) and is requesting order for Xifaxan. She said her insurance covers it and it has been very effective in past. Currently only urgent appointments available.  [facility] confirmed and she states they notify her when ready for .   Please review/advise.    Reason for Disposition   Mild abdominal pain    Answer Assessment - Initial Assessment Questions  1. LOCATION: \"Where does it hurt?\"       generalized  2. RADIATION: \"Does the pain shoot anywhere else?\" (e.g., chest, back)      no  3. ONSET: \"When did the pain begin?\" (e.g., minutes, hours or days ago)       Started on Thursday  4. SUDDEN: \"Gradual or sudden onset?\"      Came on suddenly  5. PATTERN \"Does the pain come and go, or is it constant?\"     - If constant: \"Is it getting better, staying the same, or worsening?\"       (Note: Constant means the pain never goes away completely; most serious pain is constant and it progresses)      - If intermittent: \"How long does it last?\" \"Do you have pain now?\"      (Note: Intermittent means the pain goes away completely between bouts)      constant  6. SEVERITY: \"How bad is the pain?\"  (e.g., Scale 1-10; mild, moderate, or severe)    - MILD (1-3): doesn't interfere with normal activities, abdomen soft and not tender to touch     - MODERATE (4-7): interferes with normal activities or awakens from sleep, tender to touch     - SEVERE (8-10): excruciating pain, doubled over, unable to do any normal activities       mild  7. RECURRENT SYMPTOM: \"Have you ever had this type of stomach pain before?\" If Yes, ask: \"When was the last time?\" and \"What happened that time?\"       Yes with SIBO  8. CAUSE: \"What do you think is causing the stomach pain?\"      SIBO  9. RELIEVING/AGGRAVATING " "FACTORS: \"What makes it better or worse?\" (e.g., movement, antacids, bowel movement)      denies  10. OTHER SYMPTOMS: \"Has there been any vomiting, diarrhea, constipation, or urine problems?\"        denies  11. PREGNANCY: \"Is there any chance you are pregnant?\" \"When was your last menstrual period?\"        No currently has period.    Protocols used: Abdominal Pain - Female-ADULT-[place]    "

## 2024-05-29 RX ORDER — DIPHENOXYLATE HYDROCHLORIDE AND ATROPINE SULFATE 2.5; .025 MG/1; MG/1
1 TABLET ORAL 2 TIMES DAILY
Qty: 60 TABLET | Refills: 0 | Status: SHIPPED | OUTPATIENT
Start: 2024-05-29

## 2024-06-06 DIAGNOSIS — K63.89 OTHER SPECIFIED DISEASES OF INTESTINE: Primary | ICD-10-CM

## 2024-06-06 RX ORDER — BACLOFEN 20 MG/1
20 TABLET ORAL 3 TIMES DAILY
Qty: 90 TABLET | Refills: 3 | Status: SHIPPED | OUTPATIENT
Start: 2024-06-06 | End: 2024-07-06

## 2024-06-07 ENCOUNTER — PATIENT MESSAGE (OUTPATIENT)
Dept: INTERNAL MEDICINE CLINIC | Facility: OTHER | Age: 39
End: 2024-06-07

## 2024-06-07 ENCOUNTER — OFFICE VISIT (OUTPATIENT)
Dept: URGENT CARE | Facility: CLINIC | Age: 39
End: 2024-06-07
Payer: COMMERCIAL

## 2024-06-07 VITALS
HEART RATE: 85 BPM | RESPIRATION RATE: 18 BRPM | BODY MASS INDEX: 23.73 KG/M2 | HEIGHT: 64 IN | SYSTOLIC BLOOD PRESSURE: 118 MMHG | OXYGEN SATURATION: 98 % | DIASTOLIC BLOOD PRESSURE: 80 MMHG | WEIGHT: 139 LBS | TEMPERATURE: 98.7 F

## 2024-06-07 DIAGNOSIS — L03.116 CELLULITIS OF LEFT LOWER EXTREMITY: Primary | ICD-10-CM

## 2024-06-07 PROCEDURE — S9083 URGENT CARE CENTER GLOBAL: HCPCS | Performed by: NURSE PRACTITIONER

## 2024-06-07 PROCEDURE — G0382 LEV 3 HOSP TYPE B ED VISIT: HCPCS | Performed by: NURSE PRACTITIONER

## 2024-06-07 RX ORDER — CEPHALEXIN 500 MG/1
500 CAPSULE ORAL 3 TIMES DAILY
Qty: 21 CAPSULE | Refills: 0 | Status: SHIPPED | OUTPATIENT
Start: 2024-06-07 | End: 2024-06-14

## 2024-06-07 RX ORDER — PREDNISONE 20 MG/1
20 TABLET ORAL 2 TIMES DAILY WITH MEALS
Qty: 10 TABLET | Refills: 0 | Status: SHIPPED | OUTPATIENT
Start: 2024-06-07 | End: 2024-06-12

## 2024-06-07 NOTE — PROGRESS NOTES
[facility]        NAME: Priscila Watkins is a 38 y.o. female  : 1985    MRN: [id number]  DATE: 2024  TIME: 4:51 PM    Assessment and Plan   Cellulitis of left lower extremity [L03.116]  1. Cellulitis of left lower extremity  cephalexin (KEFLEX) 500 mg capsule    predniSONE 20 mg tablet            Patient Instructions     Take meds as prescribed  Follow up with PCP in 3-5 days.  Proceed to  ER if symptoms worsen.    If tests have been performed at [facility], our office will contact you with results if changes need to be made to the care plan discussed with you at the visit.  You can review your full results on [facility].    Chief Complaint     Chief Complaint   Patient presents with    Rash     This past Wednesday she noticed a red rash, behind her right left, back of the knee area. Redness, warm to the touch.         History of Present Illness       HPI  Reports lesion on the back of the left knee, reddish, painful. Starting a couple of days ago. Went fishing when this started.     Review of Systems   Review of Systems   Constitutional:  Negative for fever.   Skin:  Positive for color change (redness, back of the left knee). Negative for rash and wound.   Neurological:  Negative for numbness.         Current Medications       Current Outpatient Medications:     amLODIPine (NORVASC) 2.5 mg tablet, Take 1 tablet (2.5 mg total) by mouth daily, Disp: 90 tablet, Rfl: 1    baclofen 20 mg tablet, Take 1 tablet (20 mg total) by mouth 3 (three) times a day, Disp: 90 tablet, Rfl: 3    busPIRone (BUSPAR) 10 mg tablet, TAKE ONE TABLET BY MOUTH THREE TIMES A DAY, Disp: 90 tablet, Rfl: 5    cephalexin (KEFLEX) 500 mg capsule, Take 1 capsule (500 mg total) by mouth 3 (three) times a day for 7 days, Disp: 21 capsule, Rfl: 0    clonazePAM (KlonoPIN) 1 mg tablet, Take 1 tablet (1 mg total) by mouth 4 (four) times a day, Disp: 120 tablet, Rfl: 2    diphenoxylate-atropine (LOMOTIL) 2.5-0.025 mg per  tablet, Take 1 tablet by mouth 2 (two) times a day, Disp: 60 tablet, Rfl: 0    medroxyPROGESTERone (DEPO-PROVERA) 150 mg/mL injection, Inject 1 mL (150 mg total) into a muscle every 3 (three) months, Disp: 1 mL, Rfl: 3    metoprolol succinate (TOPROL-XL) 25 mg 24 hr tablet, Take 1 tablet (25 mg total) by mouth daily, Disp: 90 tablet, Rfl: 1    mirtazapine (REMERON) 15 mg tablet, TAKE ONE TABLET BY MOUTH AT BEDTIME, Disp: 30 tablet, Rfl: 5    ondansetron (ZOFRAN-ODT) 8 mg disintegrating tablet, Take 1 tablet (8 mg total) by mouth as needed for nausea (EVERY 6 HOURS AS NEEDED FOR NAUSEA), Disp: 60 tablet, Rfl: 0    predniSONE 20 mg tablet, Take 1 tablet (20 mg total) by mouth 2 (two) times a day with meals for 5 days, Disp: 10 tablet, Rfl: 0    Probiotic Product (Align) 4 MG CAPS, Take 1 capsule (4 mg total) by mouth 2 (two) times a day, Disp: 60 capsule, Rfl: 1    QUEtiapine (SEROquel) 200 mg tablet, TAKE 1 TABLET BY MOUTH 3 TIMES A DAY (NOON, 5:00PM, AND BEDTIME), Disp: 90 tablet, Rfl: 5    rifaximin (XIFAXAN) 550 mg tablet, Take 1 tablet (550 mg total) by mouth every 8 (eight) hours for 14 days, Disp: 42 tablet, Rfl: 0    sucralfate (CARAFATE) 1 g tablet, Take 1 tablet (1 g total) by mouth 4 (four) times a day, Disp: 120 tablet, Rfl: 1    venlafaxine (EFFEXOR-XR) 150 mg 24 hr capsule, TAKE ONE CAPSULE BY MOUTH EVERY DAY, Disp: 30 capsule, Rfl: 5    venlafaxine (EFFEXOR-XR) 75 mg 24 hr capsule, TAKE ONE CAPSULE BY MOUTH EVERY DAY, Disp: 30 capsule, Rfl: 5    granisetron (Sancuso) 3.1 MG/24HR, Place 1 patch on the skin over 7 days once for 1 dose, Disp: 4 patch, Rfl: 0    Current Allergies     Allergies as of 06/07/2024 - Reviewed 06/07/2024   Allergen Reaction Noted    Sulfa antibiotics Anaphylaxis, Other (See Comments), and Edema 09/16/2006    Reglan [metoclopramide] Anxiety 05/11/2016            The following portions of the patient's history were reviewed and updated as appropriate: allergies, current  medications, past family history, past medical history, past social history, past surgical history and problem list.     Past Medical History:   Diagnosis Date    Anxiety     Mcallister esophagus     Bipolar 2 disorder, major depressive episode (HCC) 2021    Bipolar disorder with depression (HCC)     Last assessed: 16    Chronic pain disorder     abd    Colon polyp     COVID-19 2022    home test    Depression     Endometriosis     Last assessed: 16    Gastric ulcer     Gastroparesis     GERD (gastroesophageal reflux disease)     Hypertension     Inflammatory bowel disease     Irritable bowel syndrome     Scoliosis     Varicella        Past Surgical History:   Procedure Laterality Date    ABDOMINAL SURGERY       SECTION      CHOLECYSTECTOMY      COLONOSCOPY      HERNIA REPAIR      HERNIA REPAIR      OTHER SURGICAL HISTORY      Transoral incisionless fundoplication (TIFF)    [facility]  DELIVERY ONLY N/A 2016    Procedure:  SECTION ();  Surgeon: Jarvis Garcia DO;  Location: [facility];  Service: Obstetrics    [place] INCISION EXTENSOR TENDON SHEATH WRIST Right 2020    Procedure: RELEASE DEQUERVAINS RIGHT WRIST;  Surgeon: Smith Martins MD;  Location:  MAIN OR;  Service: Orthopedics    NE LAPAROSCOPY SURG CHOLECYSTECTOMY N/A 2018    Procedure: LAPAROSCOPIC CHOLECYSTECTOMY;  Surgeon: Aniceto Meneses MD;  Location: [facility];  Service: General    TONSILECTOMY AND ADNOIDECTOMY Bilateral     UPPER GASTROINTESTINAL ENDOSCOPY      WISDOM TOOTH EXTRACTION Bilateral        Family History   Problem Relation Age of Onset    Hypertension Father     Depression Mother     Asthma Mother     Anxiety disorder Mother     Bipolar disorder Mother     Mental illness Mother     Thyroid disease Mother     Hypothyroidism Mother     Depression Maternal Grandmother     Anxiety disorder Maternal Grandmother     Bipolar disorder Maternal Grandmother     Mental illness Maternal Grandmother  "    Cancer Paternal Grandfather     Cancer Paternal Uncle     Breast cancer Cousin     Cancer Maternal Uncle          Medications have been verified.        Objective   /80   Pulse 85   Temp 98.7 °F (37.1 °C)   Resp 18   Ht 5' 4\" (1.626 m)   Wt 63 kg (139 lb)   SpO2 98%   BMI 23.86 kg/m²   No LMP recorded.       Physical Exam     Physical Exam  Musculoskeletal:         General: Swelling (mild) and tenderness (with palpation of affected area) present. No deformity.   Skin:     Findings: Erythema (erythematous lesion that mneasures about 8 cm by 3 cm, in the back of the left knee. minimal wet. no crusting or skin break) present.   Neurological:      Gait: Gait normal.                   "

## 2024-06-07 NOTE — PATIENT COMMUNICATION
Patient needs to be seen in the office to be evaluated further for her symptoms. Message has been sent to patient, via [employer].

## 2024-06-11 ENCOUNTER — OFFICE VISIT (OUTPATIENT)
Age: 39
End: 2024-06-11
Payer: COMMERCIAL

## 2024-06-11 VITALS
OXYGEN SATURATION: 99 % | TEMPERATURE: 99.3 F | SYSTOLIC BLOOD PRESSURE: 130 MMHG | DIASTOLIC BLOOD PRESSURE: 80 MMHG | WEIGHT: 138 LBS | BODY MASS INDEX: 23.56 KG/M2 | HEIGHT: 64 IN | HEART RATE: 90 BPM

## 2024-06-11 DIAGNOSIS — L03.115 CELLULITIS OF RIGHT LOWER EXTREMITY: Primary | ICD-10-CM

## 2024-06-11 PROCEDURE — 99213 OFFICE O/P EST LOW 20 MIN: CPT | Performed by: NURSE PRACTITIONER

## 2024-06-11 RX ORDER — AMOXICILLIN AND CLAVULANATE POTASSIUM 875; 125 MG/1; MG/1
1 TABLET, FILM COATED ORAL EVERY 12 HOURS SCHEDULED
Qty: 14 TABLET | Refills: 0 | Status: SHIPPED | OUTPATIENT
Start: 2024-06-11 | End: 2024-06-18

## 2024-06-11 NOTE — ASSESSMENT & PLAN NOTE
-Discussed and examined with Dr. Hernández, surgical marker used to place outline around cellulitis  -Stop Keflex and prednisone, start Augmentin  -Return to office if worsening of symptoms  -Follow-up in 1 week for reevaluation  -Patient will place pictures in MyChart

## 2024-06-11 NOTE — PROGRESS NOTES
Assessment/Plan:    Cellulitis of right lower extremity  -Discussed and examined with Dr. Hernández, surgical marker used to place outline around cellulitis  -Stop Keflex and prednisone, start Augmentin  -Return to office if worsening of symptoms  -Follow-up in 1 week for reevaluation  -Patient will place pictures in MyChart              Diagnoses and all orders for this visit:    Cellulitis of right lower extremity  -     amoxicillin-clavulanate (AUGMENTIN) 875-125 mg per tablet; Take 1 tablet by mouth every 12 (twelve) hours for 7 days          Subjective:      Patient ID: Priscila Watkins is a 38 y.o. female.    Patient presents today to follow-up on cellulitis.    6/4 was fishing, noticed redness on 6/5 6/7 went to urgent care, was put on Keflex and prednisone     Denies fevers and chills   She reports the area of redness has expanded and is more warm to touch   Denies pain to the calf   Pain to the site has improved         The following portions of the patient's history were reviewed and updated as appropriate: allergies, current medications, past family history, past medical history, past social history, past surgical history, and problem list.    Review of Systems   Constitutional:  Negative for activity change, appetite change, chills, diaphoresis and fever.   HENT:  Negative for congestion, ear discharge, ear pain, postnasal drip, rhinorrhea, sinus pressure, sinus pain and sore throat.    Eyes:  Negative for pain, discharge, itching and visual disturbance.   Respiratory:  Negative for cough, chest tightness, shortness of breath and wheezing.    Cardiovascular:  Negative for chest pain, palpitations and leg swelling.   Gastrointestinal:  Negative for abdominal pain, constipation, diarrhea, nausea and vomiting.   Endocrine: Negative for polydipsia, polyphagia and polyuria.   Genitourinary:  Negative for difficulty urinating, dysuria and urgency.   Musculoskeletal:  Negative for arthralgias, back pain  and neck pain.   Skin:  Positive for wound. Negative for rash.   Neurological:  Negative for dizziness, weakness, numbness and headaches.         Past Medical History:   Diagnosis Date    Anxiety     Mcallister esophagus     Bipolar 2 disorder, major depressive episode (HCC) 08/17/2021    Bipolar disorder with depression (HCC)     Last assessed: 5/11/16    Chronic pain disorder     abd    Colon polyp     COVID-19 12/21/2022    home test    Depression     Endometriosis     Last assessed: 5/11/16    Gastric ulcer     Gastroparesis     GERD (gastroesophageal reflux disease)     Hypertension     Inflammatory bowel disease     Irritable bowel syndrome     Scoliosis     Varicella          Current Outpatient Medications:     amLODIPine (NORVASC) 2.5 mg tablet, Take 1 tablet (2.5 mg total) by mouth daily, Disp: 90 tablet, Rfl: 1    amoxicillin-clavulanate (AUGMENTIN) 875-125 mg per tablet, Take 1 tablet by mouth every 12 (twelve) hours for 7 days, Disp: 14 tablet, Rfl: 0    baclofen 20 mg tablet, Take 1 tablet (20 mg total) by mouth 3 (three) times a day, Disp: 90 tablet, Rfl: 3    busPIRone (BUSPAR) 10 mg tablet, TAKE ONE TABLET BY MOUTH THREE TIMES A DAY, Disp: 90 tablet, Rfl: 5    cephalexin (KEFLEX) 500 mg capsule, Take 1 capsule (500 mg total) by mouth 3 (three) times a day for 7 days, Disp: 21 capsule, Rfl: 0    clonazePAM (KlonoPIN) 1 mg tablet, Take 1 tablet (1 mg total) by mouth 4 (four) times a day, Disp: 120 tablet, Rfl: 2    diphenoxylate-atropine (LOMOTIL) 2.5-0.025 mg per tablet, Take 1 tablet by mouth 2 (two) times a day, Disp: 60 tablet, Rfl: 0    medroxyPROGESTERone (DEPO-PROVERA) 150 mg/mL injection, Inject 1 mL (150 mg total) into a muscle every 3 (three) months, Disp: 1 mL, Rfl: 3    metoprolol succinate (TOPROL-XL) 25 mg 24 hr tablet, Take 1 tablet (25 mg total) by mouth daily, Disp: 90 tablet, Rfl: 1    mirtazapine (REMERON) 15 mg tablet, TAKE ONE TABLET BY MOUTH AT BEDTIME, Disp: 30 tablet, Rfl: 5     ondansetron (ZOFRAN-ODT) 8 mg disintegrating tablet, Take 1 tablet (8 mg total) by mouth as needed for nausea (EVERY 6 HOURS AS NEEDED FOR NAUSEA), Disp: 60 tablet, Rfl: 0    predniSONE 20 mg tablet, Take 1 tablet (20 mg total) by mouth 2 (two) times a day with meals for 5 days, Disp: 10 tablet, Rfl: 0    Probiotic Product (Align) 4 MG CAPS, Take 1 capsule (4 mg total) by mouth 2 (two) times a day, Disp: 60 capsule, Rfl: 1    QUEtiapine (SEROquel) 200 mg tablet, TAKE 1 TABLET BY MOUTH 3 TIMES A DAY (NOON, 5:00PM, AND BEDTIME), Disp: 90 tablet, Rfl: 5    rifaximin (XIFAXAN) 550 mg tablet, Take 1 tablet (550 mg total) by mouth every 8 (eight) hours for 14 days, Disp: 42 tablet, Rfl: 0    sucralfate (CARAFATE) 1 g tablet, Take 1 tablet (1 g total) by mouth 4 (four) times a day, Disp: 120 tablet, Rfl: 1    venlafaxine (EFFEXOR-XR) 150 mg 24 hr capsule, TAKE ONE CAPSULE BY MOUTH EVERY DAY, Disp: 30 capsule, Rfl: 5    venlafaxine (EFFEXOR-XR) 75 mg 24 hr capsule, TAKE ONE CAPSULE BY MOUTH EVERY DAY, Disp: 30 capsule, Rfl: 5    granisetron (Sancuso) 3.1 MG/24HR, Place 1 patch on the skin over 7 days once for 1 dose, Disp: 4 patch, Rfl: 0    Allergies   Allergen Reactions    Sulfa Antibiotics Anaphylaxis, Other (See Comments) and Edema     swelling  swelling  swelling    Reglan [Metoclopramide] Anxiety       Social History   Past Surgical History:   Procedure Laterality Date    ABDOMINAL SURGERY       SECTION      CHOLECYSTECTOMY      COLONOSCOPY      HERNIA REPAIR      HERNIA REPAIR  2019    OTHER SURGICAL HISTORY      Transoral incisionless fundoplication (TIFF)    [facility]  DELIVERY ONLY N/A 2016    Procedure:  SECTION ();  Surgeon: Jarvis Garcia DO;  Location: [facility] ;  Service: Obstetrics    [place] INCISION EXTENSOR TENDON SHEATH WRIST Right 2020    Procedure: RELEASE DEQUERVAINS RIGHT WRIST;  Surgeon: Smith Martins MD;  Location:  MAIN OR;  Service: Orthopedics    [place] LAPAROSCOPY  "SURG CHOLECYSTECTOMY N/A 03/12/2018    Procedure: LAPAROSCOPIC CHOLECYSTECTOMY;  Surgeon: Aniceto Meneses MD;  Location: [facility];  Service: General    TONSILECTOMY AND ADNOIDECTOMY Bilateral     UPPER GASTROINTESTINAL ENDOSCOPY      WISDOM TOOTH EXTRACTION Bilateral      Family History   Problem Relation Age of Onset    Hypertension Father     Depression Mother     Asthma Mother     Anxiety disorder Mother     Bipolar disorder Mother     Mental illness Mother     Thyroid disease Mother     Hypothyroidism Mother     Depression Maternal Grandmother     Anxiety disorder Maternal Grandmother     Bipolar disorder Maternal Grandmother     Mental illness Maternal Grandmother     Cancer Paternal Grandfather     Cancer Paternal Uncle     Breast cancer Cousin     Cancer Maternal Uncle        Objective:  /80 (BP Location: Left arm, Patient Position: Sitting, Cuff Size: Standard)   Pulse 90   Temp 99.3 °F (37.4 °C) (Temporal)   Ht 5' 4\" (1.626 m)   Wt 62.6 kg (138 lb)   SpO2 99%   BMI 23.69 kg/m²     No results found for this or any previous visit (from the past 1344 hour(s)).         Physical Exam  Constitutional:       General: She is not in acute distress.     Appearance: She is well-developed. She is not diaphoretic.   HENT:      Head: Normocephalic and atraumatic.      Right Ear: External ear normal.      Left Ear: External ear normal.      Nose: Nose normal.      Mouth/Throat:      Mouth: Mucous membranes are moist.      Pharynx: No oropharyngeal exudate or posterior oropharyngeal erythema.   Eyes:      General:         Right eye: No discharge.         Left eye: No discharge.      Conjunctiva/sclera: Conjunctivae normal.      Pupils: Pupils are equal, round, and reactive to light.   Neck:      Thyroid: No thyromegaly.   Cardiovascular:      Rate and Rhythm: Normal rate and regular rhythm.      Heart sounds: Normal heart sounds. No murmur heard.     No friction rub. No gallop.   Pulmonary:      Effort: " Pulmonary effort is normal. No respiratory distress.      Breath sounds: Normal breath sounds. No stridor. No wheezing or rales.   Abdominal:      General: Bowel sounds are normal. There is no distension.      Palpations: Abdomen is soft.      Tenderness: There is no abdominal tenderness.   Musculoskeletal:      Cervical back: Normal range of motion and neck supple.   Lymphadenopathy:      Cervical: No cervical adenopathy.   Skin:     General: Skin is warm and dry.      Findings: No erythema or rash.          Neurological:      Mental Status: She is alert and oriented to person, place, and time.   Psychiatric:         Behavior: Behavior normal.         Thought Content: Thought content normal.         Judgment: Judgment normal.

## 2024-06-12 DIAGNOSIS — K58.0 IRRITABLE BOWEL SYNDROME WITH DIARRHEA: Primary | ICD-10-CM

## 2024-06-21 ENCOUNTER — OFFICE VISIT (OUTPATIENT)
Age: 39
End: 2024-06-21
Payer: COMMERCIAL

## 2024-06-21 VITALS
DIASTOLIC BLOOD PRESSURE: 70 MMHG | SYSTOLIC BLOOD PRESSURE: 128 MMHG | WEIGHT: 141.2 LBS | TEMPERATURE: 98.4 F | OXYGEN SATURATION: 97 % | HEART RATE: 88 BPM | HEIGHT: 64 IN | BODY MASS INDEX: 24.11 KG/M2

## 2024-06-21 DIAGNOSIS — L03.115 CELLULITIS OF RIGHT LOWER EXTREMITY: Primary | ICD-10-CM

## 2024-06-21 PROCEDURE — 99213 OFFICE O/P EST LOW 20 MIN: CPT

## 2024-06-21 NOTE — PROGRESS NOTES
Assessment/Plan:    1. Cellulitis of right lower extremity  Comments:  Symptoms resolved, wound well-healed.  No evidence of erythema, no open wound.  Continue hygiene with warm soap and water. Follow-up if symptoms return            [employer] software was used to dictate this note.  It may contain errors with dictating incorrect words or incorrect spelling. Please contact the provider directly with any questions.    Subjective:      Patient ID: Priscila Watkins is a 38 y.o. female.    Patient is a 38-year-old female presenting to the office for 1 week follow-up  She was evaluated on 6/11/2024 for cellulitis right lower extremity  Patient was put on 70 course of Augmentin, completed course  Notes symptoms have resolved.  No erythema or pain.  Denies fevers or chills            The following portions of the patient's history were reviewed and updated as appropriate: allergies, current medications, past family history, past medical history, past social history, past surgical history, and problem list.    Review of Systems   Constitutional:  Negative for chills, fatigue and fever.   Respiratory:  Negative for cough, chest tightness and shortness of breath.    Cardiovascular:  Negative for chest pain and palpitations.   Musculoskeletal:  Negative for arthralgias.   Skin:  Positive for color change (resolved). Negative for wound.   Neurological:  Negative for dizziness, light-headedness and headaches.         Past Medical History:   Diagnosis Date    Anxiety     Mcallister esophagus     Bipolar 2 disorder, major depressive episode (HCC) 08/17/2021    Bipolar disorder with depression (HCC)     Last assessed: 5/11/16    Chronic pain disorder     abd    Colon polyp     COVID-19 12/21/2022    home test    Depression     Endometriosis     Last assessed: 5/11/16    Gastric ulcer     Gastroparesis     GERD (gastroesophageal reflux disease)     Hypertension     Inflammatory bowel disease     Irritable bowel syndrome     Scoliosis      Varicella          Current Outpatient Medications:     amLODIPine (NORVASC) 2.5 mg tablet, Take 1 tablet (2.5 mg total) by mouth daily, Disp: 90 tablet, Rfl: 1    baclofen 20 mg tablet, Take 1 tablet (20 mg total) by mouth 3 (three) times a day, Disp: 90 tablet, Rfl: 3    busPIRone (BUSPAR) 10 mg tablet, TAKE ONE TABLET BY MOUTH THREE TIMES A DAY, Disp: 90 tablet, Rfl: 5    clonazePAM (KlonoPIN) 1 mg tablet, Take 1 tablet (1 mg total) by mouth 4 (four) times a day, Disp: 120 tablet, Rfl: 2    diphenoxylate-atropine (LOMOTIL) 2.5-0.025 mg per tablet, Take 1 tablet by mouth 2 (two) times a day, Disp: 60 tablet, Rfl: 0    granisetron (Sancuso) 3.1 MG/24HR, Place 1 patch on the skin over 7 days once for 1 dose, Disp: 4 patch, Rfl: 0    medroxyPROGESTERone (DEPO-PROVERA) 150 mg/mL injection, Inject 1 mL (150 mg total) into a muscle every 3 (three) months, Disp: 1 mL, Rfl: 3    metoprolol succinate (TOPROL-XL) 25 mg 24 hr tablet, Take 1 tablet (25 mg total) by mouth daily, Disp: 90 tablet, Rfl: 1    mirtazapine (REMERON) 15 mg tablet, TAKE ONE TABLET BY MOUTH AT BEDTIME, Disp: 30 tablet, Rfl: 5    ondansetron (ZOFRAN-ODT) 8 mg disintegrating tablet, Take 1 tablet (8 mg total) by mouth as needed for nausea (EVERY 6 HOURS AS NEEDED FOR NAUSEA), Disp: 60 tablet, Rfl: 0    Probiotic Product (Align) 4 MG CAPS, Take 1 capsule (4 mg total) by mouth 2 (two) times a day, Disp: 60 capsule, Rfl: 1    QUEtiapine (SEROquel) 200 mg tablet, TAKE 1 TABLET BY MOUTH 3 TIMES A DAY (NOON, 5:00PM, AND BEDTIME), Disp: 90 tablet, Rfl: 5    rifaximin (XIFAXAN) 550 mg tablet, Take 1 tablet (550 mg total) by mouth every 8 (eight) hours for 14 days Do not start before June 19, 2024., Disp: 42 tablet, Rfl: 0    venlafaxine (EFFEXOR-XR) 150 mg 24 hr capsule, TAKE ONE CAPSULE BY MOUTH EVERY DAY, Disp: 30 capsule, Rfl: 5    venlafaxine (EFFEXOR-XR) 75 mg 24 hr capsule, TAKE ONE CAPSULE BY MOUTH EVERY DAY, Disp: 30 capsule, Rfl: 5    sucralfate  "(CARAFATE) 1 g tablet, Take 1 tablet (1 g total) by mouth 4 (four) times a day (Patient not taking: Reported on 2024), Disp: 120 tablet, Rfl: 1    Allergies   Allergen Reactions    Sulfa Antibiotics Anaphylaxis, Other (See Comments) and Edema     swelling  swelling  swelling    Reglan [Metoclopramide] Anxiety       Social History   Past Surgical History:   Procedure Laterality Date    ABDOMINAL SURGERY       SECTION      CHOLECYSTECTOMY      COLONOSCOPY      HERNIA REPAIR      HERNIA REPAIR      OTHER SURGICAL HISTORY      Transoral incisionless fundoplication (TIFF)    [facility]  DELIVERY ONLY N/A 2016    Procedure:  SECTION ();  Surgeon: Jarvis Garcia DO;  Location: [facility];  Service: Obstetrics    [facility] INCISION EXTENSOR TENDON SHEATH WRIST Right 2020    Procedure: RELEASE DEQUERVAINS RIGHT WRIST;  Surgeon: Smith Martins MD;  Location:  MAIN OR;  Service: Orthopedics    [facility] LAPAROSCOPY SURG CHOLECYSTECTOMY N/A 2018    Procedure: LAPAROSCOPIC CHOLECYSTECTOMY;  Surgeon: Aniceto Meneses MD;  Location: [facility];  Service: General    TONSILECTOMY AND ADNOIDECTOMY Bilateral     UPPER GASTROINTESTINAL ENDOSCOPY      WISDOM TOOTH EXTRACTION Bilateral      Family History   Problem Relation Age of Onset    Hypertension Father     Depression Mother     Asthma Mother     Anxiety disorder Mother     Bipolar disorder Mother     Mental illness Mother     Thyroid disease Mother     Hypothyroidism Mother     Depression Maternal Grandmother     Anxiety disorder Maternal Grandmother     Bipolar disorder Maternal Grandmother     Mental illness Maternal Grandmother     Cancer Paternal Grandfather     Cancer Paternal Uncle     Breast cancer Cousin     Cancer Maternal Uncle        Objective:  /70 (BP Location: Left arm, Patient Position: Sitting, Cuff Size: Standard)   Pulse 88   Temp 98.4 °F (36.9 °C) (Temporal)   Ht 5' 4\" (1.626 m)   Wt 64 kg (141 lb 3.2 oz) Comment: shoes " on  SpO2 97% Comment: ra  BMI 24.24 kg/m²      Physical Exam  Vitals and nursing note reviewed.   Constitutional:       General: She is not in acute distress.     Appearance: Normal appearance. She is not ill-appearing.   HENT:      Head: Normocephalic and atraumatic.      Right Ear: External ear normal.      Left Ear: External ear normal.      Nose: Nose normal.      Mouth/Throat:      Mouth: Mucous membranes are moist.      Pharynx: Oropharynx is clear.   Eyes:      General: No scleral icterus.     Conjunctiva/sclera: Conjunctivae normal.   Cardiovascular:      Rate and Rhythm: Normal rate and regular rhythm.      Heart sounds: Normal heart sounds. No murmur heard.     No friction rub. No gallop.   Pulmonary:      Effort: Pulmonary effort is normal. No respiratory distress.      Breath sounds: Normal breath sounds. No wheezing, rhonchi or rales.   Chest:      Chest wall: No tenderness.   Skin:     General: Skin is warm and dry.      Comments: Area of ry skin noted, otherwise unremarkable.  No erythema, warmth, drainage.  Wound is healed   Neurological:      General: No focal deficit present.      Mental Status: She is alert and oriented to person, place, and time. Mental status is at baseline.   Psychiatric:         Mood and Affect: Mood normal.         Behavior: Behavior normal.           Disclaimer: This note was generated with voice recognition software.  Phonetic, grammatical, and spelling errors may be present as a result.  Please contact provider with any concerns or questions

## 2024-06-24 DIAGNOSIS — R11.0 NAUSEA: ICD-10-CM

## 2024-06-24 DIAGNOSIS — R11.14 BILIOUS VOMITING WITH NAUSEA: ICD-10-CM

## 2024-06-24 RX ORDER — ONDANSETRON 8 MG/1
8 TABLET, ORALLY DISINTEGRATING ORAL AS NEEDED
Qty: 60 TABLET | Refills: 0 | Status: SHIPPED | OUTPATIENT
Start: 2024-06-24

## 2024-06-24 RX ORDER — GRANISETRON 3.1 MG/24H
1 PATCH TRANSDERMAL
Qty: 4 PATCH | Refills: 0 | Status: SHIPPED | OUTPATIENT
Start: 2024-06-24

## 2024-07-16 DIAGNOSIS — F31.81 BIPOLAR 2 DISORDER, MAJOR DEPRESSIVE EPISODE (HCC): ICD-10-CM

## 2024-07-16 RX ORDER — CLONAZEPAM 1 MG/1
1 TABLET ORAL 4 TIMES DAILY
Qty: 120 TABLET | Refills: 2 | Status: SHIPPED | OUTPATIENT
Start: 2024-07-16

## 2024-07-16 NOTE — TELEPHONE ENCOUNTER
Reason for call:   [x] Refill   [] Prior Auth  [] Other:     Office:   [] PCP/Provider -   [x] Specialty/Provider - psychiatric     Medication: clonazePAM (KlonoPIN) 1 mg tablet     Dose/Frequency: 1 mg, Oral, 4 times daily     Quantity: 120    Pharmacy: [place] [id number] - [place] - [address] [facility]     Does the patient have enough for 3 days?   [] Yes   [x] No - Send as HP to POD

## 2024-07-23 DIAGNOSIS — K58.0 IRRITABLE BOWEL SYNDROME WITH DIARRHEA: ICD-10-CM

## 2024-07-24 RX ORDER — DIPHENOXYLATE HYDROCHLORIDE AND ATROPINE SULFATE 2.5; .025 MG/1; MG/1
1 TABLET ORAL 2 TIMES DAILY
Qty: 60 TABLET | Refills: 0 | Status: SHIPPED | OUTPATIENT
Start: 2024-07-24

## 2024-07-25 ENCOUNTER — PREP FOR PROCEDURE (OUTPATIENT)
Dept: GASTROENTEROLOGY | Facility: CLINIC | Age: 39
End: 2024-07-25

## 2024-07-25 ENCOUNTER — OFFICE VISIT (OUTPATIENT)
Dept: GASTROENTEROLOGY | Facility: CLINIC | Age: 39
End: 2024-07-25
Payer: COMMERCIAL

## 2024-07-25 ENCOUNTER — TELEPHONE (OUTPATIENT)
Dept: GASTROENTEROLOGY | Facility: CLINIC | Age: 39
End: 2024-07-25

## 2024-07-25 VITALS
BODY MASS INDEX: 24.38 KG/M2 | HEIGHT: 64 IN | WEIGHT: 142.8 LBS | DIASTOLIC BLOOD PRESSURE: 85 MMHG | SYSTOLIC BLOOD PRESSURE: 131 MMHG | HEART RATE: 103 BPM

## 2024-07-25 DIAGNOSIS — K21.9 GASTROESOPHAGEAL REFLUX DISEASE WITHOUT ESOPHAGITIS: Primary | ICD-10-CM

## 2024-07-25 DIAGNOSIS — K31.84 GASTROPARESIS: ICD-10-CM

## 2024-07-25 DIAGNOSIS — R14.0 BLOATING: ICD-10-CM

## 2024-07-25 DIAGNOSIS — K63.5 POLYP OF COLON, UNSPECIFIED PART OF COLON, UNSPECIFIED TYPE: ICD-10-CM

## 2024-07-25 DIAGNOSIS — F41.1 GENERALIZED ANXIETY DISORDER: ICD-10-CM

## 2024-07-25 PROCEDURE — 99214 OFFICE O/P EST MOD 30 MIN: CPT | Performed by: PHYSICIAN ASSISTANT

## 2024-07-25 RX ORDER — BUSPIRONE HYDROCHLORIDE 10 MG/1
10 TABLET ORAL 3 TIMES DAILY
Qty: 90 TABLET | Refills: 5 | Status: SHIPPED | OUTPATIENT
Start: 2024-07-25

## 2024-07-25 RX ORDER — FLUCONAZOLE 100 MG/1
100 TABLET ORAL DAILY
Qty: 7 TABLET | Refills: 0 | Status: SHIPPED | OUTPATIENT
Start: 2024-07-25 | End: 2024-08-01

## 2024-07-25 NOTE — PROGRESS NOTES
[facility] - Outpatient Follow-up Note  Priscila Watkins 38 y.o. female MRN: [id number]  Encounter: [id number]          ASSESSMENT AND PLAN:      1. Bloating  Patient with history of SIBO and IMO and previously was treated with Diflucan for fungal species noted on jejunal aspirate during EGD with push enteroscopy, patient recently treated with Xifaxan.,  Will try treatment with fluconazole as she did respond to 7-day course in the past, if symptoms persist then would recommend repeating SIBO testing after she has been off antibiotics longer since this could give false negative testing    2. Gastroesophageal reflux disease without esophagitis  Patient with longstanding history of symptoms of GERD, does have history of TIF and no longer on PPI, still with vague symptoms of bloating frequent belching, , on baclofen which helps with belching and her sleep but still has persistent belching, plan for EGD for further evaluation, also has history of intestinal metaplasia of the antrum    3. Gastroparesis  Patient only with mildly delayed gastric emptying on gastric emptying back in 2021, can consider repeating based on her symptomatology after EGD performed    4. Polyp of colon, unspecified part of colon, unspecified type  Most recent colonoscopy in 2022 failed to show any polyp, recommend recall at age 47 unless otherwise indicated    ______________________________________________________________________    SUBJECTIVE:    38-year-old female who presents for office visit.  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  Patient does have intestinal metaplasia of her antrum and she does have history of colon polyp. She does have history of TIF procedure and no longer taking PPI.  Gastric emptying study back in 2021 did show mildly delayed gastric emptying with 89% emptying at 4 hours.  She does  have chronic loose stools for which she takes Lomotil.  Was recently treated for bacterial overgrowth with Xifaxan but did have testing last year and she was positive for SIBO and IMO.  She reports that this episode feels similar to her previous episode when she did have fungal overgrowth and he did an EGD and push enteroscopy with jejunal aspiration which showed Candida species.  Recently was on Augmentin and then had the Xifaxan which she just completed on July 9 which was not efficacious and her symptoms.  She never had severe diarrhea when she was taking the Augmentin and generally is going more frequently about 3 times a day.          EGD with push- 9/15/23-  IMPRESSION:  Mild abnormal mucosa, consistent with gastritis in the antrum, duodenal bulb and 1st part of the duodenum; performed cold forceps biopsy  The major papilla and jejunum appeared normal.  The upper third of the esophagus, middle third of the esophagus, lower third of the esophagus and GE junction appeared normal.           S/p TIF, fundoplication site appeared normal           Jejunal and distal duodenal fluid was aspirated and sent for culture sensitivity    Path-  A.  Stomach, antrum, biopsy:  -Chronic inactive antral gastritis with intestinal metaplasia.  -Negative for Helicobacter pylori, confirmed by immunohistochemical stain, evaluated with appropriate positive controls.  -Negative for dysplasia or carcinoma        Culture of aspirate-  Body Fluid Culture, Sterile 1+ Growth of Candida albicans Abnormal    This organism has been edited. The previous result was Yeast on 9/17/2023 at 0753 EDT.   1+ Growth of Candida glabrata Abnormal    Presumptive identification      Gram positive cocci seen in smear not recovered in culture            GRAM STAIN RESULT  Abnormal   2+ Polys      3+ Gram positive cocci in pairs      2+ Gram positive cocci in clusters      1+ Gram positive cocci in chains      Rare Budding yeas                 Colonoscopy  -  IMPRESSION:  1. Mild erythema in the left colon  2. Normal terminal ileum, normal right colonic mucosa  3. Small internal hemorrhoid          REVIEW OF SYSTEMS IS OTHERWISE NEGATIVE.      Historical Information   Past Medical History:   Diagnosis Date    Anxiety     Mcallister esophagus     Bipolar 2 disorder, major depressive episode (HCC) 2021    Bipolar disorder with depression (HCC)     Last assessed: 16    Chronic pain disorder     abd    Colon polyp     COVID-19 2022    home test    Depression     Endometriosis     Last assessed: 16    Gastric ulcer     Gastroparesis     GERD (gastroesophageal reflux disease)     Hypertension     Inflammatory bowel disease     Irritable bowel syndrome     Scoliosis     Varicella      Past Surgical History:   Procedure Laterality Date    ABDOMINAL SURGERY       SECTION      CHOLECYSTECTOMY      COLONOSCOPY      HERNIA REPAIR      HERNIA REPAIR      OTHER SURGICAL HISTORY      Transoral incisionless fundoplication (TIFF)    [place]  DELIVERY ONLY N/A 2016    Procedure:  SECTION ();  Surgeon: Jarvis Garcia DO;  Location: [facility];  Service: Obstetrics    [place] INCISION EXTENSOR TENDON SHEATH WRIST Right 2020    Procedure: RELEASE DEQUERVAINS RIGHT WRIST;  Surgeon: Smith Martins MD;  Location:  MAIN OR;  Service: Orthopedics    [place] LAPAROSCOPY SURG CHOLECYSTECTOMY N/A 2018    Procedure: LAPAROSCOPIC CHOLECYSTECTOMY;  Surgeon: Aniceto Meneses MD;  Location: [place] [facility];  Service: General    TONSILECTOMY AND ADNOIDECTOMY Bilateral     UPPER GASTROINTESTINAL ENDOSCOPY      WISDOM TOOTH EXTRACTION Bilateral      Social History   Social History     Substance and Sexual Activity   Alcohol Use Not Currently    Comment: rare     Social History     Substance and Sexual Activity   Drug Use Not Currently     Social History     Tobacco Use   Smoking Status Light Smoker    Current packs/day: 0.50    Average packs/day: 0.5  packs/day for 14.1 years (7.1 ttl pk-yrs)    Types: Cigarettes    Start date: 8/13/2020    Passive exposure: Never   Smokeless Tobacco Never     Family History   Problem Relation Age of Onset    Hypertension Father     Depression Mother     Asthma Mother     Anxiety disorder Mother     Bipolar disorder Mother     Mental illness Mother     Thyroid disease Mother     Hypothyroidism Mother     Depression Maternal Grandmother     Anxiety disorder Maternal Grandmother     Bipolar disorder Maternal Grandmother     Mental illness Maternal Grandmother     Cancer Paternal Grandfather     Cancer Paternal Uncle     Breast cancer Cousin     Cancer Maternal Uncle        Meds/Allergies       Current Outpatient Medications:     amLODIPine (NORVASC) 2.5 mg tablet    baclofen 20 mg tablet    busPIRone (BUSPAR) 10 mg tablet    clonazePAM (KlonoPIN) 1 mg tablet    diphenoxylate-atropine (LOMOTIL) 2.5-0.025 mg per tablet    granisetron (Sancuso) 3.1 MG/24HR    medroxyPROGESTERone (DEPO-PROVERA) 150 mg/mL injection    metoprolol succinate (TOPROL-XL) 25 mg 24 hr tablet    mirtazapine (REMERON) 15 mg tablet    ondansetron (ZOFRAN-ODT) 8 mg disintegrating tablet    Probiotic Product (Align) 4 MG CAPS    QUEtiapine (SEROquel) 200 mg tablet    sucralfate (CARAFATE) 1 g tablet    venlafaxine (EFFEXOR-XR) 150 mg 24 hr capsule    venlafaxine (EFFEXOR-XR) 75 mg 24 hr capsule    Allergies   Allergen Reactions    Sulfa Antibiotics Anaphylaxis, Other (See Comments) and Edema     swelling  swelling  swelling    Reglan [Metoclopramide] Anxiety           Objective     not currently breastfeeding. There is no height or weight on file to calculate BMI.      PHYSICAL EXAM:      General Appearance:   Alert, cooperative, no distress   HEENT:   Normocephalic, atraumatic, anicteric.     Neck:  Supple, symmetrical, trachea midline   Lungs:   Clear to auscultation bilaterally; no rales, rhonchi or wheezing; respirations unlabored    Heart::   Regular rate and  rhythm; no murmur, rub, or gallop.   Abdomen:   Soft, non-tender, non-distended; normal bowel sounds; no masses, no organomegaly    Genitalia:   Deferred    Rectal:   Deferred    Extremities:  No cyanosis, clubbing or edema    Pulses:  2+ and symmetric    Skin:  No jaundice, rashes, or lesions    Lymph nodes:  No palpable cervical lymphadenopathy        Lab Results:   No visits with results within 1 Day(s) from this visit.   Latest known visit with results is:   Appointment on 03/21/2024   Component Date Value    Sodium 03/21/2024 136     Potassium 03/21/2024 4.1     Chloride 03/21/2024 104     CO2 03/21/2024 23     ANION GAP 03/21/2024 9     BUN 03/21/2024 10     Creatinine 03/21/2024 0.84     Glucose, Fasting 03/21/2024 99     Calcium 03/21/2024 9.0     eGFR 03/21/2024 89          Radiology Results:   No results found.

## 2024-07-25 NOTE — TELEPHONE ENCOUNTER
Scheduled date of EGD(as of today):9/26/24  Physician performing EGD:Dr Cee   Location of EGD:   Instructions reviewed with patient by:sb  Clearances: none

## 2024-07-30 ENCOUNTER — TELEMEDICINE (OUTPATIENT)
Dept: PSYCHIATRY | Facility: CLINIC | Age: 39
End: 2024-07-30
Payer: COMMERCIAL

## 2024-07-30 DIAGNOSIS — F31.32 BIPOLAR I DISORDER, MOST RECENT EPISODE DEPRESSED, MODERATE (HCC): Primary | ICD-10-CM

## 2024-07-30 PROCEDURE — 99214 OFFICE O/P EST MOD 30 MIN: CPT | Performed by: NURSE PRACTITIONER

## 2024-07-30 NOTE — PSYCH
Virtual Regular Visit    Verification of patient location:    Patient is located at Home in the following state in which I hold an active license [place]      Assessment/Plan:    Problem List Items Addressed This Visit    None      Goals addressed in session: Continue to function at baseline level of functioning.  Continues to require assistance from family with everyday activities.         Reason for visit is   Chief Complaint   Patient presents with    Virtual Regular Visit          Encounter provider MIKE Crenshaw      Recent Visits  No visits were found meeting these conditions.  Showing recent visits within past 7 days and meeting all other requirements  Today's Visits  Date Type Provider Dept   07/30/24 Telemedicine MIKE Crenshaw  [facility]   Showing today's visits and meeting all other requirements  Future Appointments  No visits were found meeting these conditions.  Showing future appointments within next 150 days and meeting all other requirements       The patient was identified by name and date of birth. Priscila Watkins was informed that this is a telemedicine visit and that the visit is being conducted throughthe [employer] platform. She agrees to proceed..  My office door was closed. No one else was in the room.  She acknowledged consent and understanding of privacy and security of the video platform. The patient has agreed to participate and understands they can discontinue the visit at any time.    Patient is aware this is a billable service.     Subjective  Priscila Watkins is a 38 y.o. female who has a history of bipolar depressive disorder.  Very strong family history of bipolar disorder with poor functioning.  The patient has been on disability for some time.  She needs to continue on it.  She cannot work.  She has tried multiple jobs over the years and it is stable.  She functions at a very low level baseline.  She currently presents with family members  including her mother, her sister and her  to assist her in her daily activities.  She has crippling anxiety controlled only with medication.  At times, the medication does not control the anxiety and she breaks her.  When that happens, her functioning level dips further.  She still has multiple medical issues which interfere with her daily life.  Again, as a result, she has family members to assist her.  I maintained that she continue not to work at this time.  She is not capable and not able.  She has a significant mental illness with a very strong family history of crippling disabling bipolar disorder.  Mental status exam: Patient is awake and alert and oriented x 3.  Mood is at a level of dysthymia.  And dysphoria.  She requires multiple assistance from multiple family members for ADLs.  She is not suicidal, not homicidal and not psychotic but cannot work.  She has tried gainful employment in the past and has failed many times.  Associations are intact.  No overt delusions.  Speech is clear and thoughts are organized and linear and goal directed.  Attention span is consistent with her level of functioning.  Impulse control is good.  Judgment and insight are consistent with her level of functioning.    The patient will continue on:  BuSpar 10 mg 3 times daily  Klonopin 1 mg 4 times daily  Remeron 15 mg at bedtime  Seroquel 200 mg 3 times daily  Effexor  mg daily  Follow-up with me in 3 months or sooner if necessary.      We have discussed their safety plan and pt agrees that if they experience unsafe thoughts that they will reach out to their supports including this office, the suicide hotline, and emergency services if necessary.               Past Medical History:   Diagnosis Date    Anxiety     Mcallister esophagus     Bipolar 2 disorder, major depressive episode (HCC) 08/17/2021    Bipolar disorder with depression (HCC)     Last assessed: 5/11/16    Chronic pain disorder     abd    Colon polyp      COVID-19 2022    home test    Depression     Endometriosis     Last assessed: 16    Gastric ulcer     Gastroparesis     GERD (gastroesophageal reflux disease)     Hypertension     Inflammatory bowel disease     Irritable bowel syndrome     Scoliosis     Varicella        Past Surgical History:   Procedure Laterality Date    ABDOMINAL SURGERY       SECTION      CHOLECYSTECTOMY      COLONOSCOPY      HERNIA REPAIR      HERNIA REPAIR      OTHER SURGICAL HISTORY      Transoral incisionless fundoplication (TIFF)    [place]  DELIVERY ONLY N/A 2016    Procedure:  SECTION ();  Surgeon: Jarvis Garcia DO;  Location: [facility];  Service: Obstetrics    [place] INCISION EXTENSOR TENDON SHEATH WRIST Right 2020    Procedure: RELEASE DEQUERVAINS RIGHT WRIST;  Surgeon: Smith Martins MD;  Location:  MAIN OR;  Service: Orthopedics    [place] LAPAROSCOPY SURG CHOLECYSTECTOMY N/A 2018    Procedure: LAPAROSCOPIC CHOLECYSTECTOMY;  Surgeon: Aniceto Meneses MD;  Location: [place] [facility];  Service: General    TONSILECTOMY AND ADNOIDECTOMY Bilateral     UPPER GASTROINTESTINAL ENDOSCOPY      WISDOM TOOTH EXTRACTION Bilateral        Current Outpatient Medications   Medication Sig Dispense Refill    amLODIPine (NORVASC) 2.5 mg tablet Take 1 tablet (2.5 mg total) by mouth daily 90 tablet 1    baclofen 20 mg tablet Take 1 tablet (20 mg total) by mouth 3 (three) times a day 90 tablet 3    busPIRone (BUSPAR) 10 mg tablet TAKE ONE TABLET BY MOUTH THREE TIMES A DAY 90 tablet 5    clonazePAM (KlonoPIN) 1 mg tablet Take 1 tablet (1 mg total) by mouth 4 (four) times a day 120 tablet 2    diphenoxylate-atropine (LOMOTIL) 2.5-0.025 mg per tablet Take 1 tablet by mouth 2 (two) times a day 60 tablet 0    fluconazole (DIFLUCAN) 100 mg tablet Take 1 tablet (100 mg total) by mouth daily for 7 days 7 tablet 0    granisetron (Sancuso) 3.1 MG/24HR Place 1 patch on the skin over 7 days every 7 days 4 patch 0     medroxyPROGESTERone (DEPO-PROVERA) 150 mg/mL injection Inject 1 mL (150 mg total) into a muscle every 3 (three) months 1 mL 3    metoprolol succinate (TOPROL-XL) 25 mg 24 hr tablet Take 1 tablet (25 mg total) by mouth daily 90 tablet 1    mirtazapine (REMERON) 15 mg tablet TAKE ONE TABLET BY MOUTH AT BEDTIME 30 tablet 5    ondansetron (ZOFRAN-ODT) 8 mg disintegrating tablet Take 1 tablet (8 mg total) by mouth as needed for nausea (EVERY 6 HOURS AS NEEDED FOR NAUSEA) 60 tablet 0    Probiotic Product (Align) 4 MG CAPS Take 1 capsule (4 mg total) by mouth 2 (two) times a day 60 capsule 1    QUEtiapine (SEROquel) 200 mg tablet TAKE 1 TABLET BY MOUTH 3 TIMES A DAY (NOON, 5:00PM, AND BEDTIME) 90 tablet 5    sucralfate (CARAFATE) 1 g tablet Take 1 tablet (1 g total) by mouth 4 (four) times a day (Patient not taking: Reported on 6/21/2024) 120 tablet 1    venlafaxine (EFFEXOR-XR) 150 mg 24 hr capsule TAKE ONE CAPSULE BY MOUTH EVERY DAY 30 capsule 5    venlafaxine (EFFEXOR-XR) 75 mg 24 hr capsule TAKE ONE CAPSULE BY MOUTH EVERY DAY 30 capsule 5     No current facility-administered medications for this visit.        Allergies   Allergen Reactions    Sulfa Antibiotics Anaphylaxis, Other (See Comments) and Edema     swelling  swelling  swelling    Reglan [Metoclopramide] Anxiety       Review of Systems    Video Exam    There were no vitals filed for this visit.    Physical Exam     Visit Time:  Visit Start Time: 10:30a  Visit Stop Time: 10:55a  Total Visit Duration: 25 minutes were spent in the visit.  Time was spent reviewing the treatment plan, reviewing medications, ordering medications, reviewing and updating the treatment plan and completing the progress note.

## 2024-08-08 DIAGNOSIS — R11.14 BILIOUS VOMITING WITH NAUSEA: ICD-10-CM

## 2024-08-08 RX ORDER — GRANISETRON 3.1 MG/24H
1 PATCH TRANSDERMAL
Qty: 4 PATCH | Refills: 0 | Status: SHIPPED | OUTPATIENT
Start: 2024-08-08

## 2024-08-18 DIAGNOSIS — F31.81 BIPOLAR 2 DISORDER, MAJOR DEPRESSIVE EPISODE (HCC): ICD-10-CM

## 2024-08-19 RX ORDER — QUETIAPINE FUMARATE 200 MG/1
TABLET, FILM COATED ORAL
Qty: 90 TABLET | Refills: 5 | Status: SHIPPED | OUTPATIENT
Start: 2024-08-19

## 2024-08-19 RX ORDER — MIRTAZAPINE 15 MG/1
15 TABLET, FILM COATED ORAL
Qty: 30 TABLET | Refills: 0 | Status: SHIPPED | OUTPATIENT
Start: 2024-08-19

## 2024-08-19 RX ORDER — VENLAFAXINE HYDROCHLORIDE 75 MG/1
75 CAPSULE, EXTENDED RELEASE ORAL DAILY
Qty: 30 CAPSULE | Refills: 2 | Status: SHIPPED | OUTPATIENT
Start: 2024-08-19

## 2024-08-19 RX ORDER — VENLAFAXINE HYDROCHLORIDE 150 MG/1
150 CAPSULE, EXTENDED RELEASE ORAL DAILY
Qty: 30 CAPSULE | Refills: 2 | Status: SHIPPED | OUTPATIENT
Start: 2024-08-19

## 2024-08-30 DIAGNOSIS — R11.14 BILIOUS VOMITING WITH NAUSEA: ICD-10-CM

## 2024-08-30 RX ORDER — GRANISETRON 3.1 MG/24H
1 PATCH TRANSDERMAL
Qty: 4 PATCH | Refills: 0 | Status: SHIPPED | OUTPATIENT
Start: 2024-08-30

## 2024-09-22 DIAGNOSIS — F31.81 BIPOLAR 2 DISORDER, MAJOR DEPRESSIVE EPISODE (HCC): ICD-10-CM

## 2024-09-23 DIAGNOSIS — K58.0 IRRITABLE BOWEL SYNDROME WITH DIARRHEA: ICD-10-CM

## 2024-09-23 DIAGNOSIS — R11.0 NAUSEA: ICD-10-CM

## 2024-09-23 RX ORDER — MIRTAZAPINE 15 MG/1
15 TABLET, FILM COATED ORAL
Qty: 30 TABLET | Refills: 0 | Status: SHIPPED | OUTPATIENT
Start: 2024-09-23

## 2024-09-23 NOTE — TELEPHONE ENCOUNTER
Patient called to request a refill for their   mirtazapine (REMERON) 15 mg tablet    advised a refill was requested on 9/22 and is pending approval. Patient verbalized understanding and is in agreement.

## 2024-09-24 RX ORDER — ONDANSETRON 8 MG/1
8 TABLET, ORALLY DISINTEGRATING ORAL AS NEEDED
Qty: 60 TABLET | Refills: 0 | Status: SHIPPED | OUTPATIENT
Start: 2024-09-24

## 2024-09-24 RX ORDER — DIPHENOXYLATE HCL/ATROPINE 2.5-.025MG
1 TABLET ORAL 2 TIMES DAILY
Qty: 60 TABLET | Refills: 0 | Status: SHIPPED | OUTPATIENT
Start: 2024-09-24

## 2024-09-26 ENCOUNTER — ANESTHESIA EVENT (OUTPATIENT)
Dept: GASTROENTEROLOGY | Facility: HOSPITAL | Age: 39
End: 2024-09-26
Payer: COMMERCIAL

## 2024-09-26 ENCOUNTER — HOSPITAL ENCOUNTER (OUTPATIENT)
Dept: GASTROENTEROLOGY | Facility: HOSPITAL | Age: 39
Setting detail: OUTPATIENT SURGERY
End: 2024-09-26
Payer: COMMERCIAL

## 2024-09-26 ENCOUNTER — ANESTHESIA (OUTPATIENT)
Dept: GASTROENTEROLOGY | Facility: HOSPITAL | Age: 39
End: 2024-09-26
Payer: COMMERCIAL

## 2024-09-26 VITALS
WEIGHT: 150.1 LBS | OXYGEN SATURATION: 98 % | HEART RATE: 72 BPM | DIASTOLIC BLOOD PRESSURE: 76 MMHG | RESPIRATION RATE: 14 BRPM | BODY MASS INDEX: 25.62 KG/M2 | HEIGHT: 64 IN | TEMPERATURE: 97.5 F | SYSTOLIC BLOOD PRESSURE: 121 MMHG

## 2024-09-26 DIAGNOSIS — I10 ESSENTIAL HYPERTENSION: ICD-10-CM

## 2024-09-26 DIAGNOSIS — K63.89 OTHER SPECIFIED DISEASES OF INTESTINE: ICD-10-CM

## 2024-09-26 DIAGNOSIS — K21.9 GASTROESOPHAGEAL REFLUX DISEASE WITHOUT ESOPHAGITIS: ICD-10-CM

## 2024-09-26 PROBLEM — U07.1 COVID-19 VIRUS INFECTION: Status: RESOLVED | Noted: 2021-12-03 | Resolved: 2024-09-26

## 2024-09-26 LAB
EXT PREGNANCY TEST URINE: NEGATIVE
EXT. CONTROL: NORMAL

## 2024-09-26 PROCEDURE — 43239 EGD BIOPSY SINGLE/MULTIPLE: CPT | Performed by: INTERNAL MEDICINE

## 2024-09-26 PROCEDURE — 81025 URINE PREGNANCY TEST: CPT | Performed by: INTERNAL MEDICINE

## 2024-09-26 PROCEDURE — 88305 TISSUE EXAM BY PATHOLOGIST: CPT | Performed by: PATHOLOGY

## 2024-09-26 RX ORDER — AMLODIPINE BESYLATE 2.5 MG/1
2.5 TABLET ORAL DAILY
Qty: 90 TABLET | Refills: 1 | Status: SHIPPED | OUTPATIENT
Start: 2024-09-26

## 2024-09-26 RX ORDER — BACLOFEN 20 MG/1
20 TABLET ORAL 3 TIMES DAILY
Qty: 90 TABLET | Refills: 3 | Status: SHIPPED | OUTPATIENT
Start: 2024-09-26

## 2024-09-26 RX ORDER — PROPOFOL 10 MG/ML
INJECTION, EMULSION INTRAVENOUS AS NEEDED
Status: DISCONTINUED | OUTPATIENT
Start: 2024-09-26 | End: 2024-09-26

## 2024-09-26 RX ORDER — LIDOCAINE HYDROCHLORIDE 20 MG/ML
INJECTION, SOLUTION EPIDURAL; INFILTRATION; INTRACAUDAL; PERINEURAL AS NEEDED
Status: DISCONTINUED | OUTPATIENT
Start: 2024-09-26 | End: 2024-09-26

## 2024-09-26 RX ORDER — SODIUM CHLORIDE, SODIUM LACTATE, POTASSIUM CHLORIDE, CALCIUM CHLORIDE 600; 310; 30; 20 MG/100ML; MG/100ML; MG/100ML; MG/100ML
INJECTION, SOLUTION INTRAVENOUS CONTINUOUS PRN
Status: DISCONTINUED | OUTPATIENT
Start: 2024-09-26 | End: 2024-09-26

## 2024-09-26 RX ADMIN — LIDOCAINE HYDROCHLORIDE 100 MG: 20 INJECTION, SOLUTION EPIDURAL; INFILTRATION; INTRACAUDAL; PERINEURAL at 11:15

## 2024-09-26 RX ADMIN — PROPOFOL 200 MG: 10 INJECTION, EMULSION INTRAVENOUS at 11:16

## 2024-09-26 RX ADMIN — PROPOFOL 50 MG: 10 INJECTION, EMULSION INTRAVENOUS at 11:23

## 2024-09-26 RX ADMIN — SODIUM CHLORIDE, SODIUM LACTATE, POTASSIUM CHLORIDE, AND CALCIUM CHLORIDE: .6; .31; .03; .02 INJECTION, SOLUTION INTRAVENOUS at 11:12

## 2024-09-26 RX ADMIN — PROPOFOL 50 MG: 10 INJECTION, EMULSION INTRAVENOUS at 11:17

## 2024-09-26 RX ADMIN — PROPOFOL 50 MG: 10 INJECTION, EMULSION INTRAVENOUS at 11:20

## 2024-09-26 NOTE — ANESTHESIA POSTPROCEDURE EVALUATION
Post-Op Assessment Note    CV Status:  Stable    Pain management: adequate       Mental Status:  Alert   Hydration Status:  Stable   PONV Controlled:  None   Airway Patency:  Patent and adequate     Post Op Vitals Reviewed: Yes    No anethesia notable event occurred.    Staff: CRNA               /55 (09/26/24 1130)    Temp 97.9 °F (36.6 °C) (09/26/24 1130)    Pulse 71 (09/26/24 1130)   Resp 18 (09/26/24 1130)    SpO2 97 % (09/26/24 1130)

## 2024-09-26 NOTE — ANESTHESIA PREPROCEDURE EVALUATION
Procedure:  EGD    Relevant Problems   ANESTHESIA   (-) History of anesthesia complications      CARDIO   (+) Essential hypertension   (+) Left bundle branch block   (-) Chest pain   (-) NATHAN (dyspnea on exertion)      GI/HEPATIC   (+) Acid reflux disease      MUSCULOSKELETAL   (+) Scoliosis      NEURO/PSYCH   (+) Anxiety   (+) Anxious depression      PULMONARY   (-) Shortness of breath   (-) Sleep apnea   (-) URI (upper respiratory infection)      Behavioral Health   (+) Bipolar I disorder, most recent episode depressed, moderate (HCC)   (+) Bipolar II disorder (HCC)        Physical Exam    Airway    Mallampati score: II  TM Distance: >3 FB  Neck ROM: full     Dental       Cardiovascular      Pulmonary      Other Findings  post-pubertal.      Anesthesia Plan  ASA Score- 2     Anesthesia Type- IV sedation with anesthesia with ASA Monitors.         Additional Monitors:     Airway Plan:            Plan Factors-Exercise tolerance (METS): >4 METS.    Chart reviewed. EKG reviewed.  Existing labs reviewed. Patient summary reviewed.                  Induction- intravenous.    Postoperative Plan-         Informed Consent- Anesthetic plan and risks discussed with patient.  I personally reviewed this patient with the CRNA. Discussed and agreed on the Anesthesia Plan with the CRNA..

## 2024-09-26 NOTE — H&P
History and Physical - [facility]  Priscila Watkins 39 y.o. female MRN: [id number]                  HPI: Priscila Watkins is a 39 y.o. year old female who presents for GERD, gastric intestinal metaplasia      REVIEW OF SYSTEMS: Per the HPI, and otherwise unremarkable.    Historical Information   Past Medical History:   Diagnosis Date    Anxiety     Mcallister esophagus     Bipolar 2 disorder, major depressive episode (HCC) 2021    Bipolar disorder with depression (HCC)     Last assessed: 16    Chronic pain disorder     abd    Colon polyp     COVID-19 2022    home test    Depression     Endometriosis     Last assessed: 16    Gastric ulcer     Gastroparesis     GERD (gastroesophageal reflux disease)     Hypertension     Inflammatory bowel disease     Irritable bowel syndrome     Scoliosis     Varicella      Past Surgical History:   Procedure Laterality Date    ABDOMINAL SURGERY       SECTION      CHOLECYSTECTOMY      COLONOSCOPY      HERNIA REPAIR      HERNIA REPAIR      OTHER SURGICAL HISTORY      Transoral incisionless fundoplication (TIFF)    [place]  DELIVERY ONLY N/A 2016    Procedure:  SECTION ();  Surgeon: Jarvis Garcia DO;  Location: [facility];  Service: Obstetrics    [place] INCISION EXTENSOR TENDON SHEATH WRIST Right 2020    Procedure: RELEASE DEQUERVAINS RIGHT WRIST;  Surgeon: Smith Martins MD;  Location:  MAIN OR;  Service: Orthopedics    [place] LAPAROSCOPY SURG CHOLECYSTECTOMY N/A 2018    Procedure: LAPAROSCOPIC CHOLECYSTECTOMY;  Surgeon: Aniceto Meneses MD;  Location: [facility];  Service: General    TONSILECTOMY AND ADNOIDECTOMY Bilateral     UPPER GASTROINTESTINAL ENDOSCOPY      WISDOM TOOTH EXTRACTION Bilateral      Social History   Social History     Substance and Sexual Activity   Alcohol Use Not Currently    Comment: rare     Social History     Substance and Sexual Activity   Drug Use Not Currently     Social History  "    Tobacco Use   Smoking Status Light Smoker    Current packs/day: 0.50    Average packs/day: 0.5 packs/day for 14.3 years (7.2 ttl pk-yrs)    Types: Cigarettes    Start date: 8/13/2020    Passive exposure: Never   Smokeless Tobacco Never     Family History   Problem Relation Age of Onset    Hypertension Father     Depression Mother     Asthma Mother     Anxiety disorder Mother     Bipolar disorder Mother     Mental illness Mother     Thyroid disease Mother     Hypothyroidism Mother     Depression Maternal Grandmother     Anxiety disorder Maternal Grandmother     Bipolar disorder Maternal Grandmother     Mental illness Maternal Grandmother     Cancer Paternal Grandfather     Cancer Paternal Uncle     Breast cancer Cousin     Cancer Maternal Uncle        Meds/Allergies       Current Outpatient Medications:     amLODIPine (NORVASC) 2.5 mg tablet    busPIRone (BUSPAR) 10 mg tablet    clonazePAM (KlonoPIN) 1 mg tablet    diphenoxylate-atropine (LOMOTIL) 2.5-0.025 mg per tablet    granisetron (Sancuso) 3.1 MG/24HR    metoprolol succinate (TOPROL-XL) 25 mg 24 hr tablet    mirtazapine (REMERON) 15 mg tablet    Probiotic Product (Align) 4 MG CAPS    QUEtiapine (SEROquel) 200 mg tablet    venlafaxine (EFFEXOR-XR) 150 mg 24 hr capsule    venlafaxine (EFFEXOR-XR) 75 mg 24 hr capsule    baclofen 20 mg tablet    medroxyPROGESTERone (DEPO-PROVERA) 150 mg/mL injection    ondansetron (ZOFRAN-ODT) 8 mg disintegrating tablet    sucralfate (CARAFATE) 1 g tablet    Allergies   Allergen Reactions    Sulfa Antibiotics Anaphylaxis, Other (See Comments) and Edema     swelling  swelling  swelling    Reglan [Metoclopramide] Anxiety       Objective     /80   Pulse 76   Temp 97.7 °F (36.5 °C) (Temporal)   Resp 18   Ht 5' 4\" (1.626 m)   Wt 68.1 kg (150 lb 1.6 oz)   LMP 09/05/2024   SpO2 100%   BMI 25.76 kg/m²       PHYSICAL EXAM    Gen: NAD  Head: NCAT  CV: RRR  CHEST: Clear  ABD: soft, NT/ND  EXT: no edema      ASSESSMENT/PLAN:  " This is a 39 y.o. year old female here for EGD, and she is stable and optimized for her procedure.

## 2024-09-29 DIAGNOSIS — I10 ESSENTIAL HYPERTENSION: ICD-10-CM

## 2024-09-30 RX ORDER — METOPROLOL SUCCINATE 25 MG/1
25 TABLET, EXTENDED RELEASE ORAL DAILY
Qty: 90 TABLET | Refills: 0 | Status: SHIPPED | OUTPATIENT
Start: 2024-09-30

## 2024-10-01 PROCEDURE — 88305 TISSUE EXAM BY PATHOLOGIST: CPT | Performed by: PATHOLOGY

## 2024-10-03 ENCOUNTER — APPOINTMENT (OUTPATIENT)
Dept: LAB | Age: 39
End: 2024-10-03
Payer: COMMERCIAL

## 2024-10-03 ENCOUNTER — OFFICE VISIT (OUTPATIENT)
Dept: INTERNAL MEDICINE CLINIC | Age: 39
End: 2024-10-03
Payer: COMMERCIAL

## 2024-10-03 VITALS
HEART RATE: 77 BPM | TEMPERATURE: 97.6 F | BODY MASS INDEX: 25.27 KG/M2 | DIASTOLIC BLOOD PRESSURE: 80 MMHG | HEIGHT: 64 IN | OXYGEN SATURATION: 98 % | SYSTOLIC BLOOD PRESSURE: 130 MMHG | WEIGHT: 148 LBS

## 2024-10-03 DIAGNOSIS — R73.9 HYPERGLYCEMIA: ICD-10-CM

## 2024-10-03 DIAGNOSIS — I10 ESSENTIAL HYPERTENSION: ICD-10-CM

## 2024-10-03 DIAGNOSIS — K21.9 GASTROESOPHAGEAL REFLUX DISEASE WITHOUT ESOPHAGITIS: ICD-10-CM

## 2024-10-03 DIAGNOSIS — I10 ESSENTIAL HYPERTENSION: Primary | ICD-10-CM

## 2024-10-03 DIAGNOSIS — K31.84 GASTROPARESIS: ICD-10-CM

## 2024-10-03 DIAGNOSIS — F31.81 BIPOLAR II DISORDER (HCC): ICD-10-CM

## 2024-10-03 LAB
ALBUMIN SERPL BCG-MCNC: 4.3 G/DL (ref 3.5–5)
ALP SERPL-CCNC: 50 U/L (ref 34–104)
ALT SERPL W P-5'-P-CCNC: 7 U/L (ref 7–52)
ANION GAP SERPL CALCULATED.3IONS-SCNC: 9 MMOL/L (ref 4–13)
AST SERPL W P-5'-P-CCNC: 16 U/L (ref 13–39)
BILIRUB SERPL-MCNC: 0.37 MG/DL (ref 0.2–1)
BUN SERPL-MCNC: 7 MG/DL (ref 5–25)
CALCIUM SERPL-MCNC: 8.8 MG/DL (ref 8.4–10.2)
CHLORIDE SERPL-SCNC: 106 MMOL/L (ref 96–108)
CO2 SERPL-SCNC: 27 MMOL/L (ref 21–32)
CREAT SERPL-MCNC: 0.78 MG/DL (ref 0.6–1.3)
ERYTHROCYTE [DISTWIDTH] IN BLOOD BY AUTOMATED COUNT: 13.3 % (ref 11.6–15.1)
EST. AVERAGE GLUCOSE BLD GHB EST-MCNC: 117 MG/DL
GFR SERPL CREATININE-BSD FRML MDRD: 97 ML/MIN/1.73SQ M
GLUCOSE P FAST SERPL-MCNC: 85 MG/DL (ref 65–99)
HBA1C MFR BLD: 5.7 %
HCT VFR BLD AUTO: 38.3 % (ref 34.8–46.1)
HGB BLD-MCNC: 12.3 G/DL (ref 11.5–15.4)
MCH RBC QN AUTO: 30.5 PG (ref 26.8–34.3)
MCHC RBC AUTO-ENTMCNC: 32.1 G/DL (ref 31.4–37.4)
MCV RBC AUTO: 95 FL (ref 82–98)
PLATELET # BLD AUTO: 282 THOUSANDS/UL (ref 149–390)
PMV BLD AUTO: 11.1 FL (ref 8.9–12.7)
POTASSIUM SERPL-SCNC: 4.2 MMOL/L (ref 3.5–5.3)
PROT SERPL-MCNC: 6.9 G/DL (ref 6.4–8.4)
RBC # BLD AUTO: 4.03 MILLION/UL (ref 3.81–5.12)
SODIUM SERPL-SCNC: 142 MMOL/L (ref 135–147)
WBC # BLD AUTO: 5.38 THOUSAND/UL (ref 4.31–10.16)

## 2024-10-03 PROCEDURE — 85027 COMPLETE CBC AUTOMATED: CPT

## 2024-10-03 PROCEDURE — 80053 COMPREHEN METABOLIC PANEL: CPT

## 2024-10-03 PROCEDURE — 83036 HEMOGLOBIN GLYCOSYLATED A1C: CPT

## 2024-10-03 PROCEDURE — 99213 OFFICE O/P EST LOW 20 MIN: CPT | Performed by: INTERNAL MEDICINE

## 2024-10-03 PROCEDURE — 36415 COLL VENOUS BLD VENIPUNCTURE: CPT

## 2024-10-03 NOTE — PROGRESS NOTES
Assessment/Plan:    Essential hypertension  Blood pressure is well-controlled on amlodipine 2.5 mg daily.  Continue with low-salt diet as well.  Continue with exercise    Gastroparesis  Stable on present regimen.  Managed by gastroenterologist    Acid reflux disease  Stable on present regimen.  Managed by gastroenterologist.    Bipolar II disorder (HCC)  Managed by psychiatrist.  Doing well on present regimen       Diagnoses and all orders for this visit:    Essential hypertension    Bipolar II disorder (HCC)    Gastroesophageal reflux disease without esophagitis    Gastroparesis               Subjective:          Patient ID: Priscila Watkins is a 39 y.o. female.    Patient is here for follow-up.  Recently underwent EGD by gastroenterologist.  Also was unable to do blood work as requested on previous visit.  Otherwise is feeling stable.  Nausea is better.        The following portions of the patient's history were reviewed and updated as appropriate: allergies, current medications, past family history, past medical history, past social history, past surgical history, and problem list.    Review of Systems   Constitutional:  Negative for fatigue and fever.   HENT:  Negative for congestion, ear discharge, ear pain, postnasal drip, sinus pressure, sore throat, tinnitus and trouble swallowing.    Eyes:  Negative for discharge, itching and visual disturbance.   Respiratory:  Negative for cough and shortness of breath.    Cardiovascular:  Negative for chest pain and palpitations.   Gastrointestinal:  Negative for abdominal pain, diarrhea, nausea and vomiting.   Endocrine: Negative for cold intolerance and polyuria.   Genitourinary:  Negative for difficulty urinating, dysuria and urgency.   Musculoskeletal:  Negative for arthralgias and neck pain.   Skin:  Negative for rash.   Allergic/Immunologic: Negative for environmental allergies.   Neurological:  Negative for dizziness, weakness and headaches.    Psychiatric/Behavioral:  Negative for agitation and behavioral problems. The patient is not nervous/anxious.          Past Medical History:   Diagnosis Date    Anxiety     Mcallister esophagus     Bipolar 2 disorder, major depressive episode (HCC) 08/17/2021    Bipolar disorder with depression (HCC)     Last assessed: 5/11/16    Chronic pain disorder     abd    Colon polyp     COVID-19 12/21/2022    home test    Depression     Endometriosis     Last assessed: 5/11/16    Gastric ulcer     Gastroparesis     GERD (gastroesophageal reflux disease)     Hypertension     Inflammatory bowel disease     Irritable bowel syndrome     Scoliosis     Varicella          Current Outpatient Medications:     amLODIPine (NORVASC) 2.5 mg tablet, TAKE ONE TABLET BY MOUTH EVERY DAY, Disp: 90 tablet, Rfl: 1    baclofen 20 mg tablet, TAKE ONE TABLET BY MOUTH THREE TIMES A DAY, Disp: 90 tablet, Rfl: 3    busPIRone (BUSPAR) 10 mg tablet, TAKE ONE TABLET BY MOUTH THREE TIMES A DAY, Disp: 90 tablet, Rfl: 5    clonazePAM (KlonoPIN) 1 mg tablet, Take 1 tablet (1 mg total) by mouth 4 (four) times a day, Disp: 120 tablet, Rfl: 2    diphenoxylate-atropine (LOMOTIL) 2.5-0.025 mg per tablet, Take 1 tablet by mouth 2 (two) times a day, Disp: 60 tablet, Rfl: 0    granisetron (Sancuso) 3.1 MG/24HR, Place 1 patch on the skin over 7 days every 7 days, Disp: 4 patch, Rfl: 0    medroxyPROGESTERone (DEPO-PROVERA) 150 mg/mL injection, Inject 1 mL (150 mg total) into a muscle every 3 (three) months, Disp: 1 mL, Rfl: 3    metoprolol succinate (TOPROL-XL) 25 mg 24 hr tablet, Take 1 tablet (25 mg total) by mouth daily, Disp: 90 tablet, Rfl: 0    mirtazapine (REMERON) 15 mg tablet, TAKE ONE TABLET BY MOUTH DAILY AT BEDTIME, Disp: 30 tablet, Rfl: 0    ondansetron (ZOFRAN-ODT) 8 mg disintegrating tablet, Take 1 tablet (8 mg total) by mouth as needed for nausea (EVERY 6 HOURS AS NEEDED FOR NAUSEA), Disp: 60 tablet, Rfl: 0    QUEtiapine (SEROquel) 200 mg tablet, TAKE 1  TABLET BY MOUTH 3 TIMES A DAY (NOON, 5:00PM, AND BEDTIME), Disp: 90 tablet, Rfl: 5    venlafaxine (EFFEXOR-XR) 150 mg 24 hr capsule, TAKE ONE CAPSULE BY MOUTH EVERY DAY, Disp: 30 capsule, Rfl: 2    venlafaxine (EFFEXOR-XR) 75 mg 24 hr capsule, TAKE ONE CAPSULE BY MOUTH EVERY DAY, Disp: 30 capsule, Rfl: 2    Allergies   Allergen Reactions    Sulfa Antibiotics Anaphylaxis, Other (See Comments) and Edema     swelling  swelling  swelling    Reglan [Metoclopramide] Anxiety       Social History   Past Surgical History:   Procedure Laterality Date    ABDOMINAL SURGERY       SECTION      CHOLECYSTECTOMY      COLONOSCOPY      HERNIA REPAIR      HERNIA REPAIR      OTHER SURGICAL HISTORY      Transoral incisionless fundoplication (TIFF)    IN  DELIVERY ONLY N/A 2016    Procedure:  SECTION ();  Surgeon: Jarvis Garcia DO;  Location: [facility];  Service: Obstetrics    IN INCISION EXTENSOR TENDON SHEATH WRIST Right 2020    Procedure: RELEASE DEQUERVAINS RIGHT WRIST;  Surgeon: Smith Martins MD;  Location:  MAIN OR;  Service: Orthopedics    IN LAPAROSCOPY SURG CHOLECYSTECTOMY N/A 2018    Procedure: LAPAROSCOPIC CHOLECYSTECTOMY;  Surgeon: Aniceto Meneses MD;  Location: [facility];  Service: General    TONSILECTOMY AND ADNOIDECTOMY Bilateral     UPPER GASTROINTESTINAL ENDOSCOPY      WISDOM TOOTH EXTRACTION Bilateral      Family History   Problem Relation Age of Onset    Hypertension Father     Depression Mother     Asthma Mother     Anxiety disorder Mother     Bipolar disorder Mother     Mental illness Mother     Thyroid disease Mother     Hypothyroidism Mother     Depression Maternal Grandmother     Anxiety disorder Maternal Grandmother     Bipolar disorder Maternal Grandmother     Mental illness Maternal Grandmother     Cancer Paternal Grandfather     Cancer Paternal Uncle     Breast cancer Cousin     Cancer Maternal Uncle        Objective:  /80 (BP Location: Left arm,  "Patient Position: Sitting, Cuff Size: Standard)   Pulse 77   Temp 97.6 °F (36.4 °C) (Temporal)   Ht 5' 4\" (1.626 m)   Wt 67.1 kg (148 lb)   LMP 09/05/2024   SpO2 98%   BMI 25.40 kg/m²   Body mass index is 25.4 kg/m².     Physical Exam  Constitutional:       General: She is not in acute distress.     Appearance: She is well-developed.   HENT:      Head: Normocephalic.      Right Ear: External ear normal. There is no impacted cerumen.      Left Ear: External ear normal. There is no impacted cerumen.      Nose: No rhinorrhea.      Mouth/Throat:      Pharynx: No oropharyngeal exudate or posterior oropharyngeal erythema.   Eyes:      General: No scleral icterus.     Pupils: Pupils are equal, round, and reactive to light.   Neck:      Thyroid: No thyromegaly.      Trachea: No tracheal deviation.   Cardiovascular:      Rate and Rhythm: Normal rate and regular rhythm.      Heart sounds: Normal heart sounds. No murmur heard.  Pulmonary:      Effort: Pulmonary effort is normal. No respiratory distress.      Breath sounds: Normal breath sounds.   Chest:      Chest wall: No tenderness.   Abdominal:      General: Bowel sounds are normal.      Palpations: Abdomen is soft. There is no mass.      Tenderness: There is no abdominal tenderness.   Musculoskeletal:         General: Normal range of motion.      Cervical back: Normal range of motion and neck supple. No rigidity.      Right lower leg: No edema.      Left lower leg: No edema.   Lymphadenopathy:      Cervical: No cervical adenopathy.   Skin:     General: Skin is warm.      Findings: No erythema or rash.   Neurological:      Mental Status: She is alert and oriented to person, place, and time.      Cranial Nerves: No cranial nerve deficit.   Psychiatric:         Mood and Affect: Mood normal.         Behavior: Behavior normal.                   "

## 2024-10-03 NOTE — ASSESSMENT & PLAN NOTE
Blood pressure is well-controlled on amlodipine 2.5 mg daily.  Continue with low-salt diet as well.  Continue with exercise

## 2024-10-07 DIAGNOSIS — F31.81 BIPOLAR 2 DISORDER, MAJOR DEPRESSIVE EPISODE (HCC): ICD-10-CM

## 2024-10-07 RX ORDER — CLONAZEPAM 1 MG/1
1 TABLET ORAL 4 TIMES DAILY
Qty: 120 TABLET | Refills: 2 | Status: SHIPPED | OUTPATIENT
Start: 2024-10-07

## 2024-10-08 NOTE — RESULT ENCOUNTER NOTE
Please call the patient regarding results.  Gastric biopsies were benign.  No evidence of H. pylori, significant gastritis or precancerous changes.

## 2024-10-15 DIAGNOSIS — R11.14 BILIOUS VOMITING WITH NAUSEA: ICD-10-CM

## 2024-10-15 RX ORDER — GRANISETRON 3.1 MG/24H
1 PATCH TRANSDERMAL
Qty: 4 PATCH | Refills: 0 | Status: SHIPPED | OUTPATIENT
Start: 2024-10-15

## 2024-10-20 DIAGNOSIS — F31.81 BIPOLAR 2 DISORDER, MAJOR DEPRESSIVE EPISODE (HCC): ICD-10-CM

## 2024-10-21 RX ORDER — MIRTAZAPINE 15 MG/1
15 TABLET, FILM COATED ORAL
Qty: 30 TABLET | Refills: 0 | Status: SHIPPED | OUTPATIENT
Start: 2024-10-21

## 2024-10-25 DIAGNOSIS — K58.0 IRRITABLE BOWEL SYNDROME WITH DIARRHEA: Primary | ICD-10-CM

## 2024-10-25 DIAGNOSIS — K63.8219 SMALL INTESTINAL BACTERIAL OVERGROWTH (SIBO): ICD-10-CM

## 2024-10-28 ENCOUNTER — TELEMEDICINE (OUTPATIENT)
Dept: PSYCHIATRY | Facility: CLINIC | Age: 39
End: 2024-10-28
Payer: COMMERCIAL

## 2024-10-28 DIAGNOSIS — F41.9 ANXIETY: ICD-10-CM

## 2024-10-28 DIAGNOSIS — F31.32 BIPOLAR I DISORDER, MOST RECENT EPISODE DEPRESSED, MODERATE (HCC): Primary | ICD-10-CM

## 2024-10-28 PROCEDURE — 99214 OFFICE O/P EST MOD 30 MIN: CPT | Performed by: NURSE PRACTITIONER

## 2024-10-28 NOTE — PSYCH
Virtual Regular Visit    Verification of patient location:    Patient is located at Home in the following state in which I hold an active license [place]      Assessment/Plan:    Problem List Items Addressed This Visit       Anxiety    Anxious depression    Bipolar I disorder, most recent episode depressed, moderate (HCC) - Primary       Goals addressed in session: Maintain current level of stability         Reason for visit is   Chief Complaint   Patient presents with    Virtual Regular Visit          Encounter provider MIKE Crenshaw      Recent Visits  No visits were found meeting these conditions.  Showing recent visits within past 7 days and meeting all other requirements  Today's Visits  Date Type Provider Dept   10/28/24 Telemedicine MIKE Crenshaw  [facility]   Showing today's visits and meeting all other requirements  Future Appointments  No visits were found meeting these conditions.  Showing future appointments within next 150 days and meeting all other requirements       The patient was identified by name and date of birth. Priscila Watkins was informed that this is a telemedicine visit and that the visit is being conducted throughthe [employer] platform. She agrees to proceed..  My office door was closed. No one else was in the room.  She acknowledged consent and understanding of privacy and security of the video platform. The patient has agreed to participate and understands they can discontinue the visit at any time.    Patient is aware this is a billable service.     Subjective  Priscila Watkins is a 39 y.o. female who has a history of bipolar 1 disorder..    She functions at minimal baseline.  Her family serves as most assisting to her.  Overall manages fairly well on her current medication regimen.  Many attempts have been made to adjust or change her lower medication unsuccessfully.  She becomes very anxious very easily overwhelmed and physically ill.  So for now,  we will continue current meds and treatment.  She is sleeping well and eating well.  She has a young son who is in school and he is doing fairly well.  She and her  get along well and he is of great assistance to her.  Mental status exam: Patient is awake and alert and oriented x 3.  Mood is stable.  Patient is functioning at baseline.  Patient is not suicidal, not homicidal and not psychotic.  Speech is clear.  Thoughts are limited but organized.  Associations are intact.  No overt delusions.  Attention span is at baseline.  Impulse control is good.  Judgment and insight are limited.  Patient relies on others for assistance for ADLs.  Memory is good.  Patient will continue on:  BuSpar 10 mg 3 times daily  Klonopin 1 mg 4 times daily.  As mentioned above, attempts to lower this medication at any time, has resulted in failure and decreased level of functioning for the patient.  Remeron 15 mg at bedtime Seroquel 200 mg 3 times daily  Effexor  mg daily  Follow-up with me in 3 months or sooner if necessary.    Assessment & Plan  Bipolar I disorder, most recent episode depressed, moderate (HCC)  Seroquel 200 mg 3 times daily  Effexor  mg daily BuSpar 10 mg BuSpar 10 mg 3 times daily    Remeron 15 mg at bedtime       Anxiety  Klonopin 1mg QID  Bispar 10mg TID           Past Medical History:   Diagnosis Date    Anxiety     Mcallister esophagus     Bipolar 2 disorder, major depressive episode (HCC) 2021    Bipolar disorder with depression (HCC)     Last assessed: 16    Chronic pain disorder     abd    Colon polyp     COVID-19 2022    home test    Depression     Endometriosis     Last assessed: 16    Gastric ulcer     Gastroparesis     GERD (gastroesophageal reflux disease)     Hypertension     Inflammatory bowel disease     Irritable bowel syndrome     Scoliosis     Varicella        Past Surgical History:   Procedure Laterality Date    ABDOMINAL SURGERY       SECTION       CHOLECYSTECTOMY      COLONOSCOPY      HERNIA REPAIR      HERNIA REPAIR      OTHER SURGICAL HISTORY      Transoral incisionless fundoplication (TIFF)    [place]  DELIVERY ONLY N/A 2016    Procedure:  SECTION ();  Surgeon: Jarvis Garcia DO;  Location: [facility];  Service: Obstetrics    GA INCISION EXTENSOR TENDON SHEATH WRIST Right 2020    Procedure: RELEASE DEQUERVAINS RIGHT WRIST;  Surgeon: Smith Martins MD;  Location:  MAIN OR;  Service: Orthopedics    [place] LAPAROSCOPY SURG CHOLECYSTECTOMY N/A 2018    Procedure: LAPAROSCOPIC CHOLECYSTECTOMY;  Surgeon: Aniceto Meneses MD;  Location: [facility];  Service: General    TONSILECTOMY AND ADNOIDECTOMY Bilateral     UPPER GASTROINTESTINAL ENDOSCOPY      WISDOM TOOTH EXTRACTION Bilateral        Current Outpatient Medications   Medication Sig Dispense Refill    amLODIPine (NORVASC) 2.5 mg tablet TAKE ONE TABLET BY MOUTH EVERY DAY 90 tablet 1    baclofen 20 mg tablet TAKE ONE TABLET BY MOUTH THREE TIMES A DAY 90 tablet 3    busPIRone (BUSPAR) 10 mg tablet TAKE ONE TABLET BY MOUTH THREE TIMES A DAY 90 tablet 5    clonazePAM (KlonoPIN) 1 mg tablet Take 1 tablet (1 mg total) by mouth 4 (four) times a day 120 tablet 2    diphenoxylate-atropine (LOMOTIL) 2.5-0.025 mg per tablet Take 1 tablet by mouth 2 (two) times a day 60 tablet 0    granisetron (Sancuso) 3.1 MG/24HR Place 1 patch on the skin over 7 days every 7 days 4 patch 0    medroxyPROGESTERone (DEPO-PROVERA) 150 mg/mL injection Inject 1 mL (150 mg total) into a muscle every 3 (three) months 1 mL 3    metoprolol succinate (TOPROL-XL) 25 mg 24 hr tablet Take 1 tablet (25 mg total) by mouth daily 90 tablet 0    mirtazapine (REMERON) 15 mg tablet TAKE ONE TABLET BY MOUTH DAILY AT BEDTIME 30 tablet 0    ondansetron (ZOFRAN-ODT) 8 mg disintegrating tablet Take 1 tablet (8 mg total) by mouth as needed for nausea (EVERY 6 HOURS AS NEEDED FOR NAUSEA) 60 tablet 0    QUEtiapine (SEROquel) 200 mg  tablet TAKE 1 TABLET BY MOUTH 3 TIMES A DAY (NOON, 5:00PM, AND BEDTIME) 90 tablet 5    rifaximin (XIFAXAN) 550 mg tablet Take 1 tablet (550 mg total) by mouth every 8 (eight) hours 42 tablet 2    venlafaxine (EFFEXOR-XR) 150 mg 24 hr capsule TAKE ONE CAPSULE BY MOUTH EVERY DAY 30 capsule 2    venlafaxine (EFFEXOR-XR) 75 mg 24 hr capsule TAKE ONE CAPSULE BY MOUTH EVERY DAY 30 capsule 2     No current facility-administered medications for this visit.        Allergies   Allergen Reactions    Sulfa Antibiotics Anaphylaxis, Other (See Comments) and Edema     swelling  swelling  swelling    Reglan [Metoclopramide] Anxiety       Review of Systems    Video Exam    There were no vitals filed for this visit.    Physical Exam     Visit Time    Visit Start Time: 10:00a  Visit Stop Time: 10:25a  Total Visit Duration: 25 minutes were spent in the visit.  Time spent reviewing the treatment plan, reviewing medications, ordering medications and completing the progress note.

## 2024-10-28 NOTE — ASSESSMENT & PLAN NOTE
Seroquel 200 mg 3 times daily  Effexor  mg daily BuSpar 10 mg BuSpar 10 mg 3 times daily    Remeron 15 mg at bedtime

## 2024-11-07 DIAGNOSIS — Z30.013 ENCOUNTER FOR INITIAL PRESCRIPTION OF INJECTABLE CONTRACEPTIVE: ICD-10-CM

## 2024-11-07 DIAGNOSIS — Z01.419 ENCOUNTER FOR ANNUAL ROUTINE GYNECOLOGICAL EXAMINATION: ICD-10-CM

## 2024-11-07 RX ORDER — MEDROXYPROGESTERONE ACETATE 150 MG/ML
150 INJECTION, SUSPENSION INTRAMUSCULAR
Qty: 1 ML | Refills: 0 | Status: SHIPPED | OUTPATIENT
Start: 2024-11-07

## 2024-11-07 NOTE — TELEPHONE ENCOUNTER
Asked if this could be changed to the prefilled syringes.    Reason for call:   [x] Refill   [] Prior Auth  [] Other:     Office:   [] PCP/Provider -   [x] Specialty/Provider - OBGYN    Medication:     medroxyPROGESTERone (DEPO-PROVERA) 150 mg/mL injection       Dose/Frequency:     150 mg, Intramuscular, Every 3 months       Quantity: 1 ml    Pharmacy: [address]     Does the patient have enough for 3 days?   [x] Yes   [] No - Send as HP to POD

## 2024-11-07 NOTE — TELEPHONE ENCOUNTER
Scheduled yearly for patient. Next yearly available not until 2/12/25. Can you refill to get her to that appointment.

## 2024-11-11 DIAGNOSIS — R11.14 BILIOUS VOMITING WITH NAUSEA: ICD-10-CM

## 2024-11-11 RX ORDER — GRANISETRON 3.1 MG/24H
1 PATCH TRANSDERMAL
Qty: 4 PATCH | Refills: 0 | Status: SHIPPED | OUTPATIENT
Start: 2024-11-11

## 2024-11-15 DIAGNOSIS — F31.81 BIPOLAR 2 DISORDER, MAJOR DEPRESSIVE EPISODE (HCC): ICD-10-CM

## 2024-11-15 RX ORDER — VENLAFAXINE HYDROCHLORIDE 75 MG/1
75 CAPSULE, EXTENDED RELEASE ORAL DAILY
Qty: 30 CAPSULE | Refills: 2 | Status: SHIPPED | OUTPATIENT
Start: 2024-11-15

## 2024-11-15 RX ORDER — VENLAFAXINE HYDROCHLORIDE 150 MG/1
150 CAPSULE, EXTENDED RELEASE ORAL DAILY
Qty: 30 CAPSULE | Refills: 2 | Status: SHIPPED | OUTPATIENT
Start: 2024-11-15

## 2024-11-16 DIAGNOSIS — F31.81 BIPOLAR 2 DISORDER, MAJOR DEPRESSIVE EPISODE (HCC): ICD-10-CM

## 2024-11-18 DIAGNOSIS — K58.0 IRRITABLE BOWEL SYNDROME WITH DIARRHEA: ICD-10-CM

## 2024-11-18 RX ORDER — DIPHENOXYLATE HYDROCHLORIDE AND ATROPINE SULFATE 2.5; .025 MG/1; MG/1
1 TABLET ORAL 2 TIMES DAILY
Qty: 60 TABLET | Refills: 0 | Status: CANCELLED | OUTPATIENT
Start: 2024-11-18

## 2024-11-18 RX ORDER — MIRTAZAPINE 15 MG/1
15 TABLET, FILM COATED ORAL
Qty: 30 TABLET | Refills: 0 | Status: SHIPPED | OUTPATIENT
Start: 2024-11-18

## 2024-11-19 DIAGNOSIS — K58.0 IRRITABLE BOWEL SYNDROME WITH DIARRHEA: ICD-10-CM

## 2024-11-19 RX ORDER — DIPHENOXYLATE HYDROCHLORIDE AND ATROPINE SULFATE 2.5; .025 MG/1; MG/1
1 TABLET ORAL 2 TIMES DAILY
Qty: 60 TABLET | Refills: 5 | Status: SHIPPED | OUTPATIENT
Start: 2024-11-19

## 2024-12-13 DIAGNOSIS — R11.14 BILIOUS VOMITING WITH NAUSEA: ICD-10-CM

## 2024-12-13 RX ORDER — GRANISETRON 3.1 MG/24H
1 PATCH TRANSDERMAL
Qty: 4 PATCH | Refills: 0 | Status: SHIPPED | OUTPATIENT
Start: 2024-12-13

## 2024-12-18 DIAGNOSIS — F31.81 BIPOLAR 2 DISORDER, MAJOR DEPRESSIVE EPISODE (HCC): ICD-10-CM

## 2024-12-18 RX ORDER — MIRTAZAPINE 15 MG/1
15 TABLET, FILM COATED ORAL
Qty: 30 TABLET | Refills: 0 | Status: SHIPPED | OUTPATIENT
Start: 2024-12-18 | End: 2024-12-18 | Stop reason: SDUPTHER

## 2024-12-18 RX ORDER — MIRTAZAPINE 15 MG/1
15 TABLET, FILM COATED ORAL
Qty: 90 TABLET | Refills: 1 | Status: SHIPPED | OUTPATIENT
Start: 2024-12-18

## 2024-12-18 NOTE — TELEPHONE ENCOUNTER
Patient called rx refill line inquiring a refill on Mirtazapine. Patient verbalized that she has enough medication to get through 72 hours.

## 2024-12-20 DIAGNOSIS — R11.0 NAUSEA: ICD-10-CM

## 2024-12-20 RX ORDER — ONDANSETRON 8 MG/1
8 TABLET, ORALLY DISINTEGRATING ORAL AS NEEDED
Qty: 60 TABLET | Refills: 0 | Status: SHIPPED | OUTPATIENT
Start: 2024-12-20

## 2024-12-26 DIAGNOSIS — I10 ESSENTIAL HYPERTENSION: ICD-10-CM

## 2024-12-27 RX ORDER — METOPROLOL SUCCINATE 25 MG/1
25 TABLET, EXTENDED RELEASE ORAL DAILY
Qty: 90 TABLET | Refills: 0 | Status: SHIPPED | OUTPATIENT
Start: 2024-12-27

## 2024-12-27 RX ORDER — AMLODIPINE BESYLATE 2.5 MG/1
2.5 TABLET ORAL DAILY
Qty: 90 TABLET | Refills: 0 | OUTPATIENT
Start: 2024-12-27

## 2024-12-30 DIAGNOSIS — F31.81 BIPOLAR 2 DISORDER, MAJOR DEPRESSIVE EPISODE (HCC): ICD-10-CM

## 2024-12-30 RX ORDER — CLONAZEPAM 1 MG/1
1 TABLET ORAL 4 TIMES DAILY
Qty: 120 TABLET | Refills: 2 | Status: SHIPPED | OUTPATIENT
Start: 2024-12-30

## 2024-12-30 NOTE — TELEPHONE ENCOUNTER
Reason for call:   [x] Refill   [] Prior Auth  [] Other:     Office:   [] PCP/Provider -   [x] Specialty/Provider - Psych    Medication: Clonazepam    Dose/Frequency: 1 mg    Quantity: 120 tablets    Pharmacy: [address] [facility]  [address] [place] [address] [place].      Does the patient have enough for 3 days?   [] Yes   [x] No - Send as HP to POD

## 2025-01-10 DIAGNOSIS — R11.14 BILIOUS VOMITING WITH NAUSEA: ICD-10-CM

## 2025-01-10 RX ORDER — GRANISETRON 3.1 MG/24H
1 PATCH TRANSDERMAL
Qty: 4 PATCH | Refills: 0 | Status: SHIPPED | OUTPATIENT
Start: 2025-01-10

## 2025-01-14 DIAGNOSIS — K63.89 OTHER SPECIFIED DISEASES OF INTESTINE: ICD-10-CM

## 2025-01-15 DIAGNOSIS — F41.1 GENERALIZED ANXIETY DISORDER: ICD-10-CM

## 2025-01-15 DIAGNOSIS — K63.89 OTHER SPECIFIED DISEASES OF INTESTINE: ICD-10-CM

## 2025-01-15 RX ORDER — BACLOFEN 20 MG/1
20 TABLET ORAL 3 TIMES DAILY
Qty: 90 TABLET | Refills: 0 | Status: SHIPPED | OUTPATIENT
Start: 2025-01-15

## 2025-01-15 RX ORDER — BACLOFEN 20 MG/1
20 TABLET ORAL 3 TIMES DAILY
Qty: 90 TABLET | Refills: 3 | Status: SHIPPED | OUTPATIENT
Start: 2025-01-15

## 2025-01-15 RX ORDER — BUSPIRONE HYDROCHLORIDE 10 MG/1
10 TABLET ORAL 3 TIMES DAILY
Qty: 90 TABLET | Refills: 2 | Status: SHIPPED | OUTPATIENT
Start: 2025-01-15

## 2025-01-24 ENCOUNTER — NURSE TRIAGE (OUTPATIENT)
Age: 40
End: 2025-01-24

## 2025-01-24 DIAGNOSIS — K58.0 IRRITABLE BOWEL SYNDROME WITH DIARRHEA: Primary | ICD-10-CM

## 2025-01-24 NOTE — TELEPHONE ENCOUNTER
Regarding: Sibo-abd pain  ----- Message from Mara MELGAR sent at 1/24/2025  3:16 PM EST -----   Patient called in requesting medication sent to the pharmacy. Patient is experiencing sibo symptoms,abdominal pain, bloating, excessive gas, fatigue, and nausea/no appetite, with mucusy and floating stool.

## 2025-01-24 NOTE — TELEPHONE ENCOUNTER
"Pt last treated for SIBO symptoms with xifaxan in Nov 24'. Experiencing all over abdominal pain, excessive burping, bloating, nausea, no appetite. Has hx of recurring SIBO.   Pt requesting medication be sent to pharmacy.      Reason for Disposition   Mild abdominal pain    Answer Assessment - Initial Assessment Questions  1. LOCATION: \"Where does it hurt?\"       All over  2. RADIATION: \"Does the pain shoot anywhere else?\" (e.g., chest, back)      denies  3. ONSET: \"When did the pain begin?\" (e.g., minutes, hours or days ago)       Wednesday   4. SUDDEN: \"Gradual or sudden onset?\"      sudden  5. PATTERN \"Does the pain come and go, or is it constant?\"      constant  6. SEVERITY: \"How bad is the pain?\"  (e.g., Scale 1-10; mild, moderate, or severe)      8/10  7. RECURRENT SYMPTOM: \"Have you ever had this type of stomach pain before?\" If Yes, ask: \"When was the last time?\" and \"What happened that time?\"       Many times  8. CAUSE: \"What do you think is causing the stomach pain?\"      SIBO  9. RELIEVING/AGGRAVATING FACTORS: \"What makes it better or worse?\" (e.g., antacids, bending or twisting motion, bowel movement)      denies  10. OTHER SYMPTOMS: \"Do you have any other symptoms?\" (e.g., back pain, diarrhea, fever, urination pain, vomiting)        Bloating, gas, burping, nausea and no appetite  11. PREGNANCY: \"Is there any chance you are pregnant?\" \"When was your last menstrual period?\"        denies    Protocols used: Abdominal Pain - Female-Adult-[place]    "

## 2025-01-24 NOTE — TELEPHONE ENCOUNTER
Spoke with patient to tell her that Rx was sent to her pharmacy by [facility]. She verbalized understanding.

## 2025-01-25 ENCOUNTER — NURSE TRIAGE (OUTPATIENT)
Dept: OTHER | Facility: OTHER | Age: 40
End: 2025-01-25

## 2025-01-25 NOTE — TELEPHONE ENCOUNTER
"Reason for Disposition  • [1] White patches that stick to tongue or inner cheek AND [2] can be wiped off    Answer Assessment - Initial Assessment Questions  1. ONSET: \"When did the mouth start hurting?\" (e.g., hours or days ago)       This morning    2. SEVERITY: \"How bad is the pain?\" (Scale 1-10; mild, moderate or severe)      Mild to moderate    3. SORES: \"Are there any sores or ulcers in the mouth?\" If Yes, ask: \"What part of the mouth are the sores in?\"  White patches in  mouth and tongue    4. FEVER: \"Do you have a fever?\" If Yes, ask: \"What is your temperature, how was it measured, and when did it start?\"      Denies    5. CAUSE: \"What do you think is causing the mouth pain?\"      Thrush    6. OTHER SYMPTOMS: \"Do you have any other symptoms?\" (e.g., difficulty breathing)      Heart racing, headache, chills    Patient started taking Rifaximin 550 mg every 8 hours yesterday, 3 doses so far  Patient is unable to obtain heart rate    Protocols used: Mouth Pain-Adult-[place]    "

## 2025-01-25 NOTE — TELEPHONE ENCOUNTER
Patient calling in with white patches in her mouth and on tongue after starting Rifaximin yesterday. On call provider notified and will send antifungal medication to the pharmacy. Patient notified and verbalized understanding and has no further questions.

## 2025-01-26 ENCOUNTER — TELEPHONE (OUTPATIENT)
Dept: OTHER | Facility: OTHER | Age: 40
End: 2025-01-26

## 2025-01-26 DIAGNOSIS — B37.0 ORAL THRUSH: Primary | ICD-10-CM

## 2025-01-26 RX ORDER — FLUCONAZOLE 100 MG/1
100 TABLET ORAL DAILY
Qty: 7 TABLET | Refills: 0 | Status: SHIPPED | OUTPATIENT
Start: 2025-01-26 | End: 2025-02-02

## 2025-01-26 NOTE — TELEPHONE ENCOUNTER
Patient reached out yesterday with thrush symptoms and was advised script would be called into pharmacy.    Pt reports pharmacy has not received script.    On call provider entered order.  Patient made aware.

## 2025-01-28 ENCOUNTER — TELEMEDICINE (OUTPATIENT)
Dept: PSYCHIATRY | Facility: CLINIC | Age: 40
End: 2025-01-28
Payer: COMMERCIAL

## 2025-01-28 ENCOUNTER — TELEPHONE (OUTPATIENT)
Dept: PSYCHIATRY | Facility: CLINIC | Age: 40
End: 2025-01-28

## 2025-01-28 DIAGNOSIS — F41.9 ANXIETY: ICD-10-CM

## 2025-01-28 DIAGNOSIS — F31.32 BIPOLAR I DISORDER, MOST RECENT EPISODE DEPRESSED, MODERATE (HCC): Primary | ICD-10-CM

## 2025-01-28 PROCEDURE — 99214 OFFICE O/P EST MOD 30 MIN: CPT | Performed by: NURSE PRACTITIONER

## 2025-01-28 NOTE — PSYCH
Virtual Regular Visit    Verification of patient location:    Patient is located at Home in the following state in which I hold an active license [place]      Assessment/Plan:    Problem List Items Addressed This Visit       Anxiety    Bipolar I disorder, most recent episode depressed, moderate (HCC) - Primary       Goals addressed in session: Maintain current level of baseline stability    Depression Follow-up Plan Completed: Yes    Reason for visit is   Chief Complaint   Patient presents with    Depression    Anxiety    Medication Management    Follow-up    Virtual Regular Visit          Encounter provider MIKE Crenshaw      Recent Visits  No visits were found meeting these conditions.  Showing recent visits within past 7 days and meeting all other requirements  Today's Visits  Date Type Provider Dept   01/28/25 Telemedicine MIKE Crenshaw  [facility]   Showing today's visits and meeting all other requirements  Future Appointments  No visits were found meeting these conditions.  Showing future appointments within next 150 days and meeting all other requirements       The patient was identified by name and date of birth. Priscila Watkins was informed that this is a telemedicine visit and that the visit is being conducted throughthe Baifendian platform. She agrees to proceed..  My office door was closed. No one else was in the room.  She acknowledged consent and understanding of privacy and security of the video platform. The patient has agreed to participate and understands they can discontinue the visit at any time.    Patient is aware this is a billable service.     Subjective  Priscila Watkins is a 39 y.o. female who has a history of bipolar disorder and anxiety disorder.  Functioning at her baseline.  Really offers no complaints.  Requires much assistance from all family members.  As result, is able to function in her home with her family.  She is sleeping well and eating  well.  Continues to see various specialists for various GI complaints..  Continue current treatment and follow-up with me in 3 to 4 months or sooner if necessary  Mental status exam: Patient is awake and alert and oriented x 3.  Patient's function is limited to baseline level of functioning with a lot of assistance from family.  Associations are intact.  No overt delusions.  Speech is clear and thoughts are limited but organized.  Attention span is good.  Impulse control is good.  Judgment and insight are limited.  Memory is good.  The patient will continue on:  Remeron 15 mg at bedtime  Klonopin 1 mg 4 times daily  Seroquel 200 mg 3 times daily  Effexor  mg daily  BuSpar 10 mg 3 times daily  Follow-up with me in 3 to 4 months or sooner if necessary      We have discussed their safety plan and pt agrees that if they experience unsafe thoughts that they will reach out to their supports including this office, the suicide hotline, and emergency services if necessary.       Assessment & Plan  Bipolar I disorder, most recent episode depressed, moderate (HCC)  Seroquel 200 mg 3 times daily  Effexor  mg daily  Remeron 15 mg at bedtime       Anxiety  BuSpar 10 mg 3 times daily  Klonopin 1 mg 4 times daily             Past Medical History:   Diagnosis Date    Anxiety     Mcallister esophagus     Bipolar 2 disorder, major depressive episode (HCC) 2021    Bipolar disorder with depression (HCC)     Last assessed: 16    Chronic pain disorder     abd    Colon polyp     COVID-19 2022    home test    Depression     Endometriosis     Last assessed: 16    Gastric ulcer     Gastroparesis     GERD (gastroesophageal reflux disease)     Hypertension     Inflammatory bowel disease     Irritable bowel syndrome     Scoliosis     Varicella        Past Surgical History:   Procedure Laterality Date    ABDOMINAL SURGERY       SECTION      CHOLECYSTECTOMY      COLONOSCOPY      HERNIA REPAIR      HERNIA  REPAIR      OTHER SURGICAL HISTORY      Transoral incisionless fundoplication (TIFF)    [place]  DELIVERY ONLY N/A 2016    Procedure:  SECTION ();  Surgeon: Jarvis Garcia DO;  Location: [facility];  Service: Obstetrics    [place] INCISION EXTENSOR TENDON SHEATH WRIST Right 2020    Procedure: RELEASE DEQUERVAINS RIGHT WRIST;  Surgeon: Smith Martins MD;  Location:  MAIN OR;  Service: Orthopedics    [place] LAPAROSCOPY SURG CHOLECYSTECTOMY N/A 2018    Procedure: LAPAROSCOPIC CHOLECYSTECTOMY;  Surgeon: Aniceto Meneses MD;  Location: [place] [facility];  Service: General    TONSILECTOMY AND ADNOIDECTOMY Bilateral     UPPER GASTROINTESTINAL ENDOSCOPY      WISDOM TOOTH EXTRACTION Bilateral        Current Outpatient Medications   Medication Sig Dispense Refill    mirtazapine (REMERON) 15 mg tablet Take 1 tablet (15 mg total) by mouth daily at bedtime 90 tablet 1    amLODIPine (NORVASC) 2.5 mg tablet TAKE ONE TABLET BY MOUTH EVERY DAY 90 tablet 1    baclofen 20 mg tablet Take 1 tablet (20 mg total) by mouth 3 (three) times a day 90 tablet 0    baclofen 20 mg tablet TAKE ONE TABLET BY MOUTH THREE TIMES A DAY 90 tablet 3    busPIRone (BUSPAR) 10 mg tablet TAKE ONE TABLET BY MOUTH THREE TIMES A DAY 90 tablet 2    clonazePAM (KlonoPIN) 1 mg tablet Take 1 tablet (1 mg total) by mouth 4 (four) times a day 120 tablet 2    diphenoxylate-atropine (LOMOTIL) 2.5-0.025 mg per tablet Take 1 tablet by mouth 2 (two) times a day 60 tablet 5    fluconazole (DIFLUCAN) 100 mg tablet Take 1 tablet (100 mg total) by mouth daily for 7 days 7 tablet 0    granisetron (Sancuso) 3.1 MG/24HR Place 1 patch on the skin over 7 days every 7 days 4 patch 0    medroxyPROGESTERone (DEPO-PROVERA) 150 mg/mL injection Inject 1 mL (150 mg total) into a muscle every 3 (three) months 1 mL 0    metoprolol succinate (TOPROL-XL) 25 mg 24 hr tablet TAKE ONE TABLET BY MOUTH EVERY DAY 90 tablet 0    ondansetron (ZOFRAN-ODT) 8 mg disintegrating  tablet Take 1 tablet (8 mg total) by mouth as needed for nausea (EVERY 6 HOURS AS NEEDED FOR NAUSEA) 60 tablet 0    QUEtiapine (SEROquel) 200 mg tablet TAKE 1 TABLET BY MOUTH 3 TIMES A DAY (NOON, 5:00PM, AND BEDTIME) 90 tablet 5    rifaximin (XIFAXAN) 550 mg tablet Take 1 tablet (550 mg total) by mouth every 8 (eight) hours for 14 days 42 tablet 0    venlafaxine (EFFEXOR-XR) 150 mg 24 hr capsule TAKE ONE CAPSULE BY MOUTH EVERY DAY 30 capsule 2    venlafaxine (EFFEXOR-XR) 75 mg 24 hr capsule TAKE ONE CAPSULE BY MOUTH EVERY DAY 30 capsule 2     No current facility-administered medications for this visit.        Allergies   Allergen Reactions    Sulfa Antibiotics Anaphylaxis, Other (See Comments) and Edema     swelling  swelling  swelling    Reglan [Metoclopramide] Anxiety       Review of Systems    Video Exam    There were no vitals filed for this visit.    Physical Exam     Visit Time    Visit Start Time: 10:00a  Visit Stop Time: 10:30a  Total Visit Duration: 30 minutes were spent in visit.  Time spent reviewing treatment plan, reviewing medications, ordering medications and completing the progress note.

## 2025-01-28 NOTE — TELEPHONE ENCOUNTER
Pt returned call to schedule f/u with Elham Cho for 3 mos. Writer scheduled Virtual for Tues 4/8/25 at 10 am.

## 2025-01-28 NOTE — PSYCH
TREATMENT PLAN (Medication Management Only)        [facility]    Name and Date of Birth:  Priscila Watkins 39 y.o. 1985  Date of Treatment Plan: January 28, 2025  Diagnosis/Diagnoses:    1. Bipolar I disorder, most recent episode depressed, moderate (HCC)    2. Anxiety      Strengths/Personal Resources for Self-Care: supportive family, supportive friends.  Area/Areas of need (in own words): anxiety symptoms.  1. Long Term Goal: continue improvement in acceptable anxiety level.   Target Date: 1 year - 1/28/2026  Person/Persons responsible for completion of goal: MIKE Liang  2.  Short Term Objective (s) - How will we reach this goal?:   A.  Provider new recommended medication/dosage changes and/or continue medication(s): continue current medications as prescribed.  B.  N/A.    Target Date: 3 months - 4/28/2025  Person/Persons Responsible for Completion of Goal: MIKE Liang  Progress Towards Goals: Continuing Treatment  Treatment Modality: medication management every 3 months  Review due 6 months from date of this plan: 6 months - 7/28/2025  Expected length of service: maintenance unless revised  My Physician/PA/NP and I have developed this plan together and I agree to work on the goals and objectives. I understand the treatment goals that were developed for my treatment.

## 2025-02-08 DIAGNOSIS — F31.81 BIPOLAR 2 DISORDER, MAJOR DEPRESSIVE EPISODE (HCC): ICD-10-CM

## 2025-02-10 DIAGNOSIS — K63.89 OTHER SPECIFIED DISEASES OF INTESTINE: ICD-10-CM

## 2025-02-10 DIAGNOSIS — R11.14 BILIOUS VOMITING WITH NAUSEA: ICD-10-CM

## 2025-02-10 RX ORDER — QUETIAPINE FUMARATE 200 MG/1
TABLET, FILM COATED ORAL
Qty: 90 TABLET | Refills: 5 | Status: SHIPPED | OUTPATIENT
Start: 2025-02-10

## 2025-02-10 RX ORDER — VENLAFAXINE HYDROCHLORIDE 150 MG/1
150 CAPSULE, EXTENDED RELEASE ORAL DAILY
Qty: 30 CAPSULE | Refills: 2 | Status: SHIPPED | OUTPATIENT
Start: 2025-02-10

## 2025-02-10 RX ORDER — VENLAFAXINE HYDROCHLORIDE 75 MG/1
75 CAPSULE, EXTENDED RELEASE ORAL DAILY
Qty: 30 CAPSULE | Refills: 2 | Status: SHIPPED | OUTPATIENT
Start: 2025-02-10

## 2025-02-11 ENCOUNTER — TELEPHONE (OUTPATIENT)
Age: 40
End: 2025-02-11

## 2025-02-11 DIAGNOSIS — R11.0 NAUSEA: ICD-10-CM

## 2025-02-11 DIAGNOSIS — K63.89 OTHER SPECIFIED DISEASES OF INTESTINE: ICD-10-CM

## 2025-02-11 RX ORDER — GRANISETRON 3.1 MG/24H
1 PATCH TRANSDERMAL
Qty: 4 PATCH | Refills: 0 | Status: SHIPPED | OUTPATIENT
Start: 2025-02-11

## 2025-02-11 RX ORDER — BACLOFEN 20 MG/1
20 TABLET ORAL 3 TIMES DAILY
Qty: 90 TABLET | Refills: 0 | Status: SHIPPED | OUTPATIENT
Start: 2025-02-11

## 2025-02-11 RX ORDER — BACLOFEN 20 MG/1
20 TABLET ORAL 3 TIMES DAILY
Qty: 90 TABLET | Refills: 0 | OUTPATIENT
Start: 2025-02-11

## 2025-02-12 RX ORDER — ONDANSETRON 8 MG/1
8 TABLET, ORALLY DISINTEGRATING ORAL AS NEEDED
Qty: 60 TABLET | Refills: 1 | Status: SHIPPED | OUTPATIENT
Start: 2025-02-12

## 2025-02-12 RX ORDER — BACLOFEN 20 MG/1
20 TABLET ORAL 3 TIMES DAILY
Qty: 90 TABLET | Refills: 3 | OUTPATIENT
Start: 2025-02-12

## 2025-03-08 DIAGNOSIS — I10 ESSENTIAL HYPERTENSION: ICD-10-CM

## 2025-03-08 RX ORDER — METOPROLOL SUCCINATE 25 MG/1
25 TABLET, EXTENDED RELEASE ORAL DAILY
Qty: 90 TABLET | Refills: 0 | Status: SHIPPED | OUTPATIENT
Start: 2025-03-08

## 2025-03-08 RX ORDER — AMLODIPINE BESYLATE 2.5 MG/1
2.5 TABLET ORAL DAILY
Qty: 90 TABLET | Refills: 0 | Status: SHIPPED | OUTPATIENT
Start: 2025-03-08

## 2025-03-12 DIAGNOSIS — R11.14 BILIOUS VOMITING WITH NAUSEA: ICD-10-CM

## 2025-03-12 RX ORDER — GRANISETRON 3.1 MG/24H
1 PATCH TRANSDERMAL
Qty: 4 PATCH | Refills: 0 | Status: SHIPPED | OUTPATIENT
Start: 2025-03-12

## 2025-03-22 DIAGNOSIS — F31.81 BIPOLAR 2 DISORDER, MAJOR DEPRESSIVE EPISODE (HCC): ICD-10-CM

## 2025-03-24 RX ORDER — CLONAZEPAM 1 MG/1
1 TABLET ORAL 4 TIMES DAILY
Qty: 120 TABLET | Refills: 2 | Status: SHIPPED | OUTPATIENT
Start: 2025-03-24

## 2025-03-24 NOTE — TELEPHONE ENCOUNTER
02/25/2025 12/30/2024 clonazePAM (Tablet) 120.0 30 1 [id number] [place] 2 / [place]   1 [id number] 01/28/2025 12/30/2024 clonazePAM (Tablet) 120.0 30 1 MG NA [id number] [place] 1 / 2 [place]   1 [id number] 12/30/2024 12/30/2024 clonazePAM (Tablet) 120.0 30 1 MG NA [id number] [place] 0 / 2 [place]   1 [id number] 12/01/2024 10/07/2024 clonazePAM (Tablet) 120.0 30 1 MG NA [id number] [place] 2 / 2 [place]   1 [id number] 11/19/2024 11/19/2024 Diphenoxylate Hcl-atropine Sulfate (Tablet) 60.0 30 0.025 MG-2.5 MG NA ALEXIS RICHARDS [id number] [place] 0 / 5 [place]   1 [id number] 11/04/2024 10/07/2024 clonazePAM (Tablet) 120.0 30 1 MG NA [id number] Commercial [place] 1 / 2 [place]   1 [id number] 10/08/2024 10/07/2024 clonazePAM (Tablet) 120.0 30 1 MG NA [id number] [place] / 2 [place]   1 [id number] 09/24/2024 09/24/2024 Diphenoxylate Hcl-atropine Sulfate (Tablet) 60.0 30 0.025 MG-2.5 MG NA YURY PAEZ [id number] Commercial [place] 0 / 0 [place]   1 [id number] 09/10/2024 07/16/2024 clonazePAM (Tablet

## 2025-04-02 DIAGNOSIS — F41.1 GENERALIZED ANXIETY DISORDER: ICD-10-CM

## 2025-04-02 RX ORDER — BUSPIRONE HYDROCHLORIDE 10 MG/1
10 TABLET ORAL 3 TIMES DAILY
Qty: 90 TABLET | Refills: 2 | Status: SHIPPED | OUTPATIENT
Start: 2025-04-02

## 2025-04-08 ENCOUNTER — TELEMEDICINE (OUTPATIENT)
Dept: PSYCHIATRY | Facility: CLINIC | Age: 40
End: 2025-04-08
Payer: COMMERCIAL

## 2025-04-08 DIAGNOSIS — F41.9 ANXIETY: Primary | ICD-10-CM

## 2025-04-08 DIAGNOSIS — F31.32 BIPOLAR I DISORDER, MOST RECENT EPISODE DEPRESSED, MODERATE (HCC): ICD-10-CM

## 2025-04-08 PROCEDURE — 99213 OFFICE O/P EST LOW 20 MIN: CPT | Performed by: NURSE PRACTITIONER

## 2025-04-08 NOTE — ASSESSMENT & PLAN NOTE
Klonopin 1 mg 4 times daily.  Attempts to reduce his medication in the past have resulted in significant decompensation in the patient.  Functions at baseline with this dose  BuSpar 10 mg 3 times daily

## 2025-04-08 NOTE — PSYCH
MEDICATION MANAGEMENT NOTE    Name: Priscila Watkins      : 1985      MRN: [id number]  Encounter Provider: MIKE Lawson  Encounter Date: 2025   Encounter department: [facility]    Insurance: Payor: BLUE CROSS / Plan: MISC BLUE CROSS / Product Type: Blue Fee for Service /      Reason for Visit:   Chief Complaint   Patient presents with    Medication Management    Follow-up   :  Assessment & Plan  Anxiety  Klonopin 1 mg 4 times daily.  Attempts to reduce his medication in the past have resulted in significant decompensation in the patient.  Functions at baseline with this dose  BuSpar 10 mg 3 times daily       Bipolar I disorder, most recent episode depressed, moderate (HCC)  Seroquel 200 mg 3 times daily  Effexor  mg daily  Remeron 15 mg at bedtime             The patient will continue on:  Remeron 15 mg at bedtime  Effexor  mg daily  Seroquel 200 mg 3 times daily  BuSpar 10 mg 3 times daily  Klonopin 1 mg 4 times daily  Follow-up with me in 3 months or sooner if necessary      Treatment Recommendations:    Educated about diagnosis and treatment modalities. Verbalizes understanding and agreement with the treatment plan.  Discussed self monitoring of symptoms, and symptom monitoring tools.  Discussed medications and if treatment adjustment was needed or desired.  Aware of 24 hour and weekend coverage for urgent situations accessed by calling [facility] main practice number  I am scheduling this patient out for greater than 3 months: No    Medications Risks/Benefits:      Risks, Benefits And Possible Side Effects Of Medications:    Risks, benefits, and possible side effects of medications explained to Priscila and she (or legal representative) verbalizes understanding and agreement for treatment.    Controlled Medication Discussion:     Priscila has been filling controlled prescriptions on time as prescribed according to  [place] Prescription Drug Monitoring Program.      History of Present Illness     CC: Priscila presents today for follow up on 04/08/2025     Priscila is seen every 3 months for medication management.  She functions at a baseline level.  She receives assistance from multiple family members that reside with her.  This includes her mother and her .  She continues to have multiple medical issues related to GI issues and is following with specialist.  She tells me her mood is under good control.  No breakthrough symptoms of anxiety or mood instability and her depression remains at baseline level.  She is sleeping well and she is eating fairly well and follow-up with me in 3 months or sooner if necessary    Med Compliance: yes    Since our last visit, overall symptoms have been  stable.  Patient is functioning at her baseline level of functioning. .      HPI ROS:     Medication Side Effects: None  Depression: 4 /10 (10 worst)  Anxiety: 5 /10 (10 worst)  Safety concerns (SI, HI, others): None  Sleep: Adequate  Energy: Remains at a low level.  Patient receives assistance from multiple family members  Appetite: Adequate  Weight Change: Stable    Priscila denies any side effects from medications unless noted above.    Review Of Systems: A review of systems is obtained and is negative except for the pertinent positives listed in HPI/Subjective above.      Current Rating Scores:     None completed today.    Areas of Improvement: reviewed in HPI/Subjective Section and reviewed in Assessment and Plan Section    Past Medical History:   Diagnosis Date    Anxiety     Mcallister esophagus     Bipolar 2 disorder, major depressive episode (HCC) 08/17/2021    Bipolar disorder with depression (HCC)     Last assessed: 5/11/16    Chronic pain disorder     abd    Colon polyp     COVID-19 12/21/2022    home test    Depression     Endometriosis     Last assessed: 5/11/16    Gastric ulcer     Gastroparesis     GERD (gastroesophageal  reflux disease)     Hypertension     Inflammatory bowel disease     Irritable bowel syndrome     Scoliosis     Varicella      Past Surgical History:   Procedure Laterality Date    ABDOMINAL SURGERY       SECTION      CHOLECYSTECTOMY      COLONOSCOPY      HERNIA REPAIR      HERNIA REPAIR  2019    OTHER SURGICAL HISTORY      Transoral incisionless fundoplication (TIFF)    [place]  DELIVERY ONLY N/A 2016    Procedure:  SECTION ();  Surgeon: Jarvis Garcia DO;  Location: [facility];  Service: Obstetrics    [place] INCISION EXTENSOR TENDON SHEATH WRIST Right 2020    Procedure: RELEASE DEQUERVAINS RIGHT WRIST;  Surgeon: Smith Martins MD;  Location:  MAIN OR;  Service: Orthopedics    [place] LAPAROSCOPY SURG CHOLECYSTECTOMY N/A 2018    Procedure: LAPAROSCOPIC CHOLECYSTECTOMY;  Surgeon: Aniceto Meneses MD;  Location[place] [facility];  Service: General    TONSILECTOMY AND ADNOIDECTOMY Bilateral     UPPER GASTROINTESTINAL ENDOSCOPY      WISDOM TOOTH EXTRACTION Bilateral      Allergies:   Allergies   Allergen Reactions    Sulfa Antibiotics Anaphylaxis, Other (See Comments) and Edema     swelling  swelling  swelling    Reglan [Metoclopramide] Anxiety       Current Outpatient Medications   Medication Instructions    amLODIPine (NORVASC) 2.5 mg, Oral, Daily    baclofen 20 mg, Oral, 3 times daily    baclofen 20 mg, Oral, 3 times daily    busPIRone (BUSPAR) 10 mg, Oral, 3 times daily    clonazePAM (KLONOPIN) 1 mg, Oral, 4 times daily    diphenoxylate-atropine (LOMOTIL) 2.5-0.025 mg per tablet 1 tablet, Oral, 2 times daily    granisetron (Sancuso) 3.1 MG/24HR 1 patch, Transdermal, Every 7 days    medroxyPROGESTERone (DEPO-PROVERA) 150 mg, Intramuscular, Every 3 months    metoprolol succinate (TOPROL-XL) 25 mg, Oral, Daily    mirtazapine (REMERON) 15 mg, Oral, Daily at bedtime    ondansetron (ZOFRAN-ODT) 8 mg, Oral, As needed    QUEtiapine (SEROquel) 200 mg tablet TAKE 1 TABLET BY MOUTH 3 TIMES A DAY (NOON,  5:00PM, AND BEDTIME)    venlafaxine (EFFEXOR-XR) 75 mg, Oral, Daily    venlafaxine (EFFEXOR-XR) 150 mg, Oral, Daily        Substance Abuse History:    Tobacco, Alcohol and Drug Use History     Tobacco Use    Smoking status: Light Smoker     Current packs/day: 0.50     Average packs/day: 0.5 packs/day for 14.9 years (7.4 ttl pk-yrs)     Types: Cigarettes     Start date: 8/13/2020     Passive exposure: Never    Smokeless tobacco: Never   Vaping Use    Vaping status: Never Used   Substance Use Topics    Alcohol use: Not Currently     Comment: rare    Drug use: Not Currently          Social History:    Social History     Socioeconomic History    Marital status: /Civil Union     Spouse name: Not on file    Number of children: Not on file    Years of education: Not on file    Highest education level: Not on file   Occupational History    Not on file   Other Topics Concern    Not on file   Social History Narrative    Daily caffeine consumption, 1 serving a day    Exercises regularly        Family Psychiatric History:     Family History   Problem Relation Age of Onset    Hypertension Father     Depression Mother     Asthma Mother     Anxiety disorder Mother     Bipolar disorder Mother     Mental illness Mother     Thyroid disease Mother     Hypothyroidism Mother     Depression Maternal Grandmother     Anxiety disorder Maternal Grandmother     Bipolar disorder Maternal Grandmother     Mental illness Maternal Grandmother     Cancer Paternal Grandfather     Cancer Paternal Uncle     Breast cancer Cousin     Cancer Maternal Uncle        Medical History Reviewed by provider this encounter:  Tobacco  Allergies  Meds  Problems  Med Hx  Surg Hx  Fam Hx          Objective   There were no vitals taken for this visit.     Mental Status Evaluation:    Appearance age appropriate, casually dressed, dressed appropriately   Behavior cooperative, calm   Speech normal rate, normal volume, normal pitch, spontaneous   Mood  euthymic   Affect normal range and intensity, appropriate, reactive   Thought Processes organized, goal directed   Thought Content no overt delusions   Perceptual Disturbances: no auditory hallucinations, no visual hallucinations   Abnormal Thoughts  Risk Potential Suicidal ideation - None  Homicidal ideation - None  Potential for aggression - No   Orientation oriented to person, place, time/date, and situation   Memory recent and remote memory grossly intact   Consciousness alert and awake   Attention Span Concentration Span attention span and concentration are age appropriate   Intellect appears to be of average intelligence   Insight limited   Judgement limited   Muscle Strength and  Gait normal muscle strength and normal muscle tone, normal gait and normal balance   Motor activity no abnormal movements   Language no difficulty naming common objects, no difficulty repeating a phrase, no difficulty writing a sentence   Fund of Knowledge adequate knowledge of current events  adequate fund of knowledge regarding past history  adequate fund of knowledge regarding vocabulary    Pain none   Pain Scale 0       Laboratory Results: I have personally reviewed all pertinent laboratory/tests results    Recent Labs (last 6 months):   No visits with results within 6 Month(s) from this visit.   Latest known visit with results is:   Appointment on 10/03/2024   Component Date Value    WBC 10/03/2024 5.38     RBC 10/03/2024 4.03     Hemoglobin 10/03/2024 12.3     Hematocrit 10/03/2024 38.3     MCV 10/03/2024 95     MCH 10/03/2024 30.5     MCHC 10/03/2024 32.1     RDW 10/03/2024 13.3     Platelets 10/03/2024 282     MPV 10/03/2024 11.1     Sodium 10/03/2024 142     Potassium 10/03/2024 4.2     Chloride 10/03/2024 106     CO2 10/03/2024 27     ANION GAP 10/03/2024 9     BUN 10/03/2024 7     Creatinine 10/03/2024 0.78     Glucose, Fasting 10/03/2024 85     Calcium 10/03/2024 8.8     AST 10/03/2024 16     ALT 10/03/2024 7     Alkaline  Phosphatase 10/03/2024 50     Total Protein 10/03/2024 6.9     Albumin 10/03/2024 4.3     Total Bilirubin 10/03/2024 0.37     eGFR 10/03/2024 97     Hemoglobin A1C 10/03/2024 5.7 (H)     EAG 10/03/2024 117        Suicide/Homicide Risk Assessment:    Risk of Harm to Self:  Based on today's assessment, Priscila presents the following risk of harm to self: none    Risk of Harm to Others:  Based on today's assessment, Priscila presents the following risk of harm to others: none    The following interventions are recommended: Continue medication management. No other intervention changes indicated at this time.    Psychotherapy Provided:     Individual psychotherapy provided: No    Treatment Plan:    Completed and signed during the session: Not applicable - Treatment Plan not due at this session.    Goals: Progress towards Treatment Plan goals - Yes, progressing, as evidenced by subjective findings in HPI/Subjective Section and in Assessment and Plan Section    Depression Follow-up Plan Completed: Yes    Note Share:    This note was shared with patient.    Administrative Statements   Administrative Statements   Encounter provider MIKE Lawson    The Patient is located at Home and in the following state in which I hold an active license [place].    The patient was identified by name and date of birth. Priscila Watkins was informed that this is a telemedicine visit and that the visit is being conducted through the Epic Embedded platform. She agrees to proceed..  My office door was closed. No one else was in the room.  She acknowledged consent and understanding of privacy and security of the video platform. The patient has agreed to participate and understands they can discontinue the visit at any time.    I have spent a total time of 25 minutes in caring for this patient on the day of the visit/encounter including Counseling / Coordination of care, not including the time spent for establishing the audio/video  connection.    Visit Time  Visit Start Time: 10:00AM  Visit Stop Time: 10:25AM  Total Visit Duration:  25 minutes    MIKE Lawson 04/08/25

## 2025-04-10 DIAGNOSIS — R11.0 NAUSEA: ICD-10-CM

## 2025-04-10 DIAGNOSIS — R11.14 BILIOUS VOMITING WITH NAUSEA: ICD-10-CM

## 2025-04-10 RX ORDER — ONDANSETRON 8 MG/1
8 TABLET, ORALLY DISINTEGRATING ORAL AS NEEDED
Qty: 60 TABLET | Refills: 1 | Status: SHIPPED | OUTPATIENT
Start: 2025-04-10

## 2025-04-10 RX ORDER — GRANISETRON 3.1 MG/24H
1 PATCH TRANSDERMAL
Qty: 4 PATCH | Refills: 0 | Status: SHIPPED | OUTPATIENT
Start: 2025-04-10

## 2025-04-21 ENCOUNTER — NURSE TRIAGE (OUTPATIENT)
Age: 40
End: 2025-04-21

## 2025-04-21 NOTE — TELEPHONE ENCOUNTER
"  FOLLOW UP: Please advise     REASON FOR CONVERSATION: Abdominal Pain    SYMPTOMS: Starting Thursday generalized abdominal pain 8/10, severe bloating, belching, alternating bowel habits, floating stools with mucus, fatigue and nausea     OTHER: pt explains she has SIBO reoccurs every 6 months and rifaximin helps with symptoms. She is asking for medication.   OV scheduled for Aug as this was soonest     DISPOSITION: See Within 2 Weeks in Office        Reason for Disposition   Abdominal pain is a chronic symptom (recurrent or ongoing AND lasting > 4 weeks)    Answer Assessment - Initial Assessment Questions  1. LOCATION: \"Where does it hurt?\"       Generalized abdominal pain   2. RADIATION: \"Does the pain shoot anywhere else?\" (e.g., chest, back)        3. ONSET: \"When did the pain begin?\" (e.g., minutes, hours or days ago)       Thursday   4. SUDDEN: \"Gradual or sudden onset?\"      Suddenly   5. PATTERN \"Does the pain come and go, or is it constant?\"      Constant   6. SEVERITY: \"How bad is the pain?\"  (e.g., Scale 1-10; mild, moderate, or severe)      8/10   7. RECURRENT SYMPTOM: \"Have you ever had this type of stomach pain before?\" If Yes, ask: \"When was the last time?\" and \"What happened that time?\"       Yes with SIBO   8. CAUSE: \"What do you think is causing the stomach pain?\"      SIBO   9. RELIEVING/AGGRAVATING FACTORS: \"What makes it better or worse?\" (e.g., antacids, bending or twisting motion, bowel movement)        10. OTHER SYMPTOMS: \"Do you have any other symptoms?\" (e.g., back pain, diarrhea, fever, urination pain, vomiting)  Alternating bowel habits of diarrhea for 5 days and then constipation, belching, floating stool with mucus    Protocols used: Abdominal Pain - Female-Adult-[place]    "

## 2025-04-22 DIAGNOSIS — K58.0 IRRITABLE BOWEL SYNDROME WITH DIARRHEA: Primary | ICD-10-CM

## 2025-04-22 NOTE — TELEPHONE ENCOUNTER
Spoke to pt regarding abdominal p ain, pt reprots 7/10 pian level. Reviewed Deepika recommendations, pt state dshe spoke to [facility] and was told to  Rx and take if pain persist, or keep for future if the pain does not resolve. Pt will follow up as needed.

## 2025-04-26 DIAGNOSIS — F31.81 BIPOLAR 2 DISORDER, MAJOR DEPRESSIVE EPISODE (HCC): ICD-10-CM

## 2025-04-28 DIAGNOSIS — K63.89 OTHER SPECIFIED DISEASES OF INTESTINE: ICD-10-CM

## 2025-04-28 RX ORDER — VENLAFAXINE HYDROCHLORIDE 150 MG/1
150 CAPSULE, EXTENDED RELEASE ORAL DAILY
Qty: 90 CAPSULE | Refills: 2 | Status: SHIPPED | OUTPATIENT
Start: 2025-04-28

## 2025-04-28 RX ORDER — VENLAFAXINE HYDROCHLORIDE 75 MG/1
75 CAPSULE, EXTENDED RELEASE ORAL DAILY
Qty: 90 CAPSULE | Refills: 2 | Status: SHIPPED | OUTPATIENT
Start: 2025-04-28

## 2025-04-29 RX ORDER — BACLOFEN 20 MG/1
20 TABLET ORAL 3 TIMES DAILY
Qty: 90 TABLET | Refills: 0 | OUTPATIENT
Start: 2025-04-29

## 2025-04-29 RX ORDER — BACLOFEN 20 MG/1
20 TABLET ORAL 3 TIMES DAILY
Qty: 90 TABLET | Refills: 0 | Status: SHIPPED | OUTPATIENT
Start: 2025-04-29

## 2025-05-06 ENCOUNTER — OFFICE VISIT (OUTPATIENT)
Dept: INTERNAL MEDICINE CLINIC | Age: 40
End: 2025-05-06
Payer: COMMERCIAL

## 2025-05-06 VITALS
HEIGHT: 64 IN | TEMPERATURE: 99.3 F | HEART RATE: 80 BPM | WEIGHT: 152 LBS | SYSTOLIC BLOOD PRESSURE: 120 MMHG | BODY MASS INDEX: 25.95 KG/M2 | DIASTOLIC BLOOD PRESSURE: 82 MMHG

## 2025-05-06 DIAGNOSIS — J02.9 PHARYNGITIS, UNSPECIFIED ETIOLOGY: ICD-10-CM

## 2025-05-06 DIAGNOSIS — J02.9 SORE THROAT: Primary | ICD-10-CM

## 2025-05-06 DIAGNOSIS — F31.81 BIPOLAR II DISORDER (HCC): ICD-10-CM

## 2025-05-06 DIAGNOSIS — R11.14 BILIOUS VOMITING WITH NAUSEA: ICD-10-CM

## 2025-05-06 LAB — S PYO AG THROAT QL: NEGATIVE

## 2025-05-06 PROCEDURE — 99213 OFFICE O/P EST LOW 20 MIN: CPT | Performed by: PHYSICIAN ASSISTANT

## 2025-05-06 PROCEDURE — 87880 STREP A ASSAY W/OPTIC: CPT | Performed by: PHYSICIAN ASSISTANT

## 2025-05-06 PROCEDURE — 87070 CULTURE OTHR SPECIMN AEROBIC: CPT | Performed by: PHYSICIAN ASSISTANT

## 2025-05-06 RX ORDER — AMOXICILLIN 500 MG/1
500 CAPSULE ORAL EVERY 8 HOURS SCHEDULED
Qty: 30 CAPSULE | Refills: 0 | Status: SHIPPED | OUTPATIENT
Start: 2025-05-06 | End: 2025-05-16

## 2025-05-06 RX ORDER — GRANISETRON 3.1 MG/24H
1 PATCH TRANSDERMAL
Qty: 4 PATCH | Refills: 0 | Status: SHIPPED | OUTPATIENT
Start: 2025-05-06

## 2025-05-06 NOTE — PROGRESS NOTES
"Name: Priscila Watkins      : 1985      MRN: [id number]  Encounter Provider: Yasmin Orellana PA-C  Encounter Date: 2025   Encounter department: [facility]  :  Assessment & Plan  Sore throat  Rapid strep   Orders:    POCT rapid ANTIGEN strepA    Throat culture; Future    Bipolar II disorder (HCC)         Pharyngitis, unspecified etiology  Gargle with salt water   Tylenol prn  Start amox  If culture negative may d/c     Orders:    amoxicillin (AMOXIL) 500 mg capsule; Take 1 capsule (500 mg total) by mouth every 8 (eight) hours for 10 days    Throat culture; Future           History of Present Illness   38 y/o female presents w/ c/o sore throat x 2 day  Pt reports her son was recently dx with strep and is concerned with the same  She notes night sweats last night and feels chilled this am  Tylenol around 9am - temp currently 99.3  Pt denies ear pain         Review of Systems   Constitutional:  Positive for appetite change, chills, diaphoresis and fever.   HENT:  Positive for congestion, postnasal drip, sinus pressure and sore throat.    Eyes:  Negative for pain and redness.   Respiratory:  Negative for cough, shortness of breath and wheezing.    Cardiovascular:  Negative for chest pain and leg swelling.   Gastrointestinal:  Negative for abdominal pain, constipation, diarrhea and nausea.   Genitourinary:  Negative for dysuria.   Musculoskeletal:  Positive for myalgias. Negative for arthralgias and back pain.   Skin:  Negative for rash.   Neurological:  Negative for dizziness, numbness and headaches.   Psychiatric/Behavioral:  Negative for sleep disturbance. The patient is not nervous/anxious.        Objective   /82 (BP Location: Right arm, Patient Position: Sitting, Cuff Size: Standard)   Pulse 80   Temp 99.3 °F (37.4 °C) (Temporal)   Ht 5' 4\" (1.626 m)   Wt 68.9 kg (152 lb)   BMI 26.09 kg/m²      Physical Exam  Vitals reviewed.   Constitutional:       General: " She is not in acute distress.  HENT:      Head: Normocephalic and atraumatic.      Right Ear: Tympanic membrane normal. No swelling or tenderness. No middle ear effusion. Tympanic membrane is not erythematous.      Left Ear: Tympanic membrane normal. No swelling or tenderness.  No middle ear effusion. Tympanic membrane is not erythematous.      Nose: No congestion.      Mouth/Throat:      Mouth: Mucous membranes are moist.      Pharynx: Posterior oropharyngeal erythema present. No oropharyngeal exudate.      Tonsils: No tonsillar exudate or tonsillar abscesses.   Eyes:      Conjunctiva/sclera: Conjunctivae normal.      Pupils: Pupils are equal, round, and reactive to light.   Cardiovascular:      Rate and Rhythm: Normal rate and regular rhythm.   Pulmonary:      Effort: Pulmonary effort is normal. No respiratory distress.      Breath sounds: Normal breath sounds. No wheezing or rales.   Abdominal:      Palpations: Abdomen is soft.   Neurological:      General: No focal deficit present.      Mental Status: She is alert.

## 2025-05-08 ENCOUNTER — RESULTS FOLLOW-UP (OUTPATIENT)
Dept: INTERNAL MEDICINE CLINIC | Age: 40
End: 2025-05-08

## 2025-05-08 LAB — BACTERIA THROAT CULT: NORMAL

## 2025-05-24 DIAGNOSIS — R11.0 NAUSEA: ICD-10-CM

## 2025-05-24 DIAGNOSIS — F31.81 BIPOLAR 2 DISORDER, MAJOR DEPRESSIVE EPISODE (HCC): ICD-10-CM

## 2025-05-24 DIAGNOSIS — R11.14 BILIOUS VOMITING WITH NAUSEA: ICD-10-CM

## 2025-05-25 DIAGNOSIS — I10 ESSENTIAL HYPERTENSION: ICD-10-CM

## 2025-05-26 DIAGNOSIS — K63.89 OTHER SPECIFIED DISEASES OF INTESTINE: ICD-10-CM

## 2025-05-27 DIAGNOSIS — K63.89 OTHER SPECIFIED DISEASES OF INTESTINE: ICD-10-CM

## 2025-05-27 DIAGNOSIS — I10 ESSENTIAL HYPERTENSION: ICD-10-CM

## 2025-05-27 RX ORDER — AMLODIPINE BESYLATE 2.5 MG/1
2.5 TABLET ORAL DAILY
Qty: 90 TABLET | Refills: 1 | Status: SHIPPED | OUTPATIENT
Start: 2025-05-27

## 2025-05-27 RX ORDER — GRANISETRON 3.1 MG/24H
1 PATCH TRANSDERMAL
Qty: 4 PATCH | Refills: 0 | Status: SHIPPED | OUTPATIENT
Start: 2025-05-27

## 2025-05-27 RX ORDER — MIRTAZAPINE 15 MG/1
15 TABLET, FILM COATED ORAL
Qty: 90 TABLET | Refills: 1 | Status: SHIPPED | OUTPATIENT
Start: 2025-05-27

## 2025-05-27 RX ORDER — ONDANSETRON 8 MG/1
8 TABLET, ORALLY DISINTEGRATING ORAL AS NEEDED
Qty: 60 TABLET | Refills: 0 | Status: SHIPPED | OUTPATIENT
Start: 2025-05-27

## 2025-05-28 RX ORDER — BACLOFEN 20 MG/1
20 TABLET ORAL 3 TIMES DAILY
Qty: 90 TABLET | Refills: 0 | OUTPATIENT
Start: 2025-05-28

## 2025-05-28 RX ORDER — METOPROLOL SUCCINATE 25 MG/1
25 TABLET, EXTENDED RELEASE ORAL DAILY
Qty: 90 TABLET | Refills: 1 | Status: SHIPPED | OUTPATIENT
Start: 2025-05-28

## 2025-05-28 RX ORDER — BACLOFEN 20 MG/1
20 TABLET ORAL 3 TIMES DAILY
Qty: 90 TABLET | Refills: 0 | Status: SHIPPED | OUTPATIENT
Start: 2025-05-28

## 2025-06-14 DIAGNOSIS — F31.81 BIPOLAR 2 DISORDER, MAJOR DEPRESSIVE EPISODE (HCC): ICD-10-CM

## 2025-06-16 RX ORDER — CLONAZEPAM 1 MG/1
1 TABLET ORAL 4 TIMES DAILY
Qty: 120 TABLET | Refills: 2 | Status: SHIPPED | OUTPATIENT
Start: 2025-06-16

## 2025-06-16 NOTE — TELEPHONE ENCOUNTER
Refill must be reviewed and completed by the office or provider. The refill is unable to be approved or denied by the medication management team.     05/18/2025 05/18/2025 03/24/2025 clonazePAM (Tablet) 120.0 30 1 MG NA [id number] Commercial [place] 2 / 2 [place]   1 [id number] 04/23/2025 04/22/2025 03/24/2025 clonazePAM (Tablet) 120.0 30 1 MG NA [id number] Commercial [place] 1 / 2 [place]   1 [id number] 03/26/2025 03/24/2025 03/24/2025 clonazePAM (Tablet) 120.0 30 1 MG [id number] Commercial [place] 0 / 2 [place]

## 2025-06-20 DIAGNOSIS — F41.1 GENERALIZED ANXIETY DISORDER: ICD-10-CM

## 2025-06-20 RX ORDER — BUSPIRONE HYDROCHLORIDE 10 MG/1
10 TABLET ORAL 3 TIMES DAILY
Qty: 90 TABLET | Refills: 2 | Status: SHIPPED | OUTPATIENT
Start: 2025-06-20

## 2025-06-24 DIAGNOSIS — K63.89 OTHER SPECIFIED DISEASES OF INTESTINE: ICD-10-CM

## 2025-06-24 RX ORDER — BACLOFEN 20 MG/1
20 TABLET ORAL 3 TIMES DAILY
Qty: 90 TABLET | Refills: 0 | Status: SHIPPED | OUTPATIENT
Start: 2025-06-24

## 2025-06-27 DIAGNOSIS — R11.14 BILIOUS VOMITING WITH NAUSEA: ICD-10-CM

## 2025-06-27 RX ORDER — GRANISETRON 3.1 MG/24H
1 PATCH TRANSDERMAL
Qty: 4 PATCH | Refills: 0 | Status: SHIPPED | OUTPATIENT
Start: 2025-06-27

## 2025-07-08 ENCOUNTER — TELEMEDICINE (OUTPATIENT)
Dept: PSYCHIATRY | Facility: CLINIC | Age: 40
End: 2025-07-08
Payer: COMMERCIAL

## 2025-07-08 DIAGNOSIS — F41.1 GENERALIZED ANXIETY DISORDER: ICD-10-CM

## 2025-07-08 DIAGNOSIS — F41.8 ANXIOUS DEPRESSION: ICD-10-CM

## 2025-07-08 DIAGNOSIS — F31.81 BIPOLAR 2 DISORDER, MAJOR DEPRESSIVE EPISODE (HCC): ICD-10-CM

## 2025-07-08 DIAGNOSIS — F31.32 BIPOLAR I DISORDER, MOST RECENT EPISODE DEPRESSED, MODERATE (HCC): Primary | ICD-10-CM

## 2025-07-08 PROCEDURE — 99213 OFFICE O/P EST LOW 20 MIN: CPT | Performed by: NURSE PRACTITIONER

## 2025-07-08 RX ORDER — MIRTAZAPINE 15 MG/1
15 TABLET, FILM COATED ORAL
Start: 2025-07-08

## 2025-07-08 RX ORDER — QUETIAPINE FUMARATE 200 MG/1
TABLET, FILM COATED ORAL
Qty: 90 TABLET | Refills: 5 | Status: SHIPPED | OUTPATIENT
Start: 2025-07-08

## 2025-07-08 RX ORDER — CLONAZEPAM 1 MG/1
1 TABLET ORAL 4 TIMES DAILY
Start: 2025-07-08

## 2025-07-08 RX ORDER — VENLAFAXINE HYDROCHLORIDE 75 MG/1
75 CAPSULE, EXTENDED RELEASE ORAL DAILY
Start: 2025-07-08

## 2025-07-08 RX ORDER — VENLAFAXINE HYDROCHLORIDE 150 MG/1
150 CAPSULE, EXTENDED RELEASE ORAL DAILY
Start: 2025-07-08

## 2025-07-08 RX ORDER — BUSPIRONE HYDROCHLORIDE 10 MG/1
10 TABLET ORAL 3 TIMES DAILY
Start: 2025-07-08

## 2025-07-08 NOTE — PSYCH
MEDICATION MANAGEMENT NOTE    Name: Priscila Watkins      : 1985      MRN: [id number]  Encounter Provider: MIKE Lawson  Encounter Date: 2025   Encounter department: [facility]    Insurance: Payor: BLUE CROSS / Plan: MISC BLUE CROSS / Product Type: Blue Fee for Service /      Reason for Visit:   Chief Complaint   Patient presents with    Medication Management    Follow-up   :  Assessment & Plan  Bipolar 2 disorder, major depressive episode (HCC)    Orders:    QUEtiapine (SEROquel) 200 mg tablet; TAKE 1 TABLET BY MOUTH 3 TIMES A DAY (NOON, 5:00PM, AND BEDTIME)    venlafaxine (EFFEXOR-XR) 75 mg 24 hr capsule; Take 1 capsule (75 mg total) by mouth daily    venlafaxine (EFFEXOR-XR) 150 mg 24 hr capsule; Take 1 capsule (150 mg total) by mouth daily    mirtazapine (REMERON) 15 mg tablet; Take 1 tablet (15 mg total) by mouth daily at bedtime    clonazePAM (KlonoPIN) 1 mg tablet; Take 1 tablet (1 mg total) by mouth in the morning and 1 tablet (1 mg total) at noon and 1 tablet (1 mg total) in the evening and 1 tablet (1 mg total) before bedtime.    Generalized anxiety disorder    Orders:    busPIRone (BUSPAR) 10 mg tablet; Take 1 tablet (10 mg total) by mouth 3 (three) times a day    The patient will continue on:  BuSpar 10 mg 3 times daily  Klonopin 1 mg QID daily  Remeron 15 mg at bedtime  Seroquel 200 mg 3 times daily  Effexor  mg daily  Follow-up with me in 3 months or sooner if necessary 3 months for Maryjo    Treatment Recommendations:    Educated about diagnosis and treatment modalities. Verbalizes understanding and agreement with the treatment plan.  Discussed self monitoring of symptoms, and symptom monitoring tools.  Discussed medications and if treatment adjustment was needed or desired.  Aware of 24 hour and weekend coverage for urgent situations accessed by calling [facility] main practice number  I am scheduling this  patient out for greater than 3 months: No    Medications Risks/Benefits:      Risks, Benefits And Possible Side Effects Of Medications:    Risks, benefits, and possible side effects of medications explained to Priscila and she (or legal representative) verbalizes understanding and agreement for treatment.    Controlled Medication Discussion:     Priscila has been filling controlled prescriptions on time as prescribed according to [place] Prescription Drug Monitoring Program.      History of Present Illness     CC: Priscila presents today for follow up on 07/08/2025 for treatment of bipolar disorder and anxiety disorder     Priscila is if we ever tried to reduce her medication, she decompensates very quickly mentally.  Continue current treatment.  Continue all medications and follow-up with me in 3 months or sooner if necessary.  Functioning at her baseline.  We have made no medication changes or adjustments in several years for this patient.    Med Compliance: yes    Since our last visit, overall symptoms have been stable.       HPI ROS:     Medication Side Effects: None  Depression: 0 /10 (10 worst)  Anxiety: 4 /10 (10 worst)  Safety concerns (SI, HI, others): None  Sleep: Adequate  Energy: Adequate  Appetite: Adequate  Weight Change: No weight changes or fluctuations noted    Priscila denies any side effects from medications unless noted above.    Review Of Systems: A review of systems is obtained and is negative except for the pertinent positives listed in HPI/Subjective above.      Current Rating Scores:     None completed today.    Areas of Improvement: reviewed in HPI/Subjective Section and reviewed in Assessment and Plan Section      Past Medical History[1]  Past Surgical History[2]  Allergies: Allergies[3]    Current Outpatient Medications   Medication Instructions    amLODIPine (NORVASC) 2.5 mg, Oral, Daily    baclofen 20 mg, Oral, 3 times daily    busPIRone (BUSPAR) 10 mg, Oral, 3 times daily    clonazePAM  (KLONOPIN) 1 mg, Oral, 4 times daily    diphenoxylate-atropine (LOMOTIL) 2.5-0.025 mg per tablet 1 tablet, Oral, 2 times daily    granisetron (Sancuso) 3.1 MG/24HR 1 patch, Transdermal, Every 7 days    medroxyPROGESTERone (DEPO-PROVERA) 150 mg, Intramuscular, Every 3 months    metoprolol succinate (TOPROL-XL) 25 mg, Oral, Daily    mirtazapine (REMERON) 15 mg, Oral, Daily at bedtime    ondansetron (ZOFRAN-ODT) 8 mg, Oral, As needed    QUEtiapine (SEROquel) 200 mg tablet TAKE 1 TABLET BY MOUTH 3 TIMES A DAY (NOON, 5:00PM, AND BEDTIME)    venlafaxine (EFFEXOR-XR) 75 mg, Oral, Daily    venlafaxine (EFFEXOR-XR) 150 mg, Oral, Daily        Substance Abuse History:    Tobacco, Alcohol and Drug Use History     Tobacco Use    Smoking status: Light Smoker     Current packs/day: 0.50     Average packs/day: 0.5 packs/day for 15.1 years (7.6 ttl pk-yrs)     Types: Cigarettes     Start date: 8/13/2020     Passive exposure: Never    Smokeless tobacco: Never   Vaping Use    Vaping status: Never Used   Substance Use Topics    Alcohol use: Not Currently     Comment: rare    Drug use: Not Currently          Social History:    Social History     Socioeconomic History    Marital status: /Civil Union     Spouse name: Not on file    Number of children: Not on file    Years of education: Not on file    Highest education level: Not on file   Occupational History    Not on file   Other Topics Concern    Not on file   Social History Narrative    Daily caffeine consumption, 1 serving a day    Exercises regularly        Family Psychiatric History:     Family History[4]    Medical History Reviewed by provider this encounter:  Tobacco  Allergies  Meds  Problems  Med Hx  Surg Hx  Fam Hx          Objective   There were no vitals taken for this visit.     Mental Status Evaluation:    Appearance age appropriate, casually dressed, dressed appropriately   Behavior cooperative, mildly anxious   Speech normal rate, normal volume, normal  pitch, spontaneous   Mood Chronic anxiety and depression   Affect normal range and intensity, appropriate, reactive   Thought Processes organized, goal directed   Thought Content no overt delusions   Perceptual Disturbances: no auditory hallucinations, no visual hallucinations   Abnormal Thoughts  Risk Potential Suicidal ideation - None  Homicidal ideation - None  Potential for aggression - No   Orientation oriented to person, place, time/date, and situation   Memory recent and remote memory grossly intact   Consciousness alert and awake   Attention Span Concentration Span attention span and concentration are age appropriate   Intellect appears to be of average intelligence   Insight limited   Judgement limited   Muscle Strength and  Gait normal muscle strength and normal muscle tone, normal gait and normal balance   Motor activity no abnormal movements   Language no difficulty naming common objects, no difficulty repeating a phrase, no difficulty writing a sentence   Fund of Knowledge adequate knowledge of current events  adequate fund of knowledge regarding past history  adequate fund of knowledge regarding vocabulary        Laboratory Results: I have personally reviewed all pertinent laboratory/tests results    Recent Labs (last 6 months):   Office Visit on 05/06/2025   Component Date Value     RAPID STREP A 05/06/2025 Negative     Throat Culture 05/06/2025 Negative for beta-hemolytic Streptococcus        Suicide/Homicide Risk Assessment:    Risk of Harm to Self:  Based on today's assessment, Priscila presents the following risk of harm to self: none    Risk of Harm to Others:  Based on today's assessment, Priscila presents the following risk of harm to others: none    The following interventions are recommended: Continue medication management. No other intervention changes indicated at this time.    Psychotherapy Provided:     Individual psychotherapy provided: No    Treatment Plan:    Completed and signed during  "the session: Yes - Treatment Plan done but not signed at time of office visit due to: Plan reviewed by video and verbal consent given due to virtual visit.    Goals: Progress towards Treatment Plan goals - Yes, progressing, as evidenced by subjective findings in HPI/Subjective Section and in Assessment and Plan Section    Depression Follow-up Plan Completed: Yes    Note Share:    This note was shared with patient.    Administrative Statements   Administrative Statements   Encounter provider MIKE Lawson    The Patient is located at Home and in the following state in which I hold an active license [place].    The patient was identified by name and date of birth. Priscila Watkins was informed that this is a telemedicine visit and that the visit is being conducted through the Epic Embedded platform. She agrees to proceed..  My office door was closed. No one else was in the room.  She acknowledged consent and understanding of privacy and security of the video platform. The patient has agreed to participate and understands they can discontinue the visit at any time.    I have spent a total time of 25 minutes in caring for this patient on the day of the visit/encounter including Counseling / Coordination of care, not including the time spent for establishing the audio/video connection.    Visit Time  Visit Start Time: 10:00AM  Visit Stop Time: 10:25AM  Total Visit Duration: 25 minutes    Portions of the record may have been created with voice recognition software. Occasional wrong word or \"sound a like\" substitutions may have occurred due to the inherent limitations of voice recognition software. Read the chart carefully and recognize, using context, where substitutions have occurred.    MIKE Lawson 07/08/25         [1]   Past Medical History:  Diagnosis Date    Abnormal ECG Feb 2022    Anxiety     Mcallister esophagus     Bipolar 2 disorder, major depressive episode (HCC) 08/17/2021    Bipolar disorder " with depression (HCC)     Last assessed: 16    Chronic pain disorder     abd    Colon polyp     COVID-19 2022    home test    Depression     Endometriosis     Last assessed: 16    Gastric ulcer     Gastroparesis     GERD (gastroesophageal reflux disease)     Hypertension     Inflammatory bowel disease     Irritable bowel syndrome     Panic disorder     PTSD (post-traumatic stress disorder)     Scoliosis     Varicella    [2]   Past Surgical History:  Procedure Laterality Date    ABDOMINAL SURGERY       SECTION      CHOLECYSTECTOMY      COLONOSCOPY      COLPOSCOPY      DENTAL SURGERY      HERNIA REPAIR      HERNIA REPAIR      OTHER SURGICAL HISTORY      Transoral incisionless fundoplication (TIFF)    [place]  DELIVERY ONLY N/A 2016    Procedure:  SECTION ();  Surgeon: Jarvis Garcia DO;  Location: [facility];  Service: Obstetrics    [place] INCISION EXTENSOR TENDON SHEATH WRIST Right 2020    Procedure: RELEASE DEQUERVAINS RIGHT WRIST;  Surgeon: Smith Martins MD;  Location:  MAIN OR;  Service: Orthopedics    [place] LAPAROSCOPY SURG CHOLECYSTECTOMY N/A 2018    Procedure: LAPAROSCOPIC CHOLECYSTECTOMY;  Surgeon: Aniceto Meneses MD;  Location: [facility];  Service: General    TONSILECTOMY AND ADNOIDECTOMY Bilateral     UPPER GASTROINTESTINAL ENDOSCOPY      WISDOM TOOTH EXTRACTION Bilateral    [3]   Allergies  Allergen Reactions    Sulfa Antibiotics Anaphylaxis, Other (See Comments) and Edema     swelling  swelling  swelling    Reglan [Metoclopramide] Anxiety   [4]   Family History  Problem Relation Name Age of Onset    Hypertension Father Ritchie     Depression Mother Gloria     Asthma Mother Gloria     Anxiety disorder Mother Gloria     Bipolar disorder Mother Gloria     Mental illness Mother Gloria     Thyroid disease Mother Gloria     Hypothyroidism Mother Gloria     Depression Maternal Grandmother Ramya     Anxiety disorder Maternal Grandmother Ramya     Bipolar disorder  Maternal Grandmother Ramya     Mental illness Maternal Grandmother Ramya     Cancer Paternal Grandfather Alfredito Apolinar     Cancer Paternal Uncle Martinez Jiang     Breast cancer Cousin Isela Jiang     Cancer Cousin Isela Jiang         Breast cancer    Cancer Maternal Uncle Chandra Jiang     Cancer Maternal Grandfather Alfredito Jiang     Ulcerative colitis Maternal Grandfather Alfredito Jiang     Cancer Paternal Uncle Chandra Jiang

## 2025-07-08 NOTE — PSYCH
TREATMENT PLAN (Medication Management Only)        [facility]    Name and Date of Birth:  Priscila Watkins 39 y.o. 1985  Date of Treatment Plan: July 8, 2025  Diagnosis/Diagnoses:    1. Bipolar I disorder, most recent episode depressed, moderate (HCC)    2. Bipolar 2 disorder, major depressive episode (HCC)    3. Generalized anxiety disorder    4. Anxious depression      Strengths/Personal Resources for Self-Care: supportive family, supportive friends.  Area/Areas of need (in own words): anxiety symptoms.  1. Long Term Goal: control acceptable anxiety level.   Target Date: 1 year - 7/8/2026  Person/Persons responsible for completion of goal: MIKE Liang  2.  Short Term Objective (s) - How will we reach this goal?:   A.  Provider new recommended medication/dosage changes and/or continue medication(s): continue current medications as prescribed.  B.  N/A.    Target Date: 3 months - 10/8/2025  Person/Persons Responsible for Completion of Goal: MIKE Liang  Progress Towards Goals: Continuing Treatment  Treatment Modality: medication management every 3 months  Review due 6 months from date of this plan: 6 months - 1/8/2026  Expected length of service: maintenance unless revised  My Physician/PA/NP and I have developed this plan together and I agree to work on the goals and objectives. I understand the treatment goals that were developed for my treatment.

## 2025-07-18 DIAGNOSIS — R11.14 BILIOUS VOMITING WITH NAUSEA: ICD-10-CM

## 2025-07-18 RX ORDER — GRANISETRON 3.1 MG/24H
1 PATCH TRANSDERMAL
Qty: 4 PATCH | Refills: 1 | Status: SHIPPED | OUTPATIENT
Start: 2025-07-18

## 2025-07-21 DIAGNOSIS — R11.0 NAUSEA: ICD-10-CM

## 2025-07-21 DIAGNOSIS — K63.89 OTHER SPECIFIED DISEASES OF INTESTINE: ICD-10-CM

## 2025-07-21 RX ORDER — BACLOFEN 20 MG/1
20 TABLET ORAL 3 TIMES DAILY
Qty: 120 TABLET | Refills: 0 | Status: SHIPPED | OUTPATIENT
Start: 2025-07-21

## 2025-07-21 RX ORDER — ONDANSETRON 8 MG/1
8 TABLET, ORALLY DISINTEGRATING ORAL AS NEEDED
Qty: 60 TABLET | Refills: 0 | Status: SHIPPED | OUTPATIENT
Start: 2025-07-21

## 2025-08-06 ENCOUNTER — TELEPHONE (OUTPATIENT)
Dept: PSYCHIATRY | Facility: CLINIC | Age: 40
End: 2025-08-06

## 2025-08-17 DIAGNOSIS — K63.89 OTHER SPECIFIED DISEASES OF INTESTINE: ICD-10-CM

## 2025-08-18 RX ORDER — BACLOFEN 20 MG/1
20 TABLET ORAL 3 TIMES DAILY
Qty: 120 TABLET | Refills: 0 | Status: SHIPPED | OUTPATIENT
Start: 2025-08-18

## (undated) DEVICE — SURGICAL CLIPPER BLADE GENERAL USE

## (undated) DEVICE — ALCON SURGICAL BLADE 64: Brand: ALCON

## (undated) DEVICE — DRAPE LAPAROTOMY W/POUCHES

## (undated) DEVICE — SUT MONOCRYL 4-0 PC-5 18 IN Y823G

## (undated) DEVICE — STERILE POLYISOPRENE POWDER-FREE SURGICAL GLOVES: Brand: PROTEXIS

## (undated) DEVICE — ACE WRAP 3 IN STERILE

## (undated) DEVICE — PACK GENERAL LF

## (undated) DEVICE — ANTI-FOG SOLUTION WITH FOAM PAD: Brand: DEVON

## (undated) DEVICE — GAUZE SPONGES,16 PLY: Brand: CURITY

## (undated) DEVICE — KERLIX BANDAGE ROLL: Brand: KERLIX

## (undated) DEVICE — DRAPE SHEET THREE QUARTER

## (undated) DEVICE — ENDO CLIP II 10MM PISTOL GRIP SINGLE USE CLIP APPLIER

## (undated) DEVICE — 3M™ TEGADERM™ TRANSPARENT FILM DRESSING FRAME STYLE, 1626W, 4 IN X 4-3/4 IN (10 CM X 12 CM), 50/CT 4CT/CASE: Brand: 3M™ TEGADERM™

## (undated) DEVICE — GLOVE SRG BIOGEL 7

## (undated) DEVICE — LATEX FREE 10 FT  INSUFFLATION TUBING, COLDER CONNECTOR WITH 1 MICRON FILTER STERILE ONE TIME USE, 20 U: Brand: SURGICAL DIRECT

## (undated) DEVICE — TROCARS: Brand: KII® BALLOON BLUNT TIP SYSTEM

## (undated) DEVICE — INTENDED FOR TISSUE SEPARATION, AND OTHER PROCEDURES THAT REQUIRE A SHARP SURGICAL BLADE TO PUNCTURE OR CUT.: Brand: BARD-PARKER SAFETY BLADES SIZE 15, STERILE

## (undated) DEVICE — TROCAR: Brand: KII FIOS FIRST ENTRY

## (undated) DEVICE — NEEDLE BLUNT 18 G X 1 1/2IN

## (undated) DEVICE — CABLE BIPOLAR DISP MEGADYNE

## (undated) DEVICE — NEEDLE 25G X 5/8 SAFETY

## (undated) DEVICE — STERILE POLYISOPRENE POWDER-FREE SURGICAL GLOVES WITH EMOLLIENT COATING: Brand: PROTEXIS

## (undated) DEVICE — SMARTGOWN SURGICAL GOWN, LARGE: Brand: CONVERTORS

## (undated) DEVICE — ASTOUND STANDARD SURGICAL GOWN, XL: Brand: CONVERTORS

## (undated) DEVICE — SUT PROLENE 4-0 PS-2 18 IN 8682G

## (undated) DEVICE — STOCKINETTE 2P PREROLLD 4X48

## (undated) DEVICE — SUT VICRYL PLUS 0 CT-3 27 IN VCP329H

## (undated) DEVICE — IRRIG ENDO FLO TUBING

## (undated) DEVICE — ENDOPOUCH RETRIEVER SPECIMEN RETRIEVAL BAGS: Brand: ENDOPOUCH RETRIEVER

## (undated) DEVICE — 3M™ TEGADERM™ TRANSPARENT FILM DRESSING FRAME STYLE, 1624W, 2-3/8 IN X 2-3/4 IN (6 CM X 7 CM), 100/CT 4CT/CASE: Brand: 3M™ TEGADERM™

## (undated) DEVICE — SHEARS MONOPOL MINI 5MM X 31CM RATCHET HNDL

## (undated) DEVICE — CHLORAPREP HI-LITE 26ML ORANGE

## (undated) DEVICE — SYRINGE 30ML LL

## (undated) DEVICE — GLOVE INDICATOR PI UNDERGLOVE SZ 7.5 BLUE

## (undated) DEVICE — BASIC DOUBLE BASIN 2-LF: Brand: MEDLINE INDUSTRIES, INC.

## (undated) DEVICE — CUFF TOURNIQUET DISP SZ18

## (undated) DEVICE — ENDOPATH XCEL UNIVERSAL TROCAR STABLILITY SLEEVES: Brand: ENDOPATH XCEL

## (undated) DEVICE — BETHLEHEM UNIVERSAL  MIONR EXT: Brand: CARDINAL HEALTH

## (undated) DEVICE — ENDOPATH XCEL BLADELESS TROCARS WITH STABILITY SLEEVES: Brand: ENDOPATH XCEL

## (undated) DEVICE — GAUZE ROLL,3 PLY: Brand: DERMACEA

## (undated) DEVICE — 3M™ MICROFOAM™ SURGICAL TAPE 4 ROLLS/CARTON 6 CARTONS/CASE 1528-3: Brand: 3M™ MICROFOAM™

## (undated) DEVICE — CURITY NON-ADHERENT STRIPS: Brand: CURITY

## (undated) DEVICE — SCD SEQUENTIAL COMPRESSION COMFORT SLEEVE MEDIUM KNEE LENGTH: Brand: KENDALL SCD